# Patient Record
Sex: FEMALE | Race: BLACK OR AFRICAN AMERICAN | NOT HISPANIC OR LATINO | Employment: FULL TIME | URBAN - METROPOLITAN AREA
[De-identification: names, ages, dates, MRNs, and addresses within clinical notes are randomized per-mention and may not be internally consistent; named-entity substitution may affect disease eponyms.]

---

## 2017-07-21 ENCOUNTER — HOSPITAL ENCOUNTER (OUTPATIENT)
Facility: HOSPITAL | Age: 52
Setting detail: OBSERVATION
Discharge: HOME | End: 2017-07-23
Attending: EMERGENCY MEDICINE | Admitting: FAMILY MEDICINE

## 2017-07-21 ENCOUNTER — APPOINTMENT (OUTPATIENT)
Dept: GENERAL RADIOLOGY | Facility: HOSPITAL | Age: 52
End: 2017-07-21

## 2017-07-21 ENCOUNTER — APPOINTMENT (OUTPATIENT)
Dept: CARDIOLOGY | Facility: HOSPITAL | Age: 52
End: 2017-07-21

## 2017-07-21 ENCOUNTER — APPOINTMENT (OUTPATIENT)
Dept: CT IMAGING | Facility: HOSPITAL | Age: 52
End: 2017-07-21

## 2017-07-21 ENCOUNTER — TRANSCRIBE ORDERS (OUTPATIENT)
Dept: ADMINISTRATIVE | Facility: HOSPITAL | Age: 52
End: 2017-07-21

## 2017-07-21 DIAGNOSIS — R07.9 CHEST PAIN, UNSPECIFIED TYPE: Primary | ICD-10-CM

## 2017-07-21 DIAGNOSIS — R07.9 CHEST PAIN, UNSPECIFIED: Primary | ICD-10-CM

## 2017-07-21 PROBLEM — R60.0 LOWER EXTREMITY EDEMA: Status: ACTIVE | Noted: 2017-07-21

## 2017-07-21 PROBLEM — N64.4 PAIN OF RIGHT BREAST: Status: ACTIVE | Noted: 2017-07-21

## 2017-07-21 PROBLEM — I10 HYPERTENSION: Status: ACTIVE | Noted: 2017-07-21

## 2017-07-21 PROBLEM — R06.09 DYSPNEA ON EXERTION: Status: ACTIVE | Noted: 2017-07-21

## 2017-07-21 PROBLEM — E11.9 DIABETES MELLITUS: Status: ACTIVE | Noted: 2017-07-21

## 2017-07-21 LAB
ALBUMIN SERPL-MCNC: 4.2 G/DL (ref 3.2–4.8)
ALBUMIN/GLOB SERPL: 1.2 G/DL (ref 1.5–2.5)
ALP SERPL-CCNC: 86 U/L (ref 25–100)
ALT SERPL W P-5'-P-CCNC: 42 U/L (ref 7–40)
ANION GAP SERPL CALCULATED.3IONS-SCNC: -3 MMOL/L (ref 3–11)
AST SERPL-CCNC: 27 U/L (ref 0–33)
BASOPHILS # BLD AUTO: 0.02 10*3/MM3 (ref 0–0.2)
BASOPHILS NFR BLD AUTO: 0.3 % (ref 0–1)
BILIRUB SERPL-MCNC: 0.3 MG/DL (ref 0.3–1.2)
BNP SERPL-MCNC: 13 PG/ML (ref 0–100)
BUN BLD-MCNC: 8 MG/DL (ref 9–23)
BUN/CREAT SERPL: 11.4 (ref 7–25)
CALCIUM SPEC-SCNC: 9.6 MG/DL (ref 8.7–10.4)
CHLORIDE SERPL-SCNC: 101 MMOL/L (ref 99–109)
CO2 SERPL-SCNC: 39 MMOL/L (ref 20–31)
CREAT BLD-MCNC: 0.7 MG/DL (ref 0.6–1.3)
DEPRECATED RDW RBC AUTO: 43.4 FL (ref 37–54)
EOSINOPHIL # BLD AUTO: 0.19 10*3/MM3 (ref 0–0.3)
EOSINOPHIL NFR BLD AUTO: 2.5 % (ref 0–3)
ERYTHROCYTE [DISTWIDTH] IN BLOOD BY AUTOMATED COUNT: 13.9 % (ref 11.3–14.5)
GFR SERPL CREATININE-BSD FRML MDRD: 106 ML/MIN/1.73
GLOBULIN UR ELPH-MCNC: 3.4 GM/DL
GLUCOSE BLD-MCNC: 180 MG/DL (ref 70–100)
GLUCOSE BLDC GLUCOMTR-MCNC: 168 MG/DL (ref 70–130)
HCT VFR BLD AUTO: 36.8 % (ref 34.5–44)
HGB BLD-MCNC: 11.8 G/DL (ref 11.5–15.5)
HOLD SPECIMEN: NORMAL
HOLD SPECIMEN: NORMAL
IMM GRANULOCYTES # BLD: 0.01 10*3/MM3 (ref 0–0.03)
IMM GRANULOCYTES NFR BLD: 0.1 % (ref 0–0.6)
LIPASE SERPL-CCNC: 30 U/L (ref 6–51)
LYMPHOCYTES # BLD AUTO: 2.44 10*3/MM3 (ref 0.6–4.8)
LYMPHOCYTES NFR BLD AUTO: 31.6 % (ref 24–44)
MAGNESIUM SERPL-MCNC: 1.8 MG/DL (ref 1.3–2.7)
MCH RBC QN AUTO: 27.4 PG (ref 27–31)
MCHC RBC AUTO-ENTMCNC: 32.1 G/DL (ref 32–36)
MCV RBC AUTO: 85.4 FL (ref 80–99)
MONOCYTES # BLD AUTO: 0.39 10*3/MM3 (ref 0–1)
MONOCYTES NFR BLD AUTO: 5.1 % (ref 0–12)
NEUTROPHILS # BLD AUTO: 4.66 10*3/MM3 (ref 1.5–8.3)
NEUTROPHILS NFR BLD AUTO: 60.4 % (ref 41–71)
PLATELET # BLD AUTO: 192 10*3/MM3 (ref 150–450)
PMV BLD AUTO: 11.7 FL (ref 6–12)
POTASSIUM BLD-SCNC: 3.8 MMOL/L (ref 3.5–5.5)
PROT SERPL-MCNC: 7.6 G/DL (ref 5.7–8.2)
RBC # BLD AUTO: 4.31 10*6/MM3 (ref 3.89–5.14)
SODIUM BLD-SCNC: 137 MMOL/L (ref 132–146)
TROPONIN I SERPL-MCNC: 0 NG/ML (ref 0–0.07)
TROPONIN I SERPL-MCNC: 0 NG/ML (ref 0–0.07)
WBC NRBC COR # BLD: 7.71 10*3/MM3 (ref 3.5–10.8)
WHOLE BLOOD HOLD SPECIMEN: NORMAL
WHOLE BLOOD HOLD SPECIMEN: NORMAL

## 2017-07-21 PROCEDURE — 82962 GLUCOSE BLOOD TEST: CPT

## 2017-07-21 PROCEDURE — 93005 ELECTROCARDIOGRAM TRACING: CPT

## 2017-07-21 PROCEDURE — 93005 ELECTROCARDIOGRAM TRACING: CPT | Performed by: EMERGENCY MEDICINE

## 2017-07-21 PROCEDURE — 63710000001 INSULIN LISPRO (HUMAN) PER 5 UNITS: Performed by: FAMILY MEDICINE

## 2017-07-21 PROCEDURE — 84484 ASSAY OF TROPONIN QUANT: CPT

## 2017-07-21 PROCEDURE — 99220 PR INITIAL OBSERVATION CARE/DAY 70 MINUTES: CPT | Performed by: FAMILY MEDICINE

## 2017-07-21 PROCEDURE — 0 IOPAMIDOL PER 1 ML: Performed by: EMERGENCY MEDICINE

## 2017-07-21 PROCEDURE — 71010 HC CHEST PA OR AP: CPT

## 2017-07-21 PROCEDURE — 83880 ASSAY OF NATRIURETIC PEPTIDE: CPT | Performed by: EMERGENCY MEDICINE

## 2017-07-21 PROCEDURE — 25010000002 MORPHINE PER 10 MG: Performed by: EMERGENCY MEDICINE

## 2017-07-21 PROCEDURE — 25010000002 MORPHINE SULFATE (PF) 2 MG/ML SOLUTION: Performed by: FAMILY MEDICINE

## 2017-07-21 PROCEDURE — 99285 EMERGENCY DEPT VISIT HI MDM: CPT

## 2017-07-21 PROCEDURE — 84484 ASSAY OF TROPONIN QUANT: CPT | Performed by: FAMILY MEDICINE

## 2017-07-21 PROCEDURE — 96375 TX/PRO/DX INJ NEW DRUG ADDON: CPT

## 2017-07-21 PROCEDURE — 96372 THER/PROPH/DIAG INJ SC/IM: CPT

## 2017-07-21 PROCEDURE — G0378 HOSPITAL OBSERVATION PER HR: HCPCS

## 2017-07-21 PROCEDURE — 25010000002 HEPARIN (PORCINE) PER 1000 UNITS: Performed by: FAMILY MEDICINE

## 2017-07-21 PROCEDURE — 85025 COMPLETE CBC W/AUTO DIFF WBC: CPT | Performed by: EMERGENCY MEDICINE

## 2017-07-21 PROCEDURE — 83735 ASSAY OF MAGNESIUM: CPT | Performed by: FAMILY MEDICINE

## 2017-07-21 PROCEDURE — 96376 TX/PRO/DX INJ SAME DRUG ADON: CPT

## 2017-07-21 PROCEDURE — 71275 CT ANGIOGRAPHY CHEST: CPT

## 2017-07-21 PROCEDURE — 96365 THER/PROPH/DIAG IV INF INIT: CPT

## 2017-07-21 PROCEDURE — 83690 ASSAY OF LIPASE: CPT | Performed by: EMERGENCY MEDICINE

## 2017-07-21 PROCEDURE — 80053 COMPREHEN METABOLIC PANEL: CPT | Performed by: EMERGENCY MEDICINE

## 2017-07-21 RX ORDER — NITROGLYCERIN 0.4 MG/1
TABLET SUBLINGUAL
Status: COMPLETED
Start: 2017-07-21 | End: 2017-07-21

## 2017-07-21 RX ORDER — CYCLOBENZAPRINE HCL 10 MG
10 TABLET ORAL 2 TIMES DAILY
COMMUNITY
End: 2019-05-06

## 2017-07-21 RX ORDER — LISINOPRIL 5 MG/1
5 TABLET ORAL DAILY
Status: DISCONTINUED | OUTPATIENT
Start: 2017-07-21 | End: 2017-07-21

## 2017-07-21 RX ORDER — MORPHINE SULFATE 4 MG/ML
4 INJECTION, SOLUTION INTRAMUSCULAR; INTRAVENOUS ONCE
Status: COMPLETED | OUTPATIENT
Start: 2017-07-21 | End: 2017-07-21

## 2017-07-21 RX ORDER — NITROGLYCERIN 0.4 MG/1
0.4 TABLET SUBLINGUAL
Status: DISCONTINUED | OUTPATIENT
Start: 2017-07-21 | End: 2017-07-21

## 2017-07-21 RX ORDER — SODIUM CHLORIDE 0.9 % (FLUSH) 0.9 %
10 SYRINGE (ML) INJECTION AS NEEDED
Status: DISCONTINUED | OUTPATIENT
Start: 2017-07-21 | End: 2017-07-23 | Stop reason: HOSPADM

## 2017-07-21 RX ORDER — NICOTINE POLACRILEX 4 MG
15 LOZENGE BUCCAL
Status: DISCONTINUED | OUTPATIENT
Start: 2017-07-21 | End: 2017-07-23 | Stop reason: HOSPADM

## 2017-07-21 RX ORDER — HEPARIN SODIUM 5000 [USP'U]/ML
5000 INJECTION, SOLUTION INTRAVENOUS; SUBCUTANEOUS EVERY 8 HOURS SCHEDULED
Status: DISCONTINUED | OUTPATIENT
Start: 2017-07-21 | End: 2017-07-23 | Stop reason: HOSPADM

## 2017-07-21 RX ORDER — OXYCODONE AND ACETAMINOPHEN 7.5; 325 MG/1; MG/1
1 TABLET ORAL EVERY 6 HOURS PRN
COMMUNITY
End: 2018-08-28

## 2017-07-21 RX ORDER — SUMATRIPTAN 50 MG/1
50 TABLET, FILM COATED ORAL
COMMUNITY
End: 2021-05-20 | Stop reason: SDUPTHER

## 2017-07-21 RX ORDER — ESTRADIOL 1 MG/1
1 TABLET ORAL DAILY
COMMUNITY
End: 2019-05-06

## 2017-07-21 RX ORDER — SODIUM CHLORIDE 0.9 % (FLUSH) 0.9 %
1-10 SYRINGE (ML) INJECTION AS NEEDED
Status: DISCONTINUED | OUTPATIENT
Start: 2017-07-21 | End: 2017-07-23 | Stop reason: HOSPADM

## 2017-07-21 RX ORDER — ASPIRIN 81 MG/1
324 TABLET, CHEWABLE ORAL ONCE
Status: DISCONTINUED | OUTPATIENT
Start: 2017-07-21 | End: 2017-07-21

## 2017-07-21 RX ORDER — ESCITALOPRAM OXALATE 10 MG/1
10 TABLET ORAL DAILY
COMMUNITY
End: 2017-08-09

## 2017-07-21 RX ORDER — CLOPIDOGREL BISULFATE 75 MG/1
75 TABLET ORAL DAILY
Status: DISCONTINUED | OUTPATIENT
Start: 2017-07-22 | End: 2017-07-21

## 2017-07-21 RX ORDER — ONDANSETRON 2 MG/ML
4 INJECTION INTRAMUSCULAR; INTRAVENOUS EVERY 6 HOURS PRN
Status: DISCONTINUED | OUTPATIENT
Start: 2017-07-21 | End: 2017-07-23 | Stop reason: HOSPADM

## 2017-07-21 RX ORDER — TRAZODONE HYDROCHLORIDE 100 MG/1
100 TABLET ORAL NIGHTLY
COMMUNITY
End: 2019-07-26

## 2017-07-21 RX ORDER — MORPHINE SULFATE 2 MG/ML
2 INJECTION, SOLUTION INTRAMUSCULAR; INTRAVENOUS EVERY 4 HOURS PRN
Status: DISCONTINUED | OUTPATIENT
Start: 2017-07-21 | End: 2017-07-23 | Stop reason: HOSPADM

## 2017-07-21 RX ORDER — OXYCODONE AND ACETAMINOPHEN 7.5; 325 MG/1; MG/1
1 TABLET ORAL EVERY 6 HOURS PRN
Status: DISCONTINUED | OUTPATIENT
Start: 2017-07-21 | End: 2017-07-23 | Stop reason: HOSPADM

## 2017-07-21 RX ORDER — CARVEDILOL 3.12 MG/1
3.12 TABLET ORAL 2 TIMES DAILY WITH MEALS
Status: DISCONTINUED | OUTPATIENT
Start: 2017-07-21 | End: 2017-07-23

## 2017-07-21 RX ORDER — FUROSEMIDE 40 MG/1
40 TABLET ORAL ONCE
Status: COMPLETED | OUTPATIENT
Start: 2017-07-21 | End: 2017-07-21

## 2017-07-21 RX ORDER — ASPIRIN 81 MG/1
324 TABLET, CHEWABLE ORAL ONCE
Status: COMPLETED | OUTPATIENT
Start: 2017-07-21 | End: 2017-07-21

## 2017-07-21 RX ORDER — CLOPIDOGREL BISULFATE 75 MG/1
600 TABLET ORAL ONCE
Status: DISCONTINUED | OUTPATIENT
Start: 2017-07-21 | End: 2017-07-21

## 2017-07-21 RX ORDER — ESCITALOPRAM OXALATE 20 MG/1
10 TABLET ORAL DAILY
Status: DISCONTINUED | OUTPATIENT
Start: 2017-07-22 | End: 2017-07-23 | Stop reason: HOSPADM

## 2017-07-21 RX ORDER — LOSARTAN POTASSIUM AND HYDROCHLOROTHIAZIDE 25; 100 MG/1; MG/1
1 TABLET ORAL DAILY
COMMUNITY
End: 2017-07-23 | Stop reason: HOSPADM

## 2017-07-21 RX ORDER — ATORVASTATIN CALCIUM 40 MG/1
80 TABLET, FILM COATED ORAL NIGHTLY
Status: DISCONTINUED | OUTPATIENT
Start: 2017-07-21 | End: 2017-07-23 | Stop reason: HOSPADM

## 2017-07-21 RX ORDER — FAMOTIDINE 20 MG/1
20 TABLET, FILM COATED ORAL DAILY
Status: DISCONTINUED | OUTPATIENT
Start: 2017-07-21 | End: 2017-07-23 | Stop reason: HOSPADM

## 2017-07-21 RX ORDER — CLOPIDOGREL BISULFATE 75 MG/1
75 TABLET ORAL DAILY
Status: DISCONTINUED | OUTPATIENT
Start: 2017-07-22 | End: 2017-07-23 | Stop reason: HOSPADM

## 2017-07-21 RX ORDER — ASPIRIN 81 MG/1
81 TABLET ORAL DAILY
Status: DISCONTINUED | OUTPATIENT
Start: 2017-07-22 | End: 2017-07-23 | Stop reason: HOSPADM

## 2017-07-21 RX ORDER — DEXTROSE MONOHYDRATE 25 G/50ML
25 INJECTION, SOLUTION INTRAVENOUS
Status: DISCONTINUED | OUTPATIENT
Start: 2017-07-21 | End: 2017-07-23 | Stop reason: HOSPADM

## 2017-07-21 RX ORDER — CLOPIDOGREL BISULFATE 75 MG/1
300 TABLET ORAL ONCE
Status: COMPLETED | OUTPATIENT
Start: 2017-07-21 | End: 2017-07-21

## 2017-07-21 RX ORDER — NITROGLYCERIN 20 MG/100ML
10-50 INJECTION INTRAVENOUS
Status: DISCONTINUED | OUTPATIENT
Start: 2017-07-21 | End: 2017-07-23 | Stop reason: HOSPADM

## 2017-07-21 RX ADMIN — INSULIN LISPRO 2 UNITS: 100 INJECTION, SOLUTION INTRAVENOUS; SUBCUTANEOUS at 21:03

## 2017-07-21 RX ADMIN — CLOPIDOGREL BISULFATE 300 MG: 75 TABLET ORAL at 20:07

## 2017-07-21 RX ADMIN — HEPARIN SODIUM 5000 UNITS: 5000 INJECTION, SOLUTION INTRAVENOUS; SUBCUTANEOUS at 20:08

## 2017-07-21 RX ADMIN — ASPIRIN 81 MG CHEWABLE TABLET 324 MG: 81 TABLET CHEWABLE at 17:04

## 2017-07-21 RX ADMIN — NITROGLYCERIN 0.4 MG: 0.4 TABLET SUBLINGUAL at 17:04

## 2017-07-21 RX ADMIN — IOPAMIDOL 60 ML: 755 INJECTION, SOLUTION INTRAVENOUS at 17:48

## 2017-07-21 RX ADMIN — OXYCODONE HYDROCHLORIDE AND ACETAMINOPHEN 1 TABLET: 7.5; 325 TABLET ORAL at 23:36

## 2017-07-21 RX ADMIN — MORPHINE SULFATE 4 MG: 4 INJECTION, SOLUTION INTRAMUSCULAR; INTRAVENOUS at 18:05

## 2017-07-21 RX ADMIN — CARVEDILOL 3.12 MG: 3.12 TABLET, FILM COATED ORAL at 20:07

## 2017-07-21 RX ADMIN — NITROGLYCERIN 15 MCG/MIN: 20 INJECTION INTRAVENOUS at 20:15

## 2017-07-21 RX ADMIN — ATORVASTATIN CALCIUM 80 MG: 40 TABLET, FILM COATED ORAL at 20:07

## 2017-07-21 RX ADMIN — NITROGLYCERIN 0.4 MG: 0.4 TABLET SUBLINGUAL at 17:14

## 2017-07-21 RX ADMIN — FAMOTIDINE 20 MG: 20 TABLET ORAL at 20:07

## 2017-07-21 RX ADMIN — MORPHINE SULFATE 2 MG: 2 INJECTION, SOLUTION INTRAMUSCULAR; INTRAVENOUS at 20:20

## 2017-07-21 RX ADMIN — NITROGLYCERIN 10 MCG/MIN: 20 INJECTION INTRAVENOUS at 18:04

## 2017-07-21 RX ADMIN — FUROSEMIDE 40 MG: 40 TABLET ORAL at 20:07

## 2017-07-21 NOTE — H&P
HOSPITAL MEDICINE HISTORY AND PHYSICAL    PCP: CORRIE Anderson  Specialists:    Chief Complaint: chest pain     Subjective     History of Present Illness  Kellen Esparza is a 51 y.o.female with a history of HTN and DM who presents to the ED with c/o intermittent chest pain and tightness, shortness of breath, and diaphoresis that began one week ago. Her episodes last between five and seven minutes and worsen with exertion. She states the pain radiates down her right breast and down her right arm. She was scheduled for a Mammogram today but says she is uptodate. She has had more fatigue as well as lower ext edema this past week as well.  She was seen by her PCP for this today and had an EKG and blood work done, which was overall negtive but she was referred to the ED for further evaluation. She has ongoing htn that she feels like is never really controlled but they attribute it to her chronic pain that she has in her knee. She denies fever or chills. No cough. No problems with bowel or bladder.denies any abdominal pain. She denies any history of smoking. She has a family history of CAD and MI. She also has a hx of PE/DVT 28 years ago after a hospitalization. She currently complains of CP at 7-8/10 .     Review of Systems   Otherwise complete ROS is negative except as mentioned in the HPI.    Past Medical History:   Past Medical History:   Diagnosis Date   • Hypertension    • Pulmonary embolism        Past Surgical History:  Past Surgical History:   Procedure Laterality Date   • BREAST BIOPSY Left 2014   • CHOLECYSTECTOMY     • HYSTERECTOMY  11/2014   • OOPHORECTOMY  11/2014   • TUBAL ABDOMINAL LIGATION         Family History: family history includes Breast cancer (age of onset: 42) in her mother. There is no history of Ovarian cancer.     Social History:  reports that she has never smoked. She does not have any smokeless tobacco history on file. She reports that she does not drink alcohol or use  "illicit drugs.    Medications:  Prescriptions Prior to Admission   Medication Sig Dispense Refill Last Dose   • cyclobenzaprine (FLEXERIL) 10 MG tablet Take 10 mg by mouth 2 (Two) Times a Day As Needed for Muscle Spasms.      • escitalopram (LEXAPRO) 10 MG tablet Take 10 mg by mouth Daily.      • estradiol (ESTRACE) 1 MG tablet Take 1 mg by mouth Daily.      • losartan-hydrochlorothiazide (HYZAAR) 100-25 MG per tablet Take 1 tablet by mouth Daily.      • metFORMIN (GLUCOPHAGE) 500 MG tablet Take 500 mg by mouth 2 (Two) Times a Day With Meals.      • oxyCODONE-acetaminophen (PERCOCET) 7.5-325 MG per tablet Take 1 tablet by mouth Every 6 (Six) Hours As Needed.      • SUMAtriptan (IMITREX) 50 MG tablet Take 50 mg by mouth Every 2 (Two) Hours As Needed for Migraine. Take one tablet at onset of headache. May repeat dose one time in 2 hours if headache not relieved.      • traZODone (DESYREL) 100 MG tablet Take 100 mg by mouth Every Night.        No Known Allergies    Objective    Physical Exam:   Vital Signs: /97  Pulse 82  Temp 97.9 °F (36.6 °C) (Oral)   Resp 16  Ht 71\" (180.3 cm)  Wt 268 lb (122 kg)  LMP 10/31/2014 (Approximate)  SpO2 97%  BMI 37.38 kg/m2  Physical Exam  Temp:  [97.9 °F (36.6 °C)] 97.9 °F (36.6 °C)  Heart Rate:  [82-94] 82  Resp:  [16] 16  BP: (135-167)/() 135/97  Constitutional: no acute distress, awake, alert, pleasant and cooperative   Eyes: PERRLA, sclerae anicteric, no conjunctival injection  Neck: supple, no thyromegaly, trachea midline  Respiratory: Clear to auscultation bilaterally, nonlabored respirations   Cardiovascular: RRR, no murmurs, rubs, or gallops, palpable pedal pulses bilaterally, nonreproducible chest pain.   Gastrointestinal: Positive bowel sounds, soft, nontender, nondistended  Musculoskeletal: positive bilateral ankle and calf edema, no clubbing or cyanosis to bilateral lower extremities  Psychiatric: oriented x 3, appropriate affect, cooperative  Neurologic: " Strength symmetric in all extremities, Cranial Nerves grossly intact to confrontation       Results Reviewed:  I have personally reviewed current lab, radiology, and data and agree.    Results from last 7 days  Lab Units 07/21/17  1558   WBC 10*3/mm3 7.71   HEMOGLOBIN g/dL 11.8   PLATELETS 10*3/mm3 192       Results from last 7 days  Lab Units 07/21/17  1558   SODIUM mmol/L 137   POTASSIUM mmol/L 3.8   CO2 mmol/L 39.0*   CREATININE mg/dL 0.70   GLUCOSE mg/dL 180*   CALCIUM mg/dL 9.6     Troponin 0.00 x 2    Imaging Results (last 24 hours)     Procedure Component Value Units Date/Time    XR Chest 1 View [[id number]] Collected:  07/21/17 1628     Updated:  07/21/17 1630    Narrative:       EXAMINATION: XR CHEST 1 VW- 07/21/2017     INDICATION: Chest Pain triage protocol      COMPARISON: NONE     FINDINGS: Cardiac size is borderline enlarged with increased pulmonary  vascularity centrally. No pneumothorax or pleural effusion. No focal  airspace opacity is otherwise identified. No acute osseous abnormality.       Impression:       Cardiac size is borderline enlarged with increased pulmonary  vascularity centrally. No pleural effusion.     D:  07/21/2017  E:  07/21/2017          CT Angiogram Chest With Contrast [[id number]] Collected:  07/21/17 1833     Updated:  07/21/17 1834    Narrative:       EXAMINATION: CT ANGIOGRAM CHEST W/CONTRAST date      INDICATION: R07.9-Chest pain, unspecified. Evaluate for pulmonary  embolus.      TECHNIQUE: Spiral acquisition 3 mm post-IV contrast images through the  chest and upper abdomen with coronal 10 mm 2D reconstructions.     The radiation dose reduction device was turned on for each scan per the  ALARA (As Low as Reasonably Achievable) protocol.     COMPARISON: 10/08/2009 angiographic chest CT scan.     FINDINGS: Report of 10/08/2009 study indicates no acute disease. Today  indicates chest pain and dyspnea. Previous history of PE.     Today's exam shows good contrast opacification of  the pulmonary  arteries. There are no visible filling defects to suggest pulmonary  embolic disease. There is no evidence of pericardial or pleural effusion  or mediastinal adenopathy. There is no evidence of thoracic aortic  aneurysm or dissection.     Lung window images show trace linear scarring in the right lung base,  and a few granulomatous calcifications. Lungs otherwise appear clear.     Included images of the upper abdomen show clips in the gallbladder  fossa. No significant abnormalities are seen of the liver, spleen,  pancreatic tail, adrenal glands, or upper renal poles.       Impression:       No evidence of pulmonary embolus or other acute chest  disease.     DICTATED:     07/21/2017  EDITED:         07/21/2017                  Assessment/Plan   Assessment & Plan  Active Problems:    Chest pain    Dyspnea on exertion    Diabetes mellitus    Hypertension    Pain of right breast    Lower extremity edema        Plan:  52 yo AAF with chest pain and sob x 1 week  --troponins negative x 2  --EKG with out changes but appears somewhat changed from 2015  --Cardiac echo  --nitro gtt  --asa, statin add beta blocker  --will need stress test in am   --will give 1 dose lasix 20 IV     NIX  --likely due to above likely element of CHF    DM  --check HgA1C  --LDSSI  --check tsh    HTN  --likely uncontrolled and may be etiology of above  --add bb and lasix for now , moniter closely    Right breast pain  --ensure she gets Mammo as scheduled    Lower ext edema  --venous duplex pending  --neg CTA, lasix x 1    DVT prophy  --lovenox  --teds and scds      discussed the patients findings and my recommendations with patient and family     I believe this patient meets observation     Zaria Painter DO   07/21/17   6:50 PM

## 2017-07-21 NOTE — ED PROVIDER NOTES
Subjective   HPI Comments: Kellen Esparza is a 51 y.o.female with a history of HTN and DM who presents to the ED with c/o intermittent chest pain and tightness, shortness of breath, and diaphoresis that began one week ago. Her episodes last between five and seven minutes and worsen with exertion. She was seen by her PCP for this today and had an EKG and blood work done, however, she is unable to recall these results. She was referred to the ED for further evaluation. In the ED, she also c/o lower extremity swelling and breast tenderness. There are no other acute complaints at this time. She denies any history of smoking. She has a family history of CAD and MI.     Patient is a 51 y.o. female presenting with chest pain.   History provided by:  Patient  Chest Pain   Pain location:  Substernal area  Pain quality: pressure and tightness    Pain radiates to:  Does not radiate  Pain severity:  Moderate  Onset quality:  Gradual  Duration:  1 week  Timing:  Intermittent  Progression:  Unchanged  Chronicity:  New  Relieved by:  Nothing  Worsened by:  Exertion  Ineffective treatments:  None tried  Associated symptoms: diaphoresis (intermittent) and shortness of breath (intermittent)    Risk factors: diabetes mellitus and hypertension    Risk factors: no coronary artery disease, no high cholesterol, not male and no smoking        Review of Systems   Constitutional: Positive for diaphoresis (intermittent).   Respiratory: Positive for chest tightness (intermittent) and shortness of breath (intermittent).    Cardiovascular: Positive for chest pain (intermittent) and leg swelling.   Genitourinary:        Breast tenderness   All other systems reviewed and are negative.      No past medical history on file.    No Known Allergies    Past Surgical History:   Procedure Laterality Date   • BREAST BIOPSY Left 2014   • HYSTERECTOMY  11/2014   • OOPHORECTOMY  11/2014       Family History   Problem Relation Age of Onset   • Breast cancer  Mother 42   • Ovarian cancer Neg Hx        Social History     Social History   • Marital status: Single     Spouse name: N/A   • Number of children: N/A   • Years of education: N/A     Social History Main Topics   • Smoking status: Not on file   • Smokeless tobacco: Not on file   • Alcohol use Not on file   • Drug use: Not on file   • Sexual activity: Not on file     Other Topics Concern   • Not on file     Social History Narrative   • No narrative on file         Objective   Physical Exam   Constitutional: She is oriented to person, place, and time. She appears well-developed and well-nourished. No distress.   HENT:   Head: Normocephalic and atraumatic.   Nose: Nose normal.   Mouth/Throat: Oropharynx is clear and moist.   Eyes: Conjunctivae are normal. No scleral icterus.   Neck: Normal range of motion. Neck supple.   Cardiovascular: Normal rate, regular rhythm and normal heart sounds.    No murmur heard.  Pulmonary/Chest: Effort normal and breath sounds normal. No respiratory distress. She has no wheezes. She has no rales.   Abdominal: Soft. Bowel sounds are normal. She exhibits no mass. There is no tenderness. There is no rebound and no guarding.   Musculoskeletal: Normal range of motion. She exhibits no edema.   No breast TTP.  No breast masses or erythema.   Neurological: She is alert and oriented to person, place, and time.   Skin: Skin is warm and dry. No erythema.   Psychiatric: She has a normal mood and affect. Her behavior is normal.   Nursing note and vitals reviewed.      Procedures         ED Course  ED Course   Comment By Time   1712 paged hospitalist -CUBA Montanez 07/21 1716   Discussed case in detail with the hospitalist on call who will admit. Compared current EKG with EKG from month ago which shows some nonspecific changes. Pt reports feeling better after receiving NTG. -CUBA Montanez 07/21 1718     Recent Results (from the past 24 hour(s))   Lipase    Collection Time: 07/21/17  3:58  "PM   Result Value Ref Range    Lipase 30 6 - 51 U/L   BNP    Collection Time: 07/21/17  3:58 PM   Result Value Ref Range    BNP 13.0 0.0 - 100.0 pg/mL   CBC Auto Differential    Collection Time: 07/21/17  3:58 PM   Result Value Ref Range    WBC 7.71 3.50 - 10.80 10*3/mm3    RBC 4.31 3.89 - 5.14 10*6/mm3    Hemoglobin 11.8 11.5 - 15.5 g/dL    Hematocrit 36.8 34.5 - 44.0 %    MCV 85.4 80.0 - 99.0 fL    MCH 27.4 27.0 - 31.0 pg    MCHC 32.1 32.0 - 36.0 g/dL    RDW 13.9 11.3 - 14.5 %    RDW-SD 43.4 37.0 - 54.0 fl    MPV 11.7 6.0 - 12.0 fL    Platelets 192 150 - 450 10*3/mm3    Neutrophil % 60.4 41.0 - 71.0 %    Lymphocyte % 31.6 24.0 - 44.0 %    Monocyte % 5.1 0.0 - 12.0 %    Eosinophil % 2.5 0.0 - 3.0 %    Basophil % 0.3 0.0 - 1.0 %    Immature Grans % 0.1 0.0 - 0.6 %    Neutrophils, Absolute 4.66 1.50 - 8.30 10*3/mm3    Lymphocytes, Absolute 2.44 0.60 - 4.80 10*3/mm3    Monocytes, Absolute 0.39 0.00 - 1.00 10*3/mm3    Eosinophils, Absolute 0.19 0.00 - 0.30 10*3/mm3    Basophils, Absolute 0.02 0.00 - 0.20 10*3/mm3    Immature Grans, Absolute 0.01 0.00 - 0.03 10*3/mm3   POC Troponin, Rapid    Collection Time: 07/21/17  4:03 PM   Result Value Ref Range    Troponin I 0.00 0.00 - 0.07 ng/mL     Note: In addition to lab results from this visit, the labs listed above may include labs taken at another facility or during a different encounter within the last 24 hours. Please correlate lab times with ED admission and discharge times for further clarification of the services performed during this visit.    XR Chest 1 View   Preliminary Result   Cardiac size is borderline enlarged with increased pulmonary   vascularity centrally. No pleural effusion.       D:  07/21/2017   E:  07/21/2017                Vitals:    07/21/17 1544   BP: 159/89   Pulse: 94   Resp: 16   Temp: 97.9 °F (36.6 °C)   TempSrc: Oral   SpO2: 98%   Weight: 268 lb (122 kg)   Height: 71\" (180.3 cm)     Medications   sodium chloride 0.9 % flush 10 mL (not " administered)   aspirin chewable tablet 324 mg (not administered)     ECG/EMG Results (last 24 hours)     Procedure Component Value Units Date/Time    ECG 12 Lead [[id number]] Collected:  07/21/17 1550     Updated:  07/21/17 1550             HEART Score  History: Moderately suspicious (+1)  ECG: Non specific repolarization disturbance (+1)  Age: 45 through 65 (+1)  Risk Factors: 3 or more risk factors OR history of atherosclerotic disease (+2)  Troponin: Normal limit or lower (+0)  Total: 5             MDM    Final diagnoses:   Chest pain, unspecified type       Documentation assistance provided by ana paula Montanez.  Information recorded by the scribe was done at my direction and has been verified and validated by me.     Wen Montanez  07/21/17 1657       Dilshad Amaya DO  07/22/17 0146

## 2017-07-22 ENCOUNTER — APPOINTMENT (OUTPATIENT)
Dept: CARDIOLOGY | Facility: HOSPITAL | Age: 52
End: 2017-07-22
Attending: FAMILY MEDICINE

## 2017-07-22 LAB
ANION GAP SERPL CALCULATED.3IONS-SCNC: 5 MMOL/L (ref 3–11)
ARTICHOKE IGE QN: 96 MG/DL (ref 0–130)
BH CV ECHO MEAS - AO MAX PG (FULL): 1.7 MMHG
BH CV ECHO MEAS - AO MAX PG: 6.8 MMHG
BH CV ECHO MEAS - AO MEAN PG (FULL): 1.6 MMHG
BH CV ECHO MEAS - AO MEAN PG: 4.4 MMHG
BH CV ECHO MEAS - AO ROOT AREA (BSA CORRECTED): 1.4
BH CV ECHO MEAS - AO ROOT AREA: 8.6 CM^2
BH CV ECHO MEAS - AO ROOT DIAM: 3.3 CM
BH CV ECHO MEAS - AO V2 MAX: 130.3 CM/SEC
BH CV ECHO MEAS - AO V2 MEAN: 98.6 CM/SEC
BH CV ECHO MEAS - AO V2 VTI: 27.1 CM
BH CV ECHO MEAS - AVA(I,A): 2.1 CM^2
BH CV ECHO MEAS - AVA(I,D): 2.1 CM^2
BH CV ECHO MEAS - AVA(V,A): 2.2 CM^2
BH CV ECHO MEAS - AVA(V,D): 2.2 CM^2
BH CV ECHO MEAS - BSA(HAYCOCK): 2.5 M^2
BH CV ECHO MEAS - BSA(HAYCOCK): 2.5 M^2
BH CV ECHO MEAS - BSA: 2.4 M^2
BH CV ECHO MEAS - BSA: 2.4 M^2
BH CV ECHO MEAS - BZI_BMI: 37.4 KILOGRAMS/M^2
BH CV ECHO MEAS - BZI_BMI: 37.4 KILOGRAMS/M^2
BH CV ECHO MEAS - BZI_METRIC_HEIGHT: 180.3 CM
BH CV ECHO MEAS - BZI_METRIC_HEIGHT: 180.3 CM
BH CV ECHO MEAS - BZI_METRIC_WEIGHT: 121.6 KG
BH CV ECHO MEAS - BZI_METRIC_WEIGHT: 121.6 KG
BH CV ECHO MEAS - CONTRAST EF (2CH): 56.8 ML/M^2
BH CV ECHO MEAS - CONTRAST EF 4CH: 67.2 ML/M^2
BH CV ECHO MEAS - EDV(CUBED): 47.6 ML
BH CV ECHO MEAS - EDV(MOD-SP2): 81 ML
BH CV ECHO MEAS - EDV(MOD-SP4): 64 ML
BH CV ECHO MEAS - EDV(TEICH): 55.3 ML
BH CV ECHO MEAS - EF(CUBED): 64.6 %
BH CV ECHO MEAS - EF(MOD-SP2): 56.8 %
BH CV ECHO MEAS - EF(TEICH): 57 %
BH CV ECHO MEAS - ESV(CUBED): 16.9 ML
BH CV ECHO MEAS - ESV(MOD-SP2): 35 ML
BH CV ECHO MEAS - ESV(MOD-SP4): 21 ML
BH CV ECHO MEAS - ESV(TEICH): 23.8 ML
BH CV ECHO MEAS - FS: 29.2 %
BH CV ECHO MEAS - IVS/LVPW: 1.3
BH CV ECHO MEAS - IVSD: 1.7 CM
BH CV ECHO MEAS - LA DIMENSION: 3.4 CM
BH CV ECHO MEAS - LA/AO: 1
BH CV ECHO MEAS - LAT PEAK E' VEL: 7.3 CM/SEC
BH CV ECHO MEAS - LV DIASTOLIC VOL/BSA (35-75): 26.8 ML/M^2
BH CV ECHO MEAS - LV MASS(C)D: 217.8 GRAMS
BH CV ECHO MEAS - LV MASS(C)DI: 91.2 GRAMS/M^2
BH CV ECHO MEAS - LV MAX PG: 5 MMHG
BH CV ECHO MEAS - LV MEAN PG: 2.7 MMHG
BH CV ECHO MEAS - LV SYSTOLIC VOL/BSA (12-30): 8.8 ML/M^2
BH CV ECHO MEAS - LV V1 MAX: 112.3 CM/SEC
BH CV ECHO MEAS - LV V1 MEAN: 77.2 CM/SEC
BH CV ECHO MEAS - LV V1 VTI: 22.8 CM
BH CV ECHO MEAS - LVIDD: 3.6 CM
BH CV ECHO MEAS - LVIDS: 2.6 CM
BH CV ECHO MEAS - LVLD AP2: 8.4 CM
BH CV ECHO MEAS - LVLD AP4: 8.5 CM
BH CV ECHO MEAS - LVLS AP2: 6.2 CM
BH CV ECHO MEAS - LVLS AP4: 6.5 CM
BH CV ECHO MEAS - LVOT AREA (M): 2.5 CM^2
BH CV ECHO MEAS - LVOT AREA: 2.5 CM^2
BH CV ECHO MEAS - LVOT DIAM: 1.8 CM
BH CV ECHO MEAS - LVPWD: 1.4 CM
BH CV ECHO MEAS - MED PEAK E' VEL: 6.35 CM/SEC
BH CV ECHO MEAS - MV A MAX VEL: 82.4 CM/SEC
BH CV ECHO MEAS - MV E MAX VEL: 47.9 CM/SEC
BH CV ECHO MEAS - MV E/A: 0.58
BH CV ECHO MEAS - PA ACC SLOPE: 811.9 CM/SEC^2
BH CV ECHO MEAS - PA ACC TIME: 0.08 SEC
BH CV ECHO MEAS - PA MAX PG: 3.7 MMHG
BH CV ECHO MEAS - PA PR(ACCEL): 41 MMHG
BH CV ECHO MEAS - PA V2 MAX: 95.6 CM/SEC
BH CV ECHO MEAS - SI(AO): 97.7 ML/M^2
BH CV ECHO MEAS - SI(CUBED): 12.9 ML/M^2
BH CV ECHO MEAS - SI(LVOT): 24.2 ML/M^2
BH CV ECHO MEAS - SI(MOD-SP2): 19.3 ML/M^2
BH CV ECHO MEAS - SI(MOD-SP4): 18 ML/M^2
BH CV ECHO MEAS - SI(TEICH): 13.2 ML/M^2
BH CV ECHO MEAS - SV(AO): 233.3 ML
BH CV ECHO MEAS - SV(CUBED): 30.7 ML
BH CV ECHO MEAS - SV(LVOT): 57.9 ML
BH CV ECHO MEAS - SV(MOD-SP2): 46 ML
BH CV ECHO MEAS - SV(MOD-SP4): 43 ML
BH CV ECHO MEAS - SV(TEICH): 31.5 ML
BH CV ECHO MEAS - TAPSE (>1.6): 2.7 CM2
BH CV LOWER VASCULAR LEFT COMMON FEMORAL AUGMENT: NORMAL
BH CV LOWER VASCULAR LEFT COMMON FEMORAL COMPRESS: NORMAL
BH CV LOWER VASCULAR LEFT COMMON FEMORAL PHASIC: NORMAL
BH CV LOWER VASCULAR LEFT COMMON FEMORAL SPONT: NORMAL
BH CV LOWER VASCULAR LEFT DISTAL FEMORAL COMPRESS: NORMAL
BH CV LOWER VASCULAR LEFT GASTRONEMIUS COMPRESS: NORMAL
BH CV LOWER VASCULAR LEFT GREATER SAPH AK COMPRESS: NORMAL
BH CV LOWER VASCULAR LEFT GREATER SAPH BK COMPRESS: NORMAL
BH CV LOWER VASCULAR LEFT MID FEMORAL AUGMENT: NORMAL
BH CV LOWER VASCULAR LEFT MID FEMORAL COMPRESS: NORMAL
BH CV LOWER VASCULAR LEFT MID FEMORAL PHASIC: NORMAL
BH CV LOWER VASCULAR LEFT MID FEMORAL SPONT: NORMAL
BH CV LOWER VASCULAR LEFT PERONEAL COMPRESS: NORMAL
BH CV LOWER VASCULAR LEFT POPLITEAL AUGMENT: NORMAL
BH CV LOWER VASCULAR LEFT POPLITEAL COMPRESS: NORMAL
BH CV LOWER VASCULAR LEFT POPLITEAL PHASIC: NORMAL
BH CV LOWER VASCULAR LEFT POPLITEAL SPONT: NORMAL
BH CV LOWER VASCULAR LEFT POSTERIOR TIBIAL COMPRESS: NORMAL
BH CV LOWER VASCULAR LEFT PROXIMAL FEMORAL COMPRESS: NORMAL
BH CV LOWER VASCULAR LEFT SAPHENOFEMORAL JUNCTION AUGMENT: NORMAL
BH CV LOWER VASCULAR LEFT SAPHENOFEMORAL JUNCTION COMPRESS: NORMAL
BH CV LOWER VASCULAR LEFT SAPHENOFEMORAL JUNCTION PHASIC: NORMAL
BH CV LOWER VASCULAR LEFT SAPHENOFEMORAL JUNCTION SPONT: NORMAL
BH CV LOWER VASCULAR RIGHT COMMON FEMORAL AUGMENT: NORMAL
BH CV LOWER VASCULAR RIGHT COMMON FEMORAL COMPRESS: NORMAL
BH CV LOWER VASCULAR RIGHT COMMON FEMORAL PHASIC: NORMAL
BH CV LOWER VASCULAR RIGHT COMMON FEMORAL SPONT: NORMAL
BH CV LOWER VASCULAR RIGHT DISTAL FEMORAL COMPRESS: NORMAL
BH CV LOWER VASCULAR RIGHT GASTRONEMIUS COMPRESS: NORMAL
BH CV LOWER VASCULAR RIGHT GREATER SAPH AK COMPRESS: NORMAL
BH CV LOWER VASCULAR RIGHT GREATER SAPH BK COMPRESS: NORMAL
BH CV LOWER VASCULAR RIGHT MID FEMORAL AUGMENT: NORMAL
BH CV LOWER VASCULAR RIGHT MID FEMORAL COMPRESS: NORMAL
BH CV LOWER VASCULAR RIGHT MID FEMORAL PHASIC: NORMAL
BH CV LOWER VASCULAR RIGHT MID FEMORAL SPONT: NORMAL
BH CV LOWER VASCULAR RIGHT PERONEAL COMPRESS: NORMAL
BH CV LOWER VASCULAR RIGHT POPLITEAL AUGMENT: NORMAL
BH CV LOWER VASCULAR RIGHT POPLITEAL COMPRESS: NORMAL
BH CV LOWER VASCULAR RIGHT POPLITEAL PHASIC: NORMAL
BH CV LOWER VASCULAR RIGHT POPLITEAL SPONT: NORMAL
BH CV LOWER VASCULAR RIGHT POSTERIOR TIBIAL COMPRESS: NORMAL
BH CV LOWER VASCULAR RIGHT PROXIMAL FEMORAL COMPRESS: NORMAL
BH CV LOWER VASCULAR RIGHT SAPHENOFEMORAL JUNCTION AUGMENT: NORMAL
BH CV LOWER VASCULAR RIGHT SAPHENOFEMORAL JUNCTION COMPRESS: NORMAL
BH CV LOWER VASCULAR RIGHT SAPHENOFEMORAL JUNCTION PHASIC: NORMAL
BH CV LOWER VASCULAR RIGHT SAPHENOFEMORAL JUNCTION SPONT: NORMAL
BH CV VAS BP RIGHT ARM: NORMAL MMHG
BH CV XLRA - RV BASE: 2 CM
BH CV XLRA - RV LENGTH: 7.2 CM
BH CV XLRA - RV MID: 1.8 CM
BH CV XLRA - TDI S': 10.2 CM/SEC
BUN BLD-MCNC: 13 MG/DL (ref 9–23)
BUN/CREAT SERPL: 18.6 (ref 7–25)
CALCIUM SPEC-SCNC: 9.9 MG/DL (ref 8.7–10.4)
CHLORIDE SERPL-SCNC: 104 MMOL/L (ref 99–109)
CHOLEST SERPL-MCNC: 163 MG/DL (ref 0–200)
CO2 SERPL-SCNC: 33 MMOL/L (ref 20–31)
CREAT BLD-MCNC: 0.7 MG/DL (ref 0.6–1.3)
DEPRECATED RDW RBC AUTO: 43.7 FL (ref 37–54)
E/E' RATIO: 6.5
ERYTHROCYTE [DISTWIDTH] IN BLOOD BY AUTOMATED COUNT: 14.1 % (ref 11.3–14.5)
GFR SERPL CREATININE-BSD FRML MDRD: 106 ML/MIN/1.73
GLUCOSE BLD-MCNC: 132 MG/DL (ref 70–100)
GLUCOSE BLDC GLUCOMTR-MCNC: 119 MG/DL (ref 70–130)
GLUCOSE BLDC GLUCOMTR-MCNC: 122 MG/DL (ref 70–130)
GLUCOSE BLDC GLUCOMTR-MCNC: 132 MG/DL (ref 70–130)
GLUCOSE BLDC GLUCOMTR-MCNC: 135 MG/DL (ref 70–130)
HBA1C MFR BLD: 6.6 % (ref 4.8–5.6)
HCT VFR BLD AUTO: 35.4 % (ref 34.5–44)
HDLC SERPL-MCNC: 46 MG/DL (ref 40–60)
HGB BLD-MCNC: 11.1 G/DL (ref 11.5–15.5)
LEFT ATRIUM VOLUME INDEX: 18.4 ML/M2
MAGNESIUM SERPL-MCNC: 1.9 MG/DL (ref 1.3–2.7)
MCH RBC QN AUTO: 26.8 PG (ref 27–31)
MCHC RBC AUTO-ENTMCNC: 31.4 G/DL (ref 32–36)
MCV RBC AUTO: 85.5 FL (ref 80–99)
PLATELET # BLD AUTO: 183 10*3/MM3 (ref 150–450)
PMV BLD AUTO: 11.4 FL (ref 6–12)
POTASSIUM BLD-SCNC: 4 MMOL/L (ref 3.5–5.5)
RBC # BLD AUTO: 4.14 10*6/MM3 (ref 3.89–5.14)
SODIUM BLD-SCNC: 142 MMOL/L (ref 132–146)
TRIGL SERPL-MCNC: 249 MG/DL (ref 0–150)
TROPONIN I SERPL-MCNC: <0.006 NG/ML
TROPONIN I SERPL-MCNC: <0.006 NG/ML
TSH SERPL DL<=0.05 MIU/L-ACNC: 3.28 MIU/ML (ref 0.35–5.35)
WBC NRBC COR # BLD: 6.22 10*3/MM3 (ref 3.5–10.8)

## 2017-07-22 PROCEDURE — 93005 ELECTROCARDIOGRAM TRACING: CPT | Performed by: FAMILY MEDICINE

## 2017-07-22 PROCEDURE — 84484 ASSAY OF TROPONIN QUANT: CPT | Performed by: FAMILY MEDICINE

## 2017-07-22 PROCEDURE — G0378 HOSPITAL OBSERVATION PER HR: HCPCS

## 2017-07-22 PROCEDURE — 93970 EXTREMITY STUDY: CPT | Performed by: INTERNAL MEDICINE

## 2017-07-22 PROCEDURE — 80048 BASIC METABOLIC PNL TOTAL CA: CPT | Performed by: FAMILY MEDICINE

## 2017-07-22 PROCEDURE — 25010000002 MORPHINE SULFATE (PF) 2 MG/ML SOLUTION: Performed by: FAMILY MEDICINE

## 2017-07-22 PROCEDURE — 93970 EXTREMITY STUDY: CPT

## 2017-07-22 PROCEDURE — 96376 TX/PRO/DX INJ SAME DRUG ADON: CPT

## 2017-07-22 PROCEDURE — 82962 GLUCOSE BLOOD TEST: CPT

## 2017-07-22 PROCEDURE — 80061 LIPID PANEL: CPT | Performed by: FAMILY MEDICINE

## 2017-07-22 PROCEDURE — 99226 PR SBSQ OBSERVATION CARE/DAY 35 MINUTES: CPT | Performed by: INTERNAL MEDICINE

## 2017-07-22 PROCEDURE — 83735 ASSAY OF MAGNESIUM: CPT | Performed by: FAMILY MEDICINE

## 2017-07-22 PROCEDURE — 96372 THER/PROPH/DIAG INJ SC/IM: CPT

## 2017-07-22 PROCEDURE — 84443 ASSAY THYROID STIM HORMONE: CPT | Performed by: FAMILY MEDICINE

## 2017-07-22 PROCEDURE — 93010 ELECTROCARDIOGRAM REPORT: CPT | Performed by: INTERNAL MEDICINE

## 2017-07-22 PROCEDURE — 85027 COMPLETE CBC AUTOMATED: CPT | Performed by: FAMILY MEDICINE

## 2017-07-22 PROCEDURE — 25010000002 HEPARIN (PORCINE) PER 1000 UNITS: Performed by: FAMILY MEDICINE

## 2017-07-22 PROCEDURE — 93306 TTE W/DOPPLER COMPLETE: CPT | Performed by: INTERNAL MEDICINE

## 2017-07-22 PROCEDURE — 63710000001 DIPHENHYDRAMINE PER 50 MG: Performed by: NURSE PRACTITIONER

## 2017-07-22 PROCEDURE — 93306 TTE W/DOPPLER COMPLETE: CPT

## 2017-07-22 PROCEDURE — 83036 HEMOGLOBIN GLYCOSYLATED A1C: CPT | Performed by: FAMILY MEDICINE

## 2017-07-22 RX ORDER — DIPHENHYDRAMINE HCL 25 MG
25 CAPSULE ORAL ONCE
Status: COMPLETED | OUTPATIENT
Start: 2017-07-22 | End: 2017-07-22

## 2017-07-22 RX ORDER — DIPHENHYDRAMINE HCL 25 MG
25 CAPSULE ORAL EVERY 6 HOURS PRN
Status: DISCONTINUED | OUTPATIENT
Start: 2017-07-22 | End: 2017-07-22

## 2017-07-22 RX ORDER — CYCLOBENZAPRINE HCL 10 MG
10 TABLET ORAL 2 TIMES DAILY PRN
Status: DISCONTINUED | OUTPATIENT
Start: 2017-07-22 | End: 2017-07-23 | Stop reason: HOSPADM

## 2017-07-22 RX ADMIN — FAMOTIDINE 20 MG: 20 TABLET ORAL at 08:19

## 2017-07-22 RX ADMIN — ATORVASTATIN CALCIUM 80 MG: 40 TABLET, FILM COATED ORAL at 21:25

## 2017-07-22 RX ADMIN — CLOPIDOGREL BISULFATE 75 MG: 75 TABLET ORAL at 08:20

## 2017-07-22 RX ADMIN — OXYCODONE HYDROCHLORIDE AND ACETAMINOPHEN 1 TABLET: 7.5; 325 TABLET ORAL at 05:19

## 2017-07-22 RX ADMIN — CYCLOBENZAPRINE HYDROCHLORIDE 10 MG: 10 TABLET, FILM COATED ORAL at 01:27

## 2017-07-22 RX ADMIN — CYCLOBENZAPRINE HYDROCHLORIDE 10 MG: 10 TABLET, FILM COATED ORAL at 21:25

## 2017-07-22 RX ADMIN — CARVEDILOL 3.12 MG: 3.12 TABLET, FILM COATED ORAL at 17:28

## 2017-07-22 RX ADMIN — HEPARIN SODIUM 5000 UNITS: 5000 INJECTION, SOLUTION INTRAVENOUS; SUBCUTANEOUS at 13:34

## 2017-07-22 RX ADMIN — ASPIRIN 81 MG: 81 TABLET, COATED ORAL at 08:20

## 2017-07-22 RX ADMIN — MORPHINE SULFATE 2 MG: 2 INJECTION, SOLUTION INTRAMUSCULAR; INTRAVENOUS at 03:41

## 2017-07-22 RX ADMIN — HEPARIN SODIUM 5000 UNITS: 5000 INJECTION, SOLUTION INTRAVENOUS; SUBCUTANEOUS at 21:26

## 2017-07-22 RX ADMIN — DIPHENHYDRAMINE HYDROCHLORIDE 25 MG: 25 CAPSULE ORAL at 22:12

## 2017-07-22 RX ADMIN — CARVEDILOL 3.12 MG: 3.12 TABLET, FILM COATED ORAL at 08:19

## 2017-07-22 RX ADMIN — MORPHINE SULFATE 2 MG: 2 INJECTION, SOLUTION INTRAMUSCULAR; INTRAVENOUS at 21:24

## 2017-07-22 RX ADMIN — MORPHINE SULFATE 2 MG: 2 INJECTION, SOLUTION INTRAMUSCULAR; INTRAVENOUS at 10:10

## 2017-07-22 RX ADMIN — OXYCODONE HYDROCHLORIDE AND ACETAMINOPHEN 1 TABLET: 7.5; 325 TABLET ORAL at 19:47

## 2017-07-22 RX ADMIN — HEPARIN SODIUM 5000 UNITS: 5000 INJECTION, SOLUTION INTRAVENOUS; SUBCUTANEOUS at 05:18

## 2017-07-22 RX ADMIN — ESCITALOPRAM OXALATE 10 MG: 20 TABLET ORAL at 08:19

## 2017-07-22 NOTE — PLAN OF CARE
Problem: Pain, Acute (Adult)  Goal: Acceptable Pain Control/Comfort Level  Outcome: Ongoing (interventions implemented as appropriate)    07/22/17 0224   Pain, Acute (Adult)   Acceptable Pain Control/Comfort Level making progress toward outcome

## 2017-07-22 NOTE — PLAN OF CARE
Problem: Acute Coronary Syndrome (ACS) (Adult)  Goal: Signs and Symptoms of Listed Potential Problems Will be Absent or Manageable (Acute Coronary Syndrome)  Outcome: Ongoing (interventions implemented as appropriate)    07/22/17 0224   Acute Coronary Syndrome (ACS)   Problems Assessed (Acute Coronary Syndrome (ACS)) all   Problems Present (Acute Coronary Syndrome (ACS)) chest pain (angina)

## 2017-07-22 NOTE — PLAN OF CARE
Problem: Pain, Acute (Adult)  Goal: Identify Related Risk Factors and Signs and Symptoms  Outcome: Ongoing (interventions implemented as appropriate)    07/22/17 0224   Pain, Acute   Related Risk Factors (Acute Pain) persistent pain   Signs and Symptoms (Acute Pain) verbalization of pain descriptors

## 2017-07-22 NOTE — PROGRESS NOTES
"      HOSPITALIST DAILY PROGRESS NOTE    Chief Complaint: chest pain    Subjective   SUBJECTIVE/OVERNIGHT EVENTS   Pt still had some episodes of chest pain last pm and this am, but is currently CP free.  NIX still present- noticeable on ambulation to restroom.  Just went for TTE this am.     Review of Systems:  Gen-no fevers, no chills  CV-as above  Resp-no cough, no dyspnea  GI-no N/V/D, no abd pain    Objective   OBJECTIVE   I have reviewed the vital signs.  /96 (BP Location: Left arm, Patient Position: Lying)  Pulse 77  Temp 97.8 °F (36.6 °C) (Oral)   Resp 16  Ht 71\" (180.3 cm)  Wt 268 lb (122 kg)  LMP 10/31/2014 (Approximate)  SpO2 96%  BMI 37.38 kg/m2    Physical Exam:  Gen-no acute distress  CV-RRR, S1 S2 normal, no m/r/g  Resp-CTAB, no wheezes  Abd-soft, NT, ND, +BS  Ext-no edema  Neuro-A&Ox3, no focal deficits  Psych-appropriate mood    Results:  I have reviewed the labs, culture data, radiology results, and diagnostic studies.      Results from last 7 days  Lab Units 07/22/17  0525 07/21/17  1558   WBC 10*3/mm3 6.22 7.71   HEMOGLOBIN g/dL 11.1* 11.8   HEMATOCRIT % 35.4 36.8   PLATELETS 10*3/mm3 183 192       Results from last 7 days  Lab Units 07/22/17  0525   SODIUM mmol/L 142   POTASSIUM mmol/L 4.0   CHLORIDE mmol/L 104   CO2 mmol/L 33.0*   BUN mg/dL 13   CREATININE mg/dL 0.70   GLUCOSE mg/dL 132*   CALCIUM mg/dL 9.9       Culture Data:  Cultures:           Radiology Results:  Imaging Results (last 24 hours)     Procedure Component Value Units Date/Time    XR Chest 1 View [[id number]] Collected:  07/21/17 1628     Updated:  07/21/17 1630    Narrative:       EXAMINATION: XR CHEST 1 VW- 07/21/2017     INDICATION: Chest Pain triage protocol      COMPARISON: NONE     FINDINGS: Cardiac size is borderline enlarged with increased pulmonary  vascularity centrally. No pneumothorax or pleural effusion. No focal  airspace opacity is otherwise identified. No acute osseous abnormality.       Impression:   "     Cardiac size is borderline enlarged with increased pulmonary  vascularity centrally. No pleural effusion.     D:  07/21/2017  E:  07/21/2017          CT Angiogram Chest With Contrast [[id number]] Collected:  07/21/17 1833     Updated:  07/21/17 2346    Narrative:       EXAMINATION: CT ANGIOGRAM CHEST W/CONTRAST date      INDICATION: R07.9-Chest pain, unspecified. Evaluate for pulmonary  embolus.      TECHNIQUE: Spiral acquisition 3 mm post-IV contrast images through the  chest and upper abdomen with coronal 10 mm 2D reconstructions.     The radiation dose reduction device was turned on for each scan per the  ALARA (As Low as Reasonably Achievable) protocol.     COMPARISON: 10/08/2009 angiographic chest CT scan.     FINDINGS: Report of 10/08/2009 study indicates no acute disease. Today  indicates chest pain and dyspnea. Previous history of PE.     Today's exam shows good contrast opacification of the pulmonary  arteries. There are no visible filling defects to suggest pulmonary  embolic disease. There is no evidence of pericardial or pleural effusion  or mediastinal adenopathy. There is no evidence of thoracic aortic  aneurysm or dissection.     Lung window images show trace linear scarring in the right lung base,  and a few granulomatous calcifications. Lungs otherwise appear clear.     Included images of the upper abdomen show clips in the gallbladder  fossa. No significant abnormalities are seen of the liver, spleen,  pancreatic tail, adrenal glands, or upper renal poles.       Impression:       No evidence of pulmonary embolus or other acute chest  disease.     DICTATED:     07/21/2017  EDITED:         07/21/2017        This report was finalized on 7/21/2017 11:44 PM by DR. Reginald Rehman MD.             I have reviewed the medications.      Assessment/Plan   ASSESSMENT/PLAN    Active Problems:    Chest pain    Dyspnea on exertion    Hypertension    Pain of right breast    Lower extremity edema    Diabetes  mellitus      Ms. Esparza is a very pleasant 52 yo AAF with PMH of HTN and DM who presents with several days of NIX and angina.      Plan:  --TTE PENDING read, have ordered stress test, NPO for stress test  --troponins negative x3, EKG without changes compared to 2015  --continue NTG gtt, asa, statin, started beta blocker for better BP control  --SSI for now  --venous duplex PENDING for BLE edema on admission (improved per my exam)    I expect patient to be discharged in: 1 day to home if stress test is okay    Meghan Mejia MD  07/22/17  1:07 PM

## 2017-07-23 VITALS
HEART RATE: 73 BPM | TEMPERATURE: 98.3 F | RESPIRATION RATE: 16 BRPM | DIASTOLIC BLOOD PRESSURE: 106 MMHG | SYSTOLIC BLOOD PRESSURE: 167 MMHG | WEIGHT: 268 LBS | HEIGHT: 71 IN | BODY MASS INDEX: 37.52 KG/M2 | OXYGEN SATURATION: 97 %

## 2017-07-23 LAB
ANION GAP SERPL CALCULATED.3IONS-SCNC: 7 MMOL/L (ref 3–11)
BUN BLD-MCNC: 12 MG/DL (ref 9–23)
BUN/CREAT SERPL: 17.1 (ref 7–25)
CALCIUM SPEC-SCNC: 9.6 MG/DL (ref 8.7–10.4)
CHLORIDE SERPL-SCNC: 101 MMOL/L (ref 99–109)
CO2 SERPL-SCNC: 30 MMOL/L (ref 20–31)
CREAT BLD-MCNC: 0.7 MG/DL (ref 0.6–1.3)
DEPRECATED RDW RBC AUTO: 41.6 FL (ref 37–54)
ERYTHROCYTE [DISTWIDTH] IN BLOOD BY AUTOMATED COUNT: 13.7 % (ref 11.3–14.5)
GFR SERPL CREATININE-BSD FRML MDRD: 106 ML/MIN/1.73
GLUCOSE BLD-MCNC: 120 MG/DL (ref 70–100)
GLUCOSE BLDC GLUCOMTR-MCNC: 114 MG/DL (ref 70–130)
GLUCOSE BLDC GLUCOMTR-MCNC: 125 MG/DL (ref 70–130)
HCT VFR BLD AUTO: 35.5 % (ref 34.5–44)
HGB BLD-MCNC: 11.5 G/DL (ref 11.5–15.5)
MAGNESIUM SERPL-MCNC: 1.9 MG/DL (ref 1.3–2.7)
MCH RBC QN AUTO: 26.9 PG (ref 27–31)
MCHC RBC AUTO-ENTMCNC: 32.4 G/DL (ref 32–36)
MCV RBC AUTO: 83.1 FL (ref 80–99)
PLATELET # BLD AUTO: 181 10*3/MM3 (ref 150–450)
PMV BLD AUTO: 11 FL (ref 6–12)
POTASSIUM BLD-SCNC: 4.2 MMOL/L (ref 3.5–5.5)
RBC # BLD AUTO: 4.27 10*6/MM3 (ref 3.89–5.14)
SODIUM BLD-SCNC: 138 MMOL/L (ref 132–146)
WBC NRBC COR # BLD: 6.5 10*3/MM3 (ref 3.5–10.8)

## 2017-07-23 PROCEDURE — 94799 UNLISTED PULMONARY SVC/PX: CPT

## 2017-07-23 PROCEDURE — 85027 COMPLETE CBC AUTOMATED: CPT | Performed by: INTERNAL MEDICINE

## 2017-07-23 PROCEDURE — 25010000002 MORPHINE SULFATE (PF) 2 MG/ML SOLUTION: Performed by: FAMILY MEDICINE

## 2017-07-23 PROCEDURE — 96376 TX/PRO/DX INJ SAME DRUG ADON: CPT

## 2017-07-23 PROCEDURE — 99217 PR OBSERVATION CARE DISCHARGE MANAGEMENT: CPT | Performed by: NURSE PRACTITIONER

## 2017-07-23 PROCEDURE — 83735 ASSAY OF MAGNESIUM: CPT | Performed by: FAMILY MEDICINE

## 2017-07-23 PROCEDURE — 82962 GLUCOSE BLOOD TEST: CPT

## 2017-07-23 PROCEDURE — G0378 HOSPITAL OBSERVATION PER HR: HCPCS

## 2017-07-23 PROCEDURE — 25010000002 HEPARIN (PORCINE) PER 1000 UNITS: Performed by: FAMILY MEDICINE

## 2017-07-23 PROCEDURE — 96372 THER/PROPH/DIAG INJ SC/IM: CPT

## 2017-07-23 PROCEDURE — 80048 BASIC METABOLIC PNL TOTAL CA: CPT | Performed by: INTERNAL MEDICINE

## 2017-07-23 RX ORDER — CLOPIDOGREL BISULFATE 75 MG/1
75 TABLET ORAL DAILY
Qty: 30 TABLET | Refills: 0 | Status: SHIPPED | OUTPATIENT
Start: 2017-07-23 | End: 2017-12-11

## 2017-07-23 RX ORDER — CARVEDILOL 6.25 MG/1
6.25 TABLET ORAL 2 TIMES DAILY WITH MEALS
Qty: 60 TABLET | Refills: 0 | Status: SHIPPED | OUTPATIENT
Start: 2017-07-23 | End: 2017-09-15 | Stop reason: SDUPTHER

## 2017-07-23 RX ORDER — ATORVASTATIN CALCIUM 80 MG/1
80 TABLET, FILM COATED ORAL NIGHTLY
Qty: 30 TABLET | Refills: 0 | Status: SHIPPED | OUTPATIENT
Start: 2017-07-23 | End: 2019-07-18

## 2017-07-23 RX ORDER — CARVEDILOL 6.25 MG/1
6.25 TABLET ORAL 2 TIMES DAILY WITH MEALS
Status: DISCONTINUED | OUTPATIENT
Start: 2017-07-23 | End: 2017-07-23 | Stop reason: HOSPADM

## 2017-07-23 RX ORDER — ASPIRIN 81 MG/1
81 TABLET ORAL DAILY
Start: 2017-07-23 | End: 2017-08-09

## 2017-07-23 RX ORDER — CARVEDILOL 3.12 MG/1
3.12 TABLET ORAL ONCE
Status: COMPLETED | OUTPATIENT
Start: 2017-07-23 | End: 2017-07-23

## 2017-07-23 RX ADMIN — MORPHINE SULFATE 2 MG: 2 INJECTION, SOLUTION INTRAMUSCULAR; INTRAVENOUS at 08:28

## 2017-07-23 RX ADMIN — HEPARIN SODIUM 5000 UNITS: 5000 INJECTION, SOLUTION INTRAVENOUS; SUBCUTANEOUS at 05:15

## 2017-07-23 RX ADMIN — CARVEDILOL 3.12 MG: 3.12 TABLET, FILM COATED ORAL at 11:34

## 2017-07-23 RX ADMIN — ASPIRIN 81 MG: 81 TABLET, COATED ORAL at 08:20

## 2017-07-23 RX ADMIN — HEPARIN SODIUM 5000 UNITS: 5000 INJECTION, SOLUTION INTRAVENOUS; SUBCUTANEOUS at 13:37

## 2017-07-23 RX ADMIN — CARVEDILOL 3.12 MG: 3.12 TABLET, FILM COATED ORAL at 08:21

## 2017-07-23 RX ADMIN — FAMOTIDINE 20 MG: 20 TABLET ORAL at 08:22

## 2017-07-23 RX ADMIN — ESCITALOPRAM OXALATE: 20 TABLET ORAL at 08:21

## 2017-07-23 RX ADMIN — CLOPIDOGREL BISULFATE 75 MG: 75 TABLET ORAL at 08:21

## 2017-07-23 RX ADMIN — MORPHINE SULFATE 2 MG: 2 INJECTION, SOLUTION INTRAMUSCULAR; INTRAVENOUS at 03:24

## 2017-07-23 NOTE — PLAN OF CARE
Problem: Patient Care Overview (Adult)  Goal: Plan of Care Review  Outcome: Ongoing (interventions implemented as appropriate)    Problem: Pain, Acute (Adult)  Goal: Identify Related Risk Factors and Signs and Symptoms  Outcome: Outcome(s) achieved Date Met:  07/23/17    Problem: Acute Coronary Syndrome (ACS) (Adult)  Goal: Signs and Symptoms of Listed Potential Problems Will be Absent or Manageable (Acute Coronary Syndrome)  Outcome: Ongoing (interventions implemented as appropriate)

## 2017-07-23 NOTE — DISCHARGE SUMMARY
[facility] Medicine Services  DISCHARGE SUMMARY       Date of Admission: 7/21/2017  Date of Discharge:  7/23/2017  Primary Care Physician: CORRIE Anderson  Consulting Physician(s)          None         Discharge Diagnoses:  Active Hospital Problems (** Indicates Principal Problem)    Diagnosis Date Noted   • Chest pain [R07.9] 07/21/2017   • Dyspnea on exertion [R06.09] 07/21/2017   • Hypertension [I10] 07/21/2017   • Pain of right breast [N64.4] 07/21/2017   • Lower extremity edema [R60.0] 07/21/2017   • Diabetes mellitus [E11.9] 07/21/2017      Resolved Hospital Problems    Diagnosis Date Noted Date Resolved   No resolved problems to display.     Presenting Problem/History of Present Illness  Chest pain, unspecified type [R07.9]     Chief Complaint on Day of Discharge:   Patient states she still complaining of some mild chest pain and shortness of air on exertion.    History of Present Illness on Day of Discharge:   Patient was resting comfortably awaiting a stress test, but it has been postponed to outpatient.  Patient needs to have an outpatient stress test set up tomorrow.  Patient states she still short of breath on exertion and have mild chest pain.    Hospital Course  Mrs. Esparza is a 51-year-old female with past medical history significant for DM, HTN who presented to [facility] ED on 7/21/17 with complaints of intermittent chest pain described as tightness, shortness of air on exertion and diaphoresis ×1 week.  Her episodes last between 5-7 minutes and worsen with exertion.  Patient stated that the pain radiated down her right breast and her right arm.  Patient also complains of increased fatigue, as well as lower extremity edema.  Patient was seen by her PCP prior to coming to the ED and had EKG and lab work drawn, but was referred to the ED for further evaluation.  Patient states she has ongoing HTN that feels like it is never really controlled, but they attributed to her  chronic pain in her knee.  Patient has a history of PE/DVT 28 years ago after hospitalization.  Patient has CT chest PE protocol and lower extremity duplex Dopplers that were negative for for embolism and DVT.  Patient was admitted to the hospital medicine service for further evaluation and treatment.    Patient was started on nitroglycerin drip that did not affect her pain.  Patient was scheduled for a stress test, but that is being postponed to an outpatient stress test due to the fact that no stress tests were being done on Sunday.  Patient needs to have a stress test scheduled tomorrow and she needs to be called with the information.  Patient states she still having some mild pain and dyspnea on exertion.  Patient's blood pressure medications were adjusted and patient will be discharged with adjusted medications.  Patient is to have an outpatient stress test and follow-up with her PCP sometime this week.  Patient is ready for discharge.    Procedures Performed   None    Consults:   Consults     No orders found from 6/22/2017 to 7/22/2017.        Pertinent Test Results:  Imaging Results (most recent)     Procedure Component Value Units Date/Time    XR Chest 1 View [[id number]] Collected:  07/21/17 1628     Updated:  07/21/17 1630    Narrative:       EXAMINATION: XR CHEST 1 VW- 07/21/2017     INDICATION: Chest Pain triage protocol      COMPARISON: NONE     FINDINGS: Cardiac size is borderline enlarged with increased pulmonary  vascularity centrally. No pneumothorax or pleural effusion. No focal  airspace opacity is otherwise identified. No acute osseous abnormality.       Impression:       Cardiac size is borderline enlarged with increased pulmonary  vascularity centrally. No pleural effusion.     D:  07/21/2017  E:  07/21/2017          CT Angiogram Chest With Contrast [[id number]] Collected:  07/21/17 1833     Updated:  07/21/17 2346    Narrative:       EXAMINATION: CT ANGIOGRAM CHEST W/CONTRAST date       INDICATION: R07.9-Chest pain, unspecified. Evaluate for pulmonary  embolus.      TECHNIQUE: Spiral acquisition 3 mm post-IV contrast images through the  chest and upper abdomen with coronal 10 mm 2D reconstructions.     The radiation dose reduction device was turned on for each scan per the  ALARA (As Low as Reasonably Achievable) protocol.     COMPARISON: 10/08/2009 angiographic chest CT scan.     FINDINGS: Report of 10/08/2009 study indicates no acute disease. Today  indicates chest pain and dyspnea. Previous history of PE.     Today's exam shows good contrast opacification of the pulmonary  arteries. There are no visible filling defects to suggest pulmonary  embolic disease. There is no evidence of pericardial or pleural effusion  or mediastinal adenopathy. There is no evidence of thoracic aortic  aneurysm or dissection.     Lung window images show trace linear scarring in the right lung base,  and a few granulomatous calcifications. Lungs otherwise appear clear.     Included images of the upper abdomen show clips in the gallbladder  fossa. No significant abnormalities are seen of the liver, spleen,  pancreatic tail, adrenal glands, or upper renal poles.       Impression:       No evidence of pulmonary embolus or other acute chest  disease.     DICTATED:     07/21/2017  EDITED:         07/21/2017        This report was finalized on 7/21/2017 11:44 PM by DR. Reginald Rehman MD.           Lab Results (most recent)     Procedure Component Value Units Date/Time    CBC & Differential [[id number]] Collected:  07/21/17 1558    Specimen:  Blood Updated:  07/21/17 1612    Narrative:       The following orders were created for panel order CBC & Differential.  Procedure                               Abnormality         Status                     ---------                               -----------         ------                     CBC Auto Differential[[id number]]        Normal              Final result                 Please  view results for these tests on the individual orders.    CBC Auto Differential [[id number]]  (Normal) Collected:  07/21/17 1558    Specimen:  Blood Updated:  07/21/17 1612     WBC 7.71 10*3/mm3      RBC 4.31 10*6/mm3      Hemoglobin 11.8 g/dL      Hematocrit 36.8 %      MCV 85.4 fL      MCH 27.4 pg      MCHC 32.1 g/dL      RDW 13.9 %      RDW-SD 43.4 fl      MPV 11.7 fL      Platelets 192 10*3/mm3      Neutrophil % 60.4 %      Lymphocyte % 31.6 %      Monocyte % 5.1 %      Eosinophil % 2.5 %      Basophil % 0.3 %      Immature Grans % 0.1 %      Neutrophils, Absolute 4.66 10*3/mm3      Lymphocytes, Absolute 2.44 10*3/mm3      Monocytes, Absolute 0.39 10*3/mm3      Eosinophils, Absolute 0.19 10*3/mm3      Basophils, Absolute 0.02 10*3/mm3      Immature Grans, Absolute 0.01 10*3/mm3     POC Troponin, Rapid [[id number]]  (Normal) Collected:  07/21/17 1603    Specimen:  Blood Updated:  07/21/17 1620     Troponin I 0.00 ng/mL       Serial Number: [id number]:  [id number]       BNP [[id number]]  (Normal) Collected:  07/21/17 1558    Specimen:  Blood Updated:  07/21/17 1633     BNP 13.0 pg/mL     Lipase [[id number]]  (Normal) Collected:  07/21/17 1558    Specimen:  Blood Updated:  07/21/17 1651     Lipase 30 U/L     Comprehensive Metabolic Panel [[id number]]  (Abnormal) Collected:  07/21/17 1558    Specimen:  Blood Updated:  07/21/17 1658     Glucose 180 (H) mg/dL      BUN 8 (L) mg/dL      Creatinine 0.70 mg/dL      Sodium 137 mmol/L      Potassium 3.8 mmol/L      Chloride 101 mmol/L      CO2 39.0 (H) mmol/L      Calcium 9.6 mg/dL      Total Protein 7.6 g/dL      Albumin 4.20 g/dL      ALT (SGPT) 42 (H) U/L      AST (SGOT) 27 U/L      Alkaline Phosphatase 86 U/L      Total Bilirubin 0.3 mg/dL      eGFR   Amer 106 mL/min/1.73      Globulin 3.4 gm/dL      A/G Ratio 1.2 (L) g/dL      BUN/Creatinine Ratio 11.4     Anion Gap -3.0 (L) mmol/L     POC Troponin, Rapid [[id number]]  (Normal) Collected:  07/21/17 1815    Specimen:   Blood Updated:  07/21/17 1835     Troponin I 0.00 ng/mL     Magnesium [[id number]]  (Normal) Collected:  07/21/17 1558    Specimen:  Blood Updated:  07/21/17 1946     Magnesium 1.8 mg/dL     POC Glucose Fingerstick [[id number]]  (Abnormal) Collected:  07/21/17 2051    Specimen:  Blood Updated:  07/21/17 2052     Glucose 168 (H) mg/dL     Narrative:       Meter: [id number] : [id number] Renae    Troponin [[id number]]  (Normal) Collected:  07/21/17 2341    Specimen:  Blood Updated:  07/22/17 0021     Troponin I <0.006 ng/mL     CBC (No Diff) [[id number]]  (Abnormal) Collected:  07/22/17 0525    Specimen:  Blood Updated:  07/22/17 0603     WBC 6.22 10*3/mm3      RBC 4.14 10*6/mm3      Hemoglobin 11.1 (L) g/dL      Hematocrit 35.4 %      MCV 85.5 fL      MCH 26.8 (L) pg      MCHC 31.4 (L) g/dL      RDW 14.1 %      RDW-SD 43.7 fl      MPV 11.4 fL      Platelets 183 10*3/mm3     Magnesium [[id number]]  (Normal) Collected:  07/22/17 0525    Specimen:  Blood Updated:  07/22/17 0626     Magnesium 1.9 mg/dL     Lipid Panel [[id number]]  (Abnormal) Collected:  07/22/17 0525    Specimen:  Blood Updated:  07/22/17 0626     Total Cholesterol 163 mg/dL      Triglycerides 249 (H) mg/dL      HDL Cholesterol 46 mg/dL      LDL Cholesterol  96 mg/dL     Basic Metabolic Panel [[id number]]  (Abnormal) Collected:  07/22/17 0525    Specimen:  Blood Updated:  07/22/17 0628     Glucose 132 (H) mg/dL      BUN 13 mg/dL      Creatinine 0.70 mg/dL      Sodium 142 mmol/L      Potassium 4.0 mmol/L      Chloride 104 mmol/L      CO2 33.0 (H) mmol/L      Calcium 9.9 mg/dL      eGFR   Amer 106 mL/min/1.73      BUN/Creatinine Ratio 18.6     Anion Gap 5.0 mmol/L     TSH [[id number]]  (Normal) Collected:  07/22/17 0525    Specimen:  Blood Updated:  07/22/17 0631     TSH 3.278 mIU/mL     Troponin [[id number]]  (Normal) Collected:  07/22/17 0525    Specimen:  Blood Updated:  07/22/17 0631     Troponin I <0.006 ng/mL     POC Glucose Fingerstick  [[id number]]  (Abnormal) Collected:  07/22/17 0724    Specimen:  Blood Updated:  07/22/17 0726     Glucose 132 (H) mg/dL     Narrative:       Verify with Lab Meter: [id number] : [id number] David Hancock    Hemoglobin A1c [[id number]]  (Abnormal) Collected:  07/22/17 0525    Specimen:  Blood Updated:  07/22/17 0852     Hemoglobin A1C 6.60 (H) %     POC Glucose Fingerstick [[id number]]  (Abnormal) Collected:  07/22/17 1152    Specimen:  Blood Updated:  07/22/17 1153     Glucose 135 (H) mg/dL     Narrative:       Meter: [id number] : [id number] [employer]    POC Glucose Fingerstick [[id number]]  (Normal) Collected:  07/22/17 1652    Specimen:  Blood Updated:  07/22/17 1654     Glucose 119 mg/dL     Narrative:       Meter: [id number] : [id number] [employer]    POC Glucose Fingerstick [[id number]]  (Normal) Collected:  07/22/17 1944    Specimen:  Blood Updated:  07/22/17 2000     Glucose 122 mg/dL     Narrative:       Meter: [id number] : [id number] Bon Meyer    CBC (No Diff) [[id number]]  (Abnormal) Collected:  07/23/17 0628    Specimen:  Blood Updated:  07/23/17 0656     WBC 6.50 10*3/mm3      RBC 4.27 10*6/mm3      Hemoglobin 11.5 g/dL      Hematocrit 35.5 %      MCV 83.1 fL      MCH 26.9 (L) pg      MCHC 32.4 g/dL      RDW 13.7 %      RDW-SD 41.6 fl      MPV 11.0 fL      Platelets 181 10*3/mm3     Magnesium [[id number]]  (Normal) Collected:  07/23/17 0628    Specimen:  Blood Updated:  07/23/17 0715     Magnesium 1.9 mg/dL     Basic Metabolic Panel [[id number]]  (Abnormal) Collected:  07/23/17 0628    Specimen:  Blood Updated:  07/23/17 0715     Glucose 120 (H) mg/dL      BUN 12 mg/dL      Creatinine 0.70 mg/dL      Sodium 138 mmol/L      Potassium 4.2 mmol/L      Chloride 101 mmol/L      CO2 30.0 mmol/L      Calcium 9.6 mg/dL      eGFR   Amer 106 mL/min/1.73      BUN/Creatinine Ratio 17.1     Anion Gap 7.0 mmol/L     POC Glucose Fingerstick [[id number]]  (Normal) Collected:  07/23/17 0777     "Specimen:  Blood Updated:  07/23/17 0740     Glucose 114 mg/dL     Narrative:       Verify with Lab Meter: [id number] : [id number] David Hancock    POC Glucose Fingerstick [[id number]]  (Normal) Collected:  07/23/17 1135    Specimen:  Blood Updated:  07/23/17 1137     Glucose 125 mg/dL     Narrative:       Meter: [id number] : [id number] Keith Mansfield        Condition on Discharge:  Stable    Physical Exam on Discharge:BP (!) 154/108 (BP Location: Right arm, Patient Position: Lying) Comment: nurse notified  Pulse 79  Temp 98.2 °F (36.8 °C) (Oral)   Resp 16  Ht 71\" (180.3 cm)  Wt 268 lb (122 kg)  LMP 10/31/2014 (Approximate)  SpO2 99%  BMI 37.38 kg/m2  Physical Exam  General: Alert, well-developed well-nourished female in no acute distress    Head: Normocephalic atraumatic    Eyes: PERRLA, EOMI, nonicteric, conjunctiva normal    ENT: Pink, moist mucous membranes    Neck: Supple, nontender, trachea midline without lymphadenopathy, JVD, nuchal rigidity.      Cardiovascular: RRR  no M/R/G    Respiratory: Nonlabored, symmetrical chest expansion, clear to auscultation bilaterally    Abdomen: Obese, soft, nontender, nondistended,  positive bowel sounds in all 4 quadrants     Extremities: FROM in upper and lower extremities bilaterally negative for edema/cyanosis/clubbing.  Negative calf pain    Skin: Pink/warm/dry.  No rash or lesions noted    Neuro: Alert and oriented to person place time and situation, speech is clear, follows all commands, recent and remote memory intact    Psych: Patient is pleasant and cooperative.  Normal affect.  Negative suicidal ideation or homicidal ideation.    Discharge Disposition  Home or Self Care    Discharge Medications   Kellen Esparza   Home Medication Instructions ELY:[id number]    Printed on:07/23/17 1243   Medication Information                      aspirin 81 MG EC tablet  Take 1 tablet by mouth Daily.             atorvastatin (LIPITOR) 80 MG tablet  Take 1 tablet by " mouth Every Night.             carvedilol (COREG) 6.25 MG tablet  Take 1 tablet by mouth 2 (Two) Times a Day With Meals.             clopidogrel (PLAVIX) 75 MG tablet  Take 1 tablet by mouth Daily.             cyclobenzaprine (FLEXERIL) 10 MG tablet  Take 10 mg by mouth 2 (Two) Times a Day As Needed for Muscle Spasms.             escitalopram (LEXAPRO) 10 MG tablet  Take 10 mg by mouth Daily.             estradiol (ESTRACE) 1 MG tablet  Take 1 mg by mouth Daily.             metFORMIN (GLUCOPHAGE) 500 MG tablet  Take 500 mg by mouth 2 (Two) Times a Day With Meals.             oxyCODONE-acetaminophen (PERCOCET) 7.5-325 MG per tablet  Take 1 tablet by mouth Every 6 (Six) Hours As Needed.             SUMAtriptan (IMITREX) 50 MG tablet  Take 50 mg by mouth Every 2 (Two) Hours As Needed for Migraine. Take one tablet at onset of headache. May repeat dose one time in 2 hours if headache not relieved.             traZODone (DESYREL) 100 MG tablet  Take 100 mg by mouth Every Night.               Discharge Diet:   Diet Instructions     Diet: Regular, Consistent Carbohydrate, Cardiac; Thin Liquids, No Restrictions       Discharge Diet:   Regular  Consistent Carbohydrate  Cardiac      Fluid Consistency:  Thin Liquids, No Restrictions               Discharge Care Plan / Instructions:  Activity at Discharge:   Activity Instructions     Activity as Tolerated                   Follow-up Appointments  No future appointments.  Additional Instructions for the Follow-ups that You Need to Schedule     Discharge Follow-Up With Specified Provider    As directed    To:  Patient needs to be set up for outpatient stress test as soon as possible; please make sure patient is given a number to phone on Monday so she came get the specifics about when her stress test as scheduled.       Discharge Follow-up with PCP    As directed    Follow Up Details:  Follow-up with CORRIE Anderson sometime this week; call the office tomorrow to  schedule the appointment               Test Results Pending at Discharge  None     CORRIE Duran 07/23/17 12:43 PM    Time: Discharge 60 min    Please note that portions of this note may have been completed with a voice recognition program. Efforts were made to edit the dictations, but occasionally words are mistranscribed.

## 2017-07-25 ENCOUNTER — DOCUMENTATION (OUTPATIENT)
Dept: CARDIAC REHAB | Facility: HOSPITAL | Age: 52
End: 2017-07-25

## 2017-07-26 ENCOUNTER — TELEPHONE (OUTPATIENT)
Dept: CARDIOLOGY | Facility: HOSPITAL | Age: 52
End: 2017-07-26

## 2017-07-26 ENCOUNTER — OFFICE VISIT (OUTPATIENT)
Dept: CARDIOLOGY | Facility: HOSPITAL | Age: 52
End: 2017-07-26

## 2017-07-26 ENCOUNTER — HOSPITAL ENCOUNTER (OUTPATIENT)
Dept: CARDIOLOGY | Facility: HOSPITAL | Age: 52
Discharge: HOME | End: 2017-07-26
Admitting: NURSE PRACTITIONER

## 2017-07-26 VITALS
WEIGHT: 262 LBS | HEIGHT: 71 IN | SYSTOLIC BLOOD PRESSURE: 150 MMHG | DIASTOLIC BLOOD PRESSURE: 118 MMHG | BODY MASS INDEX: 36.68 KG/M2 | HEART RATE: 70 BPM

## 2017-07-26 VITALS
SYSTOLIC BLOOD PRESSURE: 160 MMHG | BODY MASS INDEX: 37.13 KG/M2 | HEIGHT: 71 IN | WEIGHT: 265.2 LBS | TEMPERATURE: 97.7 F | HEART RATE: 83 BPM | RESPIRATION RATE: 22 BRPM | DIASTOLIC BLOOD PRESSURE: 90 MMHG | OXYGEN SATURATION: 98 %

## 2017-07-26 DIAGNOSIS — E11.8 TYPE 2 DIABETES MELLITUS WITH COMPLICATION, WITHOUT LONG-TERM CURRENT USE OF INSULIN (HCC): ICD-10-CM

## 2017-07-26 DIAGNOSIS — R07.2 PRECORDIAL CHEST PAIN: Primary | ICD-10-CM

## 2017-07-26 DIAGNOSIS — I10 ESSENTIAL HYPERTENSION: ICD-10-CM

## 2017-07-26 DIAGNOSIS — R94.39 ABNORMAL STRESS TEST: Primary | ICD-10-CM

## 2017-07-26 LAB
BH CV STRESS BP STAGE 1: NORMAL
BH CV STRESS BP STAGE 3: NORMAL
BH CV STRESS COMMENTS STAGE 1: NORMAL
BH CV STRESS DOSE REGADENOSON STAGE 1: 0.4
BH CV STRESS DURATION MIN STAGE 1: 1
BH CV STRESS DURATION MIN STAGE 2: 1
BH CV STRESS DURATION MIN STAGE 3: 1
BH CV STRESS DURATION MIN STAGE 4: 1
BH CV STRESS DURATION SEC STAGE 1: 0
BH CV STRESS DURATION SEC STAGE 2: 0
BH CV STRESS DURATION SEC STAGE 3: 0
BH CV STRESS DURATION SEC STAGE 4: 0
BH CV STRESS HR STAGE 1: 99
BH CV STRESS HR STAGE 2: 96
BH CV STRESS HR STAGE 3: 94
BH CV STRESS HR STAGE 4: 92
BH CV STRESS PROTOCOL 1: NORMAL
BH CV STRESS RECOVERY BP: NORMAL MMHG
BH CV STRESS RECOVERY HR: 88 BPM
BH CV STRESS RECOVERY O2: 99 %
BH CV STRESS STAGE 1: 1
BH CV STRESS STAGE 2: 2
BH CV STRESS STAGE 3: 3
BH CV STRESS STAGE 4: 4
LV EF NUC BP: 70 %
MAXIMAL PREDICTED HEART RATE: 167 BPM
PERCENT MAX PREDICTED HR: 63.47 %
STRESS BASELINE BP: NORMAL MMHG
STRESS BASELINE HR: 70 BPM
STRESS O2 SAT REST: 97 %
STRESS PERCENT HR: 75 %
STRESS POST O2 SAT PEAK: 100 %
STRESS POST PEAK BP: NORMAL MMHG
STRESS POST PEAK HR: 106 BPM
STRESS TARGET HR: 142 BPM

## 2017-07-26 PROCEDURE — 93017 CV STRESS TEST TRACING ONLY: CPT

## 2017-07-26 PROCEDURE — 0 RUBIDIUM CHLORIDE: Performed by: NURSE PRACTITIONER

## 2017-07-26 PROCEDURE — 78492 MYOCRD IMG PET MLT RST&STRS: CPT | Performed by: INTERNAL MEDICINE

## 2017-07-26 PROCEDURE — 99214 OFFICE O/P EST MOD 30 MIN: CPT | Performed by: NURSE PRACTITIONER

## 2017-07-26 PROCEDURE — A9555 RB82 RUBIDIUM: HCPCS | Performed by: NURSE PRACTITIONER

## 2017-07-26 PROCEDURE — 25010000002 REGADENOSON 0.4 MG/5ML SOLUTION: Performed by: NURSE PRACTITIONER

## 2017-07-26 PROCEDURE — 93018 CV STRESS TEST I&R ONLY: CPT | Performed by: INTERNAL MEDICINE

## 2017-07-26 PROCEDURE — 78492 MYOCRD IMG PET MLT RST&STRS: CPT

## 2017-07-26 RX ORDER — NAPROXEN 500 MG/1
500 TABLET ORAL DAILY PRN
Refills: 2 | COMMUNITY
Start: 2017-07-13 | End: 2019-01-15

## 2017-07-26 RX ADMIN — RUBIDIUM CHLORIDE RB-82 1 DOSE: 150 INJECTION, SOLUTION INTRAVENOUS at 10:28

## 2017-07-26 RX ADMIN — REGADENOSON 0.4 MG: 0.08 INJECTION, SOLUTION INTRAVENOUS at 10:37

## 2017-07-26 RX ADMIN — RUBIDIUM CHLORIDE RB-82 1 DOSE: 150 INJECTION, SOLUTION INTRAVENOUS at 10:39

## 2017-07-26 NOTE — TELEPHONE ENCOUNTER
Spoke with Dr. Sommer.  PET interpretation today as well as evaluating EKG today.  EKG shows new T wave inversion.  PET images demonstrate anterior defect.  He recommends elective cardiac catheter.  I spoke with patient.  She is agreeable.  Advised to continue same meds as noted today.  Report to ED for worsening symptoms.  She will be contacted by [facility] scheduling regarding arrangements for cath.

## 2017-07-26 NOTE — PROGRESS NOTES
"[facility]  [facility]  Chest Pain Clinic    Encounter Date:07/26/2017     Kellen Esparza  [address] [place] [address]  [phone]    1965    CORRIE Anderson    Kellen Esparza is a 51 y.o. female.      Subjective:     Chief Complaint: Chest pain       HPI: Ms. Esparza comes to the chest pain clinic today at the request of Dr. Ame Mejia.  Patient presented to the emergency department on Friday, July 21st after seeing primary care earlier in the day.  Ms. Esparza relates that she has had central and right-sided chest pain off and on for several days prior to the primary care appointment.  She describes the pain as dull, but waxing and waning in its intensity.  It worsened with activity.  She relates that primary care physician, Dr. Rahman, who compared the EKG from her visit 7/21 to an EKG a few weeks prior and decided there were some worrisome changes.  That is why she was referred to the emergency department.  Mrs. Esparza was admitted to the observation unit following her emergency department evaluation.  A nitroglycerin drip and other medications were utilized.  Her symptoms improved and it was decided she could come for her stress testing on an outpatient basis.  Today, patient relates she feels \"the same/just tired\".    Mrs. Esparza has a pertinent past history of poorly controlled hypertension for the past 2 years, as well as a new diagnosis of type 2 diabetes over the past one to 2 months.  Also pertinent past medical history of pulmonary embolism 20 years ago following a prolonged hospitalization/bedrest for appendicitis.  CTA chest was negative for recurrent PE.  This was done through the emergency department.    Cardiac risk factors: HTN, DM, obesity, age >50, family hx early CAD  History of CVD, dyslipidemia, hypertension, diabetes.  Tobacco use, sedentary lifestyle, obesity (BMI > 30), gender, age (>50)  Family history of premature coronary artery " disease (male < 55 yrs, female <66 yrs)    No Known Allergies      Current Outpatient Prescriptions:   •  aspirin 81 MG EC tablet, Take 1 tablet by mouth Daily., Disp: , Rfl:   •  atorvastatin (LIPITOR) 80 MG tablet, Take 1 tablet by mouth Every Night., Disp: 30 tablet, Rfl: 0  •  carvedilol (COREG) 6.25 MG tablet, Take 1 tablet by mouth 2 (Two) Times a Day With Meals., Disp: 60 tablet, Rfl: 0  •  clopidogrel (PLAVIX) 75 MG tablet, Take 1 tablet by mouth Daily., Disp: 30 tablet, Rfl: 0  •  cyclobenzaprine (FLEXERIL) 10 MG tablet, Take 10 mg by mouth 2 (Two) Times a Day As Needed for Muscle Spasms., Disp: , Rfl:   •  escitalopram (LEXAPRO) 10 MG tablet, Take 10 mg by mouth Daily., Disp: , Rfl:   •  estradiol (ESTRACE) 1 MG tablet, Take 1 mg by mouth Daily., Disp: , Rfl:   •  metFORMIN (GLUCOPHAGE) 500 MG tablet, Take 500 mg by mouth 2 (Two) Times a Day With Meals., Disp: , Rfl:   •  naproxen (NAPROSYN) 500 MG tablet, TK 1 T PO  BID, Disp: , Rfl: 2  •  oxyCODONE-acetaminophen (PERCOCET) 7.5-325 MG per tablet, Take 1 tablet by mouth Every 6 (Six) Hours As Needed., Disp: , Rfl:   •  SUMAtriptan (IMITREX) 50 MG tablet, Take 50 mg by mouth Every 2 (Two) Hours As Needed for Migraine. Take one tablet at onset of headache. May repeat dose one time in 2 hours if headache not relieved., Disp: , Rfl:   •  traZODone (DESYREL) 100 MG tablet, Take 100 mg by mouth Every Night., Disp: , Rfl:   No current facility-administered medications for this visit.     The following portions of the patient's history were reviewed and updated as appropriate in Epic:  Problem list, allergies, current medications, past medical and surgical history, past social and family history.     Review of Systems   Constitution: Positive for malaise/fatigue. Negative for chills, decreased appetite, diaphoresis, fever, weakness, night sweats, weight gain and weight loss.   HENT: Negative for congestion, headaches, hearing loss, hoarse voice and nosebleeds.   "  Eyes: Negative for blurred vision, visual disturbance and visual halos.   Cardiovascular: Positive for chest pain and dyspnea on exertion. Negative for claudication, cyanosis, irregular heartbeat, leg swelling, near-syncope, orthopnea, palpitations, paroxysmal nocturnal dyspnea and syncope.   Respiratory: Positive for shortness of breath. Negative for cough, hemoptysis, sleep disturbances due to breathing, snoring, sputum production and wheezing.    Hematologic/Lymphatic: Negative for bleeding problem. Does not bruise/bleed easily.   Skin: Negative for dry skin, itching and rash.   Musculoskeletal: Negative for arthritis, joint pain, joint swelling and myalgias.   Gastrointestinal: Negative for bloating, abdominal pain, constipation, diarrhea, flatus, heartburn, hematemesis, hematochezia, melena, nausea and vomiting.   Genitourinary: Negative for dysuria, frequency, hematuria, nocturia and urgency.   Neurological: Negative for excessive daytime sleepiness, dizziness, light-headedness and loss of balance.   Psychiatric/Behavioral: Negative for depression. The patient does not have insomnia and is not nervous/anxious.        Objective:     Vitals:    07/26/17 0826 07/26/17 0828 07/26/17 0831   BP: 158/95 160/93 160/90   BP Location: Right arm Left arm Left arm   Patient Position: Sitting Sitting Standing   Pulse: 77 73 83   Resp: 22     Temp: 97.7 °F (36.5 °C)     TempSrc: Temporal Artery      SpO2: 98%     Weight: 265 lb 3.2 oz (120 kg)     Height: 71\" (180.3 cm)           Physical Exam   Constitutional: She is oriented to person, place, and time. She appears well-developed and well-nourished. No distress.   Very pleasant, moderately obese young adult female in [facility].     HENT:   Head: Normocephalic and atraumatic.   Eyes: Pupils are equal, round, and reactive to light.   Neck: Neck supple. No JVD present. No tracheal deviation present.   Cardiovascular: Normal rate, regular rhythm, normal heart sounds and intact " distal pulses.    No murmur heard.  Pulmonary/Chest: Effort normal and breath sounds normal. No respiratory distress.   Abdominal: Soft. Bowel sounds are normal. There is no tenderness.   Musculoskeletal: Normal range of motion. She exhibits no edema.   Neurological: She is alert and oriented to person, place, and time. No cranial nerve deficit.   Skin: Skin is warm and dry. No rash noted.   Psychiatric: She has a normal mood and affect. Her behavior is normal.       Lab and Diagnostic Review: ER labs, EKG's, progress notes, imaging studies    Assessment and Plan:     1. Precordial chest pain  - s/p ER evaluation and observation in house.  Troponin negative.  EKG showed abnormal T waves.  BB, ASA, plavix, statin.  - Stress Test With Pet Myocardial Perfusion (Multi Study)  -Follow up with Cardiology.  - Will contact patient with results of today's testing and make further plans afterwards.    2. Essential hypertension  - Coreg added per Hospitalist,     3. Type 2 diabetes mellitus with complication, without long-term current use of insulin  - New diagnosis.  PCP started metformin w/in the past 6 weeks.    - HgA1C 6.6%.        *Please note that portions of this note were completed with a voice recognition program. Efforts were made to edit the dictations, but occasionally words are mistranscribed.

## 2017-08-03 PROBLEM — R94.39 ABNORMAL STRESS TEST: Status: ACTIVE | Noted: 2017-08-03

## 2017-08-04 ENCOUNTER — PREP FOR SURGERY (OUTPATIENT)
Dept: OTHER | Facility: HOSPITAL | Age: 52
End: 2017-08-04

## 2017-08-04 DIAGNOSIS — R94.39 ABNORMAL STRESS TEST: Primary | ICD-10-CM

## 2017-08-04 RX ORDER — ASPIRIN 81 MG/1
81 TABLET ORAL DAILY
Status: CANCELLED | OUTPATIENT
Start: 2017-08-05

## 2017-08-04 RX ORDER — SODIUM CHLORIDE 0.9 % (FLUSH) 0.9 %
1-10 SYRINGE (ML) INJECTION AS NEEDED
Status: CANCELLED | OUTPATIENT
Start: 2017-08-04

## 2017-08-04 RX ORDER — ASPIRIN 81 MG/1
324 TABLET, CHEWABLE ORAL ONCE
Status: CANCELLED | OUTPATIENT
Start: 2017-08-04 | End: 2017-08-04

## 2017-08-04 RX ORDER — LIDOCAINE HYDROCHLORIDE 10 MG/ML
0.1 INJECTION, SOLUTION EPIDURAL; INFILTRATION; INTRACAUDAL; PERINEURAL ONCE AS NEEDED
Status: CANCELLED | OUTPATIENT
Start: 2017-08-04

## 2017-08-04 RX ORDER — NITROGLYCERIN 0.4 MG/1
0.4 TABLET SUBLINGUAL
Status: CANCELLED | OUTPATIENT
Start: 2017-08-04

## 2017-08-04 RX ORDER — ONDANSETRON 2 MG/ML
4 INJECTION INTRAMUSCULAR; INTRAVENOUS EVERY 6 HOURS PRN
Status: CANCELLED | OUTPATIENT
Start: 2017-08-04

## 2017-08-09 ENCOUNTER — APPOINTMENT (OUTPATIENT)
Dept: PREADMISSION TESTING | Facility: HOSPITAL | Age: 52
End: 2017-08-09

## 2017-08-09 ENCOUNTER — HOSPITAL ENCOUNTER (OUTPATIENT)
Dept: GENERAL RADIOLOGY | Facility: HOSPITAL | Age: 52
Discharge: HOME | End: 2017-08-09
Admitting: NURSE PRACTITIONER

## 2017-08-09 DIAGNOSIS — R94.39 ABNORMAL STRESS TEST: ICD-10-CM

## 2017-08-09 LAB
ANION GAP SERPL CALCULATED.3IONS-SCNC: 5 MMOL/L (ref 3–11)
ARTICHOKE IGE QN: 47 MG/DL (ref 0–130)
BUN BLD-MCNC: 16 MG/DL (ref 9–23)
BUN/CREAT SERPL: 20 (ref 7–25)
CALCIUM SPEC-SCNC: 9.8 MG/DL (ref 8.7–10.4)
CHLORIDE SERPL-SCNC: 102 MMOL/L (ref 99–109)
CHOLEST SERPL-MCNC: 108 MG/DL (ref 0–200)
CO2 SERPL-SCNC: 31 MMOL/L (ref 20–31)
CREAT BLD-MCNC: 0.8 MG/DL (ref 0.6–1.3)
DEPRECATED RDW RBC AUTO: 39.9 FL (ref 37–54)
ERYTHROCYTE [DISTWIDTH] IN BLOOD BY AUTOMATED COUNT: 13.3 % (ref 11.3–14.5)
GFR SERPL CREATININE-BSD FRML MDRD: 91 ML/MIN/1.73
GLUCOSE BLD-MCNC: 121 MG/DL (ref 70–100)
HCT VFR BLD AUTO: 36 % (ref 34.5–44)
HDLC SERPL-MCNC: 48 MG/DL (ref 40–60)
HGB BLD-MCNC: 11.9 G/DL (ref 11.5–15.5)
MCH RBC QN AUTO: 26.8 PG (ref 27–31)
MCHC RBC AUTO-ENTMCNC: 33.1 G/DL (ref 32–36)
MCV RBC AUTO: 81.1 FL (ref 80–99)
PLATELET # BLD AUTO: 218 10*3/MM3 (ref 150–450)
PMV BLD AUTO: 10.9 FL (ref 6–12)
POTASSIUM BLD-SCNC: 4.6 MMOL/L (ref 3.5–5.5)
RBC # BLD AUTO: 4.44 10*6/MM3 (ref 3.89–5.14)
SODIUM BLD-SCNC: 138 MMOL/L (ref 132–146)
TRIGL SERPL-MCNC: 62 MG/DL (ref 0–150)
WBC NRBC COR # BLD: 7.12 10*3/MM3 (ref 3.5–10.8)

## 2017-08-09 PROCEDURE — 85027 COMPLETE CBC AUTOMATED: CPT | Performed by: NURSE PRACTITIONER

## 2017-08-09 PROCEDURE — 93010 ELECTROCARDIOGRAM REPORT: CPT | Performed by: INTERNAL MEDICINE

## 2017-08-09 PROCEDURE — 80048 BASIC METABOLIC PNL TOTAL CA: CPT | Performed by: NURSE PRACTITIONER

## 2017-08-09 PROCEDURE — 80061 LIPID PANEL: CPT | Performed by: NURSE PRACTITIONER

## 2017-08-09 PROCEDURE — 71010 HC CHEST PA OR AP: CPT

## 2017-08-09 PROCEDURE — 36415 COLL VENOUS BLD VENIPUNCTURE: CPT

## 2017-08-09 PROCEDURE — 93005 ELECTROCARDIOGRAM TRACING: CPT

## 2017-08-09 RX ORDER — ERGOCALCIFEROL 1.25 MG/1
50000 CAPSULE ORAL WEEKLY
COMMUNITY
End: 2021-03-19

## 2017-08-09 NOTE — DISCHARGE INSTRUCTIONS
The following instructions given during Pre Admission Testing visit:    Do not eat or drink anything after MN except for sips of water with your a.m. Prescription meds unless otherwise instructed by your physician.    Glasses and jewelry may be worn, but dentures must be removed prior to cath/procedure.    Leave any items you consider valuable at home.    Family members may wait in [facility] waiting area during procedure.    Bring all medications in their original containers the day of procedure.    Bring photo ID and insurance cards on the day of procedure.    Need to make arrangements for transportation prior to discharge.    The following handouts were given:     Heart Cath pathway (if applicable)   Cardiac Cath booklet published by [facility]    OR appropriate [facility] procedure booklet    If applicable, pt instructed to bring CPAP mask and tubing the day of procedure.

## 2017-08-10 ENCOUNTER — HOSPITAL ENCOUNTER (OUTPATIENT)
Facility: HOSPITAL | Age: 52
Discharge: HOME | End: 2017-08-10
Attending: INTERNAL MEDICINE | Admitting: INTERNAL MEDICINE

## 2017-08-10 VITALS
OXYGEN SATURATION: 99 % | WEIGHT: 264.33 LBS | BODY MASS INDEX: 37.01 KG/M2 | TEMPERATURE: 97.1 F | DIASTOLIC BLOOD PRESSURE: 98 MMHG | RESPIRATION RATE: 16 BRPM | HEIGHT: 71 IN | HEART RATE: 79 BPM | SYSTOLIC BLOOD PRESSURE: 119 MMHG

## 2017-08-10 DIAGNOSIS — R94.39 ABNORMAL STRESS TEST: ICD-10-CM

## 2017-08-10 PROBLEM — G43.909 MIGRAINE: Status: ACTIVE | Noted: 2017-08-10

## 2017-08-10 PROBLEM — E78.5 HYPERLIPIDEMIA: Status: ACTIVE | Noted: 2017-08-10

## 2017-08-10 LAB
GLUCOSE BLDC GLUCOMTR-MCNC: 114 MG/DL (ref 70–130)
GLUCOSE BLDC GLUCOMTR-MCNC: 131 MG/DL (ref 70–130)

## 2017-08-10 PROCEDURE — 93458 L HRT ARTERY/VENTRICLE ANGIO: CPT | Performed by: INTERNAL MEDICINE

## 2017-08-10 PROCEDURE — C1769 GUIDE WIRE: HCPCS | Performed by: INTERNAL MEDICINE

## 2017-08-10 PROCEDURE — C1894 INTRO/SHEATH, NON-LASER: HCPCS | Performed by: INTERNAL MEDICINE

## 2017-08-10 PROCEDURE — 25010000002 FENTANYL CITRATE (PF) 100 MCG/2ML SOLUTION: Performed by: INTERNAL MEDICINE

## 2017-08-10 PROCEDURE — 82962 GLUCOSE BLOOD TEST: CPT

## 2017-08-10 PROCEDURE — 0 IOPAMIDOL PER 1 ML: Performed by: INTERNAL MEDICINE

## 2017-08-10 PROCEDURE — 25010000002 HEPARIN (PORCINE) PER 1000 UNITS: Performed by: INTERNAL MEDICINE

## 2017-08-10 PROCEDURE — 25010000002 MIDAZOLAM PER 1 MG: Performed by: INTERNAL MEDICINE

## 2017-08-10 RX ORDER — ONDANSETRON 2 MG/ML
4 INJECTION INTRAMUSCULAR; INTRAVENOUS EVERY 6 HOURS PRN
Status: DISCONTINUED | OUTPATIENT
Start: 2017-08-10 | End: 2017-08-10 | Stop reason: HOSPADM

## 2017-08-10 RX ORDER — MIDAZOLAM HYDROCHLORIDE 1 MG/ML
INJECTION INTRAMUSCULAR; INTRAVENOUS AS NEEDED
Status: DISCONTINUED | OUTPATIENT
Start: 2017-08-10 | End: 2017-08-10 | Stop reason: HOSPADM

## 2017-08-10 RX ORDER — ASPIRIN 81 MG/1
324 TABLET, CHEWABLE ORAL ONCE
Status: COMPLETED | OUTPATIENT
Start: 2017-08-10 | End: 2017-08-10

## 2017-08-10 RX ORDER — LIDOCAINE HYDROCHLORIDE 10 MG/ML
INJECTION, SOLUTION INFILTRATION; PERINEURAL AS NEEDED
Status: DISCONTINUED | OUTPATIENT
Start: 2017-08-10 | End: 2017-08-10 | Stop reason: HOSPADM

## 2017-08-10 RX ORDER — NITROGLYCERIN 0.4 MG/1
0.4 TABLET SUBLINGUAL
Status: DISCONTINUED | OUTPATIENT
Start: 2017-08-10 | End: 2017-08-10 | Stop reason: HOSPADM

## 2017-08-10 RX ORDER — LIDOCAINE HYDROCHLORIDE 10 MG/ML
0.1 INJECTION, SOLUTION EPIDURAL; INFILTRATION; INTRACAUDAL; PERINEURAL ONCE AS NEEDED
Status: DISCONTINUED | OUTPATIENT
Start: 2017-08-10 | End: 2017-08-10 | Stop reason: HOSPADM

## 2017-08-10 RX ORDER — SODIUM CHLORIDE 9 MG/ML
1-3 INJECTION, SOLUTION INTRAVENOUS CONTINUOUS
Status: DISCONTINUED | OUTPATIENT
Start: 2017-08-10 | End: 2017-08-10 | Stop reason: HOSPADM

## 2017-08-10 RX ORDER — FENTANYL CITRATE 50 UG/ML
INJECTION, SOLUTION INTRAMUSCULAR; INTRAVENOUS AS NEEDED
Status: DISCONTINUED | OUTPATIENT
Start: 2017-08-10 | End: 2017-08-10 | Stop reason: HOSPADM

## 2017-08-10 RX ORDER — SODIUM CHLORIDE 0.9 % (FLUSH) 0.9 %
1-10 SYRINGE (ML) INJECTION AS NEEDED
Status: DISCONTINUED | OUTPATIENT
Start: 2017-08-10 | End: 2017-08-10 | Stop reason: HOSPADM

## 2017-08-10 RX ORDER — ASPIRIN 81 MG/1
81 TABLET ORAL DAILY
Status: DISCONTINUED | OUTPATIENT
Start: 2017-08-11 | End: 2017-08-10 | Stop reason: HOSPADM

## 2017-08-10 RX ADMIN — ASPIRIN 81 MG 324 MG: 81 TABLET ORAL at 08:44

## 2017-08-10 RX ADMIN — SODIUM CHLORIDE 3 ML/KG/HR: 9 INJECTION, SOLUTION INTRAVENOUS at 08:45

## 2017-08-10 NOTE — PLAN OF CARE
Problem: Patient Care Overview (Adult)  Goal: Plan of Care Review  PT. TO HAVE HEART CATH TODAY WITH DR. METCALF.

## 2017-08-10 NOTE — H&P
[facility] at [facility]        Date of Hospital Visit: 08/10/17      Place of Service: [facility]    Patient Name: Kellen Esparza  :1965    Referral Provider: Johny Sommer MD  Primary Care Provider: CORRIE Anderson    Chief complaint/Reason for Consultation:  chest pain    Problem List:  Problem   Abnormal Stress Test    Cardiac PET 17  · Left ventricular ejection fraction is normal (Calculated EF = 70%).  · Myocardial perfusion imaging indicates a medium-sized infarct pattern located in the anterior wall with no significant ischemia noted. This finding is worse on resting images which is suggestive of possible breast attenuation artifact instead of an infarction. Impressions are consistent with a study indicating uncertain risk.     Chest Pain   Hyperlipidemia   Dyspnea On Exertion   Hypertension   Diabetes Mellitus   Migraine   History of Pulmonary Embolus (Pe)       Cardiac Risk Factors:  diabetes mellitus, hypertension      History of Present Illness:  51 year old hypertensive dyslipidemic obese female with no known prior cardiac history.  She recently presented to her primary care with a 2 to three-week complaint of intermittent chest pressure that was squeezing in nature and occurred randomly.  She states this lasted 30 seconds to 1 minute and resolves spontaneously and is associated with shortness of breath and radiating pain to her left upper shoulder.  She was felt to have an abnormal EKG at primary care and referred to the [facility] emergency department for evaluation.  There MI was excluded.  She was admitted by the hospitalist service and monitored overnight.  She was discharged the next day with plans for an outpatient cardiac PET scan.  Exam was abnormal last noted above and she was referred for cardiac catheterization.  She continues to have intermittent chest pain.  She also has exertional dyspnea and progressive fatigue.  She is  unable to do simple activities such as laundry or shower without having exertional dyspnea.  She does not smoke, has no history of rheumatic heart disease or family history of precocious coronary artery disease.  She has a remote history of pulmonary embolus.  During her hospitalization she had a CT scan of the chest which excluded acute PE or other significant pulmonary defects.        No Known Allergies    Past Medical History:   Diagnosis Date   • Diabetes mellitus     doesnt check sugar    • Hypertension    • Migraine    • Pulmonary embolism 1997       Past Surgical History:   Procedure Laterality Date   • BREAST BIOPSY Left 2014   • CHOLECYSTECTOMY     • COLONOSCOPY     • ENDOSCOPY     • HYSTERECTOMY  11/2014   • KNEE SURGERY      right    • NASAL SINUS SURGERY     • OOPHORECTOMY  11/2014   • PULMONARY EMBOLISM SURGERY      right lung   • TUBAL ABDOMINAL LIGATION           Prescriptions Prior to Admission   Medication Sig Dispense Refill Last Dose   • atorvastatin (LIPITOR) 80 MG tablet Take 1 tablet by mouth Every Night. 30 tablet 0 8/9/2017 at Unknown time   • carvedilol (COREG) 6.25 MG tablet Take 1 tablet by mouth 2 (Two) Times a Day With Meals. 60 tablet 0 8/10/2017 at 0745   • clopidogrel (PLAVIX) 75 MG tablet Take 1 tablet by mouth Daily. 30 tablet 0 8/9/2017 at Unknown time   • cyclobenzaprine (FLEXERIL) 10 MG tablet Take 10 mg by mouth 2 (Two) Times a Day.   8/9/2017 at Unknown time   • estradiol (ESTRACE) 1 MG tablet Take 1 mg by mouth Daily.   8/10/2017 at 0745   • HYDROXYZINE PAMOATE PO Take 25 mg by mouth Every Night. 1-2 tablets at night-  Pt only takes one tablet   8/9/2017 at Unknown time   • Multiple Vitamins-Minerals (CENTRUM WOMEN PO) Take 1 capsule by mouth Daily. gummies   8/9/2017 at Unknown time   • oxyCODONE-acetaminophen (PERCOCET) 7.5-325 MG per tablet Take 1 tablet by mouth Every 6 (Six) Hours As Needed for Moderate Pain (4-6).   8/9/2017 at Unknown time   • traZODone (DESYREL) 100 MG  tablet Take 100 mg by mouth Every Night.   8/9/2017 at Unknown time   • vitamin D (ERGOCALCIFEROL) 96464 UNITS capsule capsule Take 50,000 Units by mouth 1 (One) Time Per Week. Wednesday 8/9/2017 at Unknown time   • metFORMIN (GLUCOPHAGE) 1000 MG tablet Take 1,000 mg by mouth 2 (Two) Times a Day With Meals.   8/9/2017   • naproxen (NAPROSYN) 500 MG tablet TK 1 T PO  BID  2 8/9/2017   • SUMAtriptan (IMITREX) 50 MG tablet Take 50 mg by mouth Every 2 (Two) Hours As Needed for Migraine. Take one tablet at onset of headache. May repeat dose one time in 2 hours if headache not relieved.   More than a month at Unknown time         Social History     Social History   • Marital status: Single     Spouse name: N/A   • Number of children: N/A   • Years of education: N/A     Occupational History   • Not on file.     Social History Main Topics   • Smoking status: Never Smoker   • Smokeless tobacco: Never Used   • Alcohol use No      Comment: rare   • Drug use: No   • Sexual activity: Defer     Other Topics Concern   • Not on file     Social History Narrative    Patient consumes 1 glass tea daily.     Patient lives at home with partner, Donald.            Family History   Problem Relation Age of Onset   • Breast cancer Mother 42   • No Known Problems Father    • Hypertension Brother    • Hypertension Maternal Grandmother    • Hypertension Maternal Grandfather    • No Known Problems Paternal Grandmother    • No Known Problems Paternal Grandfather    • Heart failure Son    • Hypertension Sister    • Ovarian cancer Neg Hx        REVIEW OF SYSTEMS:   Review of Systems   Constitution: Negative.   HENT: Negative.    Eyes: Negative.    Cardiovascular: Positive for chest pain, dyspnea on exertion and leg swelling. Negative for claudication, orthopnea, palpitations, paroxysmal nocturnal dyspnea and syncope.   Respiratory: Positive for shortness of breath.    Endocrine: Negative.    Hematologic/Lymphatic: Negative.    Skin: Negative.   "  Musculoskeletal: Negative.    Gastrointestinal: Negative.    Genitourinary: Negative.    Neurological: Negative.    Psychiatric/Behavioral: Negative.    Allergic/Immunologic: Negative.    All other systems reviewed and are negative.        Objective:     Vitals:    08/10/17 0824   BP: 135/89   BP Location: Left arm   Patient Position: Lying   Pulse: 79   Resp: 20   Temp: 97.1 °F (36.2 °C)   TempSrc: Temporal Artery    SpO2: 96%   Weight: 264 lb 5.3 oz (120 kg)   Height: 71\" (180.3 cm)     Body mass index is 36.87 kg/(m^2).  Flowsheet Rows         First Filed Value    Admission Height  71\" (180.3 cm) Documented at 08/10/2017 0824    Admission Weight  264 lb 5.3 oz (120 kg) Documented at 08/10/2017 0824          Physical Exam   General: No acute distress, well-developed and well-nourished.    Skin: Skin is warm and dry. No obvious cyanosis, erythema or pallor.   HEENT: Atraumatic, normocephalic, no conjunctival pallor, no scleral icterus.   Neck: Supple, no JVD. Normal carotid upstrokes, no bruits.    Chest:No respiratory distres No chest wall tenderness. Breath sounds are normal. No wheezes,  rhonchi or rales.  Cardiovascular: Normal S1 and S2, no murmer, gallop or rub. PMI is not displaced.    Pulses:Radial and pedal pulses are 2+ and symmetric.    Abdomen: Soft, non tender, normal bowel sounds.   Musculoskeletal/Extremities:  No clubbing, cyanosis or edema. No gross deformity.   Neurological: Alert and oriented to person, place, and time, no gross focal deficits.   Psychiatric: Normal mood and affect.Speech and behavior is normal.    Barbaeu Test:  Left: Not Assessed  (oxymetric Allens) Right: Normal     Lab Review:                  Results from last 7 days  Lab Units 08/09/17  1057   SODIUM mmol/L 138   POTASSIUM mmol/L 4.6   CHLORIDE mmol/L 102   CO2 mmol/L 31.0   BUN mg/dL 16   CREATININE mg/dL 0.80   GLUCOSE mg/dL 121*   CALCIUM mg/dL 9.8           Results from last 7 days  Lab Units 08/09/17  1057   WBC " 10*3/mm3 7.12   HEMOGLOBIN g/dL 11.9   HEMATOCRIT % 36.0   PLATELETS 10*3/mm3 218       Results from last 7 days  Lab Units 08/09/17  1057   CHOLESTEROL mg/dL 108   TRIGLYCERIDES mg/dL 62   HDL CHOL mg/dL 48   LDL CHOL mg/dL 47           EKG: CARD EKG FINDINGS: normal sinus rhythm, Lateral T wave inversions          Assessment:     · Angina, with abnormal cardiac PET scan  · Exertional dyspnea, anginal equivalent  · Hypertension  · Diabetes  · Obesity          Plan:     · Left heart catheterization        Scribed for Jez Sommer MD by Rian Coley PA-C. 8/10/2017  9:22 AM    I, Johny Sommer MD, personally performed the services as scribed by the above named individual. I have made any necessary edits and it is both accurate and complete.     Johny Sommer MD, MSc, [facility]  Interventional Cardiology  [facility] at [facility]

## 2017-09-06 PROBLEM — M50.30 DDD (DEGENERATIVE DISC DISEASE), CERVICAL: Status: ACTIVE | Noted: 2017-09-06

## 2017-09-11 ENCOUNTER — OFFICE VISIT (OUTPATIENT)
Dept: CARDIOLOGY | Facility: CLINIC | Age: 52
End: 2017-09-11

## 2017-09-11 VITALS
WEIGHT: 269.8 LBS | SYSTOLIC BLOOD PRESSURE: 160 MMHG | HEART RATE: 72 BPM | BODY MASS INDEX: 37.77 KG/M2 | HEIGHT: 71 IN | DIASTOLIC BLOOD PRESSURE: 92 MMHG

## 2017-09-11 DIAGNOSIS — I10 ESSENTIAL HYPERTENSION: Primary | ICD-10-CM

## 2017-09-11 DIAGNOSIS — I25.118 CORONARY ARTERY DISEASE OF NATIVE ARTERY OF NATIVE HEART WITH STABLE ANGINA PECTORIS (HCC): ICD-10-CM

## 2017-09-11 DIAGNOSIS — R07.9 CHEST PAIN, UNSPECIFIED TYPE: ICD-10-CM

## 2017-09-11 PROCEDURE — 99214 OFFICE O/P EST MOD 30 MIN: CPT | Performed by: INTERNAL MEDICINE

## 2017-09-11 NOTE — PROGRESS NOTES
[facility] AT [address], [address]  [place], [place], [address] [phone]  [email or web address]           OUTPATIENT CLINIC FOLLOW UP NOTE    Patient Care Team:  Patient Care Team:  CORRIE Anderson as PCP - General (Family Medicine)    Subjective:   Reason for consultation:   Chief Complaint   Patient presents with   • Hypertension   • Hyperlipidemia       HPI:    Kellen Esparza is a 51 y.o. female.  History of Present Illness  The patient has a history of diabetes, hypertension, hyperlipidemia, migraines, history of PE, who is a recent history of chest pain.  Today she presents for follow-up.    The patient underwent a negative CT for PE, underwent a stress test which was found to be abnormal.  Her left heart catheterization revealed small vessel disease in the setting of mild diastolic dysfunction and elevated blood pressures.  Today she presents for follow-up.  The patient continues to have exertional chest tightness that is a band across her chest.  The pain does not radiate anywhere, is mild to moderate severity, relieves spontaneously.    PFSH:  Patient Active Problem List   Diagnosis   • Chest pain   • Dyspnea on exertion   • Hypertension   • Pain of right breast   • Lower extremity edema   • Diabetes mellitus   • Abnormal stress test   • Migraine   • History of pulmonary embolus (PE)   • Hyperlipidemia   • DDD (degenerative disc disease), cervical   • Coronary artery disease of native artery of native heart with stable angina pectoris         Current Outpatient Prescriptions:   •  carvedilol (COREG) 6.25 MG tablet, Take 1 tablet by mouth 2 (Two) Times a Day With Meals., Disp: 60 tablet, Rfl: 0  •  clopidogrel (PLAVIX) 75 MG tablet, Take 1 tablet by mouth Daily., Disp: 30 tablet, Rfl: 0  •  cyclobenzaprine (FLEXERIL) 10 MG tablet, Take 10 mg by mouth 2 (Two) Times a Day., Disp: , Rfl:   •  estradiol (ESTRACE) 1 MG tablet, Take 1 mg by mouth Daily.,  "Disp: , Rfl:   •  HYDROXYZINE PAMOATE PO, Take 25 mg by mouth Every Night. 1-2 tablets at night-  Pt only takes one tablet, Disp: , Rfl:   •  metFORMIN (GLUCOPHAGE) 1000 MG tablet, Take 1,000 mg by mouth 2 (Two) Times a Day With Meals., Disp: , Rfl:   •  Multiple Vitamins-Minerals (CENTRUM WOMEN PO), Take 1 capsule by mouth Daily. gummies, Disp: , Rfl:   •  naproxen (NAPROSYN) 500 MG tablet, TK 1 T PO  BID, Disp: , Rfl: 2  •  oxyCODONE-acetaminophen (PERCOCET) 7.5-325 MG per tablet, Take 1 tablet by mouth Every 6 (Six) Hours As Needed for Moderate Pain (4-6)., Disp: , Rfl:   •  SUMAtriptan (IMITREX) 50 MG tablet, Take 50 mg by mouth Every 2 (Two) Hours As Needed for Migraine. Take one tablet at onset of headache. May repeat dose one time in 2 hours if headache not relieved., Disp: , Rfl:   •  traZODone (DESYREL) 100 MG tablet, Take 100 mg by mouth Every Night., Disp: , Rfl:   •  vitamin D (ERGOCALCIFEROL) 34631 UNITS capsule capsule, Take 50,000 Units by mouth 1 (One) Time Per Week. Wednesday, Disp: , Rfl:   •  atorvastatin (LIPITOR) 80 MG tablet, Take 1 tablet by mouth Every Night., Disp: 30 tablet, Rfl: 0    No Known Allergies     reports that she has never smoked. She has never used smokeless tobacco.    Family History   Problem Relation Age of Onset   • Breast cancer Mother 42   • No Known Problems Father    • Hypertension Brother    • Hypertension Maternal Grandmother    • Hypertension Maternal Grandfather    • No Known Problems Paternal Grandmother    • No Known Problems Paternal Grandfather    • Heart failure Son    • Hypertension Sister    • Ovarian cancer Neg Hx        Review of Systems:  Positive for chest pain  Negative for dyspnea with exertion, orthopnea, PND, lower extremity edema, palpitations, lightheadedness, syncope.   All other systems reviewed and are negative.    Objective:   Blood pressure 160/92, pulse 72, height 71\" (180.3 cm), weight 269 lb 12.8 oz (122 kg), last menstrual period 10/31/2014, " not currently breastfeeding.  CONSTITUTIONAL: No acute distress, normal affect  RESPIRATORY: Normal effort. Clear to auscultation bilaterally without wheezing or rales  CARDIOVASCULAR: Carotids with normal upstrokes without bruits.  Regular rate and rhythm with normal S1 and S2. Without murmur, gallop or rub.  PERIPHERAL VASCULAR: Normal radial pulses bilaterally. There is 1+ lower extremity edema bilaterally.    Labs:  Lab Results   Component Value Date    ALT 42 (H) 07/21/2017    AST 27 07/21/2017     Lab Results   Component Value Date    CHOL 108 08/09/2017    TRIG 62 08/09/2017    HDL 48 08/09/2017    LDLDIRECT 47 08/09/2017    CREATININE 0.80 08/09/2017       Diagnostic Data:    Procedures  TTE 7/2017  · Estimated EF appears to be in the range of 66 - 70%.  · Left ventricular wall thickness is consistent with moderate asymmetric hypertrophy.  · Left ventricular diastolic dysfunction (grade I) consistent with impaired relaxation.    [facility] 7/2017  · There is mild to moderate diffuse tortuosity of the vessels.  There is evidence of small vessel disease in the distal portion of a small RPL S.  There is no obstructive coronary artery disease.  · Normal left ventricular ejection fraction with no regional wall motion abnormalities    CT PE Protocol 7/2017 - No evidence of pulmonary embolus or other acute chest  disease.    Assessment and Plan:   Kellen was seen today for hypertension and hyperlipidemia.    Diagnoses and all orders for this visit:    Essential hypertension  Coronary artery disease of native artery of native heart with stable angina pectoris  Chest pain, unspecified type  -Etiology of her chest pain is not entirely clear.  For a possible it is due to small vessel disease in the setting of diastolic dysfunction and hypertension.  Her blood pressures have been in the 150- 160s systolic and 90 to 100s diastolic.  -Increase carvedilol to 12.5 mg twice a day, start hydrochlorothiazide 12.5 mg daily (that'll if  her blood pressure, but also with mild lower external swelling)  -We will call the patient one week to check her blood pressure.  If her blood pressure still uncontrolled, consider increasing carvedilol to 25 mg twice a day if her heart rate can tolerate.  If her heart rate cannot tolerate further increase in her beta blocker, can increase hydrochlorothiazide 25 mg daily.  -Follow up in 4-6 weeks.  Repeat BMP at that time if still on hydrochlorothiazide    - Dietary and exercise counseling was provided during this visit  - Return in about 4 weeks (around 10/9/2017).    Johny Sommer MD, MSc, [facility]  Interventional Cardiology  [facility] at [facility]

## 2017-09-14 RX ORDER — HYDROCHLOROTHIAZIDE 12.5 MG/1
12.5 CAPSULE, GELATIN COATED ORAL DAILY
Qty: 30 CAPSULE | Refills: 11 | Status: SHIPPED | OUTPATIENT
Start: 2017-09-14 | End: 2018-08-28 | Stop reason: SDUPTHER

## 2017-09-15 RX ORDER — CARVEDILOL 12.5 MG/1
12.5 TABLET ORAL 2 TIMES DAILY WITH MEALS
Qty: 60 TABLET | Refills: 11 | Status: SHIPPED | OUTPATIENT
Start: 2017-09-15 | End: 2017-12-11 | Stop reason: SDUPTHER

## 2017-09-19 ENCOUNTER — TELEPHONE (OUTPATIENT)
Dept: CARDIOLOGY | Facility: CLINIC | Age: 52
End: 2017-09-19

## 2017-11-13 ENCOUNTER — TRANSCRIBE ORDERS (OUTPATIENT)
Dept: ADMINISTRATIVE | Facility: HOSPITAL | Age: 52
End: 2017-11-13

## 2017-11-13 DIAGNOSIS — Z12.31 VISIT FOR SCREENING MAMMOGRAM: Primary | ICD-10-CM

## 2017-11-15 ENCOUNTER — TRANSCRIBE ORDERS (OUTPATIENT)
Dept: ADMINISTRATIVE | Facility: HOSPITAL | Age: 52
End: 2017-11-15

## 2017-11-15 DIAGNOSIS — G43.919 INTRACTABLE MIGRAINE WITHOUT STATUS MIGRAINOSUS, UNSPECIFIED MIGRAINE TYPE: Primary | ICD-10-CM

## 2017-11-17 ENCOUNTER — APPOINTMENT (OUTPATIENT)
Dept: MAMMOGRAPHY | Facility: HOSPITAL | Age: 52
End: 2017-11-17

## 2017-11-20 ENCOUNTER — APPOINTMENT (OUTPATIENT)
Dept: MRI IMAGING | Facility: HOSPITAL | Age: 52
End: 2017-11-20

## 2017-11-29 ENCOUNTER — HOSPITAL ENCOUNTER (OUTPATIENT)
Dept: MRI IMAGING | Facility: HOSPITAL | Age: 52
Discharge: HOME | End: 2017-11-29
Admitting: NURSE PRACTITIONER

## 2017-11-29 ENCOUNTER — TRANSCRIBE ORDERS (OUTPATIENT)
Dept: ADMINISTRATIVE | Facility: HOSPITAL | Age: 52
End: 2017-11-29

## 2017-11-29 ENCOUNTER — HOSPITAL ENCOUNTER (OUTPATIENT)
Dept: GENERAL RADIOLOGY | Facility: HOSPITAL | Age: 52
Discharge: HOME | End: 2017-11-29

## 2017-11-29 DIAGNOSIS — Z01.818 PRE-OP EXAM: ICD-10-CM

## 2017-11-29 DIAGNOSIS — G43.919 INTRACTABLE MIGRAINE WITHOUT STATUS MIGRAINOSUS, UNSPECIFIED MIGRAINE TYPE: ICD-10-CM

## 2017-11-29 DIAGNOSIS — Z01.818 PRE-OP EXAM: Primary | ICD-10-CM

## 2017-11-29 PROCEDURE — 71020 HC CHEST PA AND LATERAL: CPT

## 2017-11-29 PROCEDURE — 70551 MRI BRAIN STEM W/O DYE: CPT

## 2017-12-11 ENCOUNTER — OFFICE VISIT (OUTPATIENT)
Dept: CARDIOLOGY | Facility: CLINIC | Age: 52
End: 2017-12-11

## 2017-12-11 VITALS
WEIGHT: 264.8 LBS | BODY MASS INDEX: 37.07 KG/M2 | HEART RATE: 83 BPM | DIASTOLIC BLOOD PRESSURE: 90 MMHG | SYSTOLIC BLOOD PRESSURE: 148 MMHG | HEIGHT: 71 IN | OXYGEN SATURATION: 96 %

## 2017-12-11 DIAGNOSIS — E78.2 MIXED HYPERLIPIDEMIA: ICD-10-CM

## 2017-12-11 DIAGNOSIS — I10 ESSENTIAL HYPERTENSION: Primary | ICD-10-CM

## 2017-12-11 DIAGNOSIS — I25.118 CORONARY ARTERY DISEASE OF NATIVE ARTERY OF NATIVE HEART WITH STABLE ANGINA PECTORIS (HCC): ICD-10-CM

## 2017-12-11 DIAGNOSIS — Z01.811 PRE-OP CHEST EXAM: ICD-10-CM

## 2017-12-11 PROCEDURE — 99214 OFFICE O/P EST MOD 30 MIN: CPT | Performed by: INTERNAL MEDICINE

## 2017-12-11 RX ORDER — CARVEDILOL 25 MG/1
25 TABLET ORAL 2 TIMES DAILY WITH MEALS
Qty: 60 TABLET | Refills: 11 | Status: SHIPPED | OUTPATIENT
Start: 2017-12-11 | End: 2017-12-11 | Stop reason: SDUPTHER

## 2017-12-11 RX ORDER — CARVEDILOL 25 MG/1
25 TABLET ORAL 2 TIMES DAILY WITH MEALS
Qty: 60 TABLET | Refills: 11 | Status: SHIPPED | OUTPATIENT
Start: 2017-12-11 | End: 2019-04-18

## 2017-12-11 NOTE — PROGRESS NOTES
[facility] AT [address], [address]  [place], [place], [address] [phone]  WWW[facility]           OUTPATIENT CLINIC FOLLOW UP NOTE    Patient Care Team:  Patient Care Team:  CORRIE Anderson as PCP - General (Family Medicine)    Subjective:     HPI:    Kellen Esparza is a 52 y.o. female.  History of Present Illness  The patient has a history of CAD (small vessel disease close), mild diastolic dysfunction, diabetes, hypertension, hyperlipidemia, migraines, history of PE, who is a recent history of chest pain.  Today she presents for follow-up.    The patient continues to have very mild, atypical chest pain that is not exacerbated clearly with exertion.  The chest pains resolve spontaneously.  Overall the patient feels like she has more energy on a day-to-day basis. Blood pressure at home has been running around 140/75    PFSH:  Patient Active Problem List   Diagnosis   • Chest pain   • Dyspnea on exertion   • Essential hypertension   • Pain of right breast   • Lower extremity edema   • Diabetes mellitus   • Abnormal stress test   • Migraine   • History of pulmonary embolus (PE)   • Hyperlipidemia   • DDD (degenerative disc disease), cervical   • Coronary artery disease of native artery of native heart with stable angina pectoris         Current Outpatient Prescriptions:   •  atorvastatin (LIPITOR) 80 MG tablet, Take 1 tablet by mouth Every Night., Disp: 30 tablet, Rfl: 0  •  carvedilol (COREG) 25 MG tablet, Take 1 tablet by mouth 2 (Two) Times a Day With Meals., Disp: 60 tablet, Rfl: 11  •  cyclobenzaprine (FLEXERIL) 10 MG tablet, Take 10 mg by mouth 2 (Two) Times a Day., Disp: , Rfl:   •  estradiol (ESTRACE) 1 MG tablet, Take 1 mg by mouth Daily., Disp: , Rfl:   •  hydrochlorothiazide (MICROZIDE) 12.5 MG capsule, Take 1 capsule by mouth Daily., Disp: 30 capsule, Rfl: 11  •  HYDROXYZINE PAMOATE PO, Take 25 mg by mouth Every Night. 1-2 tablets at night-   "Pt only takes one tablet, Disp: , Rfl:   •  metFORMIN (GLUCOPHAGE) 1000 MG tablet, Take 1,000 mg by mouth 2 (Two) Times a Day With Meals., Disp: , Rfl:   •  Multiple Vitamins-Minerals (CENTRUM WOMEN PO), Take 1 capsule by mouth Daily. gummies, Disp: , Rfl:   •  naproxen (NAPROSYN) 500 MG tablet, TK 1 T PO  BID, Disp: , Rfl: 2  •  oxyCODONE-acetaminophen (PERCOCET) 7.5-325 MG per tablet, Take 1 tablet by mouth Every 6 (Six) Hours As Needed for Moderate Pain (4-6)., Disp: , Rfl:   •  SUMAtriptan (IMITREX) 50 MG tablet, Take 50 mg by mouth Every 2 (Two) Hours As Needed for Migraine. Take one tablet at onset of headache. May repeat dose one time in 2 hours if headache not relieved., Disp: , Rfl:   •  traZODone (DESYREL) 100 MG tablet, Take 100 mg by mouth Every Night., Disp: , Rfl:   •  vitamin D (ERGOCALCIFEROL) 77773 UNITS capsule capsule, Take 50,000 Units by mouth 1 (One) Time Per Week. Wednesday, Disp: , Rfl:     No Known Allergies     reports that she has never smoked. She has never used smokeless tobacco.    Family History   Problem Relation Age of Onset   • Breast cancer Mother 42   • No Known Problems Father    • Hypertension Brother    • Hypertension Maternal Grandmother    • Hypertension Maternal Grandfather    • No Known Problems Paternal Grandmother    • No Known Problems Paternal Grandfather    • Heart failure Son    • Hypertension Sister    • Ovarian cancer Neg Hx        Review of Systems:  Positive for chest pain  Negative for dyspnea with exertion, orthopnea, PND, lower extremity edema, palpitations, lightheadedness, syncope.   All other systems reviewed and are negative.    Objective:   Blood pressure 148/90, pulse 83, height 180.3 cm (71\"), weight 120 kg (264 lb 12.8 oz), last menstrual period 10/31/2014, SpO2 96 %, not currently breastfeeding.  CONSTITUTIONAL: No acute distress, normal affect  RESPIRATORY: Normal effort. Clear to auscultation bilaterally without wheezing or rales  CARDIOVASCULAR: " Carotids with normal upstrokes without bruits.  Regular rate and rhythm with normal S1 and S2. Without gallop or rub. Soft systolic murmur in the right upper sternal border without radiation to the carotids.  PERIPHERAL VASCULAR: Normal radial pulses bilaterally. There is 1+ Right lower extremity edema     Labs:  Lab Results   Component Value Date    ALT 42 (H) 07/21/2017    AST 27 07/21/2017     Lab Results   Component Value Date    CHOL 108 08/09/2017    TRIG 62 08/09/2017    HDL 48 08/09/2017    LDLDIRECT 47 08/09/2017    CREATININE 0.80 08/09/2017       Diagnostic Data:    Procedures  TTE 7/2017  · Estimated EF appears to be in the range of 66 - 70%.  · Left ventricular wall thickness is consistent with moderate asymmetric hypertrophy.  · Left ventricular diastolic dysfunction (grade I) consistent with impaired relaxation.    C 7/2017  · There is mild to moderate diffuse tortuosity of the vessels.  There is evidence of small vessel disease in the distal portion of a small RPL S.  There is no obstructive coronary artery disease.  · Normal left ventricular ejection fraction with no regional wall motion abnormalities    CT PE Protocol 7/2017 - No evidence of pulmonary embolus or other acute chest  disease.    Assessment and Plan:   Kellen was seen today for hypertension and hyperlipidemia.    Diagnoses and all orders for this visit:    Essential hypertension  Coronary artery disease of native artery of native heart with stable angina pectoris  Chest pain, unspecified type  -Atypical chest pain that has improved with better blood pressure control.  Chest pain may have been secondary to small vessel disease and poorly controlled blood pressure.    -Increase carvedilol to 25 mg daily   -Aspirin 81 mg daily   -Patient to call us with blood pressure measurements the week after surgery to consider further titration.  Hydrochlorothiazide could be increased to 25 mg daily if her blood pressure averages above 130/80  mmHg.    Preoperative cardiac risk assessment  -The patient has been doing well from a cardiac standpoint.  She is able to perform 4 metabolic equivalents without difficulty.  She has no active cardiac complaints.  No further cardiac testing is recommended prior to surgery.  Recommend continuing her aspirin 81 mg daily and blood pressure medicines perioperatively.    - Dietary and exercise counseling was provided during this visit  - Return in about 6 months (around 6/11/2018).    Johny Sommer MD, MSc, [facility]  Interventional Cardiology  [facility] at [facility]

## 2018-01-09 ENCOUNTER — TELEPHONE (OUTPATIENT)
Dept: CARDIOLOGY | Facility: CLINIC | Age: 53
End: 2018-01-09

## 2018-02-26 ENCOUNTER — TELEPHONE (OUTPATIENT)
Dept: CARDIOLOGY | Facility: CLINIC | Age: 53
End: 2018-02-26

## 2018-02-26 NOTE — TELEPHONE ENCOUNTER
"The patient called complaining of NIX which has worsened over the last 3 weeks.  She states that her boyfriend has complained about her \"stopping breathing during the night and catching her breath.\"  After speaking with ANCA I advised that he has recommended a sleep study, and I asked that she keep a log of her BP as well since no BP readings are available.  Understanding and agreement verbalized.   "

## 2018-04-25 ENCOUNTER — HOSPITAL ENCOUNTER (OUTPATIENT)
Dept: GENERAL RADIOLOGY | Facility: HOSPITAL | Age: 53
Discharge: HOME | End: 2018-04-25
Admitting: NURSE PRACTITIONER

## 2018-04-25 ENCOUNTER — TRANSCRIBE ORDERS (OUTPATIENT)
Dept: ADMINISTRATIVE | Facility: HOSPITAL | Age: 53
End: 2018-04-25

## 2018-04-25 DIAGNOSIS — G89.29 CHRONIC BILATERAL LOW BACK PAIN WITHOUT SCIATICA: Primary | ICD-10-CM

## 2018-04-25 DIAGNOSIS — G89.29 CHRONIC BILATERAL LOW BACK PAIN WITHOUT SCIATICA: ICD-10-CM

## 2018-04-25 DIAGNOSIS — M54.50 CHRONIC BILATERAL LOW BACK PAIN WITHOUT SCIATICA: ICD-10-CM

## 2018-04-25 DIAGNOSIS — M54.50 CHRONIC BILATERAL LOW BACK PAIN WITHOUT SCIATICA: Primary | ICD-10-CM

## 2018-04-25 PROCEDURE — 72100 X-RAY EXAM L-S SPINE 2/3 VWS: CPT

## 2018-04-25 PROCEDURE — 72072 X-RAY EXAM THORAC SPINE 3VWS: CPT

## 2018-06-06 DIAGNOSIS — Z12.11 SCREENING FOR COLON CANCER: Primary | ICD-10-CM

## 2018-06-13 ENCOUNTER — OUTSIDE FACILITY SERVICE (OUTPATIENT)
Dept: GASTROENTEROLOGY | Facility: CLINIC | Age: 53
End: 2018-06-13

## 2018-06-13 PROCEDURE — G0121 COLON CA SCRN NOT HI RSK IND: HCPCS | Performed by: INTERNAL MEDICINE

## 2018-08-27 ENCOUNTER — TELEPHONE (OUTPATIENT)
Dept: CARDIOLOGY | Facility: CLINIC | Age: 53
End: 2018-08-27

## 2018-08-27 NOTE — TELEPHONE ENCOUNTER
The patient called c/o BLE edema and dyspnea requesting to be seen.  I advised that we will get her scheduled in the office, and in the meantime she can be seen in the [facility] center if they have availability.  She is amenable to this plan.  I provided her with the number to [facility], and transferred her to scheduling.  All questions and concerns addressed at this time.  Understanding verbalized.

## 2018-08-28 ENCOUNTER — HOSPITAL ENCOUNTER (OUTPATIENT)
Dept: CARDIOLOGY | Facility: HOSPITAL | Age: 53
Discharge: HOME | End: 2018-08-28
Admitting: NURSE PRACTITIONER

## 2018-08-28 ENCOUNTER — OFFICE VISIT (OUTPATIENT)
Dept: CARDIOLOGY | Facility: HOSPITAL | Age: 53
End: 2018-08-28

## 2018-08-28 VITALS
BODY MASS INDEX: 40.1 KG/M2 | OXYGEN SATURATION: 96 % | TEMPERATURE: 97.8 F | WEIGHT: 286.4 LBS | HEIGHT: 71 IN | RESPIRATION RATE: 18 BRPM | DIASTOLIC BLOOD PRESSURE: 111 MMHG | HEART RATE: 86 BPM | SYSTOLIC BLOOD PRESSURE: 187 MMHG

## 2018-08-28 DIAGNOSIS — I25.10 CORONARY ARTERY DISEASE INVOLVING NATIVE CORONARY ARTERY OF NATIVE HEART, ANGINA PRESENCE UNSPECIFIED: ICD-10-CM

## 2018-08-28 DIAGNOSIS — R60.0 LOCALIZED EDEMA: ICD-10-CM

## 2018-08-28 DIAGNOSIS — E78.2 MIXED HYPERLIPIDEMIA: ICD-10-CM

## 2018-08-28 DIAGNOSIS — R06.00 DYSPNEA, UNSPECIFIED TYPE: ICD-10-CM

## 2018-08-28 DIAGNOSIS — I10 ESSENTIAL HYPERTENSION: ICD-10-CM

## 2018-08-28 DIAGNOSIS — E11.9 TYPE 2 DIABETES MELLITUS WITHOUT COMPLICATION, WITHOUT LONG-TERM CURRENT USE OF INSULIN (HCC): ICD-10-CM

## 2018-08-28 DIAGNOSIS — I25.10 CORONARY ARTERY DISEASE INVOLVING NATIVE CORONARY ARTERY OF NATIVE HEART, ANGINA PRESENCE UNSPECIFIED: Primary | ICD-10-CM

## 2018-08-28 PROCEDURE — 93005 ELECTROCARDIOGRAM TRACING: CPT | Performed by: NURSE PRACTITIONER

## 2018-08-28 PROCEDURE — 93010 ELECTROCARDIOGRAM REPORT: CPT | Performed by: INTERNAL MEDICINE

## 2018-08-28 PROCEDURE — 99214 OFFICE O/P EST MOD 30 MIN: CPT | Performed by: NURSE PRACTITIONER

## 2018-08-28 RX ORDER — HYDROCHLOROTHIAZIDE 12.5 MG/1
25 CAPSULE, GELATIN COATED ORAL DAILY
Qty: 30 CAPSULE | Refills: 3 | Status: SHIPPED | OUTPATIENT
Start: 2018-08-28 | End: 2018-08-28

## 2018-08-28 RX ORDER — HYDROCHLOROTHIAZIDE 25 MG/1
25 TABLET ORAL DAILY
Qty: 30 TABLET | Refills: 3 | Status: SHIPPED | OUTPATIENT
Start: 2018-08-28 | End: 2019-01-09 | Stop reason: SDUPTHER

## 2018-08-28 RX ORDER — ESCITALOPRAM OXALATE 10 MG/1
10 TABLET ORAL DAILY
Refills: 5 | COMMUNITY
Start: 2018-08-23 | End: 2020-01-06

## 2018-08-28 RX ORDER — MELOXICAM 15 MG/1
15 TABLET ORAL DAILY PRN
Refills: 3 | COMMUNITY
Start: 2018-08-23 | End: 2019-07-18

## 2018-08-28 NOTE — PROGRESS NOTES
[facility]  [facility]      Encounter Date:08/28/2018     Kellen Esparza  [address] [place] [address]      1965    Hemalatha Rahman APRN    Kellen Esparza is a 52 y.o. female.      Subjective:     Chief Complaint:  Edema and Shortness of Breath       HPI     Patient with a history of CAD (small vessel disease), mild diastolic dysfunction, diabetes, hypertension, hyperlipidemia, migraines, history of PE.  Patient with atypical chest pain presentation.      Pt no showed her last appointment with Dr. Sommer last month.  She presented today c/o of SOA with exertion, swelling.  Duration 1 week.  Denies CP, pressure, palpitations, dizziness, near syncope/syncope.  Pt is sedentary.  Works from home.  Has been without HCTZ 1 month.  Needs new PCP    Patient Active Problem List    Diagnosis   • Coronary artery disease of native artery of native heart with stable angina pectoris (CMS/HCC) [I25.118]   • DDD (degenerative disc disease), cervical [M50.30]     Overview Note:     Degenerative disk disease  I have reviewed the cervical and lumbar MRI. She straightening of the cervical spine, but no evidence of significant disk protrusion. She does have some degenerative disk disease, however. The lumbar shows similar findings of degenerative change 12/15/16     • Migraine [G43.909]   • Hyperlipidemia [E78.5]   • Abnormal stress test [R94.39]     Overview Note:     Cardiac PET 7-26-17  · Left ventricular ejection fraction is normal (Calculated EF = 70%).  · Myocardial perfusion imaging indicates a medium-sized infarct pattern located in the anterior wall with no significant ischemia noted. This finding is worse on resting images which is suggestive of possible breast attenuation artifact instead of an infarction. Impressions are consistent with a study indicating uncertain risk.     • Chest pain [R07.9]   • Dyspnea on exertion [R06.09]   • Essential hypertension [I10]   • Pain of right  breast [N64.4]   • Lower extremity edema [R60.0]   • Diabetes mellitus (CMS/HCC) [E11.9]   • History of pulmonary embolus (PE) [Z86.711]         Past Surgical History:   Procedure Laterality Date   • BREAST BIOPSY Left 2014   • CARDIAC CATHETERIZATION Left 8/10/2017    Procedure: Cardiac Catheterization/Vascular Study;  Surgeon: Johny Sommer MD;  Location: [facility] INVASIVE LOCATION;  Service:    • CHOLECYSTECTOMY     • COLONOSCOPY     • ENDOSCOPY     • HYSTERECTOMY  11/2014   • KNEE SURGERY Right 12/2017    total replacement   • NASAL SINUS SURGERY     • OOPHORECTOMY  11/2014   • PULMONARY EMBOLISM SURGERY      right lung   • TUBAL ABDOMINAL LIGATION         No Known Allergies      Current Outpatient Prescriptions:   •  aspirin 81 MG tablet, Take 1 tablet by mouth Daily., Disp: 30 tablet, Rfl: 11  •  atorvastatin (LIPITOR) 80 MG tablet, Take 1 tablet by mouth Every Night., Disp: 30 tablet, Rfl: 0  •  carvedilol (COREG) 25 MG tablet, Take 1 tablet by mouth 2 (Two) Times a Day With Meals., Disp: 60 tablet, Rfl: 11  •  cyclobenzaprine (FLEXERIL) 10 MG tablet, Take 10 mg by mouth 2 (Two) Times a Day., Disp: , Rfl:   •  escitalopram (LEXAPRO) 10 MG tablet, Take 10 mg by mouth Daily., Disp: , Rfl: 5  •  estradiol (ESTRACE) 1 MG tablet, Take 1 mg by mouth Daily., Disp: , Rfl:   •  HYDROXYZINE PAMOATE PO, Take 25 mg by mouth Every Night. 1-2 tablets at night-  Pt only takes one tablet, Disp: , Rfl:   •  meloxicam (MOBIC) 15 MG tablet, Take 15 mg by mouth Daily As Needed., Disp: , Rfl: 3  •  metFORMIN (GLUCOPHAGE) 1000 MG tablet, Take 1,000 mg by mouth 2 (Two) Times a Day With Meals., Disp: , Rfl:   •  Multiple Vitamins-Minerals (CENTRUM WOMEN PO), Take 1 capsule by mouth Daily. gummies, Disp: , Rfl:   •  naproxen (NAPROSYN) 500 MG tablet, Take 500 mg by mouth Daily As Needed., Disp: , Rfl: 2  •  SUMAtriptan (IMITREX) 50 MG tablet, Take 50 mg by mouth Every 2 (Two) Hours As Needed for Migraine. Take one tablet at  onset of headache. May repeat dose one time in 2 hours if headache not relieved., Disp: , Rfl:   •  traZODone (DESYREL) 100 MG tablet, Take 100 mg by mouth Every Night., Disp: , Rfl:   •  vitamin D (ERGOCALCIFEROL) 15770 UNITS capsule capsule, Take 50,000 Units by mouth 1 (One) Time Per Week. Wednesday, Disp: , Rfl:   •  hydrochlorothiazide (MICROZIDE) 12.5 MG capsule, Take 2 capsules by mouth Daily., Disp: 30 capsule, Rfl: 3    The following portions of the patient's history were reviewed and updated as appropriate: allergies, current medications, past family history, past medical history, past social history, past surgical history and problem list.    Review of Systems   Constitution: Positive for malaise/fatigue. Negative for chills, decreased appetite, diaphoresis, fever, weakness, night sweats, weight gain and weight loss.   HENT: Negative for congestion, hearing loss, hoarse voice and nosebleeds.    Eyes: Negative for blurred vision, visual disturbance and visual halos.   Cardiovascular: Positive for chest pain and dyspnea on exertion. Negative for claudication, cyanosis, irregular heartbeat, leg swelling, near-syncope, orthopnea, palpitations, paroxysmal nocturnal dyspnea and syncope.   Respiratory: Positive for shortness of breath. Negative for cough, hemoptysis, sleep disturbances due to breathing, snoring, sputum production and wheezing.    Hematologic/Lymphatic: Negative for bleeding problem. Does not bruise/bleed easily.   Skin: Negative for dry skin, itching and rash.   Musculoskeletal: Negative for arthritis, joint pain, joint swelling and myalgias.   Gastrointestinal: Negative for bloating, abdominal pain, constipation, diarrhea, flatus, heartburn, hematemesis, hematochezia, melena, nausea and vomiting.   Genitourinary: Negative for dysuria, frequency, hematuria, nocturia and urgency.   Neurological: Negative for excessive daytime sleepiness, dizziness, headaches, light-headedness and loss of balance.  "  Psychiatric/Behavioral: Negative for depression. The patient does not have insomnia and is not nervous/anxious.        Objective:     Vitals:    08/28/18 1312 08/28/18 1314 08/28/18 1315   BP: 163/99 163/99 (!) 187/111   BP Location: Right arm Left arm Left arm   Patient Position: Sitting Sitting Standing   Pulse: 84  86   Resp: 18     Temp: 97.8 °F (36.6 °C)     TempSrc: Temporal Artery      SpO2: 96%     Weight: 130 kg (286 lb 6.4 oz)     Height: 180.3 cm (71\")           Physical Exam   Constitutional: She is oriented to person, place, and time. She appears well-developed and well-nourished. No distress.   Very pleasant, moderately obese young adult female in [place].     HENT:   Head: Normocephalic and atraumatic.   Eyes: Pupils are equal, round, and reactive to light.   Neck: Neck supple. No JVD present. No tracheal deviation present.   Cardiovascular: Normal rate, regular rhythm, normal heart sounds and intact distal pulses.    No murmur heard.  Pulmonary/Chest: Effort normal and breath sounds normal. No respiratory distress.   Abdominal: Soft. Bowel sounds are normal. There is no tenderness.   Musculoskeletal: Normal range of motion. She exhibits no edema.   Neurological: She is alert and oriented to person, place, and time. No cranial nerve deficit.   Skin: Skin is warm and dry. No rash noted.   Psychiatric: She has a normal mood and affect. Her behavior is normal.       Lab and Diagnostic Review:  Procedures  TTE 7/2017  · Estimated EF appears to be in the range of 66 - 70%.  · Left ventricular wall thickness is consistent with moderate asymmetric hypertrophy.  · Left ventricular diastolic dysfunction (grade I) consistent with impaired relaxation.     [facility] 7/2017  · There is mild to moderate diffuse tortuosity of the vessels.  There is evidence of small vessel disease in the distal portion of a small RPL S.  There is no obstructive coronary artery disease.  · Normal left ventricular ejection fraction with no " regional wall motion abnormalities     CT PE Protocol 7/2017 - No evidence of pulmonary embolus or other acute chest  disease.  Assessment and Plan:         1. Coronary artery disease involving native coronary artery of native heart, angina presence unspecified  (small vessel disease per medical records)  No CP.  Monitor at this time  - ECG 12 Lead; SR 81 bpm  - Lipid Panel; Future    2. Essential hypertension  restart  - hydrochlorothiazide (MICROZIDE) 25 mg daily  - Comprehensive Metabolic Panel; Future    Continue Coreg  3. Localized edema    - hydrochlorothiazide (MICROZIDE) 25 mg daily    4. Dyspnea, unspecified type    - CBC (No Diff); Future  If s/s worsen please call    5. Type 2 diabetes mellitus without complication, without long-term current use of insulin (CMS/[facility])    - Hemoglobin A1c; Future    6. BMI 40.0-44.9, adult (CMS/[facility])  Diet and exercise modifications    - Lipid Panel; Future    7. Mixed hyperlipidemia  statin    F/u 6 weeks or sooner if needed.    *Please note that portions of this note were completed with a voice recognition program. Efforts were made to edit the dictations, but occasionally words are mistranscribed.

## 2018-10-24 ENCOUNTER — TRANSCRIBE ORDERS (OUTPATIENT)
Dept: DIABETES SERVICES | Facility: HOSPITAL | Age: 53
End: 2018-10-24

## 2018-10-24 DIAGNOSIS — E11.9 TYPE 2 DIABETES MELLITUS WITHOUT COMPLICATION, WITHOUT LONG-TERM CURRENT USE OF INSULIN (HCC): Primary | ICD-10-CM

## 2018-11-07 ENCOUNTER — OFFICE VISIT (OUTPATIENT)
Dept: CARDIOLOGY | Facility: HOSPITAL | Age: 53
End: 2018-11-07

## 2018-11-07 VITALS
TEMPERATURE: 97.8 F | WEIGHT: 280 LBS | HEART RATE: 77 BPM | HEIGHT: 71 IN | SYSTOLIC BLOOD PRESSURE: 149 MMHG | DIASTOLIC BLOOD PRESSURE: 97 MMHG | RESPIRATION RATE: 16 BRPM | OXYGEN SATURATION: 98 % | BODY MASS INDEX: 39.2 KG/M2

## 2018-11-07 DIAGNOSIS — I10 ESSENTIAL HYPERTENSION: ICD-10-CM

## 2018-11-07 DIAGNOSIS — I25.118 CORONARY ARTERY DISEASE OF NATIVE ARTERY OF NATIVE HEART WITH STABLE ANGINA PECTORIS (HCC): Primary | ICD-10-CM

## 2018-11-07 DIAGNOSIS — R06.02 SHORTNESS OF BREATH: ICD-10-CM

## 2018-11-07 DIAGNOSIS — R07.9 CHEST PAIN, UNSPECIFIED TYPE: ICD-10-CM

## 2018-11-07 PROCEDURE — 99214 OFFICE O/P EST MOD 30 MIN: CPT | Performed by: NURSE PRACTITIONER

## 2018-11-07 RX ORDER — ISOSORBIDE MONONITRATE 30 MG/1
30 TABLET, EXTENDED RELEASE ORAL EVERY MORNING
Qty: 30 TABLET | Refills: 3 | Status: SHIPPED | OUTPATIENT
Start: 2018-11-07 | End: 2019-04-02 | Stop reason: SDUPTHER

## 2018-11-07 RX ORDER — AMLODIPINE BESYLATE 5 MG/1
5 TABLET ORAL DAILY
Qty: 30 TABLET | Refills: 3 | Status: SHIPPED | OUTPATIENT
Start: 2018-11-07 | End: 2018-11-07

## 2018-11-07 NOTE — PROGRESS NOTES
[facility]  [facility]      Encounter Date:11/07/2018     Kellen Esparza  [address] [place] [address]  [phone]    1965    Hemalatha Rahman APRN    Kellen Esparza is a 52 y.o. female.      Subjective:     Chief Complaint:  Follow-up (HTN, CP, dyspnea)       HPI     Patient with history of non-obstructive CAD (small vessel disease), mild diastolic dysfunction, diabetes, hypertension, hyperlipidemia, migraines, history of PE.  Atypical chest.  Last seen for SOA with exertion, swelling.  Pt had been without HCTZ 1 month.  PT states continued dyspnea on exertion. She has tried to start walking daily, but has not increased endurance and has not noticed improved dyspnea.  Pt describes intermittent chest pressure with exertion, stress/anxiety.  Pt is scheduled for sleep study in December for poor quality of sleep.  Denies palpitations, edema, dizziness, syncope.     Patient Active Problem List    Diagnosis Date Noted   • Coronary artery disease of native artery of native heart with stable angina pectoris (CMS/[facility]) 09/11/2017   • DDD (degenerative disc disease), cervical 09/06/2017     Note Last Updated: 9/6/2017     Degenerative disk disease  I have reviewed the cervical and lumbar MRI. She straightening of the cervical spine, but no evidence of significant disk protrusion. She does have some degenerative disk disease, however. The lumbar shows similar findings of degenerative change 12/15/16     • Migraine 08/10/2017   • Hyperlipidemia 08/10/2017   • Abnormal stress test 08/03/2017     Note Last Updated: 8/10/2017     Cardiac PET 7-26-17  · Left ventricular ejection fraction is normal (Calculated EF = 70%).  · Myocardial perfusion imaging indicates a medium-sized infarct pattern located in the anterior wall with no significant ischemia noted. This finding is worse on resting images which is suggestive of possible breast attenuation artifact instead of an infarction.  Impressions are consistent with a study indicating uncertain risk.     • Chest pain 07/21/2017   • Dyspnea on exertion 07/21/2017   • Essential hypertension 07/21/2017   • Pain of right breast 07/21/2017   • Lower extremity edema 07/21/2017   • Diabetes mellitus (CMS/HCC) 07/21/2017   • History of pulmonary embolus (PE) 01/01/1997         Past Surgical History:   Procedure Laterality Date   • BREAST BIOPSY Left 2014   • CARDIAC CATHETERIZATION Left 8/10/2017    Procedure: Cardiac Catheterization/Vascular Study;  Surgeon: Johny Sommer MD;  Location: [facility] INVASIVE LOCATION;  Service:    • CHOLECYSTECTOMY     • COLONOSCOPY     • ENDOSCOPY     • HYSTERECTOMY  11/2014   • KNEE SURGERY Right 12/2017    total replacement   • NASAL SINUS SURGERY     • OOPHORECTOMY  11/2014   • PULMONARY EMBOLISM SURGERY      right lung   • TUBAL ABDOMINAL LIGATION         No Known Allergies      Current Outpatient Prescriptions:   •  aspirin 81 MG tablet, Take 1 tablet by mouth Daily., Disp: 30 tablet, Rfl: 11  •  atorvastatin (LIPITOR) 80 MG tablet, Take 1 tablet by mouth Every Night., Disp: 30 tablet, Rfl: 0  •  carvedilol (COREG) 25 MG tablet, Take 1 tablet by mouth 2 (Two) Times a Day With Meals., Disp: 60 tablet, Rfl: 11  •  cyclobenzaprine (FLEXERIL) 10 MG tablet, Take 10 mg by mouth 2 (Two) Times a Day., Disp: , Rfl:   •  escitalopram (LEXAPRO) 10 MG tablet, Take 10 mg by mouth Daily., Disp: , Rfl: 5  •  estradiol (ESTRACE) 1 MG tablet, Take 1 mg by mouth Daily., Disp: , Rfl:   •  hydrochlorothiazide (HYDRODIURIL) 25 MG tablet, Take 1 tablet by mouth Daily., Disp: 30 tablet, Rfl: 3  •  HYDROXYZINE PAMOATE PO, Take 25 mg by mouth Every Night. 1-2 tablets at night-  Pt only takes one tablet, Disp: , Rfl:   •  meloxicam (MOBIC) 15 MG tablet, Take 15 mg by mouth Daily As Needed., Disp: , Rfl: 3  •  metFORMIN (GLUCOPHAGE) 1000 MG tablet, Take 1,000 mg by mouth 2 (Two) Times a Day With Meals., Disp: , Rfl:   •  Multiple  Vitamins-Minerals (CENTRUM WOMEN PO), Take 1 capsule by mouth Daily. gummies, Disp: , Rfl:   •  naproxen (NAPROSYN) 500 MG tablet, Take 500 mg by mouth Daily As Needed., Disp: , Rfl: 2  •  SUMAtriptan (IMITREX) 50 MG tablet, Take 50 mg by mouth Every 2 (Two) Hours As Needed for Migraine. Take one tablet at onset of headache. May repeat dose one time in 2 hours if headache not relieved., Disp: , Rfl:   •  traZODone (DESYREL) 100 MG tablet, Take 100 mg by mouth Every Night., Disp: , Rfl:   •  vitamin D (ERGOCALCIFEROL) 11557 UNITS capsule capsule, Take 50,000 Units by mouth 1 (One) Time Per Week. Wednesday, Disp: , Rfl:     The following portions of the patient's history were reviewed and updated as appropriate: allergies, current medications, past family history, past medical history, past social history, past surgical history and problem list.    Review of Systems   Constitution: Positive for malaise/fatigue. Negative for chills, decreased appetite, diaphoresis, fever, weakness, night sweats, weight gain and weight loss.   HENT: Negative for congestion, hearing loss, hoarse voice and nosebleeds.    Eyes: Negative for blurred vision, visual disturbance and visual halos.   Cardiovascular: Positive for chest pain and dyspnea on exertion. Negative for claudication, cyanosis, irregular heartbeat, leg swelling, near-syncope, orthopnea, palpitations, paroxysmal nocturnal dyspnea and syncope.   Respiratory: Positive for shortness of breath. Negative for cough, hemoptysis, sleep disturbances due to breathing, snoring, sputum production and wheezing.    Hematologic/Lymphatic: Negative for bleeding problem. Does not bruise/bleed easily.   Skin: Negative for dry skin, itching and rash.   Musculoskeletal: Negative for arthritis, joint pain, joint swelling and myalgias.   Gastrointestinal: Negative for bloating, abdominal pain, constipation, diarrhea, flatus, heartburn, hematemesis, hematochezia, melena, nausea and vomiting.  "  Genitourinary: Negative for dysuria, frequency, hematuria, nocturia and urgency.   Neurological: Negative for excessive daytime sleepiness, dizziness, headaches, light-headedness and loss of balance.   Psychiatric/Behavioral: Negative for depression. The patient does not have insomnia and is not nervous/anxious.        Objective:     Vitals:    11/07/18 0958 11/07/18 1000 11/07/18 1001   BP: 138/89 148/92 149/97   BP Location: Right arm Left arm Left arm   Patient Position: Sitting Sitting Standing   Pulse: 74  77   Resp: 16     Temp: 97.8 °F (36.6 °C)     TempSrc: Temporal Artery      SpO2: 98%     Weight: 127 kg (280 lb)     Height: 180.3 cm (70.98\")           Physical Exam   Constitutional: She is oriented to person, place, and time. She appears well-developed and well-nourished. No distress.   Very pleasant, moderately obese young adult female in [facility].     HENT:   Head: Normocephalic and atraumatic.   Eyes: Pupils are equal, round, and reactive to light.   Neck: Neck supple. No JVD present. No tracheal deviation present.   Cardiovascular: Normal rate, regular rhythm, normal heart sounds and intact distal pulses.    No murmur heard.  Pulmonary/Chest: Effort normal and breath sounds normal. No respiratory distress.   Abdominal: Soft. Bowel sounds are normal. There is no tenderness.   Musculoskeletal: Normal range of motion. She exhibits no edema.   Neurological: She is alert and oriented to person, place, and time. No cranial nerve deficit.   Skin: Skin is warm and dry. No rash noted.   Psychiatric: She has a normal mood and affect. Her behavior is normal.       Lab and Diagnostic Review:  Lab Results   Component Value Date    WBC 7.12 08/09/2017    HGB 11.9 08/09/2017    HCT 36.0 08/09/2017    MCV 81.1 08/09/2017     08/09/2017     Lab Results   Component Value Date    GLUCOSE 121 (H) 08/09/2017    BUN 16 08/09/2017    CREATININE 0.80 08/09/2017    EGFRIFAFRI 91 08/09/2017    BCR 20.0 08/09/2017    K 4.6 " 08/09/2017    CO2 31.0 08/09/2017    CALCIUM 9.8 08/09/2017    ALBUMIN 4.20 07/21/2017    AST 27 07/21/2017    ALT 42 (H) 07/21/2017     Lab Results   Component Value Date    CHOL 108 08/09/2017    CHOL 163 07/22/2017     Lab Results   Component Value Date    TRIG 62 08/09/2017    TRIG 249 (H) 07/22/2017     Lab Results   Component Value Date    HDL 48 08/09/2017    HDL 46 07/22/2017     Lab Results   Component Value Date    LDL 47 08/09/2017    LDL 96 07/22/2017     Lab Results   Component Value Date    HGBA1C 6.60 (H) 07/22/2017         Assessment and Plan:         1. Coronary artery disease of native artery of native heart with stable angina pectoris (CMS/HCC)  Non-obstructive, but small vessel disease noted on [facility] report  Continue asa, statin, BB  Initiate:  - isosorbide mononitrate (IMDUR) 30 MG 24 hr tablet; Take 1 tablet by mouth Every Morning.  Dispense: 30 tablet; Refill: 3    2. Essential hypertension  Coreg, HCTZ  - isosorbide mononitrate (IMDUR) 30 MG 24 hr tablet; Take 1 tablet by mouth Every Morning.  Dispense: 30 tablet; Refill: 3    3. Shortness of breath    - Adult Transthoracic Echo Complete W/ Cont if Necessary Per Protocol; Future  - Full Pulmonary Function Test With Bronchodilator; Future    4. Chest pain, unspecified type    - isosorbide mononitrate (IMDUR) 30 MG 24 hr tablet; Take 1 tablet by mouth Every Morning.  Dispense: 30 tablet; Refill: 3    F/u 4 weeks or sooner if needed.    *Please note that portions of this note were completed with a voice recognition program. Efforts were made to edit the dictations, but occasionally words are mistranscribed.

## 2018-11-28 ENCOUNTER — HOSPITAL ENCOUNTER (OUTPATIENT)
Dept: PULMONOLOGY | Facility: HOSPITAL | Age: 53
Discharge: HOME | End: 2018-11-28
Admitting: NURSE PRACTITIONER

## 2018-11-28 ENCOUNTER — HOSPITAL ENCOUNTER (OUTPATIENT)
Dept: CARDIOLOGY | Facility: HOSPITAL | Age: 53
Discharge: HOME | End: 2018-11-28

## 2018-11-28 DIAGNOSIS — R06.02 SHORTNESS OF BREATH: ICD-10-CM

## 2018-11-28 PROCEDURE — 94060 EVALUATION OF WHEEZING: CPT

## 2018-11-28 PROCEDURE — 93306 TTE W/DOPPLER COMPLETE: CPT | Performed by: INTERNAL MEDICINE

## 2018-11-28 PROCEDURE — 94727 GAS DIL/WSHOT DETER LNG VOL: CPT

## 2018-11-28 PROCEDURE — 94727 GAS DIL/WSHOT DETER LNG VOL: CPT | Performed by: INTERNAL MEDICINE

## 2018-11-28 PROCEDURE — 94729 DIFFUSING CAPACITY: CPT | Performed by: INTERNAL MEDICINE

## 2018-11-28 PROCEDURE — 93306 TTE W/DOPPLER COMPLETE: CPT

## 2018-11-28 PROCEDURE — 94729 DIFFUSING CAPACITY: CPT

## 2018-11-28 PROCEDURE — 94060 EVALUATION OF WHEEZING: CPT | Performed by: INTERNAL MEDICINE

## 2018-11-29 LAB
BH CV ECHO MEAS - AI DEC SLOPE: 99.5 CM/SEC^2
BH CV ECHO MEAS - AI MAX PG: 49.9 MMHG
BH CV ECHO MEAS - AI MAX VEL: 351.8 CM/SEC
BH CV ECHO MEAS - AI P1/2T: 1036 MSEC
BH CV ECHO MEAS - AO ROOT AREA (BSA CORRECTED): 1.3
BH CV ECHO MEAS - AO ROOT AREA: 7.3 CM^2
BH CV ECHO MEAS - AO ROOT DIAM: 3 CM
BH CV ECHO MEAS - BSA(HAYCOCK): 2.6 M^2
BH CV ECHO MEAS - BSA: 2.4 M^2
BH CV ECHO MEAS - BZI_BMI: 40.2 KILOGRAMS/M^2
BH CV ECHO MEAS - BZI_METRIC_HEIGHT: 177.8 CM
BH CV ECHO MEAS - BZI_METRIC_WEIGHT: 127 KG
BH CV ECHO MEAS - EDV(CUBED): 83.4 ML
BH CV ECHO MEAS - EDV(MOD-SP2): 84 ML
BH CV ECHO MEAS - EDV(MOD-SP4): 80 ML
BH CV ECHO MEAS - EDV(TEICH): 86.3 ML
BH CV ECHO MEAS - EF(CUBED): 84.6 %
BH CV ECHO MEAS - EF(MOD-BP): 71 %
BH CV ECHO MEAS - EF(MOD-SP2): 75 %
BH CV ECHO MEAS - EF(MOD-SP4): 70 %
BH CV ECHO MEAS - EF(TEICH): 78 %
BH CV ECHO MEAS - ESV(CUBED): 12.8 ML
BH CV ECHO MEAS - ESV(MOD-SP2): 21 ML
BH CV ECHO MEAS - ESV(MOD-SP4): 24 ML
BH CV ECHO MEAS - ESV(TEICH): 19 ML
BH CV ECHO MEAS - FS: 46.4 %
BH CV ECHO MEAS - IVS/LVPW: 1
BH CV ECHO MEAS - IVSD: 1.3 CM
BH CV ECHO MEAS - LA DIMENSION: 4.1 CM
BH CV ECHO MEAS - LA/AO: 1.4
BH CV ECHO MEAS - LAD MAJOR: 5.6 CM
BH CV ECHO MEAS - LAT PEAK E' VEL: 7.8 CM/SEC
BH CV ECHO MEAS - LATERAL E/E' RATIO: 7.4
BH CV ECHO MEAS - LV DIASTOLIC VOL/BSA (35-75): 33.2 ML/M^2
BH CV ECHO MEAS - LV MASS(C)D: 203.4 GRAMS
BH CV ECHO MEAS - LV MASS(C)DI: 84.5 GRAMS/M^2
BH CV ECHO MEAS - LV SYSTOLIC VOL/BSA (12-30): 10 ML/M^2
BH CV ECHO MEAS - LVIDD: 4.4 CM
BH CV ECHO MEAS - LVIDS: 2.3 CM
BH CV ECHO MEAS - LVLD AP2: 8.1 CM
BH CV ECHO MEAS - LVLD AP4: 7.7 CM
BH CV ECHO MEAS - LVLS AP2: 6.1 CM
BH CV ECHO MEAS - LVLS AP4: 5.6 CM
BH CV ECHO MEAS - LVPWD: 1.3 CM
BH CV ECHO MEAS - MED PEAK E' VEL: 5.3 CM/SEC
BH CV ECHO MEAS - MEDIAL E/E' RATIO: 10.9
BH CV ECHO MEAS - MV A MAX VEL: 85.4 CM/SEC
BH CV ECHO MEAS - MV E MAX VEL: 59.2 CM/SEC
BH CV ECHO MEAS - MV E/A: 0.69
BH CV ECHO MEAS - PA ACC SLOPE: 417.1 CM/SEC^2
BH CV ECHO MEAS - PA ACC TIME: 0.14 SEC
BH CV ECHO MEAS - PA PR(ACCEL): 14 MMHG
BH CV ECHO MEAS - RAP SYSTOLE: 3 MMHG
BH CV ECHO MEAS - RVSP: 24 MMHG
BH CV ECHO MEAS - SI(CUBED): 29.3 ML/M^2
BH CV ECHO MEAS - SI(MOD-SP2): 26.2 ML/M^2
BH CV ECHO MEAS - SI(MOD-SP4): 23.3 ML/M^2
BH CV ECHO MEAS - SI(TEICH): 27.9 ML/M^2
BH CV ECHO MEAS - SV(CUBED): 70.6 ML
BH CV ECHO MEAS - SV(MOD-SP2): 63 ML
BH CV ECHO MEAS - SV(MOD-SP4): 56 ML
BH CV ECHO MEAS - SV(TEICH): 67.3 ML
BH CV ECHO MEAS - TR MAX PG: 21 MMHG
BH CV ECHO MEAS - TR MAX VEL: 225.9 CM/SEC
BH CV ECHO MEASUREMENTS AVERAGE E/E' RATIO: 9.04
BH CV VAS BP RIGHT ARM: NORMAL MMHG
LEFT ATRIUM VOLUME INDEX: 29.1 ML/M^2
LEFT ATRIUM VOLUME: 70 ML
LV EF 2D ECHO EST: 65 %
MAXIMAL PREDICTED HEART RATE: 166 BPM
STRESS TARGET HR: 141 BPM

## 2018-11-30 ENCOUNTER — TELEPHONE (OUTPATIENT)
Dept: CARDIOLOGY | Facility: HOSPITAL | Age: 53
End: 2018-11-30

## 2019-01-09 DIAGNOSIS — I10 ESSENTIAL HYPERTENSION: ICD-10-CM

## 2019-01-09 DIAGNOSIS — R60.0 LOCALIZED EDEMA: ICD-10-CM

## 2019-01-09 RX ORDER — HYDROCHLOROTHIAZIDE 25 MG/1
25 TABLET ORAL DAILY
Qty: 30 TABLET | Refills: 3 | Status: SHIPPED | OUTPATIENT
Start: 2019-01-09 | End: 2019-06-25 | Stop reason: SDUPTHER

## 2019-01-09 NOTE — TELEPHONE ENCOUNTER
Last seen on 11/7/18, refills for HCTZ 25mg daily sent to [facility] on [facility] [address].    Manjula Murnoe, ThangD

## 2019-01-15 ENCOUNTER — OFFICE VISIT (OUTPATIENT)
Dept: CARDIOLOGY | Facility: HOSPITAL | Age: 54
End: 2019-01-15

## 2019-01-15 VITALS
HEIGHT: 71 IN | HEART RATE: 91 BPM | DIASTOLIC BLOOD PRESSURE: 101 MMHG | OXYGEN SATURATION: 97 % | WEIGHT: 282 LBS | SYSTOLIC BLOOD PRESSURE: 152 MMHG | BODY MASS INDEX: 39.48 KG/M2 | RESPIRATION RATE: 18 BRPM

## 2019-01-15 DIAGNOSIS — I10 ESSENTIAL HYPERTENSION: ICD-10-CM

## 2019-01-15 DIAGNOSIS — I25.118 CORONARY ARTERY DISEASE OF NATIVE ARTERY OF NATIVE HEART WITH STABLE ANGINA PECTORIS (HCC): Primary | ICD-10-CM

## 2019-01-15 DIAGNOSIS — R60.0 LOCALIZED EDEMA: ICD-10-CM

## 2019-01-15 PROCEDURE — 99214 OFFICE O/P EST MOD 30 MIN: CPT | Performed by: NURSE PRACTITIONER

## 2019-01-15 RX ORDER — AMLODIPINE BESYLATE 5 MG/1
5 TABLET ORAL DAILY
Qty: 30 TABLET | Refills: 3 | Status: SHIPPED | OUTPATIENT
Start: 2019-01-15 | End: 2019-04-18

## 2019-01-15 RX ORDER — AMLODIPINE BESYLATE 5 MG/1
5 TABLET ORAL DAILY
COMMUNITY
End: 2019-01-15 | Stop reason: SDUPTHER

## 2019-01-15 NOTE — PROGRESS NOTES
[facility]  [facility] Valve Center      Encounter Date:01/15/2019     Kellen Esparza  [address] [facility] [address]  [unfilled]    1965    Hemalatha Rahman APRN    Kellen Esparza is a 53 y.o. female.      Subjective:     Chief Complaint:  Hypertension (atypical chest pain)       HPI     Patient with history of nonobstructive CAD (small vessel disease), mild diastolic dysfunction, diabetes, hypertension, hyperlipidemia, migraines.  History of PE.  Patient with history of atypical chest pain.  Complaints of continued shortness of breath with exertion and lower extremity swelling.  States that chest discomfort or pressure associated with exertion, stress and anxiety.  Blood pressure management with carvedilol, HCTZ.  Started on Imdur last office visit.  PFTs completed.  Patient with a history of poor sleep quality.  Been referred for sleep study.    Pt states chest pressure has improved with use of Imdur.  She still has dyspnea on exertion and intermittent dizziness with exertion.  Improves with rest.  Has not been taking BP log.  Edema has resolved on HCTZ.  Sleep study is scheduled this month.      Patient Active Problem List    Diagnosis Date Noted   • Coronary artery disease of native artery of native heart with stable angina pectoris (CMS/[facility]) 09/11/2017     Note Last Updated: 11/30/2018       · Cardiac PET 7-26-17 (Calculated EF = 70%).  Myocardial perfusion imaging indicates a medium-sized infarct pattern located in the anterior wall with no significant ischemia noted. This finding is worse on resting images which is suggestive of possible breast attenuation artifact instead of an infarction. Impressions are consistent with a study indicating uncertain risk.  · Hx of non-obstructive, small vessel disease, C 08/10/18  · Echocardiogram 11/29/18: EF 65%, grade 1 diastolic dysfunction, trace to mild AR     • DDD (degenerative disc disease), cervical 09/06/2017     Note Last  Updated: 9/6/2017     Degenerative disk disease  I have reviewed the cervical and lumbar MRI. She straightening of the cervical spine, but no evidence of significant disk protrusion. She does have some degenerative disk disease, however. The lumbar shows similar findings of degenerative change 12/15/16     • Migraine 08/10/2017   • Hyperlipidemia 08/10/2017   • Abnormal stress test 08/03/2017   • Chest pain 07/21/2017   • Dyspnea on exertion 07/21/2017     Note Last Updated: 12/6/2018     · PFT, 12/5/18: No air way obstruction, moderate restrictive lung disease     • Essential hypertension 07/21/2017   • Pain of right breast 07/21/2017   • Lower extremity edema 07/21/2017   • Diabetes mellitus (CMS/HCC) 07/21/2017   • History of pulmonary embolus (PE) 01/01/1997         Past Surgical History:   Procedure Laterality Date   • BREAST BIOPSY Left 2014   • CARDIAC CATHETERIZATION Left 8/10/2017    Procedure: Cardiac Catheterization/Vascular Study;  Surgeon: Johny Sommer MD;  Location: [facility] INVASIVE LOCATION;  Service:    • CHOLECYSTECTOMY     • COLONOSCOPY     • ENDOSCOPY     • HYSTERECTOMY  11/2014   • KNEE SURGERY Right 12/2017    total replacement   • NASAL SINUS SURGERY     • OOPHORECTOMY  11/2014   • PULMONARY EMBOLISM SURGERY      right lung   • TUBAL ABDOMINAL LIGATION         Allergies   Allergen Reactions   • Lisinopril Cough         Current Outpatient Medications:   •  amLODIPine (NORVASC) 5 MG tablet, Take 1 tablet by mouth Daily., Disp: 30 tablet, Rfl: 3  •  carvedilol (COREG) 25 MG tablet, Take 1 tablet by mouth 2 (Two) Times a Day With Meals., Disp: 60 tablet, Rfl: 11  •  cyclobenzaprine (FLEXERIL) 10 MG tablet, Take 10 mg by mouth 2 (Two) Times a Day., Disp: , Rfl:   •  escitalopram (LEXAPRO) 10 MG tablet, Take 10 mg by mouth Daily., Disp: , Rfl: 5  •  estradiol (ESTRACE) 1 MG tablet, Take 1 mg by mouth Daily., Disp: , Rfl:   •  hydrochlorothiazide (HYDRODIURIL) 25 MG tablet, TAKE 1 TABLET BY  "MOUTH DAILY, Disp: 30 tablet, Rfl: 3  •  HYDROXYZINE PAMOATE PO, Take 25 mg by mouth Every Night. 1-2 tablets at night-  Pt only takes one tablet, Disp: , Rfl:   •  isosorbide mononitrate (IMDUR) 30 MG 24 hr tablet, Take 1 tablet by mouth Every Morning., Disp: 30 tablet, Rfl: 3  •  meloxicam (MOBIC) 15 MG tablet, Take 15 mg by mouth Daily As Needed., Disp: , Rfl: 3  •  metFORMIN (GLUCOPHAGE) 1000 MG tablet, Take 1,000 mg by mouth 2 (Two) Times a Day With Meals., Disp: , Rfl:   •  Multiple Vitamins-Minerals (CENTRUM WOMEN PO), Take 1 capsule by mouth Daily. gummies, Disp: , Rfl:   •  SUMAtriptan (IMITREX) 50 MG tablet, Take 50 mg by mouth Every 2 (Two) Hours As Needed for Migraine. Take one tablet at onset of headache. May repeat dose one time in 2 hours if headache not relieved., Disp: , Rfl:   •  traZODone (DESYREL) 100 MG tablet, Take 100 mg by mouth Every Night., Disp: , Rfl:   •  vitamin D (ERGOCALCIFEROL) 04264 UNITS capsule capsule, Take 50,000 Units by mouth 1 (One) Time Per Week. Wednesday, Disp: , Rfl:   •  aspirin 81 MG tablet, Take 1 tablet by mouth Daily., Disp: 30 tablet, Rfl: 11  •  atorvastatin (LIPITOR) 80 MG tablet, Take 1 tablet by mouth Every Night., Disp: 30 tablet, Rfl: 0    The following portions of the patient's history were reviewed and updated as appropriate: allergies, current medications, past family history, past medical history, past social history, past surgical history and problem list.    Review of Systems   Constitution: Positive for malaise/fatigue.   Cardiovascular: Positive for chest pain.   Respiratory: Positive for shortness of breath.    Neurological: Positive for dizziness.   All other systems reviewed and are negative.      Objective:     Vitals:    01/15/19 0916 01/15/19 0917   BP: 150/92 (!) 152/101   BP Location: Left arm Left arm   Patient Position: Sitting Standing   Pulse: 79 91   Resp: 18    SpO2: 97%    Weight: 128 kg (282 lb)    Height: 180.3 cm (70.98\")          " Physical Exam   Constitutional: She is oriented to person, place, and time. She appears well-developed and well-nourished. No distress.   Very pleasant, moderately obese young adult female in NAD.     HENT:   Head: Normocephalic and atraumatic.   Eyes: Pupils are equal, round, and reactive to light.   Neck: Neck supple. No JVD present. No tracheal deviation present.   Cardiovascular: Normal rate, regular rhythm, normal heart sounds and intact distal pulses.   No murmur heard.  Pulmonary/Chest: Effort normal and breath sounds normal. No respiratory distress.   Abdominal: Soft. Bowel sounds are normal. There is no tenderness.   Musculoskeletal: Normal range of motion. She exhibits no edema.   Neurological: She is alert and oriented to person, place, and time. No cranial nerve deficit.   Skin: Skin is warm and dry. No rash noted.   Psychiatric: She has a normal mood and affect. Her behavior is normal.       Lab and Diagnostic Review:  Lab Results   Component Value Date    WBC 7.12 08/09/2017    HGB 11.9 08/09/2017    HCT 36.0 08/09/2017    MCV 81.1 08/09/2017     08/09/2017     Lab Results   Component Value Date    GLUCOSE 121 (H) 08/09/2017    BUN 16 08/09/2017    CREATININE 0.80 08/09/2017    EGFRIFAFRI 91 08/09/2017    BCR 20.0 08/09/2017    K 4.6 08/09/2017    CO2 31.0 08/09/2017    CALCIUM 9.8 08/09/2017    ALBUMIN 4.20 07/21/2017    AST 27 07/21/2017    ALT 42 (H) 07/21/2017     Lab Results   Component Value Date    TSH 3.278 07/22/2017     Lab Results   Component Value Date    CHOL 108 08/09/2017    CHOL 163 07/22/2017     Lab Results   Component Value Date    TRIG 62 08/09/2017    TRIG 249 (H) 07/22/2017     Lab Results   Component Value Date    HDL 48 08/09/2017    HDL 46 07/22/2017     Lab Results   Component Value Date    LDL 47 08/09/2017    LDL 96 07/22/2017     Reviewed PFTs and echo results  Assessment and Plan:         1. Coronary artery disease of native artery of native heart with stable angina  pectoris (CMS/[facility])  Small vessel disease, non obstructive CAD  Normal EF  Chest pressure improved on imdur  Asa, statni    2. Essential hypertension  Continue coreg, HCTZ  Initiate:  - amLODIPine (NORVASC) 5 MG tablet; Take 1 tablet by mouth Daily.  Dispense: 30 tablet; Refill: 3    Keep home BP log    3. Localized edema  Resolved     4. BMI 40.0-44.9, adult (CMS/HCC)  Diet and exercise modificaitons    F/u 3 months or sooner if needed.  F/u with PcP 2 weeks as scheduled.         *Please note that portions of this note were completed with a voice recognition program. Efforts were made to edit the dictations, but occasionally words are mistranscribed.

## 2019-04-02 DIAGNOSIS — I10 ESSENTIAL HYPERTENSION: ICD-10-CM

## 2019-04-02 DIAGNOSIS — I25.118 CORONARY ARTERY DISEASE OF NATIVE ARTERY OF NATIVE HEART WITH STABLE ANGINA PECTORIS (HCC): ICD-10-CM

## 2019-04-02 DIAGNOSIS — R07.9 CHEST PAIN, UNSPECIFIED TYPE: ICD-10-CM

## 2019-04-02 RX ORDER — ISOSORBIDE MONONITRATE 30 MG/1
30 TABLET, EXTENDED RELEASE ORAL EVERY MORNING
Qty: 30 TABLET | Refills: 3 | Status: SHIPPED | OUTPATIENT
Start: 2019-04-02 | End: 2019-07-18

## 2019-04-02 NOTE — TELEPHONE ENCOUNTER
Last seen 1/15/19 and will follow up 4/3/19. Refills for isosorbide 30mg daily sent to [facility] [address]    Thang QiuD

## 2019-04-03 ENCOUNTER — HOSPITAL ENCOUNTER (OUTPATIENT)
Dept: CARDIOLOGY | Facility: HOSPITAL | Age: 54
Discharge: HOME | End: 2019-04-03
Admitting: NURSE PRACTITIONER

## 2019-04-03 ENCOUNTER — OFFICE VISIT (OUTPATIENT)
Dept: CARDIOLOGY | Facility: HOSPITAL | Age: 54
End: 2019-04-03

## 2019-04-03 ENCOUNTER — HOSPITAL ENCOUNTER (OUTPATIENT)
Dept: CARDIOLOGY | Facility: HOSPITAL | Age: 54
Discharge: HOME | End: 2019-04-03

## 2019-04-03 ENCOUNTER — LAB (OUTPATIENT)
Dept: LAB | Facility: HOSPITAL | Age: 54
End: 2019-04-03

## 2019-04-03 VITALS
WEIGHT: 284 LBS | BODY MASS INDEX: 39.76 KG/M2 | OXYGEN SATURATION: 99 % | HEART RATE: 84 BPM | TEMPERATURE: 95.6 F | SYSTOLIC BLOOD PRESSURE: 153 MMHG | DIASTOLIC BLOOD PRESSURE: 84 MMHG | HEIGHT: 71 IN | RESPIRATION RATE: 18 BRPM

## 2019-04-03 DIAGNOSIS — R42 DIZZINESS: ICD-10-CM

## 2019-04-03 DIAGNOSIS — R55 NEAR SYNCOPE: ICD-10-CM

## 2019-04-03 DIAGNOSIS — R03.0 ELEVATED BLOOD PRESSURE READING: ICD-10-CM

## 2019-04-03 DIAGNOSIS — R06.09 DYSPNEA ON EXERTION: ICD-10-CM

## 2019-04-03 DIAGNOSIS — R07.89 CHEST PAIN, ATYPICAL: ICD-10-CM

## 2019-04-03 DIAGNOSIS — R00.2 PALPITATIONS: Primary | ICD-10-CM

## 2019-04-03 DIAGNOSIS — I10 ELEVATED BLOOD PRESSURE READING IN OFFICE WITH DIAGNOSIS OF HYPERTENSION: ICD-10-CM

## 2019-04-03 DIAGNOSIS — E55.9 VITAMIN D DEFICIENCY: ICD-10-CM

## 2019-04-03 DIAGNOSIS — R53.83 FATIGUE, UNSPECIFIED TYPE: ICD-10-CM

## 2019-04-03 DIAGNOSIS — Z86.711 HISTORY OF PULMONARY EMBOLUS (PE): ICD-10-CM

## 2019-04-03 DIAGNOSIS — R52 GENERALIZED PAIN: ICD-10-CM

## 2019-04-03 DIAGNOSIS — R73.09 ELEVATED HEMOGLOBIN A1C: ICD-10-CM

## 2019-04-03 DIAGNOSIS — R00.2 PALPITATIONS: ICD-10-CM

## 2019-04-03 DIAGNOSIS — I10 ESSENTIAL HYPERTENSION: ICD-10-CM

## 2019-04-03 LAB
25(OH)D3 SERPL-MCNC: 25 NG/ML
ALBUMIN SERPL-MCNC: 4.55 G/DL (ref 3.2–4.8)
ALBUMIN/GLOB SERPL: 1.7 G/DL (ref 1.5–2.5)
ALP SERPL-CCNC: 87 U/L (ref 25–100)
ALT SERPL W P-5'-P-CCNC: 33 U/L (ref 7–40)
ANION GAP SERPL CALCULATED.3IONS-SCNC: 11 MMOL/L (ref 3–11)
AST SERPL-CCNC: 22 U/L (ref 0–33)
BILIRUB SERPL-MCNC: 0.4 MG/DL (ref 0.3–1.2)
BNP SERPL-MCNC: 31 PG/ML (ref 0–100)
BUN BLD-MCNC: 12 MG/DL (ref 9–23)
BUN/CREAT SERPL: 14.8 (ref 7–25)
CALCIUM SPEC-SCNC: 9.8 MG/DL (ref 8.7–10.4)
CHLORIDE SERPL-SCNC: 102 MMOL/L (ref 99–109)
CO2 SERPL-SCNC: 30 MMOL/L (ref 20–31)
CREAT BLD-MCNC: 0.81 MG/DL (ref 0.6–1.3)
CRP SERPL-MCNC: 0.83 MG/DL (ref 0–1)
D DIMER PPP FEU-MCNC: 0.51 MCGFEU/ML (ref 0–0.56)
DEPRECATED RDW RBC AUTO: 44.9 FL (ref 37–54)
ERYTHROCYTE [DISTWIDTH] IN BLOOD BY AUTOMATED COUNT: 15 % (ref 11.3–14.5)
ERYTHROCYTE [SEDIMENTATION RATE] IN BLOOD: 43 MM/HR (ref 0–30)
FOLATE SERPL-MCNC: 8.52 NG/ML (ref 3.2–20)
GFR SERPL CREATININE-BSD FRML MDRD: 89 ML/MIN/1.73
GLOBULIN UR ELPH-MCNC: 2.7 GM/DL
GLUCOSE BLD-MCNC: 92 MG/DL (ref 70–100)
HBA1C MFR BLD: 7.6 % (ref 4.8–5.6)
HCT VFR BLD AUTO: 36.1 % (ref 34.5–44)
HGB BLD-MCNC: 11.7 G/DL (ref 11.5–15.5)
MAGNESIUM SERPL-MCNC: 1.8 MG/DL (ref 1.3–2.7)
MCH RBC QN AUTO: 26.6 PG (ref 27–31)
MCHC RBC AUTO-ENTMCNC: 32.4 G/DL (ref 32–36)
MCV RBC AUTO: 82 FL (ref 80–99)
PLATELET # BLD AUTO: 213 10*3/MM3 (ref 150–450)
PMV BLD AUTO: 11.5 FL (ref 6–12)
POTASSIUM BLD-SCNC: 4.1 MMOL/L (ref 3.5–5.5)
PROT SERPL-MCNC: 7.2 G/DL (ref 5.7–8.2)
RBC # BLD AUTO: 4.4 10*6/MM3 (ref 3.89–5.14)
SODIUM BLD-SCNC: 143 MMOL/L (ref 132–146)
T4 FREE SERPL-MCNC: 1.04 NG/DL (ref 0.89–1.76)
TSH SERPL DL<=0.05 MIU/L-ACNC: 1.31 MIU/ML (ref 0.35–5.35)
VIT B12 BLD-MCNC: 428 PG/ML (ref 211–911)
WBC NRBC COR # BLD: 9.58 10*3/MM3 (ref 3.5–10.8)

## 2019-04-03 PROCEDURE — 80053 COMPREHEN METABOLIC PANEL: CPT

## 2019-04-03 PROCEDURE — 83880 ASSAY OF NATRIURETIC PEPTIDE: CPT

## 2019-04-03 PROCEDURE — 82607 VITAMIN B-12: CPT

## 2019-04-03 PROCEDURE — 85379 FIBRIN DEGRADATION QUANT: CPT

## 2019-04-03 PROCEDURE — 93225 XTRNL ECG REC<48 HRS REC: CPT

## 2019-04-03 PROCEDURE — 82306 VITAMIN D 25 HYDROXY: CPT

## 2019-04-03 PROCEDURE — 84439 ASSAY OF FREE THYROXINE: CPT

## 2019-04-03 PROCEDURE — 99214 OFFICE O/P EST MOD 30 MIN: CPT | Performed by: NURSE PRACTITIONER

## 2019-04-03 PROCEDURE — 36415 COLL VENOUS BLD VENIPUNCTURE: CPT

## 2019-04-03 PROCEDURE — 83036 HEMOGLOBIN GLYCOSYLATED A1C: CPT

## 2019-04-03 PROCEDURE — 82746 ASSAY OF FOLIC ACID SERUM: CPT

## 2019-04-03 PROCEDURE — 86038 ANTINUCLEAR ANTIBODIES: CPT

## 2019-04-03 PROCEDURE — 93786 AMBL BP MNTR W/SW REC ONLY: CPT

## 2019-04-03 PROCEDURE — 85027 COMPLETE CBC AUTOMATED: CPT

## 2019-04-03 PROCEDURE — 83735 ASSAY OF MAGNESIUM: CPT

## 2019-04-03 PROCEDURE — 86430 RHEUMATOID FACTOR TEST QUAL: CPT

## 2019-04-03 PROCEDURE — 84443 ASSAY THYROID STIM HORMONE: CPT

## 2019-04-03 PROCEDURE — 86140 C-REACTIVE PROTEIN: CPT

## 2019-04-03 NOTE — PROGRESS NOTES
[facility]  [facility]      Encounter Date:04/03/2019     Kellen Esparza  [address] [facility] [address]  [unfilled]    1965    Hemalatha Rahman APRN    Kellen Esparza is a 53 y.o. female.      Subjective:     Chief Complaint:  No chief complaint on file.       HPI     Follow-up for hypertension.  Patient with history of nonobstructive CAD (small vessel disease), mild diastolic dysfunction, diabetes, hypertension, hyperlipidemia, migraines.  History of PE.  Patient with history of atypical chest pain.  Blood pressure management with carvedilol and HCTZ, norvasc, Imdur.      Pt reports continued dyspnea on exertion (minimal exertion).  PFTs completed.  Echo 2/2018 normal EF. Stress test with small vessel disease.  Pt reports severe fatigue.  \"Sometimes I can not get out of bed\".  Denies depression or anxiety.  Palpitations several times per day with dizziness, dyspnea, chest tightness.  Pt with hx of DVT/PE.  Pt feels that s/s have worsened with adding BP meds. Pt states I am motivated to move and exercise, but I can't.    Patient Active Problem List    Diagnosis Date Noted   • Coronary artery disease of native artery of native heart with stable angina pectoris (CMS/[facility]) 09/11/2017     Note Last Updated: 11/30/2018       · Cardiac PET 7-26-17 (Calculated EF = 70%).  Myocardial perfusion imaging indicates a medium-sized infarct pattern located in the anterior wall with no significant ischemia noted. This finding is worse on resting images which is suggestive of possible breast attenuation artifact instead of an infarction. Impressions are consistent with a study indicating uncertain risk.  · Hx of non-obstructive, small vessel disease, LHC 08/10/18  · Echocardiogram 11/29/18: EF 65%, grade 1 diastolic dysfunction, trace to mild AR     • DDD (degenerative disc disease), cervical 09/06/2017     Note Last Updated: 9/6/2017     Degenerative disk disease  I have reviewed the " cervical and lumbar MRI. She straightening of the cervical spine, but no evidence of significant disk protrusion. She does have some degenerative disk disease, however. The lumbar shows similar findings of degenerative change 12/15/16     • Migraine 08/10/2017   • Hyperlipidemia 08/10/2017   • Abnormal stress test 08/03/2017   • Chest pain 07/21/2017   • Dyspnea on exertion 07/21/2017     Note Last Updated: 12/6/2018     · PFT, 12/5/18: No air way obstruction, moderate restrictive lung disease     • Essential hypertension 07/21/2017   • Pain of right breast 07/21/2017   • Lower extremity edema 07/21/2017   • Diabetes mellitus (CMS/HCC) 07/21/2017   • History of pulmonary embolus (PE) 01/01/1997         Past Surgical History:   Procedure Laterality Date   • BREAST BIOPSY Left 2014   • CARDIAC CATHETERIZATION Left 8/10/2017    Procedure: Cardiac Catheterization/Vascular Study;  Surgeon: Johny Sommer MD;  Location: [facility] INVASIVE LOCATION;  Service:    • CHOLECYSTECTOMY     • COLONOSCOPY     • ENDOSCOPY     • HYSTERECTOMY  11/2014   • KNEE SURGERY Right 12/2017    total replacement   • NASAL SINUS SURGERY     • OOPHORECTOMY  11/2014   • PULMONARY EMBOLISM SURGERY      right lung   • TUBAL ABDOMINAL LIGATION         Allergies   Allergen Reactions   • Lisinopril Cough         Current Outpatient Medications:   •  amLODIPine (NORVASC) 5 MG tablet, Take 1 tablet by mouth Daily., Disp: 30 tablet, Rfl: 3  •  aspirin 81 MG tablet, Take 1 tablet by mouth Daily., Disp: 30 tablet, Rfl: 11  •  atorvastatin (LIPITOR) 80 MG tablet, Take 1 tablet by mouth Every Night., Disp: 30 tablet, Rfl: 0  •  carvedilol (COREG) 25 MG tablet, Take 1 tablet by mouth 2 (Two) Times a Day With Meals., Disp: 60 tablet, Rfl: 11  •  escitalopram (LEXAPRO) 10 MG tablet, Take 10 mg by mouth Daily., Disp: , Rfl: 5  •  hydrochlorothiazide (HYDRODIURIL) 25 MG tablet, TAKE 1 TABLET BY MOUTH DAILY, Disp: 30 tablet, Rfl: 3  •  HYDROXYZINE PAMOATE PO,  Take 25 mg by mouth Every Night. 1-2 tablets at night-  Pt only takes one tablet, Disp: , Rfl:   •  isosorbide mononitrate (IMDUR) 30 MG 24 hr tablet, TAKE 1 TABLET BY MOUTH EVERY MORNING, Disp: 30 tablet, Rfl: 3  •  meloxicam (MOBIC) 15 MG tablet, Take 15 mg by mouth Daily As Needed., Disp: , Rfl: 3  •  metFORMIN (GLUCOPHAGE) 1000 MG tablet, Take 1,000 mg by mouth 2 (Two) Times a Day With Meals., Disp: , Rfl:   •  Multiple Vitamins-Minerals (CENTRUM WOMEN PO), Take 1 capsule by mouth Daily. gummies, Disp: , Rfl:   •  SUMAtriptan (IMITREX) 50 MG tablet, Take 50 mg by mouth Every 2 (Two) Hours As Needed for Migraine. Take one tablet at onset of headache. May repeat dose one time in 2 hours if headache not relieved., Disp: , Rfl:   •  traZODone (DESYREL) 100 MG tablet, Take 100 mg by mouth Every Night., Disp: , Rfl:   •  vitamin D (ERGOCALCIFEROL) 51524 UNITS capsule capsule, Take 50,000 Units by mouth 1 (One) Time Per Week. Wednesday, Disp: , Rfl:   •  cyclobenzaprine (FLEXERIL) 10 MG tablet, Take 10 mg by mouth 2 (Two) Times a Day., Disp: , Rfl:   •  estradiol (ESTRACE) 1 MG tablet, Take 1 mg by mouth Daily., Disp: , Rfl:     The following portions of the patient's history were reviewed and updated today during office visit as appropriate: allergies, current medications, past family history, past medical history, past social history, past surgical history and problem list.    Review of Systems   Constitution: Positive for weakness, malaise/fatigue and weight gain.   Cardiovascular: Positive for chest pain, dyspnea on exertion, near-syncope and palpitations.   Respiratory: Positive for shortness of breath.    Musculoskeletal: Positive for back pain, joint pain, muscle weakness and myalgias.   Neurological: Positive for dizziness and light-headedness.   Psychiatric/Behavioral: Negative for depression and suicidal ideas. The patient is not nervous/anxious.    All other systems reviewed and are negative.      Objective:  "    Vitals:    04/03/19 1536   BP: 153/84   BP Location: Right arm   Patient Position: Sitting   Cuff Size: Large Adult   Pulse: 84   Resp: 18   Temp: 95.6 °F (35.3 °C)   TempSrc: Temporal   SpO2: 99%   Weight: 129 kg (284 lb)   Height: 180.3 cm (70.98\")         Physical Exam   Constitutional: She is oriented to person, place, and time. She appears well-developed and well-nourished. No distress.   Very pleasant, moderately obese young adult female in NAD.     HENT:   Head: Normocephalic and atraumatic.   Eyes: Pupils are equal, round, and reactive to light.   Neck: Neck supple. No JVD present. No tracheal deviation present.   Cardiovascular: Normal rate, regular rhythm, normal heart sounds and intact distal pulses.   No murmur heard.  Pulmonary/Chest: Effort normal and breath sounds normal. No respiratory distress.   Abdominal: Soft. Bowel sounds are normal. There is no tenderness.   Musculoskeletal: Normal range of motion. She exhibits no edema.   Neurological: She is alert and oriented to person, place, and time. No cranial nerve deficit.   Skin: Skin is warm and dry. No rash noted.   Psychiatric: She has a normal mood and affect. Her behavior is normal.       Lab and Diagnostic Review:  Lab Results   Component Value Date    GLUCOSE 121 (H) 08/09/2017    BUN 16 08/09/2017    CREATININE 0.80 08/09/2017    EGFRIFAFRI 91 08/09/2017    BCR 20.0 08/09/2017    K 4.6 08/09/2017    CO2 31.0 08/09/2017    CALCIUM 9.8 08/09/2017    ALBUMIN 4.20 07/21/2017    AST 27 07/21/2017    ALT 42 (H) 07/21/2017         Assessment and Plan:         1. Palpitations    - Holter Monitor - 48 Hour; Future  - TSH; Future  - T4, Free; Future  - Magnesium; Future  - D-dimer, Quantitative; Future  - Ambulatory Referral to Cardiology    2. Dizziness    - Holter Monitor - 48 Hour; Future    3. Near syncope    - Holter Monitor - 48 Hour; Future  - 24 Hour Blood Pressure Monitor; Future  - Ambulatory Referral to Cardiology    4. Elevated blood " pressure reading  NO home BP log.  Pt reports s/s have worsened with adding BP meds.  If elevated on 24 hour BP monitor will increase Norvasc    - 24 Hour Blood Pressure Monitor; Future  - Ambulatory Referral to Cardiology    5. Fatigue, unspecified type    - TSH; Future  - T4, Free; Future  - Vitamin B12; Future  - Folate; Future  - Rheumatoid Factor; Future  - Sedimentation Rate; Future  - C-reactive Protein; Future  - Antinuclear Antibodies Direct; Future    6. Generalized pain    - Rheumatoid Factor; Future  - Sedimentation Rate; Future  - C-reactive Protein; Future  - Antinuclear Antibodies Direct; Future    7. Chest pain, atypical    - D-dimer, Quantitative; Future  - Ambulatory Referral to Cardiology:  To be seen by cardiology for further managment    8. History of pulmonary embolus (PE)    - D-dimer, Quantitative; Future    9. Dyspnea on exertion    - CBC (No Diff); Future  - BNP; Future  - D-dimer, Quantitative; Future    10. Vitamin D deficiency    - Vitamin D 25 Hydroxy; Future    11. Elevated hemoglobin A1c    - Hemoglobin A1c; Future    12. Essential hypertension    - Comprehensive Metabolic Panel; Future  - Ambulatory Referral to Cardiology    F/u with PCP on routine basis.  F/u with Dr. Sommer (to be scheduled).  Pt to f/u [facility] as needed or as determined by cardiology.     *Please note that portions of this note were completed with a voice recognition program. Efforts were made to edit the dictations, but occasionally words are mistranscribed.

## 2019-04-04 LAB
ANA SER QL: NEGATIVE
RHEUMATOID FACT SERPL-ACNC: NEGATIVE [IU]/ML

## 2019-04-05 ENCOUNTER — DOCUMENTATION (OUTPATIENT)
Dept: CARDIOLOGY | Facility: HOSPITAL | Age: 54
End: 2019-04-05

## 2019-04-05 NOTE — PROGRESS NOTES
[]A 24 Hr. Ambulatory Blood Pressure Monitor was worn from 4/3/2019 to .  Results received and given to Martha for review and management. Copy of results also sent to Medical Records.

## 2019-04-10 ENCOUNTER — TELEPHONE (OUTPATIENT)
Dept: CARDIOLOGY | Facility: HOSPITAL | Age: 54
End: 2019-04-10

## 2019-04-10 NOTE — TELEPHONE ENCOUNTER
Called and reviewed results    24-hour ambulatory blood pressure monitor 4/3/2019: Overall average 135/89, 82 bpm.  Awake average 138/92, 83 bpm.  Sleep average 126/77, 80 bpm.    Continue current meds.  F/u with Dr. Sommer as scheduled.

## 2019-04-17 PROCEDURE — 93227 XTRNL ECG REC<48 HR R&I: CPT | Performed by: INTERNAL MEDICINE

## 2019-04-18 ENCOUNTER — TELEPHONE (OUTPATIENT)
Dept: CARDIOLOGY | Facility: CLINIC | Age: 54
End: 2019-04-18

## 2019-04-18 DIAGNOSIS — I10 ESSENTIAL HYPERTENSION: ICD-10-CM

## 2019-04-18 RX ORDER — AMLODIPINE BESYLATE 10 MG/1
10 TABLET ORAL DAILY
Qty: 30 TABLET | Refills: 2 | Status: SHIPPED | OUTPATIENT
Start: 2019-04-18 | End: 2019-07-18

## 2019-04-18 RX ORDER — CARVEDILOL 12.5 MG/1
12.5 TABLET ORAL 2 TIMES DAILY WITH MEALS
Qty: 60 TABLET | Refills: 2 | Status: SHIPPED | OUTPATIENT
Start: 2019-04-18 | End: 2019-07-18

## 2019-04-18 NOTE — TELEPHONE ENCOUNTER
Patient returned call to discuss Zio results.  Per [facility] patient instructed to decrease her Coreg to 12.5 mg po BID and increase her Norvasc to 10 mg po daily. She was also instructed to keep a BP log at home until her follow-up on 5/6/19 and bring it to her visit. New prescriptions sent for medications. All questions answered at this time.

## 2019-04-30 PROBLEM — I44.30 AV BLOCK: Status: ACTIVE | Noted: 2019-04-30

## 2019-05-03 NOTE — PROGRESS NOTES
[facility] AT [address], [address]  [place], [place], [address] [phone]  WWW.[facility]           OUTPATIENT CLINIC FOLLOW UP NOTE    Patient Care Team:  Patient Care Team:  Hemalatha Rahman APRN as PCP - General (Family Medicine)    Subjective:     Chief complaint: Dyspnea    HPI:    Kellen Esparza is a 53 y.o. female.  The patient has a history of CAD (small vessel disease close), mild diastolic dysfunction, diabetes, hypertension, hyperlipidemia, migraines, history of PE, who is a recent history of chest pain.  Today she presents for follow-up.    Patient states the reason she she has been having worsening dyspnea with exertion, moderately severe, does not require much effort for her to become dyspneic, has significant fatigue, symptoms are associated with palpitations, lower extremity swelling, dizziness, occasional right sided mild fleeting chest pains that radiates to the right axilla.  Symptoms are not associated with weight gain.    PFSH:  Patient Active Problem List   Diagnosis   • Chest pain   • Dyspnea on exertion   • Essential hypertension   • Pain of right breast   • Lower extremity edema   • Diabetes mellitus (CMS/HCC)   • Abnormal stress test   • Migraine   • History of pulmonary embolus (PE)   • Hyperlipidemia   • DDD (degenerative disc disease), cervical   • Coronary artery disease of native artery of native heart with stable angina pectoris (CMS/HCC)   • AV block         Current Outpatient Medications:   •  amLODIPine (NORVASC) 10 MG tablet, Take 1 tablet by mouth Daily., Disp: 30 tablet, Rfl: 2  •  aspirin 81 MG tablet, Take 1 tablet by mouth Daily., Disp: 30 tablet, Rfl: 11  •  atorvastatin (LIPITOR) 80 MG tablet, Take 1 tablet by mouth Every Night. (Patient taking differently: Take 20 mg by mouth Every Night.), Disp: 30 tablet, Rfl: 0  •  carvedilol (COREG) 12.5 MG tablet, Take 1 tablet by mouth 2 (Two) Times a Day With Meals., Disp:  60 tablet, Rfl: 2  •  escitalopram (LEXAPRO) 10 MG tablet, Take 10 mg by mouth Daily., Disp: , Rfl: 5  •  hydrochlorothiazide (HYDRODIURIL) 25 MG tablet, TAKE 1 TABLET BY MOUTH DAILY, Disp: 30 tablet, Rfl: 3  •  HYDROXYZINE PAMOATE PO, Take 25 mg by mouth Every Night. 1-2 tablets at night-  Pt only takes one tablet, Disp: , Rfl:   •  isosorbide mononitrate (IMDUR) 30 MG 24 hr tablet, TAKE 1 TABLET BY MOUTH EVERY MORNING, Disp: 30 tablet, Rfl: 3  •  meloxicam (MOBIC) 15 MG tablet, Take 15 mg by mouth Daily As Needed., Disp: , Rfl: 3  •  metFORMIN (GLUCOPHAGE) 1000 MG tablet, Take 1,000 mg by mouth 2 (Two) Times a Day With Meals., Disp: , Rfl:   •  Multiple Vitamins-Minerals (CENTRUM WOMEN PO), Take 1 capsule by mouth Daily. gummies, Disp: , Rfl:   •  SUMAtriptan (IMITREX) 50 MG tablet, Take 50 mg by mouth Every 2 (Two) Hours As Needed for Migraine. Take one tablet at onset of headache. May repeat dose one time in 2 hours if headache not relieved., Disp: , Rfl:   •  traZODone (DESYREL) 100 MG tablet, Take 100 mg by mouth Every Night., Disp: , Rfl:   •  vitamin D (ERGOCALCIFEROL) 49811 UNITS capsule capsule, Take 50,000 Units by mouth 1 (One) Time Per Week. Wednesday, Disp: , Rfl:     Allergies   Allergen Reactions   • Lisinopril Cough        reports that she has never smoked. She has never used smokeless tobacco.    Family History   Problem Relation Age of Onset   • Breast cancer Mother 42   • Heart failure Mother    • No Known Problems Father    • Hypertension Brother    • Hypertension Maternal Grandmother    • Hypertension Maternal Grandfather    • No Known Problems Paternal Grandmother    • No Known Problems Paternal Grandfather    • Heart failure Son    • Hypertension Sister    • Ovarian cancer Neg Hx        Review of Systems:  Positive for dyspnea, fatigue, palpitations chest pain, lower extremities  Negative for orthopnea, PND,lightheadedness, syncope.     Objective:   /86 (BP Location: Right arm, Patient  "Position: Sitting)   Pulse 81   Ht 180.3 cm (71\")   Wt 124 kg (274 lb)   LMP 10/31/2014 (Approximate)   BMI 38.22 kg/m²   CONSTITUTIONAL: No acute distress, normal affect  RESPIRATORY: Normal effort. Clear to auscultation bilaterally without wheezing or rales  CARDIOVASCULAR: Regular rate and rhythm with normal S1 and S2. Without gallop or rub. AZALIA at RUSB  PERIPHERAL VASCULAR: Normal radial pulses bilaterally. There is mild peripheral edema bilaterally.      Labs:  Lab Results   Component Value Date    ALT 33 04/03/2019    AST 22 04/03/2019     Lab Results   Component Value Date    CHOL 108 08/09/2017    TRIG 62 08/09/2017    HDL 48 08/09/2017    CREATININE 0.81 04/03/2019       Diagnostic Data:      ECG 12 Lead  Date/Time: 5/6/2019 6:23 PM  Performed by: Johny Sommer MD  Authorized by: Johny Sommer MD   Rhythm: sinus rhythm  Ectopy: atrial premature contractions  Comments: Heart rate 81, QRS 84, QTc 466            PFTs 11/2018 - moderate restrictive lung disease     14 days Zio Patch 4/2019  -Only 1.5 days of monitoring.  -High-grade AV block for 4.4 seconds at 7:23 AM.  -Second-degree AV block for 10 seconds at 8:53 PM.  -Average heart rate 86.    TTE 11/2018  · Left ventricular systolic function is normal. Estimated EF = 65%.  · Left ventricular wall thickness is consistent with mild concentric hypertrophy.  · Left ventricular diastolic dysfunction (grade I a) consistent with impaired relaxation.  · Left atrial cavity size is borderline dilated.  · Trace-to-mild aortic valve regurgitation is present.    TTE 7/2017  · Estimated EF appears to be in the range of 66 - 70%.  · Left ventricular wall thickness is consistent with moderate asymmetric hypertrophy.  · Left ventricular diastolic dysfunction (grade I) consistent with impaired relaxation.    C 7/2017  · There is mild to moderate diffuse tortuosity of the vessels.  There is evidence of small vessel disease in the distal portion of a small RPL S.  " There is no obstructive coronary artery disease.  · Normal left ventricular ejection fraction with no regional wall motion abnormalities    CT PE Protocol 7/2017 - No evidence of pulmonary embolus or other acute chest  disease.    Assessment and Plan:   Kellen was seen today for hypertension and hyperlipidemia.    Diagnoses and all orders for this visit:    Dyspnea, sleep disturbance, fatigue  -Abnormal PFTs and sleep disturbances.  Referral to pulmonary for further evaluation of restrictive lung disease and work-up for possible sleep apnea  -Recommended cardiac exercise    Recent heart monitor with short runs of high degree AV block  -No clear benefit from decreasing carvedilol  -ZIO Patch to see if she still having these pauses on a lower dose of beta-blocker    Coronary artery disease of native artery of native heart with stable angina pectoris  Chest pain, unspecified type  -Chest pains are atypical, had small vessel disease on her heart cath in the past, I do not believe her current chest pains or anginal in nature  -Continue current related medications  -Recommended cardiac exercise    Essential hypertension  -Stable, continue current medications      - Return in about 3 months (around 8/6/2019).    Johny Sommer MD, MSc, [facility]  Interventional Cardiology  [facility] at [facility]

## 2019-05-06 ENCOUNTER — OFFICE VISIT (OUTPATIENT)
Dept: CARDIOLOGY | Facility: CLINIC | Age: 54
End: 2019-05-06

## 2019-05-06 VITALS
HEIGHT: 71 IN | WEIGHT: 274 LBS | HEART RATE: 81 BPM | BODY MASS INDEX: 38.36 KG/M2 | SYSTOLIC BLOOD PRESSURE: 134 MMHG | DIASTOLIC BLOOD PRESSURE: 86 MMHG

## 2019-05-06 DIAGNOSIS — R00.2 PALPITATIONS: ICD-10-CM

## 2019-05-06 DIAGNOSIS — E78.2 MIXED HYPERLIPIDEMIA: ICD-10-CM

## 2019-05-06 DIAGNOSIS — R06.09 DYSPNEA ON EXERTION: ICD-10-CM

## 2019-05-06 DIAGNOSIS — I25.9 CHEST PAIN DUE TO MYOCARDIAL ISCHEMIA, UNSPECIFIED ISCHEMIC CHEST PAIN TYPE: Primary | ICD-10-CM

## 2019-05-06 DIAGNOSIS — I25.118 CORONARY ARTERY DISEASE OF NATIVE ARTERY OF NATIVE HEART WITH STABLE ANGINA PECTORIS (HCC): Primary | ICD-10-CM

## 2019-05-06 DIAGNOSIS — I10 ESSENTIAL HYPERTENSION: ICD-10-CM

## 2019-05-06 PROCEDURE — 99214 OFFICE O/P EST MOD 30 MIN: CPT | Performed by: INTERNAL MEDICINE

## 2019-05-06 PROCEDURE — 93000 ELECTROCARDIOGRAM COMPLETE: CPT | Performed by: INTERNAL MEDICINE

## 2019-06-20 ENCOUNTER — TELEPHONE (OUTPATIENT)
Dept: CARDIOLOGY | Facility: CLINIC | Age: 54
End: 2019-06-20

## 2019-06-21 ENCOUNTER — TELEPHONE (OUTPATIENT)
Dept: CARDIOLOGY | Facility: CLINIC | Age: 54
End: 2019-06-21

## 2019-06-21 DIAGNOSIS — I25.9 CHEST PAIN DUE TO MYOCARDIAL ISCHEMIA, UNSPECIFIED ISCHEMIC CHEST PAIN TYPE: Primary | ICD-10-CM

## 2019-06-21 NOTE — TELEPHONE ENCOUNTER
Pt returned call. Pt had 2 ZIOs placed and they both had trouble sticking. Pt willing to try MCOT for 14 days. Pt to come in Monday morning to get monitor placed.

## 2019-06-24 ENCOUNTER — TELEPHONE (OUTPATIENT)
Dept: CARDIOLOGY | Facility: CLINIC | Age: 54
End: 2019-06-24

## 2019-06-24 RX ORDER — VALSARTAN 40 MG/1
40 TABLET ORAL DAILY
Qty: 30 TABLET | Refills: 11 | Status: SHIPPED | OUTPATIENT
Start: 2019-06-24 | End: 2019-07-26

## 2019-06-24 NOTE — TELEPHONE ENCOUNTER
contacted patient in regards to starting Lisinopril which is listed as an allergy. LVM with patient switching this to Valsartan 40 mg daily. Requested return call.

## 2019-06-25 DIAGNOSIS — R60.0 LOCALIZED EDEMA: ICD-10-CM

## 2019-06-25 DIAGNOSIS — I10 ESSENTIAL HYPERTENSION: ICD-10-CM

## 2019-06-25 NOTE — TELEPHONE ENCOUNTER
Pt returned call to discuss monitor alert further. Pt to begin Valsartan today and call office with any further needs. Pt verbalizes understanding and all questions were answered at this time.

## 2019-06-26 RX ORDER — HYDROCHLOROTHIAZIDE 25 MG/1
25 TABLET ORAL DAILY
Qty: 30 TABLET | Refills: 1 | Status: SHIPPED | OUTPATIENT
Start: 2019-06-26 | End: 2019-07-18

## 2019-06-26 NOTE — TELEPHONE ENCOUNTER
Patient last seen on 4/3/19 and will follow up with Dr. Sommer on 8/19/19. Sent refills to last until follow up with Dr. Sommer and instructed patient to have him send refills.    Manjula Munroe, ThangD

## 2019-07-09 ENCOUNTER — DOCUMENTATION (OUTPATIENT)
Dept: CARDIOLOGY | Facility: CLINIC | Age: 54
End: 2019-07-09

## 2019-07-09 DIAGNOSIS — I44.2 AV BLOCK, COMPLETE (HCC): Primary | ICD-10-CM

## 2019-07-09 NOTE — H&P (VIEW-ONLY)
Brief cardiology update  -Patient continues to have intermittent high degree AV block despite discontinuing her beta blocker two weeks ago. The events are symptomatic including a ventricular escape rhythm of 35 bpm in the middle of the day that was associated with lightheadedness  -Discussed with EP and Dr. Gay  -We will set up the patient to undergo dual-chamber pacemaker placement with Dr. Gay in the near future  -Discussed with the patient over the phone.  She is agreeable to proceed    Johny Sommer MD, MSc, [facility]  Interventional Cardiology  [facility]

## 2019-07-09 NOTE — PROGRESS NOTES
Brief cardiology update  -Patient continues to have intermittent high degree AV block despite discontinuing her beta blocker two weeks ago. The events are symptomatic including a ventricular escape rhythm of 35 bpm in the middle of the day that was associated with lightheadedness  -Discussed with EP and Dr. Gay  -We will set up the patient to undergo dual-chamber pacemaker placement with Dr. Gay in the near future  -Discussed with the patient over the phone.  She is agreeable to proceed    Johny Sommer MD, MSc, [facility]  Interventional Cardiology  [facility]

## 2019-07-12 ENCOUNTER — PREP FOR SURGERY (OUTPATIENT)
Dept: OTHER | Facility: HOSPITAL | Age: 54
End: 2019-07-12

## 2019-07-12 DIAGNOSIS — I44.2 AV BLOCK, COMPLETE (HCC): Primary | ICD-10-CM

## 2019-07-12 RX ORDER — CEFAZOLIN SODIUM IN 0.9 % NACL 3 G/100 ML
3 INTRAVENOUS SOLUTION, PIGGYBACK (ML) INTRAVENOUS
Status: CANCELLED | OUTPATIENT
Start: 2019-07-19

## 2019-07-12 NOTE — PLAN OF CARE
Dr. Shaw is not in today, please review this request and comments below:     Requested Prescriptions   Pending Prescriptions Disp Refills     diazepam (VALIUM) 5 MG tablet 30 tablet 0     Sig: Take 1 tablet (5 mg) by mouth daily as needed for anxiety or sleep       There is no refill protocol information for this order        HYDROmorphone (DILAUDID) 4 MG tablet 180 tablet 0     Sig: Take 2 tablets (8 mg) by mouth 4 times daily as needed for moderate to severe pain (every 4-6hour prn)       There is no refill protocol information for this order        morphine (MS CONTIN) 15 MG CR tablet 30 tablet 0     Sig: Take 1 tablet (15 mg) by mouth daily Additional to 30 mg twice daily.       There is no refill protocol information for this order        morphine (MS CONTIN) 30 MG CR tablet 60 tablet 0     Sig: Take 1 tablet (30 mg) by mouth 2 times daily With 15 mg in between.       There is no refill protocol information for this order        Guadalupe Cabrera, KYLIEN, RN, PHN   Problem: Patient Care Overview (Adult)  Goal: Plan of Care Review  PT. TO HAVE HEART TODAY WITH DR. METCALF.

## 2019-07-18 ENCOUNTER — APPOINTMENT (OUTPATIENT)
Dept: PREADMISSION TESTING | Facility: HOSPITAL | Age: 54
End: 2019-07-18

## 2019-07-18 DIAGNOSIS — I44.2 AV BLOCK, COMPLETE (HCC): ICD-10-CM

## 2019-07-18 LAB
ALBUMIN SERPL-MCNC: 4.1 G/DL (ref 3.5–5.2)
ALBUMIN/GLOB SERPL: 1.4 G/DL
ALP SERPL-CCNC: 72 U/L (ref 39–117)
ALT SERPL W P-5'-P-CCNC: 13 U/L (ref 1–33)
ANION GAP SERPL CALCULATED.3IONS-SCNC: 13 MMOL/L (ref 5–15)
AST SERPL-CCNC: 12 U/L (ref 1–32)
BILIRUB SERPL-MCNC: 0.3 MG/DL (ref 0.2–1.2)
BUN BLD-MCNC: 12 MG/DL (ref 6–20)
BUN/CREAT SERPL: 19 (ref 7–25)
CALCIUM SPEC-SCNC: 9.5 MG/DL (ref 8.6–10.5)
CHLORIDE SERPL-SCNC: 100 MMOL/L (ref 98–107)
CO2 SERPL-SCNC: 28 MMOL/L (ref 22–29)
CREAT BLD-MCNC: 0.63 MG/DL (ref 0.57–1)
DEPRECATED RDW RBC AUTO: 42.5 FL (ref 37–54)
ERYTHROCYTE [DISTWIDTH] IN BLOOD BY AUTOMATED COUNT: 14.6 % (ref 12.3–15.4)
GFR SERPL CREATININE-BSD FRML MDRD: 119 ML/MIN/1.73
GLOBULIN UR ELPH-MCNC: 2.9 GM/DL
GLUCOSE BLD-MCNC: 124 MG/DL (ref 65–99)
HCT VFR BLD AUTO: 37.7 % (ref 34–46.6)
HGB BLD-MCNC: 11.9 G/DL (ref 12–15.9)
MCH RBC QN AUTO: 25.7 PG (ref 26.6–33)
MCHC RBC AUTO-ENTMCNC: 31.6 G/DL (ref 31.5–35.7)
MCV RBC AUTO: 81.4 FL (ref 79–97)
PLATELET # BLD AUTO: 170 10*3/MM3 (ref 140–450)
PMV BLD AUTO: 12.5 FL (ref 6–12)
POTASSIUM BLD-SCNC: 4.5 MMOL/L (ref 3.5–5.2)
PROT SERPL-MCNC: 7 G/DL (ref 6–8.5)
RBC # BLD AUTO: 4.63 10*6/MM3 (ref 3.77–5.28)
SODIUM BLD-SCNC: 141 MMOL/L (ref 136–145)
WBC NRBC COR # BLD: 7.28 10*3/MM3 (ref 3.4–10.8)

## 2019-07-18 PROCEDURE — 36415 COLL VENOUS BLD VENIPUNCTURE: CPT

## 2019-07-18 PROCEDURE — 80053 COMPREHEN METABOLIC PANEL: CPT | Performed by: NURSE PRACTITIONER

## 2019-07-18 PROCEDURE — 85027 COMPLETE CBC AUTOMATED: CPT | Performed by: NURSE PRACTITIONER

## 2019-07-18 RX ORDER — ESTRADIOL 1 MG/1
1 TABLET ORAL DAILY
COMMUNITY
End: 2022-02-07 | Stop reason: SDUPTHER

## 2019-07-18 NOTE — DISCHARGE INSTRUCTIONS

## 2019-07-18 NOTE — PAT
Patient to apply Chlorhexadine wipes  to surgical area (as instructed) the night before procedure and the AM of procedure. Wipes provided.    No permit ordered, Pt has not met Dr Gay.

## 2019-07-19 ENCOUNTER — HOSPITAL ENCOUNTER (OUTPATIENT)
Facility: HOSPITAL | Age: 54
Discharge: HOME | End: 2019-07-20
Attending: INTERNAL MEDICINE | Admitting: INTERNAL MEDICINE

## 2019-07-19 DIAGNOSIS — I44.2 AV BLOCK, COMPLETE (HCC): ICD-10-CM

## 2019-07-19 LAB
GLUCOSE BLDC GLUCOMTR-MCNC: 111 MG/DL (ref 70–130)
GLUCOSE BLDC GLUCOMTR-MCNC: 112 MG/DL (ref 70–130)
GLUCOSE BLDC GLUCOMTR-MCNC: 115 MG/DL (ref 70–130)
GLUCOSE BLDC GLUCOMTR-MCNC: 144 MG/DL (ref 70–130)

## 2019-07-19 PROCEDURE — 25010000002 MORPHINE PER 10 MG: Performed by: INTERNAL MEDICINE

## 2019-07-19 PROCEDURE — 33208 INSRT HEART PM ATRIAL & VENT: CPT | Performed by: INTERNAL MEDICINE

## 2019-07-19 PROCEDURE — 63710000001 INSULIN LISPRO (HUMAN) PER 5 UNITS: Performed by: NURSE PRACTITIONER

## 2019-07-19 PROCEDURE — 0 IOPAMIDOL PER 1 ML: Performed by: INTERNAL MEDICINE

## 2019-07-19 PROCEDURE — 99152 MOD SED SAME PHYS/QHP 5/>YRS: CPT | Performed by: INTERNAL MEDICINE

## 2019-07-19 PROCEDURE — 25010000002 MIDAZOLAM PER 1 MG: Performed by: INTERNAL MEDICINE

## 2019-07-19 PROCEDURE — 99153 MOD SED SAME PHYS/QHP EA: CPT | Performed by: INTERNAL MEDICINE

## 2019-07-19 PROCEDURE — 25010000002 FENTANYL CITRATE (PF) 100 MCG/2ML SOLUTION: Performed by: INTERNAL MEDICINE

## 2019-07-19 PROCEDURE — 25010000003 CEFAZOLIN IN DEXTROSE 2-4 GM/100ML-% SOLUTION: Performed by: INTERNAL MEDICINE

## 2019-07-19 PROCEDURE — C1785 PMKR, DUAL, RATE-RESP: HCPCS | Performed by: INTERNAL MEDICINE

## 2019-07-19 PROCEDURE — 82962 GLUCOSE BLOOD TEST: CPT

## 2019-07-19 PROCEDURE — C1898 LEAD, PMKR, OTHER THAN TRANS: HCPCS | Performed by: INTERNAL MEDICINE

## 2019-07-19 PROCEDURE — C1892 INTRO/SHEATH,FIXED,PEEL-AWAY: HCPCS | Performed by: INTERNAL MEDICINE

## 2019-07-19 DEVICE — PACEMAKER
Type: IMPLANTABLE DEVICE | Status: FUNCTIONAL
Brand: ACCOLADE™ MRI DR

## 2019-07-19 DEVICE — ENDOCARDIAL STEROID-ELUTING MR CONDITIONAL STYLET DELIVERED PACE/SENSE LEAD
Type: IMPLANTABLE DEVICE | Status: FUNCTIONAL
Brand: INGEVITY™ MRI

## 2019-07-19 RX ORDER — CEFAZOLIN SODIUM IN 0.9 % NACL 3 G/100 ML
3 INTRAVENOUS SOLUTION, PIGGYBACK (ML) INTRAVENOUS
Status: COMPLETED | OUTPATIENT
Start: 2019-07-19 | End: 2019-07-19

## 2019-07-19 RX ORDER — IBUPROFEN 400 MG/1
400 TABLET ORAL EVERY 6 HOURS PRN
Status: DISCONTINUED | OUTPATIENT
Start: 2019-07-19 | End: 2019-07-20 | Stop reason: HOSPADM

## 2019-07-19 RX ORDER — VALSARTAN 80 MG/1
40 TABLET ORAL DAILY
Status: DISCONTINUED | OUTPATIENT
Start: 2019-07-19 | End: 2019-07-20 | Stop reason: HOSPADM

## 2019-07-19 RX ORDER — ONDANSETRON 2 MG/ML
4 INJECTION INTRAMUSCULAR; INTRAVENOUS EVERY 6 HOURS PRN
Status: DISCONTINUED | OUTPATIENT
Start: 2019-07-19 | End: 2019-07-20 | Stop reason: HOSPADM

## 2019-07-19 RX ORDER — NICOTINE POLACRILEX 4 MG
15 LOZENGE BUCCAL
Status: DISCONTINUED | OUTPATIENT
Start: 2019-07-19 | End: 2019-07-20 | Stop reason: HOSPADM

## 2019-07-19 RX ORDER — MORPHINE SULFATE 2 MG/ML
2 INJECTION, SOLUTION INTRAMUSCULAR; INTRAVENOUS EVERY 4 HOURS PRN
Status: DISCONTINUED | OUTPATIENT
Start: 2019-07-19 | End: 2019-07-20 | Stop reason: HOSPADM

## 2019-07-19 RX ORDER — CEFAZOLIN SODIUM 2 G/100ML
2 INJECTION, SOLUTION INTRAVENOUS EVERY 8 HOURS
Status: COMPLETED | OUTPATIENT
Start: 2019-07-19 | End: 2019-07-19

## 2019-07-19 RX ORDER — LIDOCAINE HYDROCHLORIDE 10 MG/ML
INJECTION, SOLUTION EPIDURAL; INFILTRATION; INTRACAUDAL; PERINEURAL AS NEEDED
Status: DISCONTINUED | OUTPATIENT
Start: 2019-07-19 | End: 2019-07-19 | Stop reason: HOSPADM

## 2019-07-19 RX ORDER — ESCITALOPRAM OXALATE 10 MG/1
10 TABLET ORAL DAILY
Status: DISCONTINUED | OUTPATIENT
Start: 2019-07-19 | End: 2019-07-20 | Stop reason: HOSPADM

## 2019-07-19 RX ORDER — TRAZODONE HYDROCHLORIDE 50 MG/1
100 TABLET ORAL NIGHTLY
Status: DISCONTINUED | OUTPATIENT
Start: 2019-07-19 | End: 2019-07-20 | Stop reason: HOSPADM

## 2019-07-19 RX ORDER — ESTRADIOL 0.5 MG/1
1 TABLET ORAL DAILY
Status: DISCONTINUED | OUTPATIENT
Start: 2019-07-19 | End: 2019-07-20 | Stop reason: HOSPADM

## 2019-07-19 RX ORDER — SODIUM CHLORIDE 0.9 % (FLUSH) 0.9 %
3 SYRINGE (ML) INJECTION EVERY 12 HOURS SCHEDULED
Status: DISCONTINUED | OUTPATIENT
Start: 2019-07-19 | End: 2019-07-20 | Stop reason: HOSPADM

## 2019-07-19 RX ORDER — MIDAZOLAM HYDROCHLORIDE 1 MG/ML
INJECTION INTRAMUSCULAR; INTRAVENOUS AS NEEDED
Status: DISCONTINUED | OUTPATIENT
Start: 2019-07-19 | End: 2019-07-19 | Stop reason: HOSPADM

## 2019-07-19 RX ORDER — NALOXONE HCL 0.4 MG/ML
0.4 VIAL (ML) INJECTION
Status: DISCONTINUED | OUTPATIENT
Start: 2019-07-19 | End: 2019-07-20 | Stop reason: HOSPADM

## 2019-07-19 RX ORDER — HYDROCODONE BITARTRATE AND ACETAMINOPHEN 5; 325 MG/1; MG/1
1 TABLET ORAL EVERY 4 HOURS PRN
Status: DISCONTINUED | OUTPATIENT
Start: 2019-07-19 | End: 2019-07-20 | Stop reason: HOSPADM

## 2019-07-19 RX ORDER — ASPIRIN 81 MG/1
81 TABLET, CHEWABLE ORAL DAILY
Status: DISCONTINUED | OUTPATIENT
Start: 2019-07-19 | End: 2019-07-20 | Stop reason: HOSPADM

## 2019-07-19 RX ORDER — ZOLPIDEM TARTRATE 5 MG/1
5 TABLET ORAL NIGHTLY PRN
Status: DISCONTINUED | OUTPATIENT
Start: 2019-07-19 | End: 2019-07-20 | Stop reason: HOSPADM

## 2019-07-19 RX ORDER — SODIUM CHLORIDE 0.9 % (FLUSH) 0.9 %
1-10 SYRINGE (ML) INJECTION AS NEEDED
Status: DISCONTINUED | OUTPATIENT
Start: 2019-07-19 | End: 2019-07-20 | Stop reason: HOSPADM

## 2019-07-19 RX ORDER — DEXTROSE MONOHYDRATE 25 G/50ML
25 INJECTION, SOLUTION INTRAVENOUS
Status: DISCONTINUED | OUTPATIENT
Start: 2019-07-19 | End: 2019-07-20 | Stop reason: HOSPADM

## 2019-07-19 RX ORDER — FENTANYL CITRATE 50 UG/ML
INJECTION, SOLUTION INTRAMUSCULAR; INTRAVENOUS AS NEEDED
Status: DISCONTINUED | OUTPATIENT
Start: 2019-07-19 | End: 2019-07-19 | Stop reason: HOSPADM

## 2019-07-19 RX ADMIN — CEFAZOLIN SODIUM 2 G: 2 INJECTION, SOLUTION INTRAVENOUS at 14:57

## 2019-07-19 RX ADMIN — HYDROCODONE BITARTRATE AND ACETAMINOPHEN 1 TABLET: 5; 325 TABLET ORAL at 12:02

## 2019-07-19 RX ADMIN — CEFAZOLIN SODIUM 2 G: 2 INJECTION, SOLUTION INTRAVENOUS at 21:58

## 2019-07-19 RX ADMIN — SODIUM CHLORIDE, PRESERVATIVE FREE 3 ML: 5 INJECTION INTRAVENOUS at 20:46

## 2019-07-19 RX ADMIN — HYDROCODONE BITARTRATE AND ACETAMINOPHEN 1 TABLET: 5; 325 TABLET ORAL at 16:00

## 2019-07-19 RX ADMIN — MORPHINE SULFATE 2 MG: 2 INJECTION, SOLUTION INTRAMUSCULAR; INTRAVENOUS at 17:10

## 2019-07-19 RX ADMIN — HYDROCODONE BITARTRATE AND ACETAMINOPHEN 1 TABLET: 5; 325 TABLET ORAL at 20:45

## 2019-07-19 RX ADMIN — ZOLPIDEM TARTRATE 5 MG: 5 TABLET ORAL at 23:07

## 2019-07-19 RX ADMIN — MORPHINE SULFATE 2 MG: 2 INJECTION, SOLUTION INTRAMUSCULAR; INTRAVENOUS at 23:07

## 2019-07-19 RX ADMIN — Medication 3 G: at 08:38

## 2019-07-19 RX ADMIN — TRAZODONE HYDROCHLORIDE 100 MG: 50 TABLET ORAL at 20:45

## 2019-07-19 NOTE — PLAN OF CARE
Problem: Patient Care Overview  Goal: Plan of Care Review  Pt had pacemaker placed today dressing dry intact sling as well, bedrest til 6 however can use bathroom per Dr Gay, room air, pacer spikes off & on,  ekg & cxr in am nsr on monitor with 1st heart block.

## 2019-07-19 NOTE — INTERVAL H&P NOTE
H&P updated. The patient was examined and the following changes are noted:  patient  continues to have intermittent high degree AV block despite discontinuing her beta blocker. The events are symptomatic including a ventricular escape rhythm of 35 bpm in the middle of the day that was associated with lightheadedness. Symptomatic pauses up to 3.4 sec. Echo 11/2018: EF 65%.     Patient presents today for DDD PPM. The risks, benefits, and alternatives of the procedure have been reviewed and the patient wishes to proceed.       Electronically signed by CORRIE Francisco, 07/19/19, 7:07 AM.    Sonya Gay MD, FACC

## 2019-07-20 ENCOUNTER — APPOINTMENT (OUTPATIENT)
Dept: GENERAL RADIOLOGY | Facility: HOSPITAL | Age: 54
End: 2019-07-20

## 2019-07-20 VITALS
BODY MASS INDEX: 38.36 KG/M2 | SYSTOLIC BLOOD PRESSURE: 104 MMHG | HEART RATE: 74 BPM | DIASTOLIC BLOOD PRESSURE: 75 MMHG | OXYGEN SATURATION: 97 % | WEIGHT: 274 LBS | TEMPERATURE: 97.7 F | RESPIRATION RATE: 16 BRPM | HEIGHT: 71 IN

## 2019-07-20 LAB
GLUCOSE BLDC GLUCOMTR-MCNC: 114 MG/DL (ref 70–130)
GLUCOSE BLDC GLUCOMTR-MCNC: 142 MG/DL (ref 70–130)

## 2019-07-20 PROCEDURE — 99213 OFFICE O/P EST LOW 20 MIN: CPT | Performed by: INTERNAL MEDICINE

## 2019-07-20 PROCEDURE — 71046 X-RAY EXAM CHEST 2 VIEWS: CPT

## 2019-07-20 PROCEDURE — 82962 GLUCOSE BLOOD TEST: CPT

## 2019-07-20 PROCEDURE — 93005 ELECTROCARDIOGRAM TRACING: CPT | Performed by: INTERNAL MEDICINE

## 2019-07-20 RX ORDER — ATORVASTATIN CALCIUM 80 MG/1
80 TABLET, FILM COATED ORAL DAILY
Qty: 90 TABLET | Refills: 3 | Status: SHIPPED | OUTPATIENT
Start: 2019-07-20 | End: 2020-07-27

## 2019-07-20 RX ADMIN — HYDROCODONE BITARTRATE AND ACETAMINOPHEN 1 TABLET: 5; 325 TABLET ORAL at 04:24

## 2019-07-20 RX ADMIN — SODIUM CHLORIDE, PRESERVATIVE FREE 3 ML: 5 INJECTION INTRAVENOUS at 09:24

## 2019-07-20 RX ADMIN — ESTRADIOL 1 MG: 0.5 TABLET ORAL at 09:18

## 2019-07-20 RX ADMIN — VALSARTAN 40 MG: 80 TABLET, FILM COATED ORAL at 09:18

## 2019-07-20 RX ADMIN — ESCITALOPRAM OXALATE 10 MG: 10 TABLET ORAL at 09:18

## 2019-07-20 RX ADMIN — ASPIRIN 81 MG 81 MG: 81 TABLET ORAL at 09:17

## 2019-07-20 RX ADMIN — HYDROCODONE BITARTRATE AND ACETAMINOPHEN 1 TABLET: 5; 325 TABLET ORAL at 14:21

## 2019-07-20 RX ADMIN — HYDROCODONE BITARTRATE AND ACETAMINOPHEN 1 TABLET: 5; 325 TABLET ORAL at 09:17

## 2019-07-20 NOTE — PROGRESS NOTES
"[facility] at [facility] Progress Note     LOS: 0 days   Patient Care Team:  Hemalatha Rahman APRN as PCP - General (Family Medicine)  Johny Sommer MD as Consulting Physician (Cardiology)  PCP:  Hemalatha Rahman APRN    Chief Complaint: AV block, status post pacemaker implant.    SUBJECTIVE:  Resting, feels well, occasional discomfort at the pacemaker site, no current chest pain or shortness of breath.  Wishes to go home.    Review of Systems:   All systems have been reviewed and are negative with the exception of those mentioned above.      OBJECTIVE:    Vital Sign Min/Max for last 24 hours  Temp  Min: 97.8 °F (36.6 °C)  Max: 98.6 °F (37 °C)   BP  Min: 99/82  Max: 158/95   Pulse  Min: 63  Max: 91   Resp  Min: 16  Max: 20   SpO2  Min: 93 %  Max: 99 %   No Data Recorded   No Data Recorded     Flowsheet Rows      First Filed Value   Admission Height  180.3 cm (71\") Documented at 07/19/2019 0645   Admission Weight  124 kg (274 lb) Documented at 07/19/2019 0645          Telemetry: nsr, occasionally pacing, 70s      Intake/Output Summary (Last 24 hours) at 7/20/2019 0852  Last data filed at 7/20/2019 0420  Gross per 24 hour   Intake 600 ml   Output 1550 ml   Net -950 ml     Intake & Output (last 3 days)       07/17 0701 - 07/18 0700 07/18 0701 - 07/19 0700 07/19 0701 - 07/20 0700 07/20 0701 - 07/21 0700    P.O.   600     Total Intake(mL/kg)   600 (4.8)     Urine (mL/kg/hr)   1550 (0.5)     Total Output   1550     Net   -950             Urine Unmeasured Occurrence   2 x            Physical Exam:  General: No apparent distress.  Neck: no JVD.  Chest:No respiratory distress, breath sounds are normal. No wheezes,  rhonchi or rales.  Pacemaker site intact, no bleeding or hematoma.  Cardiovascular: Normal S1 and S2, no murmer, gallop or rub.    Extremities: No edema.       LABS/DIAGNOSTIC DATA:  Results from last 7 days   Lab Units 07/18/19  1028   WBC 10*3/mm3 7.28   HEMOGLOBIN g/dL " 11.9*   HEMATOCRIT % 37.7   PLATELETS 10*3/mm3 170     No results found for: TROPONINT      Results from last 7 days   Lab Units 07/18/19  1028   SODIUM mmol/L 141   POTASSIUM mmol/L 4.5   CHLORIDE mmol/L 100   CO2 mmol/L 28.0   BUN mg/dL 12   CREATININE mg/dL 0.63   CALCIUM mg/dL 9.5   BILIRUBIN mg/dL 0.3   ALK PHOS U/L 72   ALT (SGPT) U/L 13   AST (SGOT) U/L 12   GLUCOSE mg/dL 124*                       Medication Review:     aspirin 81 mg Oral Daily   escitalopram 10 mg Oral Daily   estradiol 1 mg Oral Daily   insulin lispro 0-9 Units Subcutaneous 4x Daily With Meals & Nightly   sodium chloride 3 mL Intravenous Q12H   traZODone 100 mg Oral Nightly   valsartan 40 mg Oral Daily              AV block, complete (CMS/HCC)    Chest x-ray: Good lead position, no pneumothorax.  ASSESSMENT:  1. High Degree AV Block/ symptomatic bradycardia  1. S/p DDD PPM 7/19/19 per Dr. Gay  2. 11/2018 echo EF 65%  2. CAD  3. HTN  4. T2DM, a1c 7.6  1. No home testing materials  2. Pt is off statin, states Martha DENTON d/c'd this but I see no record of this.   5. Hx PE      PLAN:  1. Stable post pacemaker implant.  2. Okay to discharge to home, continue usual home medications, Lipitor added for management of dyslipidemia.  3. Follow-up at our office in 8 to 10 days for wound check and in 3 months for device check.    Electronically signed by Ann-Marie Healy PA-C, 07/20/19, 8:28 AM.    I have seen and examined the patient, case was discussed with the physician extender, reviewed the above note, necessary changes were made and I agree with the final note.   Joel Jackson MD, FACC, [facility]

## 2019-07-20 NOTE — PLAN OF CARE
Problem: Patient Care Overview  Goal: Plan of Care Review  Outcome: Ongoing (interventions implemented as appropriate)   07/20/19 5282   Coping/Psychosocial   Plan of Care Reviewed With patient   Plan of Care Review   Progress improving   OTHER   Outcome Summary Pt. c/o of incisional pain; loratab given x2 & morphine once during the shift; after medicating, Pt. seemed to rest well; SR, with pacer spikes on occasion; HR primarily in the 70's; RA; no other problems identified; continue to monitor.

## 2019-07-22 ENCOUNTER — TELEPHONE (OUTPATIENT)
Dept: FAMILY MEDICINE CLINIC | Facility: CLINIC | Age: 54
End: 2019-07-22

## 2019-07-22 NOTE — TELEPHONE ENCOUNTER
Received msg over the weekend from [facility] pt requesting new pcp with [facility]. Called pt. She voiced no preferences. Scheduled with Jake VALLADARES for tomorrow 7/23/19 as requested. TCM not applicable d/t hospital outpatient status.   Statement Selected

## 2019-07-26 ENCOUNTER — OFFICE VISIT (OUTPATIENT)
Dept: FAMILY MEDICINE CLINIC | Facility: CLINIC | Age: 54
End: 2019-07-26

## 2019-07-26 ENCOUNTER — LAB (OUTPATIENT)
Dept: LAB | Facility: HOSPITAL | Age: 54
End: 2019-07-26

## 2019-07-26 VITALS
OXYGEN SATURATION: 98 % | HEIGHT: 71 IN | BODY MASS INDEX: 39.03 KG/M2 | SYSTOLIC BLOOD PRESSURE: 124 MMHG | WEIGHT: 278.8 LBS | HEART RATE: 74 BPM | DIASTOLIC BLOOD PRESSURE: 80 MMHG

## 2019-07-26 DIAGNOSIS — I10 ESSENTIAL HYPERTENSION: Primary | ICD-10-CM

## 2019-07-26 DIAGNOSIS — G47.9 DYSSOMNIA: ICD-10-CM

## 2019-07-26 DIAGNOSIS — I44.2 AV BLOCK, COMPLETE (HCC): ICD-10-CM

## 2019-07-26 DIAGNOSIS — R21 RASH: ICD-10-CM

## 2019-07-26 DIAGNOSIS — E55.9 VITAMIN D DEFICIENCY: ICD-10-CM

## 2019-07-26 DIAGNOSIS — E11.9 TYPE 2 DIABETES MELLITUS WITHOUT COMPLICATION, WITHOUT LONG-TERM CURRENT USE OF INSULIN (HCC): ICD-10-CM

## 2019-07-26 DIAGNOSIS — E78.2 MIXED HYPERLIPIDEMIA: ICD-10-CM

## 2019-07-26 DIAGNOSIS — I10 ESSENTIAL HYPERTENSION: ICD-10-CM

## 2019-07-26 LAB
ALBUMIN SERPL-MCNC: 4.1 G/DL (ref 3.5–5.2)
ALBUMIN UR-MCNC: 4.5 MG/L
ALBUMIN/GLOB SERPL: 1.1 G/DL
ALP SERPL-CCNC: 77 U/L (ref 39–117)
ALT SERPL W P-5'-P-CCNC: 12 U/L (ref 1–33)
ANION GAP SERPL CALCULATED.3IONS-SCNC: 13.6 MMOL/L (ref 5–15)
AST SERPL-CCNC: 13 U/L (ref 1–32)
BASOPHILS # BLD AUTO: 0.05 10*3/MM3 (ref 0–0.2)
BASOPHILS NFR BLD AUTO: 0.6 % (ref 0–1.5)
BILIRUB SERPL-MCNC: 0.3 MG/DL (ref 0.2–1.2)
BILIRUB UR QL STRIP: NEGATIVE
BUN BLD-MCNC: 13 MG/DL (ref 6–20)
BUN/CREAT SERPL: 17.1 (ref 7–25)
CALCIUM SPEC-SCNC: 9.8 MG/DL (ref 8.6–10.5)
CHLORIDE SERPL-SCNC: 101 MMOL/L (ref 98–107)
CHOLEST SERPL-MCNC: 114 MG/DL (ref 0–200)
CLARITY UR: CLEAR
CO2 SERPL-SCNC: 28.4 MMOL/L (ref 22–29)
COLOR UR: YELLOW
CREAT BLD-MCNC: 0.76 MG/DL (ref 0.57–1)
CREAT UR-MCNC: 108.7 MG/DL
DEPRECATED RDW RBC AUTO: 46 FL (ref 37–54)
EOSINOPHIL # BLD AUTO: 0.33 10*3/MM3 (ref 0–0.4)
EOSINOPHIL NFR BLD AUTO: 3.9 % (ref 0.3–6.2)
ERYTHROCYTE [DISTWIDTH] IN BLOOD BY AUTOMATED COUNT: 15 % (ref 12.3–15.4)
GFR SERPL CREATININE-BSD FRML MDRD: 96 ML/MIN/1.73
GLOBULIN UR ELPH-MCNC: 3.6 GM/DL
GLUCOSE BLD-MCNC: 99 MG/DL (ref 65–99)
GLUCOSE UR STRIP-MCNC: NEGATIVE MG/DL
HBA1C MFR BLD: 7.34 % (ref 4.8–5.6)
HCT VFR BLD AUTO: 36.9 % (ref 34–46.6)
HDLC SERPL-MCNC: 55 MG/DL (ref 40–60)
HGB BLD-MCNC: 11.3 G/DL (ref 12–15.9)
HGB UR QL STRIP.AUTO: NEGATIVE
IMM GRANULOCYTES # BLD AUTO: 0.02 10*3/MM3 (ref 0–0.05)
IMM GRANULOCYTES NFR BLD AUTO: 0.2 % (ref 0–0.5)
KETONES UR QL STRIP: NEGATIVE
LDLC SERPL CALC-MCNC: 39 MG/DL (ref 0–100)
LDLC/HDLC SERPL: 0.71 {RATIO}
LEUKOCYTE ESTERASE UR QL STRIP.AUTO: NEGATIVE
LYMPHOCYTES # BLD AUTO: 2.32 10*3/MM3 (ref 0.7–3.1)
LYMPHOCYTES NFR BLD AUTO: 27.1 % (ref 19.6–45.3)
MCH RBC QN AUTO: 25.6 PG (ref 26.6–33)
MCHC RBC AUTO-ENTMCNC: 30.6 G/DL (ref 31.5–35.7)
MCV RBC AUTO: 83.5 FL (ref 79–97)
MICROALBUMIN/CREAT UR: 41.4 MG/G
MONOCYTES # BLD AUTO: 0.53 10*3/MM3 (ref 0.1–0.9)
MONOCYTES NFR BLD AUTO: 6.2 % (ref 5–12)
NEUTROPHILS # BLD AUTO: 5.3 10*3/MM3 (ref 1.7–7)
NEUTROPHILS NFR BLD AUTO: 62 % (ref 42.7–76)
NITRITE UR QL STRIP: NEGATIVE
NRBC BLD AUTO-RTO: 0 /100 WBC (ref 0–0.2)
PH UR STRIP.AUTO: 6.5 [PH] (ref 5–8)
PLATELET # BLD AUTO: 195 10*3/MM3 (ref 140–450)
PMV BLD AUTO: 12.7 FL (ref 6–12)
POTASSIUM BLD-SCNC: 4.2 MMOL/L (ref 3.5–5.2)
PROT SERPL-MCNC: 7.7 G/DL (ref 6–8.5)
PROT UR QL STRIP: NEGATIVE
RBC # BLD AUTO: 4.42 10*6/MM3 (ref 3.77–5.28)
SODIUM BLD-SCNC: 143 MMOL/L (ref 136–145)
SP GR UR STRIP: 1.02 (ref 1–1.03)
TRIGL SERPL-MCNC: 100 MG/DL (ref 0–150)
UROBILINOGEN UR QL STRIP: NORMAL
VLDLC SERPL-MCNC: 20 MG/DL (ref 5–40)
WBC NRBC COR # BLD: 8.55 10*3/MM3 (ref 3.4–10.8)

## 2019-07-26 PROCEDURE — 85025 COMPLETE CBC W/AUTO DIFF WBC: CPT | Performed by: FAMILY MEDICINE

## 2019-07-26 PROCEDURE — 99214 OFFICE O/P EST MOD 30 MIN: CPT | Performed by: FAMILY MEDICINE

## 2019-07-26 PROCEDURE — 82570 ASSAY OF URINE CREATININE: CPT | Performed by: FAMILY MEDICINE

## 2019-07-26 PROCEDURE — 83036 HEMOGLOBIN GLYCOSYLATED A1C: CPT | Performed by: FAMILY MEDICINE

## 2019-07-26 PROCEDURE — 80053 COMPREHEN METABOLIC PANEL: CPT | Performed by: FAMILY MEDICINE

## 2019-07-26 PROCEDURE — 80061 LIPID PANEL: CPT | Performed by: FAMILY MEDICINE

## 2019-07-26 PROCEDURE — 82043 UR ALBUMIN QUANTITATIVE: CPT | Performed by: FAMILY MEDICINE

## 2019-07-26 PROCEDURE — 81003 URINALYSIS AUTO W/O SCOPE: CPT | Performed by: FAMILY MEDICINE

## 2019-07-26 RX ORDER — VALSARTAN 40 MG/1
20 TABLET ORAL DAILY
Qty: 30 TABLET | Refills: 11
Start: 2019-07-26 | End: 2020-06-30 | Stop reason: SDUPTHER

## 2019-07-26 RX ORDER — AMITRIPTYLINE HYDROCHLORIDE 25 MG/1
25 TABLET, FILM COATED ORAL NIGHTLY
Qty: 30 TABLET | Refills: 2 | Status: SHIPPED | OUTPATIENT
Start: 2019-07-26 | End: 2019-10-31

## 2019-07-29 ENCOUNTER — OFFICE VISIT (OUTPATIENT)
Dept: CARDIOLOGY | Facility: CLINIC | Age: 54
End: 2019-07-29

## 2019-07-29 VITALS
HEIGHT: 71 IN | SYSTOLIC BLOOD PRESSURE: 124 MMHG | BODY MASS INDEX: 38.36 KG/M2 | DIASTOLIC BLOOD PRESSURE: 86 MMHG | HEART RATE: 73 BPM | WEIGHT: 274 LBS

## 2019-07-29 DIAGNOSIS — R52 GENERALIZED PAIN: Primary | ICD-10-CM

## 2019-07-29 PROCEDURE — 99024 POSTOP FOLLOW-UP VISIT: CPT | Performed by: NURSE PRACTITIONER

## 2019-07-29 RX ORDER — MELOXICAM 7.5 MG/1
7.5 TABLET ORAL DAILY PRN
Status: DISCONTINUED | OUTPATIENT
Start: 2019-07-29 | End: 2020-01-09

## 2019-07-29 NOTE — PROGRESS NOTES
WOUND CHECK    2019    Kellen Esparza, : 1965    Joan Ahuja APRN    B/P: 124/86 sitting    Pulse: 73 bpm    Patient has fever: [] Temperature if indicated: n/a    Wound Location: left-sided     Dressing Removed [x]        Old Dressing Appearance:  Clean, dry []                 Old, bloody drainage [x]                            Moist, serous drainage []                Moist, thick yellow/green drainage []       Wound Appearance: The edges of the most lateral end of incision site were not approximated. No sutures were visible. No erythema or drainage noted. Small amount of soft tissue swelling and tenderness to palpation noted. No hematoma noted. Small papules along edges from dressing.      Gloves used, wound cleansed with sterile 4x4 and peroxide [x]   Steri strips placed on the most lateral end. Will return on Friday for repeat wound check.     MD notified [] MD orders: Mobic PRN PPM site pain.     Antibiotic started []  If checked, type n/a  Other: n/a    Appointment for follow-up scheduled for 3 months post procedure [x]    Future Appointments   Date Time Provider Department Center   2019 11:00 AM [facility], PFT LAB 3 [facility] None   2019 12:30 PM RAD TECH [facility] None   2019 12:45 PM Vernon Quispe MD [facility] None   2019  9:30 AM CLASSROOM 3 [facility]   10/21/2019 10:00 AM Johny Sommer MD [facility] None   10/28/2019  7:45 AM Ryne Puente DO [facility] None           Electronically signed by CORRIE Francisco, 19, 2:28 PM.

## 2019-07-30 ENCOUNTER — APPOINTMENT (OUTPATIENT)
Dept: DIABETES SERVICES | Facility: HOSPITAL | Age: 54
End: 2019-07-30

## 2019-07-30 ENCOUNTER — CLINICAL SUPPORT NO REQUIREMENTS (OUTPATIENT)
Dept: CARDIOLOGY | Facility: CLINIC | Age: 54
End: 2019-07-30

## 2019-07-30 DIAGNOSIS — I44.2 AV BLOCK, COMPLETE (HCC): Primary | ICD-10-CM

## 2019-07-30 RX ORDER — MELOXICAM 7.5 MG/1
TABLET ORAL
Qty: 14 TABLET | Refills: 0 | Status: SHIPPED | OUTPATIENT
Start: 2019-07-30 | End: 2019-09-23

## 2019-08-02 ENCOUNTER — OFFICE VISIT (OUTPATIENT)
Dept: CARDIOLOGY | Facility: CLINIC | Age: 54
End: 2019-08-02

## 2019-08-02 ENCOUNTER — OFFICE VISIT (OUTPATIENT)
Dept: PULMONOLOGY | Facility: CLINIC | Age: 54
End: 2019-08-02

## 2019-08-02 VITALS
SYSTOLIC BLOOD PRESSURE: 122 MMHG | WEIGHT: 274 LBS | TEMPERATURE: 98.2 F | HEART RATE: 65 BPM | RESPIRATION RATE: 18 BRPM | BODY MASS INDEX: 38.36 KG/M2 | HEIGHT: 71 IN | OXYGEN SATURATION: 97 % | DIASTOLIC BLOOD PRESSURE: 80 MMHG

## 2019-08-02 DIAGNOSIS — R06.09 DYSPNEA ON EXERTION: Primary | ICD-10-CM

## 2019-08-02 DIAGNOSIS — G47.33 OSA (OBSTRUCTIVE SLEEP APNEA): ICD-10-CM

## 2019-08-02 DIAGNOSIS — R94.2 RESTRICTIVE PATTERN PRESENT ON PULMONARY FUNCTION TESTING: ICD-10-CM

## 2019-08-02 DIAGNOSIS — Z95.0 PACEMAKER: Primary | ICD-10-CM

## 2019-08-02 DIAGNOSIS — I44.2 AV BLOCK, COMPLETE (HCC): ICD-10-CM

## 2019-08-02 DIAGNOSIS — E66.9 OBESITY, CLASS II, BMI 35-39.9: ICD-10-CM

## 2019-08-02 PROBLEM — E66.812 OBESITY, CLASS II, BMI 35-39.9: Status: ACTIVE | Noted: 2019-08-02

## 2019-08-02 PROCEDURE — 94729 DIFFUSING CAPACITY: CPT | Performed by: INTERNAL MEDICINE

## 2019-08-02 PROCEDURE — 94726 PLETHYSMOGRAPHY LUNG VOLUMES: CPT | Performed by: INTERNAL MEDICINE

## 2019-08-02 PROCEDURE — 99024 POSTOP FOLLOW-UP VISIT: CPT | Performed by: INTERNAL MEDICINE

## 2019-08-02 PROCEDURE — 99214 OFFICE O/P EST MOD 30 MIN: CPT | Performed by: INTERNAL MEDICINE

## 2019-08-02 PROCEDURE — 94375 RESPIRATORY FLOW VOLUME LOOP: CPT | Performed by: INTERNAL MEDICINE

## 2019-08-02 NOTE — PROGRESS NOTES
WOUND CHECK    2019    Kellen Sherrill Esparza, : 1965    WOUND CHECK            Patient has fever: No    Wound Location: Left infraclavicular    Dressing Removed [x]   Steristrips     Old Dressing Appearance:  Clean, dry [x]                 Old, bloody drainage []                            Moist, serous drainage []                Moist, thick yellow/green drainage []       Wound Appearance: Redness []                  Drainage []                  Culture obtained []        Color: N/A     Consistency: NA     Amount: none         Gloves used, wound cleansed with sterile 4x4 and peroxide [x]       MD notified [] MD orders:     Antibiotic started []  If checked, type     Other:  Steri-strip adhesive had become gooey and wanted to separate from the strip and not come off of skin.  I had to use a lot of adhesive remover in order to get strips off.  It did not however do that right over incision, so incision was well-approximated now at distal site.  I was able to remove all the adhesive, cleansed with peroxide and was pleased with how the site looked at the end.  Pt is going to return on Monday at 2:30 for you to just re-check since the strips were so difficult and pt still having sig amount of pain but did think it was slightly better from Monday when Joellen saw her.    Appointment for follow-up scheduled for 3 months post procedure []    Future Appointments   Date Time Provider Department Center   2019 11:00 AM [facility], PFT LAB 3 [facility] None   2019 12:30 PM RAD TECH [facility] None   2019 12:45 PM Vernon Quispe MD [facility] None   2019  2:30 PM Sonya Gay MD [facility] None   2019  9:30 AM CLASSROOM 3 [facility]   10/21/2019 10:00 AM Johny Sommer MD [facility] None   10/28/2019  7:45 AM Ryne Puente DO [facility] None           Naz Carver RN,  08/02/19      MD Signature:______________________________ Completed By/Date:

## 2019-08-02 NOTE — PROGRESS NOTES
PULMONARY  NOTE    Chief Complaint     Dyspnea, non-smoker, high degree AV block with permanent pacemaker, class II obesity    History of Present Illness     53-year-old  female referred for evaluation of dyspnea.    She indicates that she has had dyspnea for about a year.  She feels that it came on fairly abruptly.  She is short of breath currently to the point that she would have difficulty going up 1 flight of stairs without having to stop due to shortness of breath.  She does not really feel short of breath at rest.    She has noted wheezing intermittently, typically with exertion.  She has been on no respiratory medicines and has no history of asthma.    She had a persistent cough but her ACE inhibitor was changed to valsartan and the dose was recently decreased and she feels that her cough has gotten significantly better.  She has no sputum production and is never had hemoptysis.    She has had a recurrent high degree AV block and underwent a permanent pacemaker placement on 7/9/2019.  She has not felt that her dyspnea has improved since that time.    Her last echocardiogram was in November 2018.  That revealed grade 1 diastolic dysfunction, dilated left atrium, but no significant valvular heart disease or elevated right-sided pressures.    She has symptoms suggestive of obstructive sleep apnea including nonrestorative sleep, poor sleep quality, daytime hypersomnolence.  She is never had a sleep study.    Patient Active Problem List   Diagnosis   • Chest pain   • Dyspnea on exertion   • Essential hypertension   • Pain of right breast   • Lower extremity edema   • Diabetes mellitus (CMS/HCC)   • Abnormal stress test   • Migraine   • History of pulmonary embolus (PE)   • Hyperlipidemia   • DDD (degenerative disc disease), cervical   • Coronary artery disease of native artery of native heart with stable angina pectoris (CMS/HCC)   • AV block, complete (S/P PPM 7/2019)   • Restrictive pattern on  PFT's without evidence of ILD   • Obesity, Class II, BMI 35-39.9   • R/O ALYSIA (obstructive sleep apnea)     Allergies   Allergen Reactions   • Lisinopril Cough       Current Outpatient Medications:   •  amitriptyline (ELAVIL) 25 MG tablet, Take 1 tablet by mouth Every Night., Disp: 30 tablet, Rfl: 2  •  aspirin 81 MG tablet, Take 1 tablet by mouth Daily., Disp: 30 tablet, Rfl: 11  •  atorvastatin (LIPITOR) 80 MG tablet, Take 1 tablet by mouth Daily., Disp: 90 tablet, Rfl: 3  •  escitalopram (LEXAPRO) 10 MG tablet, Take 10 mg by mouth Daily., Disp: , Rfl: 5  •  estradiol (ESTRACE) 1 MG tablet, Take 1 mg by mouth Daily., Disp: , Rfl:   •  meloxicam (MOBIC) 7.5 MG tablet, 1 tab po daily as needed, Disp: 14 tablet, Rfl: 0  •  metFORMIN (GLUCOPHAGE) 500 MG tablet, Take 1,000 mg by mouth 2 (Two) Times a Day With Meals., Disp: , Rfl:   •  Multiple Vitamins-Minerals (CENTRUM WOMEN PO), Take 1 capsule by mouth Daily. gummies, Disp: , Rfl:   •  SUMAtriptan (IMITREX) 50 MG tablet, Take 50 mg by mouth Every 2 (Two) Hours As Needed for Migraine. Take one tablet at onset of headache. May repeat dose one time in 2 hours if headache not relieved., Disp: , Rfl:   •  valsartan (DIOVAN) 40 MG tablet, Take 0.5 tablets by mouth Daily., Disp: 30 tablet, Rfl: 11  •  vitamin D (ERGOCALCIFEROL) 01893 UNITS capsule capsule, Take 50,000 Units by mouth 1 (One) Time Per Week. Wednesday, Disp: , Rfl:     Current Facility-Administered Medications:   •  meloxicam (MOBIC) tablet 7.5 mg, 7.5 mg, Oral, Daily PRN, Seymour, Joan B, APRN  MEDICATION LIST AND ALLERGIES REVIEWED.    Family History   Problem Relation Age of Onset   • Breast cancer Mother 42   • Heart failure Mother    • No Known Problems Father    • Hypertension Brother    • Hypertension Maternal Grandmother    • Hypertension Maternal Grandfather    • No Known Problems Paternal Grandmother    • No Known Problems Paternal Grandfather    • Heart failure Son    • Hypertension Sister   "  • Ovarian cancer Neg Hx      Social History     Tobacco Use   • Smoking status: Never Smoker   • Smokeless tobacco: Never Used   Substance Use Topics   • Alcohol use: No     Frequency: Never     Comment: rare   • Drug use: No     Social History     Social History Narrative    Patient consumes NO CAFFEINE     Patient lives at home with partner, Donald.         Lifelong non-smoker    Does not drink alcohol    Works as a      FAMILY AND SOCIAL HISTORY REVIEWED.    Review of Systems  ALSO REFER TO SCANNED ROS SHEET FROM SAME DATE.    /80 (BP Location: Left arm, Patient Position: Sitting, Cuff Size: Adult)   Pulse 65   Temp 98.2 °F (36.8 °C)   Resp 18   Ht 180.3 cm (71\")   Wt 124 kg (274 lb)   LMP 10/31/2014 (Approximate)   SpO2 97% Comment: room air at rest  BMI 38.22 kg/m²   Physical Exam   Constitutional: She is oriented to person, place, and time. She appears well-developed. No distress.   HENT:   Head: Normocephalic and atraumatic.   Neck: No thyromegaly present.   Cardiovascular: Normal rate, regular rhythm and normal heart sounds.   No murmur heard.  Pacemaker incision site healing well   Pulmonary/Chest: Effort normal and breath sounds normal. No stridor.   Abdominal: Soft. Bowel sounds are normal.   Musculoskeletal: Normal range of motion.   Trace ankle edema   Lymphadenopathy:     She has no cervical adenopathy.        Right: No supraclavicular and no epitrochlear adenopathy present.        Left: No supraclavicular and no epitrochlear adenopathy present.   Neurological: She is alert and oriented to person, place, and time.   Skin: Skin is warm and dry. She is not diaphoretic.   Psychiatric: She has a normal mood and affect. Her behavior is normal.   Nursing note and vitals reviewed.      Results     Chest x-ray from 7/20/2019 reveals permanent pacemaker placement without pneumothorax, infiltrate, effusion, or consolidation.    Echocardiogram from November " 2018 revealed a dilated left atrium in changes of grade 1 diastolic dysfunction otherwise unremarkable.    PFTs today revealed no airway obstruction, severe restriction, and a normal corrected diffusion capacity.    Problem List       ICD-10-CM ICD-9-CM   1. Dyspnea on exertion R06.09 786.09   2. Restrictive pattern on PFT's without evidence of ILD R94.2 794.2   3. Obesity, Class II, BMI 35-39.9 E66.9 278.00   4. R/O ALYSIA (obstructive sleep apnea) G47.33 327.23   5. AV block, complete (S/P PPM 7/2019) I44.2 426.0       Discussion     We reviewed her test results.  Her most recent chest x-ray is unremarkable for parenchymal disease.  She had a CT scan of the chest in 2017 which revealed no parenchymal disease.  Her PFTs reveal no airway obstruction.  Her restrictive defect by TLC is probably on the basis of her obesity.  She does not appear to have evidence of interstitial lung disease.    Her cough is improved with her antihypertensive dosing and may be related to that.    The etiology of her lifestyle limiting dyspnea is unclear at this time.  We will obtain a ventilation/perfusion scan to help exclude chronic pulmonary thromboembolic disease.  Assuming that is unremarkable, then a cardiopulmonary exercise test.    We will also arrange for a high-resolution CT scan of the chest to evaluate for underlying interstitial lung disease.    She has multiple symptoms suggestive of obstructive sleep apnea including nonrestorative sleep and daytime hypersomnolence.  We will arrange a sleep study.    At some point, might consider a bronchoscopy to evaluate her airway and rule out excessive dynamic airway collapse as a contributing factor to her symptoms.    I will plan to see her back after the above    Vernon Quispe MD  Note electronically signed    CC: Ryne Puente DO

## 2019-08-05 ENCOUNTER — OFFICE VISIT (OUTPATIENT)
Dept: CARDIOLOGY | Facility: CLINIC | Age: 54
End: 2019-08-05

## 2019-08-05 VITALS
OXYGEN SATURATION: 96 % | SYSTOLIC BLOOD PRESSURE: 144 MMHG | DIASTOLIC BLOOD PRESSURE: 92 MMHG | TEMPERATURE: 98.7 F | HEART RATE: 72 BPM

## 2019-08-05 DIAGNOSIS — I44.2 AV BLOCK, COMPLETE (HCC): Primary | ICD-10-CM

## 2019-08-05 PROCEDURE — 99024 POSTOP FOLLOW-UP VISIT: CPT | Performed by: INTERNAL MEDICINE

## 2019-08-05 NOTE — PROGRESS NOTES
Kellen Esparza  1965    There is no work phone number on file.      08/05/2019    Ryne Puente,     No chief complaint on file.        Allergies   Allergen Reactions   • Lisinopril Cough       Current Medications:      Current Outpatient Medications:   •  amitriptyline (ELAVIL) 25 MG tablet, Take 1 tablet by mouth Every Night., Disp: 30 tablet, Rfl: 2  •  aspirin 81 MG tablet, Take 1 tablet by mouth Daily., Disp: 30 tablet, Rfl: 11  •  atorvastatin (LIPITOR) 80 MG tablet, Take 1 tablet by mouth Daily., Disp: 90 tablet, Rfl: 3  •  escitalopram (LEXAPRO) 10 MG tablet, Take 10 mg by mouth Daily., Disp: , Rfl: 5  •  estradiol (ESTRACE) 1 MG tablet, Take 1 mg by mouth Daily., Disp: , Rfl:   •  meloxicam (MOBIC) 7.5 MG tablet, 1 tab po daily as needed, Disp: 14 tablet, Rfl: 0  •  metFORMIN (GLUCOPHAGE) 500 MG tablet, Take 1,000 mg by mouth 2 (Two) Times a Day With Meals., Disp: , Rfl:   •  Multiple Vitamins-Minerals (CENTRUM WOMEN PO), Take 1 capsule by mouth Daily. gummies, Disp: , Rfl:   •  SUMAtriptan (IMITREX) 50 MG tablet, Take 50 mg by mouth Every 2 (Two) Hours As Needed for Migraine. Take one tablet at onset of headache. May repeat dose one time in 2 hours if headache not relieved., Disp: , Rfl:   •  valsartan (DIOVAN) 40 MG tablet, Take 0.5 tablets by mouth Daily., Disp: 30 tablet, Rfl: 11  •  vitamin D (ERGOCALCIFEROL) 89112 UNITS capsule capsule, Take 50,000 Units by mouth 1 (One) Time Per Week. Wednesday, Disp: , Rfl:     Current Facility-Administered Medications:   •  meloxicam (MOBIC) tablet 7.5 mg, 7.5 mg, Oral, Daily PRN, Joan Ahuja, CORRIE    HPI    Ms. Esparza returns for follow-up after pacemaker placement.  She was seen last week twice.  She apparently had a reaction to the Aquacel which was used to cover the pacemaker site and then the Steri-Strips that were placed for very small area of skin which was not completely opposed at the lateral edge.  She is feeling good at this  point and does not have any complaints.  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    The following portions of the patient's history were reviewed and updated as appropriate: allergies, current medications and problem list.    The above systems were reviewed and pertinent positives were noted.    Vitals:    08/05/19 1433   BP: 144/92   BP Location: Right arm   Patient Position: Sitting   Pulse: 72   Temp: 98.7 °F (37.1 °C)   SpO2: 96%     The pacemaker site is well-healed.  The lateral edge is now well opposed.  There is no erythema.  There is no fluctuance.  There is no hematoma.  There is no drainage.        Diagnostic Data:        Invalid input(s): LABALBU, PROT  @LABRCNTIP(wbc:3,hgb:3,hct:3,PLT:3,NEUTOPHILPCT:3;LYMPHOPCT:3,MONOPCT  )@  Lab Results   Component Value Date    CHOL 114 07/26/2019    CHOL 108 08/09/2017    CHOL 163 07/22/2017     Lab Results   Component Value Date    TRIG 100 07/26/2019    TRIG 62 08/09/2017    TRIG 249 (H) 07/22/2017     Lab Results   Component Value Date    HDL 55 07/26/2019    HDL 48 08/09/2017    HDL 46 07/22/2017     No components found for: LDLCALC    Procedures    Assessment:      ICD-10-CM ICD-9-CM   1. AV block, complete (S/P PPM 7/2019) I44.2 426.0       Plan:      1. Continue current medications.  2. F/up with Dr. Sommer in October or sooner if needed.    Sonya Gay MD, FACC

## 2019-08-06 DIAGNOSIS — G47.33 OSA (OBSTRUCTIVE SLEEP APNEA): ICD-10-CM

## 2019-08-06 DIAGNOSIS — R06.02 SHORTNESS OF BREATH: ICD-10-CM

## 2019-08-06 DIAGNOSIS — R06.09 DYSPNEA ON EXERTION: Primary | ICD-10-CM

## 2019-08-13 NOTE — PROGRESS NOTES
Subjective   Kellen Esparza is a 53 y.o. female.     Chief Complaint   Patient presents with   • Establish Care       History of Present Illness     Acute complaints:  Kellen Esparza is a 53 y.o. female who presents today to establish care.  She has a history of hypertension and hyperlipidemia, which have been stable on diet, exercise, and atorvastatin 80 mg.  She is a diabetic on metformin monotherapy.  She has never been to diabetic education, and has not had a diabetic eye exam.  Acutely today she reports a rash of her right forearm, would like to see a dermatologist.    This patient is accompanied by their self who contributes to the history of their care.    The following portions of the patient's history were reviewed and updated as appropriate: allergies, current medications, past family history, past medical history, past social history, past surgical history and problem list.    Active Ambulatory Problems     Diagnosis Date Noted   • Chest pain 07/21/2017   • Dyspnea on exertion 07/21/2017   • Essential hypertension 07/21/2017   • Pain of right breast 07/21/2017   • Lower extremity edema 07/21/2017   • Diabetes mellitus (CMS/HCC) 07/21/2017   • Abnormal stress test 08/03/2017   • Migraine 08/10/2017   • History of pulmonary embolus (PE) 01/01/1997   • Hyperlipidemia 08/10/2017   • DDD (degenerative disc disease), cervical 09/06/2017   • Coronary artery disease of native artery of native heart with stable angina pectoris (CMS/[facility]) 09/11/2017   • AV block, complete (S/P PPM 7/2019) 04/30/2019   • Restrictive pattern on PFT's without evidence of ILD 08/02/2019   • Obesity, Class II, BMI 35-39.9 08/02/2019   • R/O ALYSIA (obstructive sleep apnea) 08/02/2019     Resolved Ambulatory Problems     Diagnosis Date Noted   • No Resolved Ambulatory Problems     Past Medical History:   Diagnosis Date   • Arthritis    • Diabetes mellitus (CMS/HCC)    • Hypertension    • Migraine    • Musculoligamentous strain    •  Pulmonary embolism (CMS/HCC) 1997   • Wears eyeglasses      Past Surgical History:   Procedure Laterality Date   • BREAST BIOPSY Left 2014   • CARDIAC CATHETERIZATION Left 8/10/2017    Procedure: Cardiac Catheterization/Vascular Study;  Surgeon: Johny Sommer MD;  Location:  [facility];  Service:    • CARDIAC ELECTROPHYSIOLOGY PROCEDURE N/A 7/19/2019    Procedure: Pacemaker DC new;  Surgeon: Sonya Gay MD;  Location:  [facility];  Service: Cardiology   • CHOLECYSTECTOMY     • COLONOSCOPY     • ENDOSCOPY     • HYSTERECTOMY  11/2014   • INSERT / REPLACE / REMOVE PACEMAKER     • JOINT REPLACEMENT Right     knee   • KNEE SURGERY Right 12/2017    total replacement   • NASAL SINUS SURGERY     • OOPHORECTOMY  11/2014   • PULMONARY EMBOLISM SURGERY      right lung   • TUBAL ABDOMINAL LIGATION       Family History   Problem Relation Age of Onset   • Breast cancer Mother 42   • Heart failure Mother    • No Known Problems Father    • Hypertension Brother    • Hypertension Maternal Grandmother    • Hypertension Maternal Grandfather    • No Known Problems Paternal Grandmother    • No Known Problems Paternal Grandfather    • Heart failure Son    • Hypertension Sister    • Ovarian cancer Neg Hx      Social History     Socioeconomic History   • Marital status: Single     Spouse name: Not on file   • Number of children: Not on file   • Years of education: Not on file   • Highest education level: Not on file   Tobacco Use   • Smoking status: Never Smoker   • Smokeless tobacco: Never Used   Substance and Sexual Activity   • Alcohol use: No     Frequency: Never     Comment: rare   • Drug use: No   • Sexual activity: Defer   Social History Narrative    Patient consumes NO CAFFEINE     Patient lives at home with partner, Donald.         Lifelong non-smoker    Does not drink alcohol    Works as a          Review of Systems   Constitutional: Negative.  "   HENT: Negative.    Eyes: Negative.    Respiratory: Negative for cough, chest tightness, shortness of breath and wheezing.    Cardiovascular: Negative for chest pain and palpitations.   Gastrointestinal: Negative for abdominal distention, abdominal pain, constipation, diarrhea, nausea and vomiting.   Musculoskeletal: Negative for gait problem and joint swelling.   Skin: Positive for rash. Negative for color change and wound.   Psychiatric/Behavioral: Negative for agitation and hallucinations.       Objective   Blood pressure 124/80, pulse 74, height 180.3 cm (70.98\"), weight 126 kg (278 lb 12.8 oz), last menstrual period 10/31/2014, SpO2 98 %, not currently breastfeeding.  Nursing note reviewed  Physical Exam  Const: NAD, A&Ox4, Pleasant, Cooperative  Eyes: EOMI, no conjunctivitis  ENT: No nasal discharge present, neck supple  Cardiac: Regular rate and rhythm, no cyanosis  Resp: Respiratory rate within normal limits, no increased work of breathing, no audible wheezing or retractions noted  GI: No distention or ascites  MSK: Motor and sensation grossly intact in bilateral upper extremities  Neurologic: CN II-XII grossly intact  Psych: Appropriate mood and behavior.  Skin: Rash noted on right forearm  Procedures  Assessment/Plan   Problem List Items Addressed This Visit        Cardiovascular and Mediastinum    Essential hypertension - Primary    Overview     · 24-hour ambulatory blood pressure monitor 4/3/2019: Overall average 135/89, 82 bpm.  Awake average 138/92, 83 bpm.  Sleep average 126/77, 80 bpm.         Relevant Medications    valsartan (DIOVAN) 40 MG tablet    Other Relevant Orders    Urinalysis With Microscopic If Indicated (No Culture) - Urine, Clean Catch (Completed)    Microalbumin / Creatinine Urine Ratio - Urine, Clean Catch (Completed)    Hyperlipidemia    Relevant Orders    Lipid Panel (Completed)    AV block, complete (S/P PPM 7/2019)    Overview     · 24-hour Holter 4/3/2019: Average heart rate 86 " bpm.  High-grade AV block for 4.4 seconds at 7:23 AM.  Second-degree AV block for 10 seconds at 8:53 PM.         Relevant Orders    CBC & Differential (Completed)       Endocrine    Diabetes mellitus (CMS/HCC)    Relevant Medications    valsartan (DIOVAN) 40 MG tablet    Other Relevant Orders    Ambulatory Referral to Diabetic Education    Urinalysis With Microscopic If Indicated (No Culture) - Urine, Clean Catch (Completed)    Hemoglobin A1c (Completed)    Comprehensive Metabolic Panel (Completed)    Microalbumin / Creatinine Urine Ratio - Urine, Clean Catch (Completed)    Ambulatory Referral for Diabetic Eye Exam-Ophthalmology      Other Visit Diagnoses     Vitamin D deficiency        Dyssomnia        Relevant Medications    amitriptyline (ELAVIL) 25 MG tablet    Rash of right forearm        Relevant Orders    Ambulatory Referral to Dermatology          We will plan to obtain previous records both for chronic preventative care as well as those related to the current episode of care.  Any records that the patient brought with her today were reviewed personally by me during the visit today and will be scanned into the chart for posterity.    Patient Instructions   1.  Continue medications and supplements - as listed.    2.  Continue well-balanced diet - low in calories and low in added sugar.  - A1c down to 6.5%  - Aim for 5-7 pounds of weight loss by next visit    3.  Maintain a routine exercise program - every week.    4.  A letter will be sent with your test results.      Return in about 3 months (around 10/26/2019) for with A1c;.    Ambulatory progress note signed and attested to by Ryne Puente D.O.

## 2019-08-25 DIAGNOSIS — R07.9 CHEST PAIN, UNSPECIFIED TYPE: ICD-10-CM

## 2019-08-25 DIAGNOSIS — R60.0 LOCALIZED EDEMA: ICD-10-CM

## 2019-08-25 DIAGNOSIS — I10 ESSENTIAL HYPERTENSION: ICD-10-CM

## 2019-08-25 DIAGNOSIS — I25.118 CORONARY ARTERY DISEASE OF NATIVE ARTERY OF NATIVE HEART WITH STABLE ANGINA PECTORIS (HCC): ICD-10-CM

## 2019-08-26 RX ORDER — MELOXICAM 7.5 MG/1
TABLET ORAL
Qty: 14 TABLET | Refills: 0 | OUTPATIENT
Start: 2019-08-26

## 2019-08-26 RX ORDER — HYDROCHLOROTHIAZIDE 25 MG/1
TABLET ORAL
Qty: 30 TABLET | Refills: 0 | OUTPATIENT
Start: 2019-08-26

## 2019-08-26 RX ORDER — ISOSORBIDE MONONITRATE 30 MG/1
30 TABLET, EXTENDED RELEASE ORAL EVERY MORNING
Qty: 30 TABLET | Refills: 0 | OUTPATIENT
Start: 2019-08-26

## 2019-08-26 NOTE — TELEPHONE ENCOUNTER
Last seen in clinic on 4/3.  No Show 4/16, no follow ups scheduled.  Contacted patient to discuss with her.  She stated that she has not been taking either of these medications (hctz/ imdur).    Will refuse Rx.    Encouraged her to send refill requests to PCP or cards.    Ainsley Lovell, ThangD

## 2019-08-28 ENCOUNTER — HOSPITAL ENCOUNTER (OUTPATIENT)
Dept: SLEEP MEDICINE | Facility: HOSPITAL | Age: 54
End: 2019-08-28

## 2019-09-12 ENCOUNTER — TELEPHONE (OUTPATIENT)
Dept: CARDIOLOGY | Facility: CLINIC | Age: 54
End: 2019-09-12

## 2019-09-12 NOTE — TELEPHONE ENCOUNTER
"Patient called to report that her PM site has been \"pinching\" since yesterday and is causing her a lot of pain. Pt denies redness, swelling, drainage. Pt advised to come into office for wound check. Pt agreeable to plan.   "

## 2019-09-18 ENCOUNTER — TELEPHONE (OUTPATIENT)
Dept: PULMONOLOGY | Facility: CLINIC | Age: 54
End: 2019-09-18

## 2019-09-18 DIAGNOSIS — R06.02 SHORTNESS OF BREATH: Primary | ICD-10-CM

## 2019-09-18 NOTE — TELEPHONE ENCOUNTER
Called patient regarding her missed appt for her HRCT and V/Q scan ordered by Dr Kareem Quispe. She states that she forgot about scans and was having issues with her PM and HR. She agreed to call and reschedule, number to CS provided. Will need to place a new order for HRCT as referral was closed.

## 2019-09-19 ENCOUNTER — TELEPHONE (OUTPATIENT)
Dept: CARDIOLOGY | Facility: CLINIC | Age: 54
End: 2019-09-19

## 2019-09-19 NOTE — TELEPHONE ENCOUNTER
"Patient called to report that her PM site is still \"unbearable\". Pt also reports that she is still feeling tired and as bad as she did before her PM implant. Pt denies any drainage, redness around site but does state that their is a dark bruise over the area. Pt reports no fever.     Pt scheduled sooner follow up with Dr. Sommer (9/23/19) and is agreeable to plan.   "

## 2019-09-23 ENCOUNTER — OFFICE VISIT (OUTPATIENT)
Dept: CARDIOLOGY | Facility: CLINIC | Age: 54
End: 2019-09-23

## 2019-09-23 ENCOUNTER — HOSPITAL ENCOUNTER (OUTPATIENT)
Dept: SLEEP MEDICINE | Facility: HOSPITAL | Age: 54
Discharge: HOME | End: 2019-09-23
Admitting: INTERNAL MEDICINE

## 2019-09-23 VITALS
SYSTOLIC BLOOD PRESSURE: 178 MMHG | OXYGEN SATURATION: 97 % | BODY MASS INDEX: 40.4 KG/M2 | WEIGHT: 288.6 LBS | HEART RATE: 76 BPM | DIASTOLIC BLOOD PRESSURE: 93 MMHG | HEIGHT: 71 IN

## 2019-09-23 VITALS
BODY MASS INDEX: 40.57 KG/M2 | SYSTOLIC BLOOD PRESSURE: 146 MMHG | DIASTOLIC BLOOD PRESSURE: 90 MMHG | HEART RATE: 83 BPM | WEIGHT: 289.8 LBS | HEIGHT: 71 IN

## 2019-09-23 DIAGNOSIS — I44.2 AV BLOCK, COMPLETE (HCC): ICD-10-CM

## 2019-09-23 DIAGNOSIS — I10 ESSENTIAL HYPERTENSION: ICD-10-CM

## 2019-09-23 DIAGNOSIS — G47.33 OSA (OBSTRUCTIVE SLEEP APNEA): ICD-10-CM

## 2019-09-23 DIAGNOSIS — I25.118 CORONARY ARTERY DISEASE OF NATIVE ARTERY OF NATIVE HEART WITH STABLE ANGINA PECTORIS (HCC): Primary | ICD-10-CM

## 2019-09-23 DIAGNOSIS — R06.09 DYSPNEA ON EXERTION: ICD-10-CM

## 2019-09-23 PROCEDURE — 99214 OFFICE O/P EST MOD 30 MIN: CPT | Performed by: INTERNAL MEDICINE

## 2019-09-23 PROCEDURE — 95800 SLP STDY UNATTENDED: CPT | Performed by: INTERNAL MEDICINE

## 2019-09-23 PROCEDURE — 95800 SLP STDY UNATTENDED: CPT

## 2019-09-23 PROCEDURE — 93280 PM DEVICE PROGR EVAL DUAL: CPT | Performed by: INTERNAL MEDICINE

## 2019-09-23 RX ORDER — HYDROCHLOROTHIAZIDE 12.5 MG/1
12.5 CAPSULE, GELATIN COATED ORAL DAILY
Qty: 30 CAPSULE | Refills: 11 | Status: SHIPPED | OUTPATIENT
Start: 2019-09-23 | End: 2020-09-21 | Stop reason: SDUPTHER

## 2019-09-23 RX ORDER — GABAPENTIN 300 MG/1
300 CAPSULE ORAL 2 TIMES DAILY
COMMUNITY
End: 2019-10-22

## 2019-09-23 NOTE — PROGRESS NOTES
[facility] AT [address], [address]  [place], [place], [address] [phone]  [email or web address]           OUTPATIENT CLINIC FOLLOW UP NOTE    Patient Care Team:  Patient Care Team:  Ryne Puente DO as PCP - General (Family Medicine)  Johny Sommer MD as Consulting Physician (Cardiology)    Subjective:     Chief complaint: Dyspnea, AV block, pacemaker site pain    HPI:    Kellen Esparza is a 53 y.o. female.  Problem List  1/ CAD (small vessel disease)  2. High degree AV Block s/p DC PPM 7/2019  3. Mild diastolic dysfunction  4. Diabetes  5. Hypertension  6. Hyperlipidemia  7. Migraines  8. History of PE    Today she presents for follow-up.  The patient has exquisitely sensitive tenderness over her pacemaker incision site.  Denies associated swelling or ecchymosis at this time.    Also the patient has persistent chronic significant dyspnea after walking a block or 2.  Denies associated chest pain.  When she takes a shower she does develop some back tightness over her mid to lower back.    Has mild lower extremity swelling over the last few days.  Previously on hydrochlorothiazide but was discontinued for unclear reasons    Review of Systems:  Positive for pacemaker site pain, dyspnea, fatigue, lower extremity edema  Negative for chest pain, palpitations, orthopnea, PND,lightheadedness, syncope.     PFSH:  Patient Active Problem List   Diagnosis   • Chest pain   • Dyspnea on exertion   • Essential hypertension   • Pain of right breast   • Lower extremity edema   • Diabetes mellitus (CMS/HCC)   • Abnormal stress test   • Migraine   • History of pulmonary embolus (PE)   • Hyperlipidemia   • DDD (degenerative disc disease), cervical   • Coronary artery disease of native artery of native heart with stable angina pectoris (CMS/HCC)   • AV block, complete (S/P PPM 7/2019)   • Restrictive pattern on PFT's without evidence of ILD   • Obesity, Class II, BMI 35-39.9   •  R/O ALYSIA (obstructive sleep apnea)         Current Outpatient Medications:   •  amitriptyline (ELAVIL) 25 MG tablet, Take 1 tablet by mouth Every Night., Disp: 30 tablet, Rfl: 2  •  aspirin 81 MG tablet, Take 1 tablet by mouth Daily., Disp: 30 tablet, Rfl: 11  •  atorvastatin (LIPITOR) 80 MG tablet, Take 1 tablet by mouth Daily., Disp: 90 tablet, Rfl: 3  •  escitalopram (LEXAPRO) 10 MG tablet, Take 10 mg by mouth Daily., Disp: , Rfl: 5  •  estradiol (ESTRACE) 1 MG tablet, Take 1 mg by mouth Daily., Disp: , Rfl:   •  meloxicam (MOBIC) 7.5 MG tablet, 1 tab po daily as needed, Disp: 14 tablet, Rfl: 0  •  metFORMIN (GLUCOPHAGE) 500 MG tablet, Take 1,000 mg by mouth 2 (Two) Times a Day With Meals., Disp: , Rfl:   •  Multiple Vitamins-Minerals (CENTRUM WOMEN PO), Take 1 capsule by mouth Daily. gummies, Disp: , Rfl:   •  SUMAtriptan (IMITREX) 50 MG tablet, Take 50 mg by mouth Every 2 (Two) Hours As Needed for Migraine. Take one tablet at onset of headache. May repeat dose one time in 2 hours if headache not relieved., Disp: , Rfl:   •  valsartan (DIOVAN) 40 MG tablet, Take 0.5 tablets by mouth Daily., Disp: 30 tablet, Rfl: 11  •  vitamin D (ERGOCALCIFEROL) 36951 UNITS capsule capsule, Take 50,000 Units by mouth 1 (One) Time Per Week. Wednesday, Disp: , Rfl:     Current Facility-Administered Medications:   •  meloxicam (MOBIC) tablet 7.5 mg, 7.5 mg, Oral, Daily PRN, Wallys-Bahena, Joan B, APRN    Allergies   Allergen Reactions   • Lisinopril Cough        reports that she has never smoked. She has never used smokeless tobacco.    Family History   Problem Relation Age of Onset   • Breast cancer Mother 42   • Heart failure Mother    • No Known Problems Father    • Hypertension Brother    • Hypertension Maternal Grandmother    • Hypertension Maternal Grandfather    • No Known Problems Paternal Grandmother    • No Known Problems Paternal Grandfather    • Heart failure Son    • Hypertension Sister    • Ovarian cancer Neg Hx           Objective:   LMP 10/31/2014 (Approximate)   CONSTITUTIONAL: No acute distress, normal affect  RESPIRATORY: Normal effort. Clear to auscultation bilaterally without wheezing or rales  CARDIOVASCULAR: Regular rate and rhythm with normal S1 and S2. Without gallop or rub.  Her previously heard systolic ejection murmur at the right upper sternal border is no audible today.  PERIPHERAL VASCULAR: Normal radial pulses bilaterally. There is mild peripheral edema bilaterally.  MSK: Pacemaker site clean dry and intact, keloid present over incision, exquisitely tender, no ecchymosis, not swollen    Labs:  Lab Results   Component Value Date    ALT 12 07/26/2019    AST 13 07/26/2019     Lab Results   Component Value Date    CHOL 114 07/26/2019    TRIG 100 07/26/2019    HDL 55 07/26/2019    CREATININE 0.76 07/26/2019       Diagnostic Data:    Procedures    PFTs 11/2018 - moderate restrictive lung disease     [employer] heart monitor 6/2019  -High degree symptomatic AV Block    Zio Patch 4/2019  -Only 1.5 days of monitoring.  -High-grade AV block for 4.4 seconds at 7:23 AM.  -Second-degree AV block for 10 seconds at 8:53 PM.  -Average heart rate 86.    TTE 11/2018  · Left ventricular systolic function is normal. Estimated EF = 65%.  · Left ventricular wall thickness is consistent with mild concentric hypertrophy.  · Left ventricular diastolic dysfunction (grade I a) consistent with impaired relaxation.  · Left atrial cavity size is borderline dilated.  · Trace-to-mild aortic valve regurgitation is present.    TTE 7/2017  · Estimated EF appears to be in the range of 66 - 70%.  · Left ventricular wall thickness is consistent with moderate asymmetric hypertrophy.  · Left ventricular diastolic dysfunction (grade I) consistent with impaired relaxation.    [facility] 7/2017  · There is mild to moderate diffuse tortuosity of the vessels.  There is evidence of small vessel disease in the distal portion of a small RPL S.  There is no  obstructive coronary artery disease.  · Normal left ventricular ejection fraction with no regional wall motion abnormalities    CT PE Protocol 7/2017 - No evidence of pulmonary embolus or other acute chest disease.    DEVICE INTERROGATION:  [employer], Interrogation date 9/23/19-   RA pacing 1%%, RV pacing <1%. P wave is 4.2 mV with a threshold of 0.7 V at 0.5 msec and an impedance of 565 ohms. R wave is 14.3 mV with a threshold of 0.9 V at 0.5 msec and an impedance of 660 ohms. Battery voltage is 10yrs.  RA and RV outputs decreased to 2.0 at 0.5.      Assessment and Plan:   Kellen was seen today for hypertension and hyperlipidemia.    Diagnoses and all orders for this visit:    Complete AV block  -s/p [employer] DC PPM 7/2019, Dr Gay  -Pacemaker incision site exquisitely tender.  Meloxicam did not help.  Incision does appear to have healed over adequately and has a keloid.  Will start gabapentin at 300 mg daily and increase to 300 mg twice daily in a couple days to see if this helps her pain. Sherrill Brothers to discuss findings with Dr. Gay and determine next steps.    Coronary artery disease of native artery of native heart with stable angina pectoris  Chest pain, unspecified type  -CCS class 0 at this current time.  No clear anginal-like symptoms.  -Continue current related medications    Essential hypertension  -Stable  -Elevated, has some swelling, restart hydrochlorothiazide at 12.5 mg daily.  The patient will take her blood pressure once a day for the next week.  Will increase to 25 mg daily if still averaging above 140/90 mmHg.    Dyspnea, sleep disturbance, fatigue  -Followed by Dr Quispe, pulmonary  -Has restrictive lung disease due to obesity  -High-resolution CT scan and VQ scan still pending.  -If those studies are unremarkable, will consider repeat echocardiogram.  With that said, her findings between 2017 and 2018 echoes were unchanged.      - Return in about 3 months (around  12/23/2019).    Johny Sommer MD, MSc, [facility]  Interventional Cardiology  [facility] at [facility]

## 2019-09-30 ENCOUNTER — HOSPITAL ENCOUNTER (OUTPATIENT)
Dept: NUCLEAR MEDICINE | Facility: HOSPITAL | Age: 54
Discharge: HOME | End: 2019-09-30

## 2019-09-30 ENCOUNTER — HOSPITAL ENCOUNTER (OUTPATIENT)
Dept: GENERAL RADIOLOGY | Facility: HOSPITAL | Age: 54
Discharge: HOME | End: 2019-09-30

## 2019-09-30 ENCOUNTER — HOSPITAL ENCOUNTER (OUTPATIENT)
Dept: CT IMAGING | Facility: HOSPITAL | Age: 54
Discharge: HOME | End: 2019-09-30
Admitting: NURSE PRACTITIONER

## 2019-09-30 ENCOUNTER — TELEPHONE (OUTPATIENT)
Dept: PULMONOLOGY | Facility: CLINIC | Age: 54
End: 2019-09-30

## 2019-09-30 DIAGNOSIS — R06.00 DYSPNEA: ICD-10-CM

## 2019-09-30 DIAGNOSIS — R06.09 DYSPNEA ON EXERTION: ICD-10-CM

## 2019-09-30 PROCEDURE — 71250 CT THORAX DX C-: CPT

## 2019-09-30 PROCEDURE — 0 XENON XE 133: Performed by: INTERNAL MEDICINE

## 2019-09-30 PROCEDURE — 78582 LUNG VENTILAT&PERFUS IMAGING: CPT

## 2019-09-30 PROCEDURE — A9540 TC99M MAA: HCPCS | Performed by: INTERNAL MEDICINE

## 2019-09-30 PROCEDURE — A9558 XE133 XENON 10MCI: HCPCS | Performed by: INTERNAL MEDICINE

## 2019-09-30 PROCEDURE — 71045 X-RAY EXAM CHEST 1 VIEW: CPT

## 2019-09-30 PROCEDURE — 0 TECHNETIUM ALBUMIN AGGREGATED: Performed by: INTERNAL MEDICINE

## 2019-09-30 RX ADMIN — XENON XE-133 28.3 MILLICURIE: 10 GAS RESPIRATORY (INHALATION) at 13:15

## 2019-09-30 RX ADMIN — Medication 1 DOSE: at 13:25

## 2019-09-30 NOTE — TELEPHONE ENCOUNTER
Spoke with patient regarding her Sleep Study. She is agreeable to start auto CPAP 8-18. RT will contact patient to set up.    Impression:       -  moderate obstructive sleep apnea is present.  -  Oxygen desaturation is present.    -  Snoring is present.     Plan:      - Recommend trial of CPAP such as AutoSet with an 8 cm minimum an 18 cm maximum.  - Recommend weight loss.  - Recommend the patient avoid alcohol and sedatives close to bedtime.

## 2019-10-03 ENCOUNTER — TELEPHONE (OUTPATIENT)
Dept: CARDIOLOGY | Facility: CLINIC | Age: 54
End: 2019-10-03

## 2019-10-03 NOTE — TELEPHONE ENCOUNTER
Spoke with patient, she is to come to office today to  prescription. Advised patient to keep scheduled follow up with [facility]. Pt agreeable to plan.

## 2019-10-03 NOTE — TELEPHONE ENCOUNTER
Per Dr. Sommer, send in Ultram 50 mg every 8 hours as needed for pain- will see Dr. Gay as scheduled next wednesday she will discuss pocket revision with Dr. Stout- we may end up doing revision instead of appointment     trinity will get script from -

## 2019-10-03 NOTE — TELEPHONE ENCOUNTER
Patient called to report that her pacemaker site pain is not improved. Patient reports that since starting the Gabapentin her pain is worse. Patient states that the pain is keeping her up at night and she is unable to type on a keyboard at work.     Offered patient a follow up with Dr. Gay on Wednesday, October 9. Patient agreeable to appointment, however, feels as if she cannot wait to be seen that long.       Patient advised that I will consult with [facility] and return call with recommendations.

## 2019-10-08 ENCOUNTER — APPOINTMENT (OUTPATIENT)
Dept: GENERAL RADIOLOGY | Facility: HOSPITAL | Age: 54
End: 2019-10-08

## 2019-10-08 ENCOUNTER — HOSPITAL ENCOUNTER (EMERGENCY)
Facility: HOSPITAL | Age: 54
Discharge: HOME | End: 2019-10-09
Attending: EMERGENCY MEDICINE | Admitting: EMERGENCY MEDICINE

## 2019-10-08 DIAGNOSIS — R25.3 MUSCLE TWITCHING: Primary | ICD-10-CM

## 2019-10-08 LAB
ALBUMIN SERPL-MCNC: 4.5 G/DL (ref 3.5–5.2)
ALBUMIN/GLOB SERPL: 1.2 G/DL
ALP SERPL-CCNC: 90 U/L (ref 39–117)
ALT SERPL W P-5'-P-CCNC: 15 U/L (ref 1–33)
ANION GAP SERPL CALCULATED.3IONS-SCNC: 10 MMOL/L (ref 5–15)
AST SERPL-CCNC: 15 U/L (ref 1–32)
BASOPHILS # BLD AUTO: 0.03 10*3/MM3 (ref 0–0.2)
BASOPHILS NFR BLD AUTO: 0.3 % (ref 0–1.5)
BILIRUB SERPL-MCNC: 0.3 MG/DL (ref 0.2–1.2)
BUN BLD-MCNC: 10 MG/DL (ref 6–20)
BUN/CREAT SERPL: 14.3 (ref 7–25)
CALCIUM SPEC-SCNC: 10.1 MG/DL (ref 8.6–10.5)
CHLORIDE SERPL-SCNC: 94 MMOL/L (ref 98–107)
CO2 SERPL-SCNC: 33 MMOL/L (ref 22–29)
CREAT BLD-MCNC: 0.7 MG/DL (ref 0.57–1)
DEPRECATED RDW RBC AUTO: 41.5 FL (ref 37–54)
EOSINOPHIL # BLD AUTO: 0.19 10*3/MM3 (ref 0–0.4)
EOSINOPHIL NFR BLD AUTO: 1.9 % (ref 0.3–6.2)
ERYTHROCYTE [DISTWIDTH] IN BLOOD BY AUTOMATED COUNT: 14.2 % (ref 12.3–15.4)
GFR SERPL CREATININE-BSD FRML MDRD: 105 ML/MIN/1.73
GLOBULIN UR ELPH-MCNC: 3.7 GM/DL
GLUCOSE BLD-MCNC: 144 MG/DL (ref 65–99)
HCT VFR BLD AUTO: 36.6 % (ref 34–46.6)
HGB BLD-MCNC: 11.9 G/DL (ref 12–15.9)
IMM GRANULOCYTES # BLD AUTO: 0.03 10*3/MM3 (ref 0–0.05)
IMM GRANULOCYTES NFR BLD AUTO: 0.3 % (ref 0–0.5)
LIPASE SERPL-CCNC: 18 U/L (ref 13–60)
LYMPHOCYTES # BLD AUTO: 3.58 10*3/MM3 (ref 0.7–3.1)
LYMPHOCYTES NFR BLD AUTO: 35.3 % (ref 19.6–45.3)
MCH RBC QN AUTO: 26.2 PG (ref 26.6–33)
MCHC RBC AUTO-ENTMCNC: 32.5 G/DL (ref 31.5–35.7)
MCV RBC AUTO: 80.4 FL (ref 79–97)
MONOCYTES # BLD AUTO: 0.66 10*3/MM3 (ref 0.1–0.9)
MONOCYTES NFR BLD AUTO: 6.5 % (ref 5–12)
NEUTROPHILS # BLD AUTO: 5.64 10*3/MM3 (ref 1.7–7)
NEUTROPHILS NFR BLD AUTO: 55.7 % (ref 42.7–76)
NRBC BLD AUTO-RTO: 0 /100 WBC (ref 0–0.2)
NT-PROBNP SERPL-MCNC: 7.9 PG/ML (ref 5–900)
PLATELET # BLD AUTO: 218 10*3/MM3 (ref 140–450)
PMV BLD AUTO: 10.8 FL (ref 6–12)
POTASSIUM BLD-SCNC: 3.5 MMOL/L (ref 3.5–5.2)
PROT SERPL-MCNC: 8.2 G/DL (ref 6–8.5)
RBC # BLD AUTO: 4.55 10*6/MM3 (ref 3.77–5.28)
SODIUM BLD-SCNC: 137 MMOL/L (ref 136–145)
TROPONIN T SERPL-MCNC: <0.01 NG/ML (ref 0–0.03)
WBC NRBC COR # BLD: 10.13 10*3/MM3 (ref 3.4–10.8)

## 2019-10-08 PROCEDURE — 71045 X-RAY EXAM CHEST 1 VIEW: CPT

## 2019-10-08 PROCEDURE — 83690 ASSAY OF LIPASE: CPT | Performed by: EMERGENCY MEDICINE

## 2019-10-08 PROCEDURE — 85025 COMPLETE CBC W/AUTO DIFF WBC: CPT | Performed by: EMERGENCY MEDICINE

## 2019-10-08 PROCEDURE — 99283 EMERGENCY DEPT VISIT LOW MDM: CPT

## 2019-10-08 PROCEDURE — 93005 ELECTROCARDIOGRAM TRACING: CPT | Performed by: EMERGENCY MEDICINE

## 2019-10-08 PROCEDURE — 84484 ASSAY OF TROPONIN QUANT: CPT | Performed by: EMERGENCY MEDICINE

## 2019-10-08 PROCEDURE — 80053 COMPREHEN METABOLIC PANEL: CPT | Performed by: EMERGENCY MEDICINE

## 2019-10-08 PROCEDURE — 83880 ASSAY OF NATRIURETIC PEPTIDE: CPT | Performed by: EMERGENCY MEDICINE

## 2019-10-08 RX ORDER — SODIUM CHLORIDE 0.9 % (FLUSH) 0.9 %
10 SYRINGE (ML) INJECTION AS NEEDED
Status: DISCONTINUED | OUTPATIENT
Start: 2019-10-08 | End: 2019-10-09 | Stop reason: HOSPADM

## 2019-10-08 RX ORDER — ASPIRIN 81 MG/1
324 TABLET, CHEWABLE ORAL ONCE
Status: DISCONTINUED | OUTPATIENT
Start: 2019-10-08 | End: 2019-10-08

## 2019-10-09 ENCOUNTER — OFFICE VISIT (OUTPATIENT)
Dept: CARDIOLOGY | Facility: CLINIC | Age: 54
End: 2019-10-09

## 2019-10-09 VITALS
SYSTOLIC BLOOD PRESSURE: 150 MMHG | DIASTOLIC BLOOD PRESSURE: 86 MMHG | HEIGHT: 71 IN | BODY MASS INDEX: 38.92 KG/M2 | WEIGHT: 278 LBS | OXYGEN SATURATION: 97 % | HEART RATE: 80 BPM

## 2019-10-09 VITALS
OXYGEN SATURATION: 95 % | RESPIRATION RATE: 22 BRPM | BODY MASS INDEX: 36.4 KG/M2 | SYSTOLIC BLOOD PRESSURE: 142 MMHG | TEMPERATURE: 98.3 F | HEIGHT: 71 IN | HEART RATE: 92 BPM | WEIGHT: 260 LBS | DIASTOLIC BLOOD PRESSURE: 95 MMHG

## 2019-10-09 DIAGNOSIS — I44.2 AV BLOCK, COMPLETE (HCC): Primary | ICD-10-CM

## 2019-10-09 DIAGNOSIS — I10 ESSENTIAL HYPERTENSION: ICD-10-CM

## 2019-10-09 DIAGNOSIS — E78.2 MIXED HYPERLIPIDEMIA: ICD-10-CM

## 2019-10-09 DIAGNOSIS — R52 PAIN IN PACEMAKER POCKET: ICD-10-CM

## 2019-10-09 DIAGNOSIS — I25.118 CORONARY ARTERY DISEASE OF NATIVE ARTERY OF NATIVE HEART WITH STABLE ANGINA PECTORIS (HCC): ICD-10-CM

## 2019-10-09 LAB
HOLD SPECIMEN: NORMAL
HOLD SPECIMEN: NORMAL
WHOLE BLOOD HOLD SPECIMEN: NORMAL
WHOLE BLOOD HOLD SPECIMEN: NORMAL

## 2019-10-09 PROCEDURE — 99024 POSTOP FOLLOW-UP VISIT: CPT | Performed by: INTERNAL MEDICINE

## 2019-10-09 RX ORDER — PREDNISONE 1 MG/1
TABLET ORAL
Qty: 44 TABLET | Refills: 0 | Status: SHIPPED | OUTPATIENT
Start: 2019-10-09 | End: 2019-10-22

## 2019-10-09 NOTE — PROGRESS NOTES
Kellen Mccartney Isaias  1965  53 y.o.  [phone]  [phone]      Date: 10/09/2019    PCP: Ryne Puente DO    Chief Complaint   Patient presents with   • av block complete       Problem List:  1. CAD (small vessel disease)  2.  High degree AV Block s/p DC PPM 7/2019  a. Significant neuropathic pain at the pacemaker site  3.  Mild diastolic dysfunction  4. Diabetes  5.  Hypertension  6.  Hyperlipidemia  7. Migraines  8. History of PE    Allergies   Allergen Reactions   • Lisinopril Cough       Current Medications:      Current Outpatient Medications:   •  amitriptyline (ELAVIL) 25 MG tablet, Take 1 tablet by mouth Every Night., Disp: 30 tablet, Rfl: 2  •  aspirin 81 MG tablet, Take 1 tablet by mouth Daily., Disp: 30 tablet, Rfl: 11  •  atorvastatin (LIPITOR) 80 MG tablet, Take 1 tablet by mouth Daily., Disp: 90 tablet, Rfl: 3  •  escitalopram (LEXAPRO) 10 MG tablet, Take 10 mg by mouth Daily., Disp: , Rfl: 5  •  estradiol (ESTRACE) 1 MG tablet, Take 1 mg by mouth Daily., Disp: , Rfl:   •  gabapentin (NEURONTIN) 300 MG capsule, Take 300 mg by mouth 2 (Two) Times a Day., Disp: , Rfl:   •  hydrochlorothiazide (MICROZIDE) 12.5 MG capsule, Take 1 capsule by mouth Daily., Disp: 30 capsule, Rfl: 11  •  metFORMIN (GLUCOPHAGE) 500 MG tablet, Take 1,000 mg by mouth 2 (Two) Times a Day With Meals., Disp: , Rfl:   •  Multiple Vitamins-Minerals (CENTRUM WOMEN PO), Take 1 capsule by mouth Daily. gummies, Disp: , Rfl:   •  SUMAtriptan (IMITREX) 50 MG tablet, Take 50 mg by mouth Every 2 (Two) Hours As Needed for Migraine. Take one tablet at onset of headache. May repeat dose one time in 2 hours if headache not relieved., Disp: , Rfl:   •  valsartan (DIOVAN) 40 MG tablet, Take 0.5 tablets by mouth Daily., Disp: 30 tablet, Rfl: 11  •  vitamin D (ERGOCALCIFEROL) 96671 UNITS capsule capsule, Take 50,000 Units by mouth 1 (One) Time Per Week. Wednesday, Disp: , Rfl:     Current Facility-Administered Medications:   •   "meloxicam (MOBIC) tablet 7.5 mg, 7.5 mg, Oral, Daily PRN, Joan Ahuja APRN    HPI    Kellen Esparza is a 53 y.o. female who presents today for  follow up of pacemaker site pain.She has a hx of AV block, CAD, hypertension, and hyperlipidemia. Since last visit, she still complains of pain in her PM incision site. The pain has been worsening and is exacerbated by palpation. She has not been on steroids for pain management, and gabapentin, tramadol and Mobic have been ineffective.  She reports \"jerking\" for which she went to the ER las night.  She is not sleeping well at all.  Patient denies chest pain, palpitations, shortness of breath, edema, dizziness, and syncope. She works in customer service at [employer].    The following portions of the patient's history were reviewed and updated as appropriate: allergies, current medications and problem list.    Pertinent positives as listed in the HPI.  All other systems reviewed are negative.    Vitals:    10/09/19 1247   BP: 150/86   BP Location: Right arm   Patient Position: Sitting   Pulse: 80   SpO2: 97%   Weight: 126 kg (278 lb)   Height: 180.3 cm (71\")       Physical Exam:    General: Alert and oriented.  The pacemaker site is exquisitely starting approximately 4 cm above the site and extending 2 cm below the site.    Diagnostic Data:  Lab Results   Component Value Date    GLUCOSE 144 (H) 10/08/2019    BUN 10 10/08/2019    CREATININE 0.70 10/08/2019    EGFRIFAFRI 105 10/08/2019    BCR 14.3 10/08/2019     10/08/2019    K 3.5 10/08/2019    CL 94 (L) 10/08/2019    CO2 33.0 (H) 10/08/2019    CALCIUM 10.1 10/08/2019    ALBUMIN 4.50 10/08/2019    AST 15 10/08/2019    ALT 15 10/08/2019     Lab Results   Component Value Date    CHOL 114 07/26/2019    TRIG 100 07/26/2019    HDL 55 07/26/2019    LDL 39 07/26/2019      Lab Results   Component Value Date    WBC 10.13 10/08/2019    HGB 11.9 (L) 10/08/2019    HCT 36.6 10/08/2019    MCV 80.4 10/08/2019     " 10/08/2019     Lab Results   Component Value Date    TSH 1.314 04/03/2019       Procedures    Assessment:      ICD-10-CM ICD-9-CM   1. AV block, complete (S/P PPM 7/2019) I44.2 426.0   2. Essential hypertension I10 401.9   3. Mixed hyperlipidemia E78.2 272.2   4. Coronary artery disease of native artery of native heart with stable angina pectoris (CMS/[facility]) I25.118 414.01     413.9   5. Pain in pacemaker pocket R52 338.19     Lab results found above were reviewed with the patient.    Plan:      1. Start prednisone for pacemaker pocket pain management. Start at a dose of 40mg then gradually decrease to 5mg as directed  2. Lortab 7.5 #20 given with no refills every 6 hours as needed pain  3. Continue atorvastatin for hyperlipidemia  4. Continue valsartan for hypertension  5. continue aspirin for CAD  6. Continue all other current medications.  7. FU 1 week 9:15 am      Scribed for Sonya Gay MD by Christiana Sidhu. 10/2/2019  12:40 PM    I Sonya Gay MD personally performed the services described in this documentation as scribed by the above individual in my presence, and it is both accurate and complete.    Sonya Gay MD, [facility]

## 2019-10-09 NOTE — DISCHARGE INSTRUCTIONS
The patient is advised to keep follow-up with her cardiologist, Dr. Gay, as scheduled.    Return to the ER with any further concern.

## 2019-10-09 NOTE — ED PROVIDER NOTES
"Subjective   Ms. Kellen Esparza is a 53 year old female who presents to the ED with c/o \"jerks.\"  Patient reports her whole body suddenly started jerking this afternoon around 1700.  She tells me this problem was initially pretty mild and episodes were sporadic, so she tried ignoring it and went about her day.  However, as time progressed she laments that they became much worse and more frequent.  She decided to come in tonight for evaluation of this issue.  On exam, she continues to intermittently jerk.  She denies any associated numbness, weakness, or tingling.  No recent fevers or chills.  No abdominal pain, nausea, vomiting, diarrhea, or urinary symptoms.  Does have some chest pain now but this is chronic (throught to be associated with her pacemaker and scar tissue) and unchanged from baseline.  No other acute symptoms at this time.  Other past medical history includes arthritis, poorly monitored diabetes mellitus, hypertension, migraine, pulmonary embolism, and sleep apnea (recently started CPAP).        History provided by:  Patient  Illness   Location:  Generalized  Quality:  \"jerks\"  Severity:  Moderate  Onset quality:  Sudden  Duration:  6 hours  Timing:  Intermittent  Progression:  Worsening  Chronicity:  New  Associated symptoms: no abdominal pain, no diarrhea, no fever, no headaches, no nausea, no shortness of breath and no vomiting  Chest pain: chronic.        Review of Systems   Constitutional: Negative for chills and fever.   Respiratory: Negative for shortness of breath.    Cardiovascular: Chest pain: chronic.   Gastrointestinal: Negative for abdominal pain, diarrhea, nausea and vomiting.   Genitourinary: Negative.  Negative for dysuria.   Musculoskeletal: Negative for back pain and neck pain.   Neurological: Negative for weakness, numbness and headaches.        + \"jerks\"   All other systems reviewed and are negative.      Past Medical History:   Diagnosis Date   • Arthritis    • Diabetes mellitus " (CMS/HCC)     doesnt check sugar    • Hypertension    • Migraine    • Musculoligamentous strain    • Pulmonary embolism (CMS/HCC) 1997   • Sleep apnea    • Wears eyeglasses        Allergies   Allergen Reactions   • Lisinopril Cough       Past Surgical History:   Procedure Laterality Date   • BREAST BIOPSY Left 2014   • CARDIAC CATHETERIZATION Left 8/10/2017    Procedure: Cardiac Catheterization/Vascular Study;  Surgeon: Johny Sommer MD;  Location:  [facility];  Service:    • CARDIAC ELECTROPHYSIOLOGY PROCEDURE N/A 7/19/2019    Procedure: Pacemaker DC new;  Surgeon: Sonya Gay MD;  Location:  [facility];  Service: Cardiology   • CHOLECYSTECTOMY     • COLONOSCOPY     • ENDOSCOPY     • HYSTERECTOMY  11/2014   • INSERT / REPLACE / REMOVE PACEMAKER     • JOINT REPLACEMENT Right     knee   • KNEE SURGERY Right 12/2017    total replacement   • NASAL SINUS SURGERY     • OOPHORECTOMY  11/2014   • PULMONARY EMBOLISM SURGERY      right lung   • TUBAL ABDOMINAL LIGATION         Family History   Problem Relation Age of Onset   • Breast cancer Mother 42   • Heart failure Mother    • No Known Problems Father    • Hypertension Brother    • Hypertension Maternal Grandmother    • Hypertension Maternal Grandfather    • No Known Problems Paternal Grandmother    • No Known Problems Paternal Grandfather    • Heart failure Son    • Hypertension Sister    • Ovarian cancer Neg Hx        Social History     Socioeconomic History   • Marital status: Single     Spouse name: Not on file   • Number of children: Not on file   • Years of education: Not on file   • Highest education level: Not on file   Tobacco Use   • Smoking status: Never Smoker   • Smokeless tobacco: Never Used   Substance and Sexual Activity   • Alcohol use: No     Frequency: Never     Comment: rare   • Drug use: No   • Sexual activity: Defer   Social History Narrative    Patient consumes NO CAFFEINE     Patient lives at home  with partner, Donald.         Lifelong non-smoker    Does not drink alcohol    Works as a          Objective   Physical Exam   Constitutional: She is oriented to person, place, and time. She appears well-developed and well-nourished. No distress.   HENT:   Head: Normocephalic and atraumatic.   Eyes: Conjunctivae are normal. No scleral icterus.   Neck: Normal range of motion. Neck supple.   Cardiovascular: Normal rate, regular rhythm, normal heart sounds and intact distal pulses. Exam reveals no gallop and no friction rub.   No murmur heard.  Pulmonary/Chest: Effort normal and breath sounds normal. No respiratory distress. She has no wheezes. She has no rales.   Abdominal: Soft. Bowel sounds are normal. There is no tenderness. There is no guarding.   Musculoskeletal: Normal range of motion.   Neurological: She is alert and oriented to person, place, and time.   Intermittent whole body jerks throughout exam.   Skin: Skin is warm and dry. She is not diaphoretic.   Psychiatric: She has a normal mood and affect. Her behavior is normal.   Nursing note and vitals reviewed.      Procedures         ED Course       Recent Results (from the past 24 hour(s))   Troponin    Collection Time: 10/08/19 10:47 PM   Result Value Ref Range    Troponin T <0.010 0.000 - 0.030 ng/mL   Comprehensive Metabolic Panel    Collection Time: 10/08/19 10:47 PM   Result Value Ref Range    Glucose 144 (H) 65 - 99 mg/dL    BUN 10 6 - 20 mg/dL    Creatinine 0.70 0.57 - 1.00 mg/dL    Sodium 137 136 - 145 mmol/L    Potassium 3.5 3.5 - 5.2 mmol/L    Chloride 94 (L) 98 - 107 mmol/L    CO2 33.0 (H) 22.0 - 29.0 mmol/L    Calcium 10.1 8.6 - 10.5 mg/dL    Total Protein 8.2 6.0 - 8.5 g/dL    Albumin 4.50 3.50 - 5.20 g/dL    ALT (SGPT) 15 1 - 33 U/L    AST (SGOT) 15 1 - 32 U/L    Alkaline Phosphatase 90 39 - 117 U/L    Total Bilirubin 0.3 0.2 - 1.2 mg/dL    eGFR  African Amer 105 >60 mL/min/1.73    Globulin 3.7 gm/dL    A/G  Ratio 1.2 g/dL    BUN/Creatinine Ratio 14.3 7.0 - 25.0    Anion Gap 10.0 5.0 - 15.0 mmol/L   Lipase    Collection Time: 10/08/19 10:47 PM   Result Value Ref Range    Lipase 18 13 - 60 U/L   BNP    Collection Time: 10/08/19 10:47 PM   Result Value Ref Range    proBNP 7.9 5.0 - 900.0 pg/mL   Light Blue Top    Collection Time: 10/08/19 10:47 PM   Result Value Ref Range    Extra Tube hold for add-on    Green Top (Gel)    Collection Time: 10/08/19 10:47 PM   Result Value Ref Range    Extra Tube Hold for add-ons.    Lavender Top    Collection Time: 10/08/19 10:47 PM   Result Value Ref Range    Extra Tube hold for add-on    Gold Top - SST    Collection Time: 10/08/19 10:47 PM   Result Value Ref Range    Extra Tube Hold for add-ons.    CBC Auto Differential    Collection Time: 10/08/19 10:47 PM   Result Value Ref Range    WBC 10.13 3.40 - 10.80 10*3/mm3    RBC 4.55 3.77 - 5.28 10*6/mm3    Hemoglobin 11.9 (L) 12.0 - 15.9 g/dL    Hematocrit 36.6 34.0 - 46.6 %    MCV 80.4 79.0 - 97.0 fL    MCH 26.2 (L) 26.6 - 33.0 pg    MCHC 32.5 31.5 - 35.7 g/dL    RDW 14.2 12.3 - 15.4 %    RDW-SD 41.5 37.0 - 54.0 fl    MPV 10.8 6.0 - 12.0 fL    Platelets 218 140 - 450 10*3/mm3    Neutrophil % 55.7 42.7 - 76.0 %    Lymphocyte % 35.3 19.6 - 45.3 %    Monocyte % 6.5 5.0 - 12.0 %    Eosinophil % 1.9 0.3 - 6.2 %    Basophil % 0.3 0.0 - 1.5 %    Immature Grans % 0.3 0.0 - 0.5 %    Neutrophils, Absolute 5.64 1.70 - 7.00 10*3/mm3    Lymphocytes, Absolute 3.58 (H) 0.70 - 3.10 10*3/mm3    Monocytes, Absolute 0.66 0.10 - 0.90 10*3/mm3    Eosinophils, Absolute 0.19 0.00 - 0.40 10*3/mm3    Basophils, Absolute 0.03 0.00 - 0.20 10*3/mm3    Immature Grans, Absolute 0.03 0.00 - 0.05 10*3/mm3    nRBC 0.0 0.0 - 0.2 /100 WBC     Note: In addition to lab results from this visit, the labs listed above may include labs taken at another facility or during a different encounter within the last 24 hours. Please correlate lab times with ED admission and discharge  "times for further clarification of the services performed during this visit.    XR Chest 1 View   Final Result   No active disease.      Signer Name: Kev Zelaya MD    Signed: 10/8/2019 11:31 PM    Workstation Name: [email or web address]     [facility]        Vitals:    10/08/19 2238   BP: 169/89   BP Location: Left arm   Patient Position: Sitting   Pulse: 86   Resp: 22   Temp: 98.3 °F (36.8 °C)   TempSrc: Oral   SpO2: 95%   Weight: 118 kg (260 lb)   Height: 180.3 cm (71\")     Medications   sodium chloride 0.9 % flush 10 mL (not administered)     ECG/EMG Results (last 24 hours)     Procedure Component Value Units Date/Time    ECG 12 Lead [[id number]] Collected:  10/08/19 2248     Updated:  10/08/19 2249        ECG 12 Lead                           MDM  Number of Diagnoses or Management Options  Muscle twitching: new and requires workup  Diagnosis management comments: Patient presents with a complaint of dark/twitching to her upper body that began earlier today.  She has a pacemaker in place but there is no defibrillator present.  She denies any other changes in her home routine or different medications.  She denies fever or systemic symptoms of infection.  No chest pain or difficulty breathing.  No abdominal pain, no change in bowel or urinary function.    No significant abnormalities identified on vital signs or lab evaluation.    The patient continued to have intermittent jerks throughout the ER course.  I offered to contact her cardiologist for further discussion, but she reports she is scheduled to see her cardiologist, Dr. Gay, tomorrow.  She is advised to keep this outpatient follow-up and she was discharged home appearing well.       Amount and/or Complexity of Data Reviewed  Clinical lab tests: ordered and reviewed  Tests in the radiology section of CPT®: ordered and reviewed  Decide to obtain previous medical records or to obtain history from someone other than the patient: " yes  Obtain history from someone other than the patient: yes  Review and summarize past medical records: yes  Independent visualization of images, tracings, or specimens: yes        Final diagnoses:   Muscle twitching       Documentation assistance provided by ana paula Colorado.  Information recorded by the scribe was done at my direction and has been verified and validated by me.     Alexis [place]  10/09/19 0026       Alexis [place]  10/09/19 0027       Samantha Patel MD  10/09/19 0104

## 2019-10-16 ENCOUNTER — OFFICE VISIT (OUTPATIENT)
Dept: CARDIOLOGY | Facility: CLINIC | Age: 54
End: 2019-10-16

## 2019-10-16 VITALS
SYSTOLIC BLOOD PRESSURE: 122 MMHG | OXYGEN SATURATION: 95 % | BODY MASS INDEX: 40.4 KG/M2 | HEIGHT: 71 IN | WEIGHT: 288.6 LBS | HEART RATE: 69 BPM | DIASTOLIC BLOOD PRESSURE: 84 MMHG

## 2019-10-16 DIAGNOSIS — I25.118 CORONARY ARTERY DISEASE OF NATIVE ARTERY OF NATIVE HEART WITH STABLE ANGINA PECTORIS (HCC): ICD-10-CM

## 2019-10-16 DIAGNOSIS — I10 ESSENTIAL HYPERTENSION: ICD-10-CM

## 2019-10-16 DIAGNOSIS — E78.2 MIXED HYPERLIPIDEMIA: ICD-10-CM

## 2019-10-16 DIAGNOSIS — I44.2 AV BLOCK, COMPLETE (HCC): ICD-10-CM

## 2019-10-16 DIAGNOSIS — R52 PAIN IN PACEMAKER POCKET: Primary | ICD-10-CM

## 2019-10-16 PROCEDURE — 99024 POSTOP FOLLOW-UP VISIT: CPT | Performed by: INTERNAL MEDICINE

## 2019-10-16 RX ORDER — HYDROCODONE BITARTRATE AND ACETAMINOPHEN 7.5; 325 MG/1; MG/1
TABLET ORAL
Refills: 0 | COMMUNITY
Start: 2019-10-09 | End: 2019-11-14

## 2019-10-16 RX ORDER — TRAZODONE HYDROCHLORIDE 100 MG/1
100 TABLET ORAL NIGHTLY
Qty: 30 TABLET | Refills: 0 | Status: SHIPPED | OUTPATIENT
Start: 2019-10-16 | End: 2019-10-31 | Stop reason: SDUPTHER

## 2019-10-16 NOTE — PROGRESS NOTES
[facility] Cardiology  Kellen Esparza  1965  53 y.o.  [phone]      Date: 10/16/2019    PCP: Ryne Puente DO    Chief Complaint   Patient presents with   • AV Block, Complete       Problem List:  1. Small-vessel coronary artery disease:  a. MPS, 07/26/2017: EF 70%. Medium-sized infarct pattern located in the anterior wall with no significant ischemia noted. This finding is worse on resting images which is suggestive of possible breast attenuation artifact instead of an infarction.  b. LHC, 08/10/2017: Mild-to-moderate diffuse tortuosity of the vessels. Evidence of small vessel disease in the distal portion of a small RPL S. No obstructive CAD.  2.  High degree AV Block:  amrit. Fátima, 04/29/2019: Only 1.5 days of monitoring. High grade AV block for 4.4 seconds at 7:23 am. 2nd degree AV block for 10 seconds at 8:53 pm. Average HR 86 bpm.  dotty Shine, 06/18/2019: Only one day of monitoring. 2.9% PACs. 1st degree AV block, brief 2nd degree type I AVB.   c. MCOT, 06/24/2019: High degree AV block.  d. Pacemaker implant, 07/19/2019, Dr. Gay: OneM[employer] Accolade MRI DR model L311 serial #[id number]. DDDR .  e. Significant neuropathic pain at the pacemaker site.  3. Mild diastolic dysfunction:  a. Echo, 11/28/2018: EF 65% with mild concentric LVH. Grade I a diastolic dysfunction. Left atrium borderline dilated.  4. Diabetes  5. Hypertension:  a. 24h BP monitor, 04/09/2019: 45% > 140 mmHg systolic, max 164, min 103. Average HR 82 max 111, min 56.  6. Hyperlipidemia  7. Migraines  8. History of PE  9. ALYSIA, on CPAP.    Allergies   Allergen Reactions   • Lisinopril Cough       Current Medications:      Current Outpatient Medications:   •  amitriptyline (ELAVIL) 25 MG tablet, Take 1 tablet by mouth Every Night., Disp: 30 tablet, Rfl: 2  •  aspirin 81 MG tablet, Take 1 tablet by mouth Daily., Disp: 30 tablet, Rfl: 11  •  atorvastatin (LIPITOR) 80 MG tablet, Take 1 tablet by mouth  "Daily., Disp: 90 tablet, Rfl: 3  •  escitalopram (LEXAPRO) 10 MG tablet, Take 10 mg by mouth Daily., Disp: , Rfl: 5  •  estradiol (ESTRACE) 1 MG tablet, Take 1 mg by mouth Daily., Disp: , Rfl:   •  gabapentin (NEURONTIN) 300 MG capsule, Take 300 mg by mouth 2 (Two) Times a Day., Disp: , Rfl:   •  hydrochlorothiazide (MICROZIDE) 12.5 MG capsule, Take 1 capsule by mouth Daily., Disp: 30 capsule, Rfl: 11  •  HYDROcodone-acetaminophen (NORCO) 7.5-325 MG per tablet, TK 1 T PO Q 6 H PRF PAIN, Disp: , Rfl: 0  •  metFORMIN (GLUCOPHAGE) 500 MG tablet, Take 1,000 mg by mouth 2 (Two) Times a Day With Meals., Disp: , Rfl:   •  Multiple Vitamins-Minerals (CENTRUM WOMEN PO), Take 1 capsule by mouth Daily. gummies, Disp: , Rfl:   •  predniSONE (DELTASONE) 5 MG tablet, 40 mg X 1 day.30 mg X 2 days.20 mg X 3 days.10 mg X 4 days. 5 mg X 4 days., Disp: 44 tablet, Rfl: 0  •  SUMAtriptan (IMITREX) 50 MG tablet, Take 50 mg by mouth Every 2 (Two) Hours As Needed for Migraine. Take one tablet at onset of headache. May repeat dose one time in 2 hours if headache not relieved., Disp: , Rfl:   •  valsartan (DIOVAN) 40 MG tablet, Take 0.5 tablets by mouth Daily., Disp: 30 tablet, Rfl: 11  •  vitamin D (ERGOCALCIFEROL) 15665 UNITS capsule capsule, Take 50,000 Units by mouth 1 (One) Time Per Week. Wednesday, Disp: , Rfl:     Current Facility-Administered Medications:   •  meloxicam (MOBIC) tablet 7.5 mg, 7.5 mg, Oral, Daily PRN, Joan Ahuja APRN HPI    Kellen Esparza is a 53 y.o. female who presents today for 1 week follow up of pacemaker site pain. Since last visit, she has continued to experience pain in her pacemaker site. She does report an improvement in her symptoms since she was put on prednisone daily and Lortab prn, in addition to her previously prescribed gabapentin. Pt states that her pain relief is short, and reoccurs daily \"after the meds wear off\", after 8-10 hours. She takes Lortab twice daily, which has helped " "give her relief at nighttime. Pt has been sleeping better since she was started on her CPAP. Pt was previously on trazodone 100 mg nightly, but was switched to Elavil in July. She does not feel that this medication is as effective as trazodone was in putting her and keeping her asleep. Pt notes an improvement in the jerking she experienced last week. At last visit, we decided to keep the patient off work until 10/22/2019. Pt is looking forward to return to work after her pain becomes manageable.  She is interested in options other than a pacemaker pocket revision.    The following portions of the patient's history were reviewed and updated as appropriate: allergies, current medications and problem list.    Pertinent positives as listed in the HPI.  All other systems reviewed are negative.    Vitals:    10/16/19 0933   BP: 122/84   BP Location: Right arm   Patient Position: Sitting   Pulse: 69   SpO2: 95%   Weight: 131 kg (288 lb 9.6 oz)   Height: 180.3 cm (71\")       Physical Exam:    General: Alert and oriented.  Cardiovascular: Heart has a nondisplaced focal PMI. Regular rate and rhythm without murmur, gallop or rub.  Lungs: Clear without rales or wheezes. Equal expansion is noted.   Extremities: Show no edema.  Skin: Warm and dry.      Diagnostic Data:  Lab Results   Component Value Date    GLUCOSE 144 (H) 10/08/2019    BUN 10 10/08/2019    CREATININE 0.70 10/08/2019    EGFRIFAFRI 105 10/08/2019    BCR 14.3 10/08/2019     10/08/2019    K 3.5 10/08/2019    CL 94 (L) 10/08/2019    CO2 33.0 (H) 10/08/2019    CALCIUM 10.1 10/08/2019    ALBUMIN 4.50 10/08/2019    AST 15 10/08/2019    ALT 15 10/08/2019     Lab Results   Component Value Date    CHOL 114 07/26/2019    TRIG 100 07/26/2019    HDL 55 07/26/2019    LDL 39 07/26/2019      Lab Results   Component Value Date    WBC 10.13 10/08/2019    HGB 11.9 (L) 10/08/2019    HCT 36.6 10/08/2019    MCV 80.4 10/08/2019     10/08/2019     Lab Results   Component " Value Date    TSH 1.314 04/03/2019       Procedures    Assessment:      ICD-10-CM ICD-9-CM   1. Pain in pacemaker pocket R52 338.19   2. AV block, complete (S/P PPM 7/2019) I44.2 426.0   3. Coronary artery disease of native artery of native heart with stable angina pectoris (CMS/[facility]) I25.118 414.01     413.9   4. Essential hypertension I10 401.9   5. Mixed hyperlipidemia E78.2 272.2     Lab results found above were reviewed with the patient.    Plan:    1. DC gabapentin and start Lyrica 150 mg BID for neuropathic pain in pacemaker site.  2. DC Elavil and start trazodone 100 mg qhs for better sleep quality.  3. Lortab 7.5, q6h as needed for pain.  4. Continue all other current medications.  5. F/up on 10/22/2019, or sooner if needed.      Scribed for Sonya Gay MD by Jessa Barry. 10/16/2019  9:45 AM     I Sonya Gay MD personally performed the services described in this documentation as scribed by the above individual in my presence, and it is both accurate and complete.    Sonya Gay MD, FACC

## 2019-10-22 ENCOUNTER — OFFICE VISIT (OUTPATIENT)
Dept: CARDIOLOGY | Facility: CLINIC | Age: 54
End: 2019-10-22

## 2019-10-22 VITALS
HEART RATE: 70 BPM | SYSTOLIC BLOOD PRESSURE: 136 MMHG | BODY MASS INDEX: 40.6 KG/M2 | DIASTOLIC BLOOD PRESSURE: 80 MMHG | HEIGHT: 71 IN | WEIGHT: 290 LBS

## 2019-10-22 DIAGNOSIS — I44.2 AV BLOCK, COMPLETE (HCC): Primary | ICD-10-CM

## 2019-10-22 PROCEDURE — 99024 POSTOP FOLLOW-UP VISIT: CPT | Performed by: INTERNAL MEDICINE

## 2019-10-22 RX ORDER — PREGABALIN 150 MG/1
CAPSULE ORAL
Refills: 5 | COMMUNITY
Start: 2019-10-16 | End: 2020-06-30 | Stop reason: SDUPTHER

## 2019-10-22 NOTE — PROGRESS NOTES
Kellen Sherrill Esparza  1965    There is no work phone number on file.      10/22/2019    Ryne Puente DO    Chief Complaint   Patient presents with   • AV block, complete   • Coronary Artery Disease         Problem List:  1. Small-vessel coronary artery disease:  a. MPS, 07/26/2017: EF 70%. Medium-sized infarct pattern located in the anterior wall with no significant ischemia noted. This finding is worse on resting images which is suggestive of possible breast attenuation artifact instead of an infarction.  b. LHC, 08/10/2017: Mild-to-moderate diffuse tortuosity of the vessels. Evidence of small vessel disease in the distal portion of a small RPL S. No obstructive CAD.  2.  High degree AV Block:  a. Fátima, 04/29/2019: Only 1.5 days of monitoring. High grade AV block for 4.4 seconds at 7:23 am. 2nd degree AV block for 10 seconds at 8:53 pm. Average HR 86 bpm.  dotty Shine, 06/18/2019: Only one day of monitoring. 2.9% PACs. 1st degree AV block, brief 2nd degree type I AVB.   c. MCOT, 06/24/2019: High degree AV block.  d. Pacemaker implant, 07/19/2019, Dr. Gay: [employer]alakshmi Accolade MRI DR model L311 serial #[id number]. DDDR .  e. Significant neuropathic pain at the pacemaker site.  3. Mild diastolic dysfunction:  a. Echo, 11/28/2018: EF 65% with mild concentric LVH. Grade I a diastolic dysfunction. Left atrium borderline dilated.  4. Diabetes  5. Hypertension:  a. 24h BP monitor, 04/09/2019: 45% > 140 mmHg systolic, max 164, min 103. Average HR 82 max 111, min 56.  6. Hyperlipidemia  7. Migraines  8. History of PE  9. ALYSIA, on CPAP.      Allergies   Allergen Reactions   • Lisinopril Cough       Current Medications:      Current Outpatient Medications:   •  amitriptyline (ELAVIL) 25 MG tablet, Take 1 tablet by mouth Every Night., Disp: 30 tablet, Rfl: 2  •  aspirin 81 MG tablet, Take 1 tablet by mouth Daily., Disp: 30 tablet, Rfl: 11  •  atorvastatin (LIPITOR) 80 MG tablet, Take 1 tablet by mouth Daily., Disp:  90 tablet, Rfl: 3  •  escitalopram (LEXAPRO) 10 MG tablet, Take 10 mg by mouth Daily., Disp: , Rfl: 5  •  estradiol (ESTRACE) 1 MG tablet, Take 1 mg by mouth Daily., Disp: , Rfl:   •  hydrochlorothiazide (MICROZIDE) 12.5 MG capsule, Take 1 capsule by mouth Daily., Disp: 30 capsule, Rfl: 11  •  HYDROcodone-acetaminophen (NORCO) 7.5-325 MG per tablet, TK 1 T PO Q 6 H PRF PAIN, Disp: , Rfl: 0  •  metFORMIN (GLUCOPHAGE) 500 MG tablet, Take 1,000 mg by mouth 2 (Two) Times a Day With Meals., Disp: , Rfl:   •  Multiple Vitamins-Minerals (CENTRUM WOMEN PO), Take 1 capsule by mouth Daily. gummies, Disp: , Rfl:   •  pregabalin (LYRICA) 150 MG capsule, TK ONE C PO BID, Disp: , Rfl: 5  •  SUMAtriptan (IMITREX) 50 MG tablet, Take 50 mg by mouth Every 2 (Two) Hours As Needed for Migraine. Take one tablet at onset of headache. May repeat dose one time in 2 hours if headache not relieved., Disp: , Rfl:   •  traZODone (DESYREL) 100 MG tablet, Take 1 tablet by mouth Every Night., Disp: 30 tablet, Rfl: 0  •  valsartan (DIOVAN) 40 MG tablet, Take 0.5 tablets by mouth Daily., Disp: 30 tablet, Rfl: 11  •  vitamin D (ERGOCALCIFEROL) 08725 UNITS capsule capsule, Take 50,000 Units by mouth 1 (One) Time Per Week. Wednesday, Disp: , Rfl:     Current Facility-Administered Medications:   •  meloxicam (MOBIC) tablet 7.5 mg, 7.5 mg, Oral, Daily PRN, Kuns-Bahena, Joan B, APRN    HPI      The following portions of the patient's history were reviewed and updated as appropriate: allergies, current medications and problem list.  Lise returns for follow-up of the discomfort at her pacemaker site.  She is currently taking Lyrica and Lortab.  She is now finished with her steroids.  She looks more symptomatic today than she did at the last visit.  She states that she did not sleep well last night.  The pain is becoming more chronic in nature and seems to be less intermittently sharp.    The above systems were reviewed and pertinent positives were  "noted.    Vitals:    10/22/19 1525   BP: 136/80   BP Location: Right arm   Patient Position: Sitting   Pulse: 70   Weight: 132 kg (290 lb)   Height: 180.3 cm (71\")         Diagnostic Data:        Invalid input(s): LABALBU, PROT  @LABRCNTIP(wbc:3,hgb:3,hct:3,PLT:3,NEUTOPHILPCT:3;LYMPHOPCT:3,MONOPCT  )@  Lab Results   Component Value Date    CHOL 114 07/26/2019    CHOL 108 08/09/2017    CHOL 163 07/22/2017     Lab Results   Component Value Date    TRIG 100 07/26/2019    TRIG 62 08/09/2017    TRIG 249 (H) 07/22/2017     Lab Results   Component Value Date    HDL 55 07/26/2019    HDL 48 08/09/2017    HDL 46 07/22/2017     No components found for: LDLCALC    Procedures    Assessment:      ICD-10-CM ICD-9-CM   1. AV block, complete (S/P PPM 7/2019) I44.2 426.0       Plan:    1. Discussed further with Dr. Stout tomorrow.  I have already discussed this with Dr. Johny Leblanc who has agreed to see the patient if she would like.  2. Continue current medications.  3. F/up on November 6 or sooner if needed.  4. The patient will remain off work until November 7.    Sonya Gay MD, FACC    "

## 2019-10-31 ENCOUNTER — OFFICE VISIT (OUTPATIENT)
Dept: FAMILY MEDICINE CLINIC | Facility: CLINIC | Age: 54
End: 2019-10-31

## 2019-10-31 VITALS
DIASTOLIC BLOOD PRESSURE: 80 MMHG | WEIGHT: 290 LBS | HEART RATE: 73 BPM | BODY MASS INDEX: 40.6 KG/M2 | OXYGEN SATURATION: 98 % | HEIGHT: 71 IN | SYSTOLIC BLOOD PRESSURE: 120 MMHG

## 2019-10-31 DIAGNOSIS — E11.9 TYPE 2 DIABETES MELLITUS WITHOUT COMPLICATION, WITHOUT LONG-TERM CURRENT USE OF INSULIN (HCC): Primary | ICD-10-CM

## 2019-10-31 DIAGNOSIS — I10 ESSENTIAL HYPERTENSION: ICD-10-CM

## 2019-10-31 DIAGNOSIS — G47.9 DYSSOMNIA: ICD-10-CM

## 2019-10-31 LAB — HBA1C MFR BLD: 6.8 %

## 2019-10-31 PROCEDURE — 99214 OFFICE O/P EST MOD 30 MIN: CPT | Performed by: FAMILY MEDICINE

## 2019-10-31 PROCEDURE — 83036 HEMOGLOBIN GLYCOSYLATED A1C: CPT | Performed by: FAMILY MEDICINE

## 2019-10-31 RX ORDER — TRAZODONE HYDROCHLORIDE 100 MG/1
100 TABLET ORAL NIGHTLY
Qty: 30 TABLET | Refills: 2 | Status: SHIPPED | OUTPATIENT
Start: 2019-10-31 | End: 2020-01-06

## 2019-11-07 ENCOUNTER — TELEPHONE (OUTPATIENT)
Dept: CARDIOLOGY | Facility: CLINIC | Age: 54
End: 2019-11-07

## 2019-11-07 DIAGNOSIS — R52 PAIN IN PACEMAKER POCKET: Primary | ICD-10-CM

## 2019-11-07 NOTE — TELEPHONE ENCOUNTER
Dr. Leblanc and Dr. Gay discussed pt- Dr. Gay will contact PT tomorrow for further recommendations

## 2019-11-07 NOTE — TELEPHONE ENCOUNTER
PT left a msg stating that she saw Dr. Leblanc yesterday and he did a steroid injection- today she is in worse pain than she has ever been in-     Per Dr. Gay, she needs to contact Dr. Leblanc to let him know. relayed to PT and she verbalized understanding-

## 2019-11-07 NOTE — TELEPHONE ENCOUNTER
Pt called back and has requested a referral to another physician - I explained that she needs to give Dr. Leblanc's office a chance to return her message that she left them-

## 2019-11-08 NOTE — TELEPHONE ENCOUNTER
Dr. Gay talked with PT this morning and would like to refer PT to pain management. She did provide another script for Phillip benton and scanned into chart. Referral placed

## 2019-11-13 ENCOUNTER — TELEPHONE (OUTPATIENT)
Dept: PAIN MEDICINE | Facility: CLINIC | Age: 54
End: 2019-11-13

## 2019-11-13 ENCOUNTER — OFFICE VISIT (OUTPATIENT)
Dept: CARDIOLOGY | Facility: CLINIC | Age: 54
End: 2019-11-13

## 2019-11-13 VITALS
WEIGHT: 286 LBS | SYSTOLIC BLOOD PRESSURE: 128 MMHG | DIASTOLIC BLOOD PRESSURE: 82 MMHG | HEART RATE: 75 BPM | OXYGEN SATURATION: 96 % | BODY MASS INDEX: 40.04 KG/M2 | HEIGHT: 71 IN

## 2019-11-13 DIAGNOSIS — R52 PAIN IN PACEMAKER POCKET: Primary | ICD-10-CM

## 2019-11-13 DIAGNOSIS — I44.2 AV BLOCK, COMPLETE (HCC): ICD-10-CM

## 2019-11-13 DIAGNOSIS — I10 ESSENTIAL HYPERTENSION: ICD-10-CM

## 2019-11-13 DIAGNOSIS — E78.2 MIXED HYPERLIPIDEMIA: ICD-10-CM

## 2019-11-13 DIAGNOSIS — Z86.711 HISTORY OF PULMONARY EMBOLUS (PE): ICD-10-CM

## 2019-11-13 DIAGNOSIS — I48.0 PAROXYSMAL ATRIAL FIBRILLATION (HCC): ICD-10-CM

## 2019-11-13 DIAGNOSIS — I25.118 CORONARY ARTERY DISEASE OF NATIVE ARTERY OF NATIVE HEART WITH STABLE ANGINA PECTORIS (HCC): ICD-10-CM

## 2019-11-13 PROCEDURE — 99214 OFFICE O/P EST MOD 30 MIN: CPT | Performed by: INTERNAL MEDICINE

## 2019-11-13 PROCEDURE — 93280 PM DEVICE PROGR EVAL DUAL: CPT | Performed by: INTERNAL MEDICINE

## 2019-11-13 NOTE — PROGRESS NOTES
[facility]  Kellen Esparza  1965  53 y.o.  [phone]      Date: 11/13/2019    PCP: Ryne Puente DO    Chief Complaint   Patient presents with   • av block complete       Problem List:  1. Small-vessel coronary artery disease:  a. MPS, 07/26/2017: EF 70%. Medium-sized infarct pattern located in the anterior wall with no significant ischemia noted. This finding is worse on resting images which is suggestive of possible breast attenuation artifact instead of an infarction.  b. LHC, 08/10/2017: Mild-to-moderate diffuse tortuosity of the vessels. Evidence of small vessel disease in the distal portion of a small RPL S. No obstructive CAD.  2.  High degree AV Block:  srinivasan Shine, 04/29/2019: Only 1.5 days of monitoring. High grade AV block for 4.4 seconds at 7:23 am. 2nd degree AV block for 10 seconds at 8:53 pm. Average HR 86 bpm.  dotty Shine, 06/18/2019: Only one day of monitoring. 2.9% PACs. 1st degree AV block, brief 2nd degree type I AVB.   c. MCOT, 06/24/2019: High degree AV block.  d. Pacemaker implant, 07/19/2019, Dr. Gay: Flin[employer]er Accolade MRI DR model L311 serial #[id number]. DDDR .  e. Significant neuropathic pain at the pacemaker site.  3. Mild diastolic dysfunction:  a. Echo, 11/28/2018: EF 65% with mild concentric LVH. Grade I a diastolic dysfunction. Left atrium borderline dilated.  4. Paroxysmal atrial fibrillation:  a. Noted on device interrogation, 11/13/2019.   b. CHADS-VASc = 5 (HTN, DM, Female, Vasc disease, history of PE).    c. Multiple episodes of atrial fibrillation all less than 1 minute.  5. Diabetes  6. Hypertension:  a. 24h BP monitor, 04/09/2019: 45% > 140 mmHg systolic, max 164, min 103. Average HR 82 max 111, min 56.  7. Hyperlipidemia  8. Migraines  9. History of PE  10. ALYSIA, on CPAP.    Allergies   Allergen Reactions   • Lisinopril Cough       Current Medications:      Current Outpatient Medications:   •  aspirin 81  MG tablet, Take 1 tablet by mouth Daily., Disp: 30 tablet, Rfl: 11  •  atorvastatin (LIPITOR) 80 MG tablet, Take 1 tablet by mouth Daily., Disp: 90 tablet, Rfl: 3  •  escitalopram (LEXAPRO) 10 MG tablet, Take 10 mg by mouth Daily., Disp: , Rfl: 5  •  estradiol (ESTRACE) 1 MG tablet, Take 1 mg by mouth Daily., Disp: , Rfl:   •  hydrochlorothiazide (MICROZIDE) 12.5 MG capsule, Take 1 capsule by mouth Daily., Disp: 30 capsule, Rfl: 11  •  HYDROcodone-acetaminophen (NORCO) 7.5-325 MG per tablet, TK 1 T PO Q 6 H PRF PAIN, Disp: , Rfl: 0  •  metFORMIN (GLUCOPHAGE) 500 MG tablet, Take 1,000 mg by mouth 2 (Two) Times a Day With Meals., Disp: , Rfl:   •  Multiple Vitamins-Minerals (CENTRUM WOMEN PO), Take 1 capsule by mouth Daily. gummies, Disp: , Rfl:   •  pregabalin (LYRICA) 150 MG capsule, TK ONE C PO BID, Disp: , Rfl: 5  •  SUMAtriptan (IMITREX) 50 MG tablet, Take 50 mg by mouth Every 2 (Two) Hours As Needed for Migraine. Take one tablet at onset of headache. May repeat dose one time in 2 hours if headache not relieved., Disp: , Rfl:   •  traZODone (DESYREL) 100 MG tablet, Take 1 tablet by mouth Every Night., Disp: 30 tablet, Rfl: 2  •  valsartan (DIOVAN) 40 MG tablet, Take 0.5 tablets by mouth Daily., Disp: 30 tablet, Rfl: 11  •  vitamin D (ERGOCALCIFEROL) 49363 UNITS capsule capsule, Take 50,000 Units by mouth 1 (One) Time Per Week. Wednesday, Disp: , Rfl:     Current Facility-Administered Medications:   •  meloxicam (MOBIC) tablet 7.5 mg, 7.5 mg, Oral, Daily PRN, Joan Ahuja, APRN    HPI    Kellen Esparza is a 53 y.o. female who presents today for follow up of pacemaker site pain. Since last visit, she has continued to experience significant pain in her pacemaker site. She was given a steroid/Kenalog injection by Dr. Leblanc on 11/06, which she reports has made it worse. Before the shot, her pain was localized to the area of the pacemaker pocket. After the short, this pain began radiating to the left  "shoulder. She has been taking 1.5 tablets of Lortab every 6 hours. Pt has obstructive sleep apnea and has been wearing a CPAP for  \"one or two months\".  She reports waking up with extreme fatigue some days, despite wearing her CPAP. Pt is present in the office today with her daughter. Patient denies palpitations, shortness of breath, edema, dizziness, and syncope.        The following portions of the patient's history were reviewed and updated as appropriate: allergies, current medications and problem list.    Pertinent positives as listed in the HPI.  All other systems reviewed are negative.    Vitals:    11/13/19 0927   BP: 128/82   BP Location: Right arm   Patient Position: Sitting   Pulse: 75   SpO2: 96%   Weight: 130 kg (286 lb)   Height: 180.3 cm (71\")       Physical Exam:    General: Alert and oriented.  Neck: Jugular venous pressure is within normal limits. Carotids have normal upstrokes without bruits.   Cardiovascular: Heart has a nondisplaced focal PMI. Regular rate and rhythm without murmur, gallop or rub.  Lungs: Clear without rales or wheezes. Equal expansion is noted.   Extremities: Show no edema.  Skin: Warm and dry.  Still very tender over the pacemaker site  Neurologic: Nonfocal.    Diagnostic Data:  Lab Results   Component Value Date    GLUCOSE 144 (H) 10/08/2019    BUN 10 10/08/2019    CREATININE 0.70 10/08/2019    EGFRIFAFRI 105 10/08/2019    BCR 14.3 10/08/2019     10/08/2019    K 3.5 10/08/2019    CL 94 (L) 10/08/2019    CO2 33.0 (H) 10/08/2019    CALCIUM 10.1 10/08/2019    ALBUMIN 4.50 10/08/2019    AST 15 10/08/2019    ALT 15 10/08/2019     Lab Results   Component Value Date    CHOL 114 07/26/2019    TRIG 100 07/26/2019    HDL 55 07/26/2019    LDL 39 07/26/2019      Lab Results   Component Value Date    WBC 10.13 10/08/2019    HGB 11.9 (L) 10/08/2019    HCT 36.6 10/08/2019    MCV 80.4 10/08/2019     10/08/2019     Lab Results   Component Value Date    TSH 1.314 04/03/2019 "     Lab Results   Component Value Date    HGBA1C 6.8 10/31/2019        Procedures     MANUAL DEVICE INTERROGATION: 11/13/2019, [facility] Pacemaker:   RA pacing <1%, RV pacing <1%.  P wave is 5.2 mV with a threshold of 0.8 V at 0.4 msec and an impedance of 613 ohms.   R wave is 21.4 mV with a threshold of 1.0 V at 0.4 msec and an impedance of 685 ohms.  Battery voltage is 9.5 years.  Events: 19 AMS (longest <1 min, total burden <1%). 4 VHR.     Assessment:      ICD-10-CM ICD-9-CM   1. Pain in pacemaker pocket R52 338.19   2. AV block, complete (S/P PPM 7/2019) I44.2 426.0   3. Paroxysmal atrial fibrillation (CMS/HCC) I48.0 427.31   4. Coronary artery disease of native artery of native heart with stable angina pectoris (CMS/HCC) I25.118 414.01     413.9   5. History of pulmonary embolus (PE) Z86.711 V12.55   6. Essential hypertension I10 401.9   7. Mixed hyperlipidemia E78.2 272.2     Lab results and device interrogation found above were reviewed with the patient. She was educated on her PAF, as well as the importance of anticoagulant therapy for stroke prevention given her high CHADS-VASc. We also discussed antiarrhythmic therapy, which we will start at a later date.    Plan:    1. Start Eliquis 5 mg BID for stroke prophylaxis with PAF. Samples given.  2. Consider sotalol 80 mg twice daily at follow-up.  An alternative would be propafenone.  Flecainide would be a bad choice as the patient has small vessel disease noted at cath  3. Continue atorvastatin 80 mg for hyperlipidemia.  4. Continue valsartan and HCT for hypertension and fluid retention.  5. Continue all other current medications.  6. F/up in 3 weeks, or sooner if needed.    The patient has been on temporary disability.  She would like to go back to work and is worked things out with her employer such that she can work a split shift daily.  She will need FMLA with up to 2 days off per week for appointments and for days that she feels that she is unable to go  again.  I spoke with Dr. Choi's office this morning and hopefully she will be able to get into his office for an appointment soon for pain management.  I gave her a prescription for Lortab 10 as she is taking 1-1/2 of the 7.5's every 6 hours.  I have asked her to only take 1 every 6 hours and I did give her 120 this time.    Scribed for Sonya Gay MD by Jessa Barry. 11/13/2019  9:44 AM     I Sonya Gay MD personally performed the services described in this documentation as scribed by the above individual in my presence, and it is both accurate and complete.    Sonya Gay MD, FACC

## 2019-11-14 ENCOUNTER — OFFICE VISIT (OUTPATIENT)
Dept: PAIN MEDICINE | Facility: CLINIC | Age: 54
End: 2019-11-14

## 2019-11-14 VITALS
HEIGHT: 71 IN | DIASTOLIC BLOOD PRESSURE: 108 MMHG | TEMPERATURE: 97.7 F | BODY MASS INDEX: 40.04 KG/M2 | WEIGHT: 286 LBS | SYSTOLIC BLOOD PRESSURE: 168 MMHG

## 2019-11-14 DIAGNOSIS — I25.118 CORONARY ARTERY DISEASE OF NATIVE ARTERY OF NATIVE HEART WITH STABLE ANGINA PECTORIS (HCC): ICD-10-CM

## 2019-11-14 DIAGNOSIS — Z99.89 OSA ON CPAP: ICD-10-CM

## 2019-11-14 DIAGNOSIS — G90.59 COMPLEX REGIONAL PAIN SYNDROME TYPE 1 AFFECTING OTHER SITE: ICD-10-CM

## 2019-11-14 DIAGNOSIS — E66.01 MORBID OBESITY (HCC): ICD-10-CM

## 2019-11-14 DIAGNOSIS — E11.9 TYPE 2 DIABETES MELLITUS WITHOUT COMPLICATION, WITHOUT LONG-TERM CURRENT USE OF INSULIN (HCC): ICD-10-CM

## 2019-11-14 DIAGNOSIS — Z00.8 PRE-SURGICAL PSYCHOLOGICAL ASSESSMENT, ENCOUNTER FOR: ICD-10-CM

## 2019-11-14 DIAGNOSIS — Z95.0 PACEMAKER: ICD-10-CM

## 2019-11-14 DIAGNOSIS — I44.2 AV BLOCK, COMPLETE (HCC): ICD-10-CM

## 2019-11-14 DIAGNOSIS — Z79.01 CHRONIC ANTICOAGULATION: ICD-10-CM

## 2019-11-14 DIAGNOSIS — G47.33 OSA ON CPAP: ICD-10-CM

## 2019-11-14 DIAGNOSIS — G90.59 COMPLEX REGIONAL PAIN SYNDROME TYPE 1 AFFECTING OTHER SITE: Primary | ICD-10-CM

## 2019-11-14 DIAGNOSIS — R52 PAIN IN PACEMAKER POCKET: ICD-10-CM

## 2019-11-14 DIAGNOSIS — G58.9 ENTRAPMENT NEUROPATHY: ICD-10-CM

## 2019-11-14 PROBLEM — G90.50 COMPLEX REGIONAL PAIN SYNDROME TYPE I: Status: ACTIVE | Noted: 2019-11-14

## 2019-11-14 PROCEDURE — 99205 OFFICE O/P NEW HI 60 MIN: CPT | Performed by: ANESTHESIOLOGY

## 2019-11-14 RX ORDER — BACLOFEN 10 MG/1
TABLET ORAL
Qty: 60 TABLET | Refills: 5 | Status: SHIPPED | OUTPATIENT
Start: 2019-11-14 | End: 2020-06-01

## 2019-11-14 RX ORDER — NORTRIPTYLINE HYDROCHLORIDE 10 MG/1
CAPSULE ORAL
Qty: 60 CAPSULE | Refills: 5 | Status: SHIPPED | OUTPATIENT
Start: 2019-11-14 | End: 2020-01-09 | Stop reason: DRUGHIGH

## 2019-11-14 RX ORDER — MULTIVITAMIN WITH IRON
100 TABLET ORAL DAILY
Qty: 30 TABLET | Refills: 5 | Status: SHIPPED | OUTPATIENT
Start: 2019-11-14 | End: 2020-06-01

## 2019-11-14 RX ORDER — HYDROCODONE BITARTRATE AND ACETAMINOPHEN 10; 325 MG/1; MG/1
TABLET ORAL
Refills: 0 | COMMUNITY
Start: 2019-11-13 | End: 2020-01-06

## 2019-11-14 RX ORDER — ME-TETRAHYDROFOLATE/B12/HRB236 1-1-500 MG
CAPSULE ORAL
Qty: 180 CAPSULE | Refills: 1 | Status: SHIPPED | OUTPATIENT
Start: 2019-11-14 | End: 2020-01-06 | Stop reason: SDUPTHER

## 2019-11-14 NOTE — PROGRESS NOTES
"Chief Complaint: \"Pain at pacemaker pocket site.\"      History of Present Illness:   Patient: Ms. Kellen Esparza, 53 y.o. female   Referring Physician: Dr. Sonya Gay   Reason for Referral: Consultation for chronic intractable left upper chest wall pacemaker insertion site pain.   Pain History: Patient reports a 4-month history of pain, which began after the pacemaker was implanted. Pain has progressed in intensity over the past 4 months.  Patient reports that approximately 1 week after implantation of her pacemaker device, she started experiencing her current pain.  Patient reports that she had some blistering when they remove the dressings that resolved within the following 2 weeks.  Unfortunately, her pain has persisted. Patient saw Dr. Johny Leblanc who performed a steroid injection in her wound that apparently caused more pain.  Records are not available for review.      Pain Description: Constant pain with intermittent exacerbation, described as burning, sharp and \"worse than shingles sensation.\"   Radiation of pain: The pain radiates from the pacemaker site on the left upper quadrant of her chest wall into the anterior aspect of the left shoulder and proximal arm and into the left side of her neck  Pain intensity today: 7/10 (after taking Lortab)  Average pain intensity last week: 10/10  Pain intensity ranges from: 7/10 to 9/10  Aggravating factors: Pain increases with laying on the left side, light touch, when water touches the skin when showering.  Patient describes severe hypersensitivity around the area of her pacemaker generator  Alleviating factors: Pain decreases with analgesics and topical compounded cream, opiods, Lyrica     Associated Symptoms:   Patient denies pain, numbness or weakness in the upper or lower extremities  Patient denies any new bladder or bowel problems.   Patient denies difficulties with her balance or falls  Pain interferes with her sleep causing " fragmentation.    Review of previous therapies and additional medical records:  Kellen Esparza has already failed the following measures, including:   Conservative measures: Oral analgesics, opioids and topical analgesics   Interventional measures: Steroid/Lidocaine Injection by Dr. Leblanc- Records requested- not yet available   Surgical measures: Left chest wall pacemaker placement, 07/19/2019 by Dr. Gay  Kellen Esparza presents with significant comorbidities including insomnia, obesity, hypertension, non-insulin dependent diabetes, PE 21 years ago, ALYSIA on CPAP, complete AV block s/p pacemaker placement ([employer]), coronary artery disease of native artery of native heart with stable angina pectoris, on Eliquis, mixed hyperlipidemia, engaged in treatment.  In terms of current analgesics, Kellen Esparza takes: Meloxicam, Norco 7.5/325, Lyrica 150 mg BID, Topical Compounded Cream.  Patient also takes trazodone, Lexapro  I have reviewed Smith Report #[id number] consistent to medication reconciliation.     Global Pain Scale 11-14  2019                  Pain  20                 Feelings    6                 Clinical outcomes  20                 Activities  23                 GPS Total:  69                    Review of Diagnostic Studies:    Xray Chest, 10/08/2019: Heart size and pulmonary vascularity are within normal limits. The lungs are expanded and clear. Dual-chamber cardiac pacemaker. No pleural effusion.  CT Chest, 09/30/2019: no evidence of pulmonary fibrosis. No evidence for subpleural reticulation or bronchiolectasis. Very mild lower lobe bronchial wall thickening is identified with bilateral lower lobe (right greater than left) scarring/atelectasis. No evidence of focal airspace disease to suggest pneumonia. No pleural effusion or pneumothorax identified. The thyroid does not demonstrate abnormality. No axillary or mediastinal lymphadenopathy identified. No pericardial effusion. Cardiac  conduction device is identified. The visualized upper abdomen is unrevealing. The surrounding soft tissues do not demonstrate abnormality.  Xray Chest, 07/20/2019: Left-sided dual-lead pacemaker has been placed. The heart and vasculature are normal in size. Lungs appear well-expanded and clear.       Review of Systems   Respiratory: Positive for apnea.    Psychiatric/Behavioral: Positive for sleep disturbance.   All other systems reviewed and are negative.        Patient Active Problem List   Diagnosis   • Chest pain   • Dyspnea on exertion   • Essential hypertension   • Pain of right breast   • Lower extremity edema   • Diabetes mellitus (CMS/HCC)   • Abnormal stress test   • Migraine   • History of pulmonary embolus (PE)   • Hyperlipidemia   • DDD (degenerative disc disease), cervical   • Coronary artery disease of native artery of native heart with stable angina pectoris (CMS/HCC)   • AV block, complete (S/P PPM 7/2019)   • Restrictive pattern on PFT's without evidence of ILD   • Obesity, Class II, BMI 35-39.9   • ALYSIA on CPAP   • Pain in pacemaker pocket   • Paroxysmal atrial fibrillation (CMS/HCC)   • Complex regional pain syndrome type I   • Chronic anticoagulation   • Pacemaker   • Morbid obesity (CMS/HCC)   • Entrapment neuropathy       Past Medical History:   Diagnosis Date   • Arthritis    • Diabetes mellitus (CMS/HCC)     doesnt check sugar    • Hypertension    • Migraine    • Musculoligamentous strain    • Pulmonary embolism (CMS/HCC) 1997   • Sleep apnea    • Wears eyeglasses          Past Surgical History:   Procedure Laterality Date   • BREAST BIOPSY Left 2014   • CARDIAC CATHETERIZATION Left 8/10/2017    Procedure: Cardiac Catheterization/Vascular Study;  Surgeon: Johny Sommer MD;  Location:  [facility] C[facility] INVASIVE LOCATION;  Service:    • CARDIAC ELECTROPHYSIOLOGY PROCEDURE N/A 7/19/2019    Procedure: Pacemaker DC new;  Surgeon: Sonya Gay MD;  Location:  [facility] CATH INVASIVE LOCATION;   Service: Cardiology   • CHOLECYSTECTOMY     • COLONOSCOPY     • ENDOSCOPY     • HYSTERECTOMY  11/2014   • INSERT / REPLACE / REMOVE PACEMAKER     • JOINT REPLACEMENT Right     knee   • KNEE SURGERY Right 12/2017    total replacement   • NASAL SINUS SURGERY     • OOPHORECTOMY  11/2014   • PULMONARY EMBOLISM SURGERY      right lung   • TUBAL ABDOMINAL LIGATION           Family History   Problem Relation Age of Onset   • Breast cancer Mother 42   • Heart failure Mother    • No Known Problems Father    • Hypertension Brother    • Hypertension Maternal Grandmother    • Hypertension Maternal Grandfather    • No Known Problems Paternal Grandmother    • No Known Problems Paternal Grandfather    • Heart failure Son    • Hypertension Sister    • Ovarian cancer Neg Hx          Social History     Socioeconomic History   • Marital status: Single     Spouse name: Not on file   • Number of children: Not on file   • Years of education: Not on file   • Highest education level: Not on file   Tobacco Use   • Smoking status: Never Smoker   • Smokeless tobacco: Never Used   Substance and Sexual Activity   • Alcohol use: No     Frequency: Never     Comment: rare   • Drug use: No   • Sexual activity: Defer   Social History Narrative    Patient consumes NO CAFFEINE     Patient lives at home with partner, Donald.         Lifelong non-smoker    Does not drink alcohol    Works as a            Current Outpatient Medications:   •  apixaban (ELIQUIS) 5 MG tablet tablet, Take 1 tablet by mouth 2 (Two) Times a Day., Disp: 180 tablet, Rfl: 3  •  aspirin 81 MG tablet, Take 1 tablet by mouth Daily., Disp: 30 tablet, Rfl: 11  •  atorvastatin (LIPITOR) 80 MG tablet, Take 1 tablet by mouth Daily., Disp: 90 tablet, Rfl: 3  •  escitalopram (LEXAPRO) 10 MG tablet, Take 10 mg by mouth Daily., Disp: , Rfl: 5  •  estradiol (ESTRACE) 1 MG tablet, Take 1 mg by mouth Daily., Disp: , Rfl:   •  hydrochlorothiazide (MICROZIDE)  "12.5 MG capsule, Take 1 capsule by mouth Daily., Disp: 30 capsule, Rfl: 11  •  HYDROcodone-acetaminophen (NORCO)  MG per tablet, TK 1 T PO Q 6 H PRN FOR PAIN, Disp: , Rfl: 0  •  metFORMIN (GLUCOPHAGE) 500 MG tablet, Take 1,000 mg by mouth 2 (Two) Times a Day With Meals., Disp: , Rfl:   •  Multiple Vitamins-Minerals (CENTRUM WOMEN PO), Take 1 capsule by mouth Daily. gummies, Disp: , Rfl:   •  pregabalin (LYRICA) 150 MG capsule, TK ONE C PO BID, Disp: , Rfl: 5  •  SUMAtriptan (IMITREX) 50 MG tablet, Take 50 mg by mouth Every 2 (Two) Hours As Needed for Migraine. Take one tablet at onset of headache. May repeat dose one time in 2 hours if headache not relieved., Disp: , Rfl:   •  traZODone (DESYREL) 100 MG tablet, Take 1 tablet by mouth Every Night., Disp: 30 tablet, Rfl: 2  •  valsartan (DIOVAN) 40 MG tablet, Take 0.5 tablets by mouth Daily., Disp: 30 tablet, Rfl: 11  •  vitamin D (ERGOCALCIFEROL) 59276 UNITS capsule capsule, Take 50,000 Units by mouth 1 (One) Time Per Week. Wednesday, Disp: , Rfl:     Current Facility-Administered Medications:   •  meloxicam (MOBIC) tablet 7.5 mg, 7.5 mg, Oral, Daily PRN, Wallys-Bahena, Joan B, APRN      Allergies   Allergen Reactions   • Lisinopril Cough         BP (!) 168/108 (BP Location: Left arm, Patient Position: Sitting)   Temp 97.7 °F (36.5 °C) (Temporal)   Ht 180.3 cm (71\")   Wt 130 kg (286 lb)   LMP 10/31/2014 (Approximate)   BMI 39.89 kg/m²       Physical Exam:  Constitutional: Patient is oriented to person, place, and time.   Patient appears well-developed and well-nourished.   HEENT: Head: Normocephalic and atraumatic. Eyes: Conjunctivae and lids are normal. Pupils: Equal, round, reactive to light.   Neck: Trachea normal. Neck supple. No JVD present.   Lymphatic: No cervical adenopathy  Pulmonary Respiratory effort: No increased work of breathing or signs of respiratory distress. Auscultation of lungs: Clear to auscultation.   Cardiovascular Auscultation of " "heart: Normal rate and rhythm, normal S1 and S2, no murmurs.   Musculoskeletal   Gait and station: Gait evaluation demonstrated a normal gait   Cervical spine: Passive and active range of motion are full and without pain. Extension, flexion, lateral flexion, rotation of the cervical spine did not increase or reproduce pain. Cervical facet joint loading maneuvers are negative.   Muscles: Presence of active trigger points: None  Neurological:   Patient is alert and oriented to person, place, and time.   Speech: speech is normal.   Cortical function: Normal mental status.   Cranial nerves: Cranial nerves 2-12 intact.   Reflex Scores:  Right brachioradialis: 2+  Left brachioradialis: 2+  Right biceps: 2+  Left biceps: 2+  Right triceps: 2+  Left triceps: 2+  Right patellar: 1+  Left patellar: 1+  Right Achilles: 1+  Left Achilles: 1+  Motor strength: 5/5  Motor Tone: normal tone.   Involuntary movements: none.   Superficial/Primitive Reflexes: primitive reflexes were absent.   Right Barakat: absent  Left Barakat: absent  Right ankle clonus: absent  Left ankle clonus: absent   Babinsky: absent  Spurling sign is negative. Neck tornado test is negative. Lhermitte sign is negative. Negative long tract signs.   Sensation: No sensory loss. Sensory exam: intact to light touch, intact pain and temperature sensation, intact vibration sensation and normal proprioception. There is hyperalgesia, hyperesthesia, and allodynia around the surgical wound of her pacemaker site.  The surgical wound is healed with a little bit of a keloid.  It is really hard to palpate and mobilized the pacemaker generator to assess if there is pain at the pocket site.  The \"superficial pain\" is overwhelming and patient has a very hard time cooperating with this part of the physical exam. The rest of the exam was negative.  Coordination: Normal finger to nose and heel to shin. Normal balance and  negative Romberg's sign   Skin and subcutaneous tissue: Skin " is warm and intact. No rash noted. No cyanosis.  There is a keloid at the surgical site.  There is no erythema, drainage, or apparent fluid accumulation at the generator site  Psychiatric: Judgment and insight: Normal. Orientation to person, place and time: Normal. Recent and remote memory: Intact. Mood and affect: Normal.     ASSESSMENT:   1. Complex regional pain syndrome type 1 affecting other site    2. Entrapment neuropathy    3. Pain in pacemaker pocket    4. AV block, complete (S/P PPM 7/2019)    5. Pacemaker    6. Coronary artery disease of native artery of native heart with stable angina pectoris (CMS/[facility])    7. Chronic anticoagulation    8. Type 2 diabetes mellitus without complication, without long-term current use of insulin (CMS/[facility])    9. ALYSIA on CPAP    10. Morbid obesity (CMS/[facility])          PLAN/MEDICAL DECISION MAKING: I had a lengthy conversation with Ms. Kellen Esparza regarding her chronic pain condition and potential therapeutic options including risks, benefits, alternative therapies, to name a few.  Patient presents with an formal history of unrelenting pain at that pacemaker generator placement site, which started 1 week after surgery.  Patient has developed severe hyperalgesia, hyperesthesia and allodynia at the generator site that is spreading towards her left shoulder and is consistent with neuropathic pain, particularly CRPS of her chest wall.   I have reviewed all available patient's medical records as well as previous therapies as referenced above.  Patient has failed to obtain pain relief with conservative measures and interventional pain management measures,  as referenced above.  The treatment of her current condition requires of a multidisciplinary and multimodal approach. Therefore, I have proposed the following plan:  1. Diagnostic studies: will get CBC with differential, ESR, CRP and Procalcitonin  2. Pharmacological measures: Reviewed. Discussed.  I have discussed with the  patient that treatment should be based on medications for treatment of neuropathic pain.  Therefore, I have made some adjustments:  A.  Patient takes Norco 7.5/325, Lyrica 150 mg BID, Topical Compounded Cream.  Patient also takes Lexapro  B. Trial with nortriptyline 10 mg 1-2 tablets at bedtime  C. Trial with baclofen 10 mg 1/2 to 1 tablet BID times a day as needed muscle spasm  D. Trial with Rheumate one tablet twice daily  E. Start pyridoxine 100 mg one tablet by mouth daily  F. Start alpha lipoid acid 7314-0927 mg per day divided into 3 doses  G. Trial with prilocaine 2%, lidocaine 10%, imipramine 3%, capsaicin 0.001% and mannitol 20%  cream, apply 1 to 2 grams of cream to the affected areas every 4 to 6 hours as needed  3. Interventional pain management measures: Patient will need to stop apixaban (Eliquis) at least 48 hours between last dose and procedure (Dr. Gay has agreed with this) to proceed with diagnostic left stellate ganglion block.  If patient experiences resolution of her hyperalgesia, hyperesthesia and allodynia, then, we may proceed with a series of left posterior ganglion blocks with local anesthetic and steroids combined with infiltration of her surgical scar to expedite desensitization of the area.  Patient understands that the treatment of her condition requires of a multi-model and multidisciplinary approach.  4. Long-term rehabilitation efforts:  A. Patient will start a comprehensive physical therapy program for desensitization techniques and myofascial release   B. Contrast therapy: Apply ice-packs for 15-20 minutes, followed by heating pads for 15-20 minutes to affected area   C. Referral to Dr. Joes Lam for psychological evaluation, cognitive behavioral therapy and biofeedback.  D. Referral to [facility]  5. The patient and her family have been instructed to contact my office with any questions or difficulties. The patient understands the  plan and agrees to proceed accordingly.      I spent more than 60 minutes face-to-face with the patient and family, of which more than 50% of the time were spent counseling regarding evaluation, diagnosis, prognosis, diagnostic testing, potential referrals, coordination of care with other providers involved in patient's care, long-term management of concurrent comorbidities affecting effective pain control, risk and benefits of different interventions and alternative therapies      Patient Care Team:  Ryne Puente DO as PCP - General (Family Medicine)  Johny Sommer MD as Consulting Physician (Cardiology)  Sonya Gay MD as Consulting Physician (Cardiology)  Deb Guajardo APRN as Nurse Practitioner (Pulmonary Disease)  Otis Choi MD as Consulting Physician (Pain Medicine)     No orders of the defined types were placed in this encounter.        Future Appointments   Date Time Provider Department Center   11/18/2019  9:00 AM Deb Guajardo APRN [facility] None   12/4/2019  9:15 AM Sonya Gay MD [facility] None   1/6/2020 10:00 AM Johny Sommer MD [facility] None   1/30/2020  2:30 PM Ryne Puente DO [facility] None         Otis Choi MD     EMR Dragon/Transcription disclaimer:  Much of this encounter note is an electronic transcription of spoken language to printed text. Electronic transcription of spoken language may permit erroneous, or at times, nonsensical words or phrases to be inadvertently transcribed. Although I have reviewed the note for such errors, some may still exist.

## 2019-11-19 ENCOUNTER — APPOINTMENT (OUTPATIENT)
Dept: LAB | Facility: HOSPITAL | Age: 54
End: 2019-11-19

## 2019-11-19 PROCEDURE — 84145 PROCALCITONIN (PCT): CPT | Performed by: ANESTHESIOLOGY

## 2019-11-19 PROCEDURE — 85652 RBC SED RATE AUTOMATED: CPT | Performed by: ANESTHESIOLOGY

## 2019-11-19 PROCEDURE — 86140 C-REACTIVE PROTEIN: CPT | Performed by: ANESTHESIOLOGY

## 2019-11-19 PROCEDURE — 85025 COMPLETE CBC W/AUTO DIFF WBC: CPT | Performed by: ANESTHESIOLOGY

## 2019-11-19 PROCEDURE — 36415 COLL VENOUS BLD VENIPUNCTURE: CPT | Performed by: ANESTHESIOLOGY

## 2019-11-20 ENCOUNTER — OUTSIDE FACILITY SERVICE (OUTPATIENT)
Dept: PAIN MEDICINE | Facility: CLINIC | Age: 54
End: 2019-11-20

## 2019-11-20 DIAGNOSIS — G90.59 COMPLEX REGIONAL PAIN SYNDROME TYPE 1 AFFECTING OTHER SITE: Primary | ICD-10-CM

## 2019-11-20 LAB
BASOPHILS # BLD AUTO: 0.04 10*3/MM3 (ref 0–0.2)
BASOPHILS NFR BLD AUTO: 0.5 % (ref 0–1.5)
CRP SERPL-MCNC: 1.4 MG/DL (ref 0–0.5)
DEPRECATED RDW RBC AUTO: 42.1 FL (ref 37–54)
EOSINOPHIL # BLD AUTO: 0.16 10*3/MM3 (ref 0–0.4)
EOSINOPHIL NFR BLD AUTO: 1.9 % (ref 0.3–6.2)
ERYTHROCYTE [DISTWIDTH] IN BLOOD BY AUTOMATED COUNT: 14.4 % (ref 12.3–15.4)
ERYTHROCYTE [SEDIMENTATION RATE] IN BLOOD: 25 MM/HR (ref 0–30)
HCT VFR BLD AUTO: 36.5 % (ref 34–46.6)
HGB BLD-MCNC: 12 G/DL (ref 12–15.9)
IMM GRANULOCYTES # BLD AUTO: 0.04 10*3/MM3 (ref 0–0.05)
IMM GRANULOCYTES NFR BLD AUTO: 0.5 % (ref 0–0.5)
LYMPHOCYTES # BLD AUTO: 3.07 10*3/MM3 (ref 0.7–3.1)
LYMPHOCYTES NFR BLD AUTO: 35.7 % (ref 19.6–45.3)
MCH RBC QN AUTO: 26.3 PG (ref 26.6–33)
MCHC RBC AUTO-ENTMCNC: 32.9 G/DL (ref 31.5–35.7)
MCV RBC AUTO: 80 FL (ref 79–97)
MONOCYTES # BLD AUTO: 0.38 10*3/MM3 (ref 0.1–0.9)
MONOCYTES NFR BLD AUTO: 4.4 % (ref 5–12)
NEUTROPHILS # BLD AUTO: 4.91 10*3/MM3 (ref 1.7–7)
NEUTROPHILS NFR BLD AUTO: 57 % (ref 42.7–76)
NRBC BLD AUTO-RTO: 0 /100 WBC (ref 0–0.2)
PLATELET # BLD AUTO: 205 10*3/MM3 (ref 140–450)
PMV BLD AUTO: 12 FL (ref 6–12)
PROCALCITONIN SERPL-MCNC: 0.04 NG/ML (ref 0.1–0.25)
RBC # BLD AUTO: 4.56 10*6/MM3 (ref 3.77–5.28)
WBC NRBC COR # BLD: 8.6 10*3/MM3 (ref 3.4–10.8)

## 2019-11-20 PROCEDURE — 99152 MOD SED SAME PHYS/QHP 5/>YRS: CPT | Performed by: ANESTHESIOLOGY

## 2019-11-20 PROCEDURE — 64510 N BLOCK STELLATE GANGLION: CPT | Performed by: ANESTHESIOLOGY

## 2019-11-21 ENCOUNTER — TELEPHONE (OUTPATIENT)
Dept: PAIN MEDICINE | Facility: CLINIC | Age: 54
End: 2019-11-21

## 2019-11-21 NOTE — TELEPHONE ENCOUNTER
Patient had dx stellate ganglion block on 11/20/2019. Spoke with patient. She states that she is doing okay

## 2019-11-25 ENCOUNTER — OFFICE VISIT (OUTPATIENT)
Dept: PULMONOLOGY | Facility: CLINIC | Age: 54
End: 2019-11-25

## 2019-11-25 VITALS
WEIGHT: 282.5 LBS | RESPIRATION RATE: 18 BRPM | SYSTOLIC BLOOD PRESSURE: 140 MMHG | HEART RATE: 83 BPM | HEIGHT: 71 IN | OXYGEN SATURATION: 98 % | TEMPERATURE: 97.6 F | DIASTOLIC BLOOD PRESSURE: 88 MMHG | BODY MASS INDEX: 39.55 KG/M2

## 2019-11-25 DIAGNOSIS — I48.0 PAROXYSMAL ATRIAL FIBRILLATION (HCC): ICD-10-CM

## 2019-11-25 DIAGNOSIS — G47.33 OSA ON CPAP: Primary | ICD-10-CM

## 2019-11-25 DIAGNOSIS — Z99.89 OSA ON CPAP: Primary | ICD-10-CM

## 2019-11-25 DIAGNOSIS — R06.09 DYSPNEA ON EXERTION: ICD-10-CM

## 2019-11-25 PROCEDURE — 99213 OFFICE O/P EST LOW 20 MIN: CPT | Performed by: NURSE PRACTITIONER

## 2019-11-25 NOTE — PROGRESS NOTES
Subjective   Kellen Esparza is a 53 y.o. female.     Chief Complaint   Patient presents with   • Follow-up     diabetes        History of Present Illness     Kellen Esparza presents today for   Chief Complaint   Patient presents with   • Follow-up     diabetes         she is in need of chronic followup. she is currently stable. Compliance with prescribed medications is adequate. she reports no side effects from medications. she has been adherent to changes as prescribed in last OP visit.    This patient is accompanied by their self who contributes to the history of their care.    The following portions of the patient's history were reviewed and updated as appropriate: allergies, current medications, past family history, past medical history, past social history, past surgical history and problem list.    Active Ambulatory Problems     Diagnosis Date Noted   • Chest pain 07/21/2017   • Dyspnea on exertion 07/21/2017   • Essential hypertension 07/21/2017   • Pain of right breast 07/21/2017   • Lower extremity edema 07/21/2017   • Diabetes mellitus (CMS/[facility]) 07/21/2017   • Abnormal stress test 08/03/2017   • Migraine 08/10/2017   • History of pulmonary embolus (PE) 01/01/1997   • Hyperlipidemia 08/10/2017   • DDD (degenerative disc disease), cervical 09/06/2017   • Coronary artery disease of native artery of native heart with stable angina pectoris (CMS/[facility]) 09/11/2017   • AV block, complete (S/P PPM 7/2019) 04/30/2019   • Restrictive pattern on PFT's without evidence of ILD 08/02/2019   • Obesity, Class II, BMI 35-39.9 08/02/2019   • ALYSIA on CPAP 08/02/2019   • Pain in pacemaker pocket 11/13/2019   • Paroxysmal atrial fibrillation (CMS/[facility]) 11/13/2019   • Complex regional pain syndrome type I 11/14/2019   • Chronic anticoagulation 11/14/2019   • Pacemaker 11/14/2019   • Morbid obesity (CMS/[facility]) 11/14/2019   • Entrapment neuropathy 11/14/2019     Resolved Ambulatory Problems     Diagnosis Date Noted   • No Resolved  Ambulatory Problems     Past Medical History:   Diagnosis Date   • Arthritis    • Diabetes mellitus (CMS/HCC)    • Hypertension    • Migraine    • Musculoligamentous strain    • Pulmonary embolism (CMS/HCC) 1997   • Sleep apnea    • Wears eyeglasses      Past Surgical History:   Procedure Laterality Date   • BREAST BIOPSY Left 2014   • CARDIAC CATHETERIZATION Left 8/10/2017    Procedure: Cardiac Catheterization/Vascular Study;  Surgeon: Johny Sommer MD;  Location:  [facility] CATH INVASIVE LOCATION;  Service:    • CARDIAC ELECTROPHYSIOLOGY PROCEDURE N/A 7/19/2019    Procedure: Pacemaker DC new;  Surgeon: Sonya Gay MD;  Location:  [facility] CATH INVASIVE LOCATION;  Service: Cardiology   • CHOLECYSTECTOMY     • COLONOSCOPY     • ENDOSCOPY     • HYSTERECTOMY  11/2014   • INSERT / REPLACE / REMOVE PACEMAKER     • JOINT REPLACEMENT Right     knee   • KNEE SURGERY Right 12/2017    total replacement   • NASAL SINUS SURGERY     • OOPHORECTOMY  11/2014   • PULMONARY EMBOLISM SURGERY      right lung   • TUBAL ABDOMINAL LIGATION       Family History   Problem Relation Age of Onset   • Breast cancer Mother 42   • Heart failure Mother    • No Known Problems Father    • Hypertension Brother    • Hypertension Maternal Grandmother    • Hypertension Maternal Grandfather    • No Known Problems Paternal Grandmother    • No Known Problems Paternal Grandfather    • Heart failure Son    • Hypertension Sister    • Ovarian cancer Neg Hx      Social History     Socioeconomic History   • Marital status: Single     Spouse name: Not on file   • Number of children: Not on file   • Years of education: Not on file   • Highest education level: Not on file   Tobacco Use   • Smoking status: Never Smoker   • Smokeless tobacco: Never Used   Substance and Sexual Activity   • Alcohol use: No     Frequency: Never     Comment: rare   • Drug use: No   • Sexual activity: Defer   Social History Narrative    Patient consumes NO CAFFEINE     Patient  "lives at home with partner, Donald.         Lifelong non-smoker    Does not drink alcohol    Works as a        Review of Systems  Review of Systems -  General ROS: negative for - chills, fever or night sweats  Cardiovascular ROS: no chest pain or dyspnea on exertion  Gastrointestinal ROS: no abdominal pain, change in bowel habits, or black or bloody stools  Genito-Urinary ROS: no trouble voiding or gross hematuria    Objective   Blood pressure 120/80, pulse 73, height 180.3 cm (70.98\"), weight 132 kg (290 lb), last menstrual period 10/31/2014, SpO2 98 %, not currently breastfeeding.  Nursing note reviewed  Physical Exam  Const: NAD, A&Ox4, Pleasant, Cooperative  Eyes: EOMI, no conjunctivitis  ENT: No nasal discharge present, neck supple  Cardiac: Regular rate and rhythm, no cyanosis  Resp: Respiratory rate within normal limits, no increased work of breathing, no audible wheezing or retractions noted  GI: No distention or ascites  Neurologic: CN II-XII grossly intact  Psych: Appropriate mood and behavior.  Skin: Warm, dry  Procedures  Assessment/Plan     Problem List Items Addressed This Visit        Cardiovascular and Mediastinum    Essential hypertension    Overview     · 24-hour ambulatory blood pressure monitor 4/3/2019: Overall average 135/89, 82 bpm.  Awake average 138/92, 83 bpm.  Sleep average 126/77, 80 bpm.            Endocrine    Diabetes mellitus (CMS/[facility]) - Primary    Relevant Orders    POC Glycosylated Hemoglobin (Hb A1C) (Completed)      Other Visit Diagnoses     Dyssomnia        Relevant Medications    traZODone (DESYREL) 100 MG tablet          See patient diagnoses and orders along with patient instructions for assessment, plan, and changes to care for patient.    Patient Instructions   1.  Continue medications and supplements - as listed.    2.  Continue well-balanced diet - low in calories and low in added sugar.  - A1c up to 6.9%    3.  Maintain a routine " exercise program - every week.    4.  A letter will be sent with your test results.      Return in about 3 months (around 1/31/2020).    Ambulatory progress note signed and attested to by Ryne Puente D.O.

## 2019-11-25 NOTE — PROGRESS NOTES
[facility] Pulmonary Follow up    CHIEF COMPLAINT    ALYSIA    HISTORY OF PRESENT ILLNESS    Kellen Esparza is a 53 y.o.female here today for follow-up of her obstructive sleep apnea.  She was last seen in the office in September by Dr. Quispe.  She had a sleep study performed on 9/24 that revealed moderate obstructive sleep apnea with oxygen desaturations.  She was set up with CPAP therapy and has had her CPAP for over 2 months.  She also had a HRCT performed and a VQ scan and is here today for the results.    She continues to wear her CPAP every night for an average of 5 to 8 hours.  She does occasionally have difficulty with her nasal pillow and states that occasionally she will have a leak.  She is curious if she needs a new mask.  She has not noticed any improvement in her daytime somnolence or fatigue.  She does not feel well rested in the mornings.    She continues to have significant shortness of breath with any activity.  She is unable to walk 1 block without shortness of breath.  She states that she does recover fairly quickly with rest.    She continues to follow with cardiology and it was recently diagnosed with proximal atrial fibrillation.  She was started on Eliquis twice daily.  She has a follow-up with Dr. Gay next week.    She denies a fever, chills, sputum production, hemoptysis, night sweats, weight loss, chest pain or palpitations.  She denies any lower extremity edema or calf tenderness.  She denies any sinus or allergy symptoms.  She denies reflux symptoms currently.    She continues to have difficulty with pain management and follows Dr. Choi.      She refuses to receive the influenza vaccination.    Patient Active Problem List   Diagnosis   • Chest pain   • Dyspnea on exertion   • Essential hypertension   • Pain of right breast   • Lower extremity edema   • Diabetes mellitus (CMS/HCC)   • Abnormal stress test   • Migraine   • History of pulmonary embolus (PE)   • Hyperlipidemia    • DDD (degenerative disc disease), cervical   • Coronary artery disease of native artery of native heart with stable angina pectoris (CMS/HCC)   • AV block, complete (S/P PPM 7/2019)   • Restrictive pattern on PFT's without evidence of ILD   • Obesity, Class II, BMI 35-39.9   • ALYSIA on CPAP   • Pain in pacemaker pocket   • Paroxysmal atrial fibrillation (CMS/HCC)   • Complex regional pain syndrome type I   • Chronic anticoagulation   • Pacemaker   • Morbid obesity (CMS/HCC)   • Entrapment neuropathy       Allergies   Allergen Reactions   • Lisinopril Cough       Current Outpatient Medications:   •  apixaban (ELIQUIS) 5 MG tablet tablet, Take 1 tablet by mouth 2 (Two) Times a Day., Disp: 180 tablet, Rfl: 3  •  atorvastatin (LIPITOR) 80 MG tablet, Take 1 tablet by mouth Daily., Disp: 90 tablet, Rfl: 3  •  baclofen (LIORESAL) 10 MG tablet, Take half to 1 tablet BID times a day as needed muscle spasm, Disp: 60 tablet, Rfl: 5  •  Dietary Management Product (RHEUMATE PO), Take  by mouth., Disp: , Rfl:   •  Dietary Management Product (RHEUMATE) capsule, Take 1 capsule twice daily, Disp: 180 capsule, Rfl: 1  •  estradiol (ESTRACE) 1 MG tablet, Take 1 mg by mouth Daily., Disp: , Rfl:   •  Gel Base gel, CAPSAICIN 0.001% IMIPRAMINE 3% LIDOCAINE 10% MANNITOL 20% MELOXICAM 0.1% PRILOCAINE 2%. APPLY 1-2 G TO AREA 3-4 TIMES DAILY, Disp: 30 g, Rfl: PRN  •  hydrochlorothiazide (MICROZIDE) 12.5 MG capsule, Take 1 capsule by mouth Daily., Disp: 30 capsule, Rfl: 11  •  HYDROcodone-acetaminophen (NORCO)  MG per tablet, TK 1 T PO Q 6 H PRN FOR PAIN, Disp: , Rfl: 0  •  metFORMIN (GLUCOPHAGE) 500 MG tablet, Take 1,000 mg by mouth 2 (Two) Times a Day With Meals., Disp: , Rfl:   •  Multiple Vitamins-Minerals (CENTRUM WOMEN PO), Take 1 capsule by mouth Daily. gummies, Disp: , Rfl:   •  nortriptyline (PAMELOR) 10 MG capsule, 1-2 TABLETS AT BEDTIME, Disp: 60 capsule, Rfl: 5  •  pregabalin (LYRICA) 150 MG capsule, TK ONE C PO BID, Disp: ,  Rfl: 5  •  traZODone (DESYREL) 100 MG tablet, Take 1 tablet by mouth Every Night., Disp: 30 tablet, Rfl: 2  •  valsartan (DIOVAN) 40 MG tablet, Take 0.5 tablets by mouth Daily., Disp: 30 tablet, Rfl: 11  •  vitamin B-6 (PYRIDOXINE) 100 MG tablet, Take 1 tablet by mouth Daily., Disp: 30 tablet, Rfl: 5  •  vitamin D (ERGOCALCIFEROL) 99754 UNITS capsule capsule, Take 50,000 Units by mouth 1 (One) Time Per Week. Wednesday, Disp: , Rfl:   •  aspirin 81 MG tablet, Take 1 tablet by mouth Daily., Disp: 30 tablet, Rfl: 11  •  escitalopram (LEXAPRO) 10 MG tablet, Take 10 mg by mouth Daily., Disp: , Rfl: 5  •  SUMAtriptan (IMITREX) 50 MG tablet, Take 50 mg by mouth Every 2 (Two) Hours As Needed for Migraine. Take one tablet at onset of headache. May repeat dose one time in 2 hours if headache not relieved., Disp: , Rfl:     Current Facility-Administered Medications:   •  meloxicam (MOBIC) tablet 7.5 mg, 7.5 mg, Oral, Daily PRN, Kuns-Bahena, Joan B, APRN  MEDICATION LIST AND ALLERGIES REVIEWED.    Social History     Tobacco Use   • Smoking status: Never Smoker   • Smokeless tobacco: Never Used   Substance Use Topics   • Alcohol use: No     Frequency: Never     Comment: rare   • Drug use: No       FAMILY AND SOCIAL HISTORY REVIEWED.    Review of Systems   Constitutional: Positive for activity change and fatigue. Negative for appetite change, fever and unexpected weight change.   HENT: Negative for congestion, postnasal drip, rhinorrhea, sinus pressure, sore throat and voice change.    Eyes: Negative for visual disturbance.   Respiratory: Positive for shortness of breath. Negative for cough, chest tightness and wheezing.    Cardiovascular: Negative for chest pain, palpitations and leg swelling.   Gastrointestinal: Negative for abdominal distention, abdominal pain, nausea and vomiting.   Endocrine: Negative for cold intolerance and heat intolerance.   Genitourinary: Negative for difficulty urinating and urgency.  "  Musculoskeletal: Negative for arthralgias, back pain and neck pain.   Skin: Negative for color change and pallor.   Allergic/Immunologic: Negative for environmental allergies and food allergies.   Neurological: Negative for dizziness, syncope, weakness and light-headedness.   Hematological: Negative for adenopathy. Does not bruise/bleed easily.   Psychiatric/Behavioral: Negative for agitation and behavioral problems.   .    /88 (BP Location: Left arm, Patient Position: Sitting, Cuff Size: Adult)   Pulse 83   Temp 97.6 °F (36.4 °C)   Resp 18   Ht 180.3 cm (71\")   Wt 128 kg (282 lb 8 oz)   LMP 10/31/2014 (Approximate)   SpO2 98% Comment: room air at rest  BMI 39.40 kg/m²       There is no immunization history on file for this patient.    Physical Exam   Constitutional: She is oriented to person, place, and time. She appears well-developed and well-nourished.   HENT:   Head: Normocephalic and atraumatic.   Eyes: Pupils are equal, round, and reactive to light.   Neck: Normal range of motion. Neck supple. No thyromegaly present.   Cardiovascular: Normal rate, regular rhythm, normal heart sounds and intact distal pulses. Exam reveals no gallop and no friction rub.   No murmur heard.  Pulmonary/Chest: Effort normal and breath sounds normal. No respiratory distress. She has no wheezes. She has no rales. She exhibits no tenderness.   Abdominal: Soft. Bowel sounds are normal. There is no tenderness.   Musculoskeletal: Normal range of motion.   Lymphadenopathy:     She has no cervical adenopathy.   Neurological: She is alert and oriented to person, place, and time.   Skin: Skin is warm and dry. Capillary refill takes less than 2 seconds. She is not diaphoretic.   Psychiatric: She has a normal mood and affect. Her behavior is normal.   Nursing note and vitals reviewed.        RESULTS  Nm Lung Ventilation Perfusion    Result Date: 9/30/2019  Very low probability for pulmonary arterial embolus.   D:  09/30/2019 E: "  09/30/2019  This report was finalized on 9/30/2019 4:27 PM by Dr. Rojas Pierre MD.      Ct Chest Hi Resolution    Result Date: 9/30/2019  1.  No evidence of pulmonary fibrosis or interstitial lung disease. 2.  Very mild nonspecific lower lobe bronchial wall thickening and scarring noted.  D:  09/30/2019 E:  09/30/2019  This report was finalized on 9/30/2019 4:34 PM by Dr. Rojas Pierre MD.        PROBLEM LIST    Problem List Items Addressed This Visit        Cardiovascular and Mediastinum    Paroxysmal atrial fibrillation (CMS/HCC)       Respiratory    Dyspnea on exertion    Overview     · PFT, 12/5/18: No air way obstruction, moderate restrictive lung disease         ALYSIA on CPAP - Primary            DISCUSSION    Ms. Esparza was here for follow-up of her obstructive sleep apnea.  She will remain on her CPAP every night for obstructive sleep apnea.  We were not able to obtain her CPAP download today in the office however I will call her [employer] company and get this for our records.  If her settings and download look normal I am going to order a CPAP with titration study to see if we can get her settings adjusted better for her.  I am also going to order her a facemask to use instead of a nasal pillow to see if she has improvement with her CPAP as well.  Hopefully we can get her CPAP adjusted and she can have an improvement in her symptoms.  She continues to have daytime somnolence, fatigue and nonrestorative sleep.    We reviewed her high-resolution CT scan which did not show any evidence of pulmonary fibrosis or interstitial lung disease.  She does have some nonspecific lower lobe bronchial wall thickening and scarring.  Her VQ scan also showed a low probability for pulmonary embolus.  We did discuss the possibility of ordering a CPX to rule out any other causes of her shortness of breath however she states that she is unable to walk 1 block without severe shortness of breath and not be able to perform the  testing at this time.    She will continue close follow-up with cardiology for her proximal atrial fibrillation.  She has an appointment with Dr. Gay next week.    She will follow-up in 3 months or sooner if her symptoms worsen.  She will call with any additional concerns or questions.  I spent 15 minutes with the patient. I spent > 50% percent of this time counseling and discussing diagnosis, prognosis, diagnostic testing, evaluation, current status, treatment options and management.    Deb Guajardo, APRN  11/25/20193:45 PM  Electronically signed     Please note that portions of this note were completed with a voice recognition program. Efforts were made to edit the dictations, but occasionally words are mistranscribed.      CC: Ryne Puente, DO

## 2019-12-04 ENCOUNTER — OFFICE VISIT (OUTPATIENT)
Dept: CARDIOLOGY | Facility: CLINIC | Age: 54
End: 2019-12-04

## 2019-12-04 VITALS
WEIGHT: 285 LBS | HEART RATE: 78 BPM | SYSTOLIC BLOOD PRESSURE: 136 MMHG | HEIGHT: 71 IN | BODY MASS INDEX: 39.9 KG/M2 | OXYGEN SATURATION: 98 % | DIASTOLIC BLOOD PRESSURE: 78 MMHG

## 2019-12-04 DIAGNOSIS — E78.2 MIXED HYPERLIPIDEMIA: ICD-10-CM

## 2019-12-04 DIAGNOSIS — I44.2 AV BLOCK, COMPLETE (HCC): ICD-10-CM

## 2019-12-04 DIAGNOSIS — R52 PAIN IN PACEMAKER POCKET: Primary | ICD-10-CM

## 2019-12-04 DIAGNOSIS — I10 ESSENTIAL HYPERTENSION: ICD-10-CM

## 2019-12-04 DIAGNOSIS — I48.0 PAROXYSMAL ATRIAL FIBRILLATION (HCC): ICD-10-CM

## 2019-12-04 PROCEDURE — 99213 OFFICE O/P EST LOW 20 MIN: CPT | Performed by: INTERNAL MEDICINE

## 2019-12-04 NOTE — PROGRESS NOTES
[facility] Cardiology  Kellen Esparza  1965  53 y.o.  [phone]      Date: 12/04/2019    PCP: Ryne Puente DO    Chief Complaint   Patient presents with   • pain in pacemaker pocket       Problem List:  1. Small-vessel coronary artery disease:  a. MPS, 07/26/2017: EF 70%. Medium-sized infarct pattern located in the anterior wall with no significant ischemia noted. This finding is worse on resting images which is suggestive of possible breast attenuation artifact instead of an infarction.  b. LHC, 08/10/2017: Mild-to-moderate diffuse tortuosity of the vessels. Evidence of small vessel disease in the distal portion of a small RPL S. No obstructive CAD.  2.  High degree AV Block:  amrit. Fátima, 04/29/2019: Only 1.5 days of monitoring. High grade AV block for 4.4 seconds at 7:23 am. 2nd degree AV block for 10 seconds at 8:53 pm. Average HR 86 bpm.  dotty Shine, 06/18/2019: Only one day of monitoring. 2.9% PACs. 1st degree AV block, brief 2nd degree type I AVB.   c. MCOT, 06/24/2019: High degree AV block.  d. Pacemaker implant, 07/19/2019, Dr. Gay: [employer] Accolade MRI DR model L311 serial #[id number]. DDDR .  e. Significant neuropathic pain at the pacemaker site.  3. Mild diastolic dysfunction:  a. Echo, 11/28/2018: EF 65% with mild concentric LVH. Grade I a diastolic dysfunction. Left atrium borderline dilated.  4. Paroxysmal atrial fibrillation:  a. Noted on device interrogation, 11/13/2019.   b. CHADS-VASc = 5 (HTN, DM, Female, Vasc disease, history of PE).    c. Multiple episodes of atrial fibrillation all less than 1 minute.  5. Diabetes  6. Hypertension:  a. 24h BP monitor, 04/09/2019: 45% > 140 mmHg systolic, max 164, min 103. Average HR 82 max 111, min 56.  7. Hyperlipidemia  8. Migraines  9. History of PE  10. ALYSIA, on CPAP.    Allergies   Allergen Reactions   • Lisinopril Cough       Current Medications:      Current Outpatient Medications:   •  apixaban  (ELIQUIS) 5 MG tablet tablet, Take 1 tablet by mouth 2 (Two) Times a Day., Disp: 180 tablet, Rfl: 3  •  aspirin 81 MG tablet, Take 1 tablet by mouth Daily., Disp: 30 tablet, Rfl: 11  •  atorvastatin (LIPITOR) 80 MG tablet, Take 1 tablet by mouth Daily., Disp: 90 tablet, Rfl: 3  •  baclofen (LIORESAL) 10 MG tablet, Take half to 1 tablet BID times a day as needed muscle spasm, Disp: 60 tablet, Rfl: 5  •  Dietary Management Product (RHEUMATE PO), Take  by mouth., Disp: , Rfl:   •  Dietary Management Product (RHEUMATE) capsule, Take 1 capsule twice daily, Disp: 180 capsule, Rfl: 1  •  escitalopram (LEXAPRO) 10 MG tablet, Take 10 mg by mouth Daily., Disp: , Rfl: 5  •  estradiol (ESTRACE) 1 MG tablet, Take 1 mg by mouth Daily., Disp: , Rfl:   •  Gel Base gel, CAPSAICIN 0.001% IMIPRAMINE 3% LIDOCAINE 10% MANNITOL 20% MELOXICAM 0.1% PRILOCAINE 2%. APPLY 1-2 G TO AREA 3-4 TIMES DAILY, Disp: 30 g, Rfl: PRN  •  hydrochlorothiazide (MICROZIDE) 12.5 MG capsule, Take 1 capsule by mouth Daily., Disp: 30 capsule, Rfl: 11  •  HYDROcodone-acetaminophen (NORCO)  MG per tablet, TK 1 T PO Q 6 H PRN FOR PAIN, Disp: , Rfl: 0  •  metFORMIN (GLUCOPHAGE) 500 MG tablet, Take 1,000 mg by mouth 2 (Two) Times a Day With Meals., Disp: , Rfl:   •  Multiple Vitamins-Minerals (CENTRUM WOMEN PO), Take 1 capsule by mouth Daily. gummies, Disp: , Rfl:   •  nortriptyline (PAMELOR) 10 MG capsule, 1-2 TABLETS AT BEDTIME, Disp: 60 capsule, Rfl: 5  •  pregabalin (LYRICA) 150 MG capsule, TK ONE C PO BID, Disp: , Rfl: 5  •  SUMAtriptan (IMITREX) 50 MG tablet, Take 50 mg by mouth Every 2 (Two) Hours As Needed for Migraine. Take one tablet at onset of headache. May repeat dose one time in 2 hours if headache not relieved., Disp: , Rfl:   •  traZODone (DESYREL) 100 MG tablet, Take 1 tablet by mouth Every Night., Disp: 30 tablet, Rfl: 2  •  valsartan (DIOVAN) 40 MG tablet, Take 0.5 tablets by mouth Daily., Disp: 30 tablet, Rfl: 11  •  vitamin B-6  "(PYRIDOXINE) 100 MG tablet, Take 1 tablet by mouth Daily., Disp: 30 tablet, Rfl: 5  •  vitamin D (ERGOCALCIFEROL) 59188 UNITS capsule capsule, Take 50,000 Units by mouth 1 (One) Time Per Week. Wednesday, Disp: , Rfl:     Current Facility-Administered Medications:   •  meloxicam (MOBIC) tablet 7.5 mg, 7.5 mg, Oral, Daily PRN, Joan Ahuja, CORRIE    HPI    Kellen Esparza is a 53 y.o. female who presents today for 3 week follow up of her pacemaker site pain. Since last visit, she was seen by Dr. Choi in pain medicine, who is treating her for complex regional pain syndrome type 1. Pt was given nerve blocks by Dr. Choi, which she reports are ineffective in managing her pain. She has not seen him since her appointment on 11/14/19, and has not called his office to let them know her pain has been persisting. She has continued to experience difficulty sleeping, only about 4-5 hours per night, which is interrupted by waking up frequently. Her pain originates in the area of her pacemaker pocket in her left chest, with radiation to her left side and shoulder.      The following portions of the patient's history were reviewed and updated as appropriate: allergies, current medications and problem list.    Pertinent positives as listed in the HPI.  All other systems reviewed are negative.    Vitals:    12/04/19 0930   BP: 136/78   BP Location: Right arm   Patient Position: Sitting   Pulse: 78   SpO2: 98%   Weight: 129 kg (285 lb)   Height: 180.3 cm (71\")       Physical Exam:    General: Alert and oriented.  Neck: Jugular venous pressure is within normal limits. Carotids have normal upstrokes without bruits.   Cardiovascular: Heart has a nondisplaced focal PMI. Regular rate and rhythm without murmur, gallop or rub.  Lungs: Clear without rales or wheezes. Equal expansion is noted.   Extremities: Show no edema.  Good left radial and ulnar pulses  Skin: Warm and dry.  Neurologic: Nonfocal.     Diagnostic Data:  Lab " Results   Component Value Date    GLUCOSE 144 (H) 10/08/2019    BUN 10 10/08/2019    CREATININE 0.70 10/08/2019    EGFRIFAFRI 105 10/08/2019    BCR 14.3 10/08/2019     10/08/2019    K 3.5 10/08/2019    CL 94 (L) 10/08/2019    CO2 33.0 (H) 10/08/2019    CALCIUM 10.1 10/08/2019    ALBUMIN 4.50 10/08/2019    AST 15 10/08/2019    ALT 15 10/08/2019     Lab Results   Component Value Date    CHOL 114 07/26/2019    TRIG 100 07/26/2019    HDL 55 07/26/2019    LDL 39 07/26/2019      Lab Results   Component Value Date    WBC 8.60 11/19/2019    HGB 12.0 11/19/2019    HCT 36.5 11/19/2019    MCV 80.0 11/19/2019     11/19/2019     Lab Results   Component Value Date    TSH 1.314 04/03/2019       Procedures    Assessment:      ICD-10-CM ICD-9-CM   1. Pain in pacemaker pocket R52 338.19   2. AV block, complete (S/P PPM 7/2019) I44.2 426.0   3. Paroxysmal atrial fibrillation (CMS/HCC) I48.0 427.31   4. Essential hypertension I10 401.9   5. Mixed hyperlipidemia E78.2 272.2     Lab results found above were reviewed with the patient.  As per Dr. Choi the patient has developed severe hyperalgesia, hyperesthesia and allodynia at the generator site that is spreading towards her left shoulder and is consistent with neuropathic pain, particularly CRPS of her chest wall.    I do not think that the discomfort is related to the pacemaker and I do not think that removing the pacemaker or revising the pacemaker pocket would be beneficial.    Plan:    1. Pt advised to call Dr. Choi's office for follow-up. We informed her that our system shows she has an appointment scheduled with him on 12/11/19 at 11:30 AM.   2. Continue Eliquis 5 mg BID for stroke prophylaxis with PAF.  3. Continue valsartan for hypertension.  4. Continue atorvastatin 80 mg for hyperlipidemia.  5. Continue all other current medications.  6. F/up with me as needed.  7. F/up with Dr. Sommer as scheduled on 01/06/2020        Scribed for Sonya Gay MD  by Jessa Barry. 12/4/2019  9:41 AM     I Sonya Gay MD personally performed the services described in this documentation as scribed by the above individual in my presence, and it is both accurate and complete.    Sonya Gay MD, FACC

## 2019-12-06 DIAGNOSIS — G47.33 OSA ON CPAP: Primary | ICD-10-CM

## 2019-12-06 DIAGNOSIS — Z99.89 OSA ON CPAP: Primary | ICD-10-CM

## 2019-12-11 ENCOUNTER — OUTSIDE FACILITY SERVICE (OUTPATIENT)
Dept: PAIN MEDICINE | Facility: CLINIC | Age: 54
End: 2019-12-11

## 2019-12-11 ENCOUNTER — TELEPHONE (OUTPATIENT)
Dept: PAIN MEDICINE | Facility: CLINIC | Age: 54
End: 2019-12-11

## 2019-12-11 PROCEDURE — 64510 N BLOCK STELLATE GANGLION: CPT | Performed by: ANESTHESIOLOGY

## 2019-12-11 PROCEDURE — 64450 NJX AA&/STRD OTHER PN/BRANCH: CPT | Performed by: ANESTHESIOLOGY

## 2019-12-11 PROCEDURE — 99152 MOD SED SAME PHYS/QHP 5/>YRS: CPT | Performed by: ANESTHESIOLOGY

## 2019-12-11 NOTE — TELEPHONE ENCOUNTER
I was informed that Mrs. Esparza had some questions after her procedure. Patient had a left stellate ganglion block and did very well during and after her procedure.  I went to the bedside to check on her temperature probes for assessment of a successful block as well as her pain levels.  I touched her pacemaker area.  She denied any pain.  There was no hyperesthesia, hyperalgesia, or allodynia. This was reflected on her procedure note.  Before her procedure today, I asked her if she had any questions and she confirmed that she did not have any questions.  I inquired about her physical therapy program.  I voiced my concerns that she had not started physical therapy despite my strong recommendation during her initial visit with me.  I explained to her that the treatment of her condition is multidisciplinary including physical therapy on a frequent basis to promote progressive desensitization, cognitive behavioral therapy and biofeedback, as well as modification of any comorbid factors that may contribute to her overall pain experience.  Also, my CMA went to the bedside to remind the patient of her physical therapy appointment as well assess her condition.  I called her today 3:40 PM and again at 3:50 PM.  Patient did not answer the phone. I left a message on her voicemail (her greeting included her full name) with instructions to call us back with any questions of concerns.   Patient called me back at 3:59 PM.  Her only question was in regards of perspectives for her treatment in the future.  Apparently, there has been some sort of miscommunication regarding her physical therapy.  Therefore, I called physical therapy and made her an appointment for today at 5 PM for her initial evaluation.  Patient verbalized understanding.  Crystal my nurse gave her specific directions on how to get to physical therapy.

## 2019-12-12 ENCOUNTER — TELEPHONE (OUTPATIENT)
Dept: PAIN MEDICINE | Facility: CLINIC | Age: 54
End: 2019-12-12

## 2019-12-12 NOTE — TELEPHONE ENCOUNTER
Patient had series of left stellate ganglion blocks and infiltration of peripheral neuroma of surgical scar on 12/11/19. Called patient to f/u post procedure. Reached voicemail. Left message for return call

## 2019-12-12 NOTE — TELEPHONE ENCOUNTER
I called Mrs. Esparza to check as to whether she had attended her physical therapy appointment on 12/11/2019.  She confirms that she underwent her initial evaluation and that she is scheduled for therapy starting on Tuesday.  She reports that she had complete pain relief from the block until about this morning.  Her pain level when I talked to her yesterday afternoon was 0/10.  She is now reporting a pain level of 8/10.  She feels sore across her chest.  She denies any new symptoms.  I explained to her that her blocks yesterday were done with the addition of steroids, and consequently, the initial relief was provided by the effect of the local anesthetics and now we will have to wait for the steroids to take full effect-which may take up to about a week or so-.  Also explained to her that if she does not respond to conventional therapies for CRPS including conservative measures, pharmacological measures, intensive physical therapy, then, we will have to look into the possibility of a spinal cord stimulator trial or an intrathecal pump trial.  Patient understood.  She will see Janett Regalado, my nurse practitioner at the end of December for follow-up evaluation of her pain.  Initial response to the blocks, then, we will continue with a series of blocks if she does not, we will look into other alternatives.

## 2019-12-16 ENCOUNTER — TELEPHONE (OUTPATIENT)
Dept: PAIN MEDICINE | Facility: CLINIC | Age: 54
End: 2019-12-16

## 2019-12-16 ENCOUNTER — OFFICE VISIT (OUTPATIENT)
Dept: FAMILY MEDICINE CLINIC | Facility: CLINIC | Age: 54
End: 2019-12-16

## 2019-12-16 VITALS
HEIGHT: 71 IN | SYSTOLIC BLOOD PRESSURE: 120 MMHG | TEMPERATURE: 97.3 F | WEIGHT: 281 LBS | BODY MASS INDEX: 39.34 KG/M2 | HEART RATE: 81 BPM | DIASTOLIC BLOOD PRESSURE: 80 MMHG | OXYGEN SATURATION: 98 %

## 2019-12-16 DIAGNOSIS — G56.40 COMPLEX REGIONAL PAIN SYNDROME TYPE 2, AFFECTING UNSPECIFIED SITE: ICD-10-CM

## 2019-12-16 DIAGNOSIS — M79.89 PAIN AND SWELLING OF LEFT UPPER EXTREMITY: ICD-10-CM

## 2019-12-16 DIAGNOSIS — R52 PAIN IN PACEMAKER POCKET: Primary | ICD-10-CM

## 2019-12-16 DIAGNOSIS — G90.59 COMPLEX REGIONAL PAIN SYNDROME TYPE 1 AFFECTING OTHER SITE: ICD-10-CM

## 2019-12-16 DIAGNOSIS — G56.40 COMPLEX REGIONAL PAIN SYNDROME TYPE 2, AFFECTING UNSPECIFIED SITE: Primary | ICD-10-CM

## 2019-12-16 DIAGNOSIS — Z95.0 PACEMAKER: ICD-10-CM

## 2019-12-16 DIAGNOSIS — M79.602 PAIN AND SWELLING OF LEFT UPPER EXTREMITY: ICD-10-CM

## 2019-12-16 PROCEDURE — 99214 OFFICE O/P EST MOD 30 MIN: CPT | Performed by: FAMILY MEDICINE

## 2019-12-16 RX ORDER — LEVETIRACETAM 250 MG/1
TABLET ORAL
Qty: 60 TABLET | Refills: 1 | Status: SHIPPED | OUTPATIENT
Start: 2019-12-16 | End: 2020-02-20

## 2019-12-16 NOTE — TELEPHONE ENCOUNTER
I spoke to Mrs Esparza at 1 PM after multiple attempts between procedures. She didn't answer the phone. Janett called her too. She reports exactly the same pain as she did the first day I saw her. She reported that her pain was also worse after she had a block of her scar with Dr. Leblanc. However, after the diagnostic stellate ganglion block and her therapeutic block, she reported complete pain relief. I was able to touch her chest and she reported no pain at all. Her hyperesthesia, hyperalgesia and allodynia all resolved after each block. Unfortunately, she started experiencing the pain again when the effect of the LA dissipated over the following 24-48 hours. I explained to Mrs. Esparza the need to wait a bit longer after her block to work (the first one was diagnostic, the second one had steroids) and to start PT and intensive CBT. She appears to struggle with anxiety to the extent that she mentioned the possibility of getting her pacemaker removed, which likely won't resolve the issue. She reports that she is still dealing with severe chest wall pain. Unfortunately, she did not start an intensive PT program for desensitization. I myself had to call a physical therapist and make her an appointment last week. She did not go back to Dr Lam for intensive CBT and biofeedback. I have explained Mrs Esparza several times that the tx of her condition is challenging and involves a multimodal and multidisciplinary approach. I also told her that I would look into her meds to see if we could find something that might help with her pain. She agreed. Will try this:  1. Will Continue Lyrica BID, baclofen, and cream as prescribed  2. I have e-prescribed Keppra 250 mg BID   3. I e-prescribed tapentadol 50 mg to take 1 hour before PT (she didn't have any more Norcos -which wasn't helping anyway--)  4. We may add BusPar 5 mg BID PRN to help her relax a little. I have not sent this Rx, yet.     I encourage her to try the proposed plan: meds,  PT (for desensitization), CBT/biofeedback. If the block does not help, then, we won't repeat them. If they do, will resume. Otherwise, we might have to look into SCS or IT --although I have concerns due to her anxiety, unrealistic expectations, and understanding of her current condition-.  Will communicate this to Dr. Lam in the meantime. I have instructed my staff to call her tomorrow to check on her response to the new meds     Dr. Choi

## 2019-12-17 ENCOUNTER — DOCUMENTATION (OUTPATIENT)
Dept: PAIN MEDICINE | Facility: CLINIC | Age: 54
End: 2019-12-17

## 2019-12-17 ENCOUNTER — TELEPHONE (OUTPATIENT)
Dept: PAIN MEDICINE | Facility: CLINIC | Age: 54
End: 2019-12-17

## 2019-12-17 NOTE — PROGRESS NOTES
Subjective   Kellen Esparza is a 54 y.o. female.     Chief Complaint   Patient presents with   • Cardiac Stress Test     She is unhappy with her current plan of care with her current cardiologist. She would like to consult with a PCP and is requesting a referral to another provider       History of Present Illness     Kellen Esparza presents today for   Chief Complaint   Patient presents with   • Cardiac Stress Test     She is unhappy with her current plan of care with her current cardiologist. She would like to consult with a PCP and is requesting a referral to another provider     She reports that she had a pacemaker placed within the last year, on 7/19/2019.  Since that time she has developed significant neuropathic pain at the pacemaker site, and diagnosed with complex regional pain syndrome 1.  She is under the care of Dr. Choi.  She has received nerve blocks which have not been effective.  The pain is in the area of her pacemaker pocket over the left chest, with radiation to her left shoulder and arm.  She has very little swelling in her arm, but does report that it occasionally feels swollen.  She reports that she does not feel like she has gotten any satisfactory answers from her cardiothoracic surgeon and is looking to change cardiologists.  She had asked for the pacemaker to be removed, it has been felt that this would not likely affect her pain.    This patient is accompanied by their self who contributes to the history of their care.    The following portions of the patient's history were reviewed and updated as appropriate: allergies, current medications, past family history, past medical history, past social history, past surgical history and problem list.    Active Ambulatory Problems     Diagnosis Date Noted   • Chest pain 07/21/2017   • Dyspnea on exertion 07/21/2017   • Essential hypertension 07/21/2017   • Pain of right breast 07/21/2017   • Lower extremity edema 07/21/2017   • Diabetes  mellitus (CMS/[facility]) 07/21/2017   • Abnormal stress test 08/03/2017   • Migraine 08/10/2017   • History of pulmonary embolus (PE) 01/01/1997   • Hyperlipidemia 08/10/2017   • DDD (degenerative disc disease), cervical 09/06/2017   • Coronary artery disease of native artery of native heart with stable angina pectoris (CMS/[facility]) 09/11/2017   • AV block, complete (S/P PPM 7/2019) 04/30/2019   • Restrictive pattern on PFT's without evidence of ILD 08/02/2019   • Obesity, Class II, BMI 35-39.9 08/02/2019   • ALYSIA on CPAP 08/02/2019   • Pain in pacemaker pocket 11/13/2019   • Paroxysmal atrial fibrillation (CMS/[facility]) 11/13/2019   • Complex regional pain syndrome type I 11/14/2019   • Chronic anticoagulation 11/14/2019   • Pacemaker 11/14/2019   • Morbid obesity (CMS/[facility]) 11/14/2019   • Entrapment neuropathy 11/14/2019     Resolved Ambulatory Problems     Diagnosis Date Noted   • No Resolved Ambulatory Problems     Past Medical History:   Diagnosis Date   • Arthritis    • Hypertension    • Musculoligamentous strain    • Pulmonary embolism (CMS/[facility]) 1997   • Sleep apnea    • Wears eyeglasses      Past Surgical History:   Procedure Laterality Date   • BREAST BIOPSY Left 2014   • CARDIAC CATHETERIZATION Left 8/10/2017    Procedure: Cardiac Catheterization/Vascular Study;  Surgeon: Johny Sommer MD;  Location:  [facility] CATH INVASIVE LOCATION;  Service:    • CARDIAC ELECTROPHYSIOLOGY PROCEDURE N/A 7/19/2019    Procedure: Pacemaker DC new;  Surgeon: Sonya Gay MD;  Location:  [facility] CATH INVASIVE LOCATION;  Service: Cardiology   • CHOLECYSTECTOMY     • COLONOSCOPY     • ENDOSCOPY     • HYSTERECTOMY  11/2014   • INSERT / REPLACE / REMOVE PACEMAKER     • JOINT REPLACEMENT Right     knee   • KNEE SURGERY Right 12/2017    total replacement   • NASAL SINUS SURGERY     • OOPHORECTOMY  11/2014   • PULMONARY EMBOLISM SURGERY      right lung   • TUBAL ABDOMINAL LIGATION       Family History   Problem Relation Age of Onset   •  "Breast cancer Mother 42   • Heart failure Mother    • No Known Problems Father    • Hypertension Brother    • Hypertension Maternal Grandmother    • Hypertension Maternal Grandfather    • No Known Problems Paternal Grandmother    • No Known Problems Paternal Grandfather    • Heart failure Son    • Hypertension Sister    • Ovarian cancer Neg Hx      Social History     Socioeconomic History   • Marital status: Single     Spouse name: Not on file   • Number of children: Not on file   • Years of education: Not on file   • Highest education level: Not on file   Tobacco Use   • Smoking status: Never Smoker   • Smokeless tobacco: Never Used   Substance and Sexual Activity   • Alcohol use: No     Frequency: Never     Comment: rare   • Drug use: No   • Sexual activity: Defer   Social History Narrative    Patient consumes NO CAFFEINE     Patient lives at home with partner, Donald.         Lifelong non-smoker    Does not drink alcohol    Works as a        Review of Systems  Review of Systems -  General ROS: negative for - chills, fever or night sweats  Cardiovascular ROS: Positive for chest pain, negative for dyspnea on exertion  Gastrointestinal ROS: no abdominal pain, change in bowel habits, or black or bloody stools  Genito-Urinary ROS: no dysuria, trouble voiding, or hematuria    Objective   Blood pressure 120/80, pulse 81, temperature 97.3 °F (36.3 °C), height 180.3 cm (71\"), weight 127 kg (281 lb), last menstrual period 10/31/2014, SpO2 98 %, not currently breastfeeding.  Nursing note reviewed  Physical Exam  Const: NAD, A&Ox4, Pleasant, Cooperative  Eyes: EOMI, no conjunctivitis  ENT: No nasal discharge present, neck supple  Cardiac: Regular rate and rhythm, no cyanosis.  There is tenderness over the left chest wall  Resp: Respiratory rate within normal limits, no increased work of breathing, no audible wheezing or retractions noted  Procedures  Assessment/Plan   Problem List Items " Addressed This Visit        Cardiovascular and Mediastinum    Pacemaker    Relevant Orders    Ambulatory Referral to Cardiothoracic Surgery (Completed)       Nervous and Auditory    Pain in pacemaker pocket - Primary    Relevant Orders    Ambulatory Referral to Cardiothoracic Surgery (Completed)    Complex regional pain syndrome type I    Relevant Orders    Ambulatory Referral to Cardiothoracic Surgery (Completed)      Other Visit Diagnoses     Pain and swelling of left upper extremity        Relevant Orders    Duplex Venous Upper Extremity - Left CAR        #1 chest pain with radiation to left arm  There has been a small amount of swelling, agree with Dr. Choi to rule out upper extremity blood clot.  Duplex has been ordered today.  -She has been diagnosed with complex regional pain syndrome, and should continue to follow-up with Dr. Choi.  He has called in medication for her to take before physical therapy, she should attend physical therapy starting tomorrow, this has been ordered for her previously but she has not been yet.  A referral to a new cardiothoracic surgeon has been placed at the request of the patient for a second opinion.    See patient diagnoses and orders along with patient instructions for assessment, plan, and changes to care for patient.    There are no Patient Instructions on file for this visit.    No follow-ups on file.    Ambulatory progress note signed and attested to by Ryne Puente D.O.

## 2019-12-17 NOTE — TELEPHONE ENCOUNTER
I just spoke with Mrs. Esparza  She reports that she is doing much better. Pain level 4-5/10  I have explained to her that perhaps the effect of the block is starting to kick in  She started Keppra last night and took one dose. No side effects  She was about to take the tapentadol, as instructed, one hour before her PT  I asked her to report back to us tomorrow to continue assessing her progress  Buspar is an option if anxiety continues to be an issue  Dr. Choi

## 2019-12-19 ENCOUNTER — APPOINTMENT (OUTPATIENT)
Dept: DIABETES SERVICES | Facility: HOSPITAL | Age: 54
End: 2019-12-19

## 2019-12-23 ENCOUNTER — APPOINTMENT (OUTPATIENT)
Dept: CARDIOLOGY | Facility: HOSPITAL | Age: 54
End: 2019-12-23

## 2020-01-05 NOTE — PROGRESS NOTES
"Chief Complaint: \"I still have severe skin sensitivity in the left upper chest that now is spreading into the left shoulder and left arm.\"      History of Present Illness:  Ms. Kellen Esparza, 54 y.o. female, originally referred by Dr. Sonya Gay in consultation for chronic intractable left upper chest wall pacemaker insertion site pain.   Pain History: Patient reports a 7-month history of pain, which began after the pacemaker was implanted. Patient reports that 1 week after implantation of her pacemaker device, she started experiencing her current pain. Patient reports that she had some blistering when they remove the dressings that resolved within the following 2 weeks. Unfortunately, her pain has persisted. Patient saw Dr. Johny Leblanc who performed a steroid injection in her wound that apparently caused more pain. Records are not available for review. Patient underwent diagnostic stellate ganglion block followed by therapeutic stellate ganglion block. I personally examined her after each procedure and patient reported complete pain relief to the extent that I was able to touch her pacemaker site and patient reported no pain whatsoever. Her hyperesthesia, hyperalgesia and allodynia all resolved after each block. Also, objective measures to assess success of the block (skin temperature measurement) revealed a significant increase (more than 2 degrees Celsius increase) in the skin temperature at the affected side. Unfortunately, she reports that she started experiencing pain again when the effect of the LA dissipated over the following 24-48 hours. I explained to Mrs. Esparza the need to wait a bit longer after her block to work (the first one was diagnostic, the second one had steroids) and start an intensive PT and intensive CBT programs. Neither she participated in physical therapy nor did she participate in CBT despite medical advice. I myself had to call a physical therapist and make her first " appointment for her. She didn't go back to Dr Jose Lam for intensive CBT and biofeedback. I have explained Mrs Esparza several times that the tx of her condition is challenging and involves a multimodal and multidisciplinary approach. She agreed with the plan. The last time I spoke with her over-the-phone, she mentioned that she was struggling with anxiety and mentioned the possibility of getting her pacemaker removed. She then saw her PCP and discussed the possibility of seeing a new cardiologist. I spent a long time explaining to Mrs. Esparza her diagnosis and that removing her pacemaker would likely not resolve her pain. She underwent follow-up cardiology consultation with Dr. Johny Sommer on January 6, 2020.  Patient is has a complete AV block with paroxysmal atrial fibrillation. S/p [employer] pacemaker. Patient discussed the possibility of a referral to a different cardiology team for second opinion. She also discussed the possibility of a second opinion for pain management. At her last visit, I adjusted her medications, as follows:  Continue Lyrica BID, baclofen, and cream as prescribed  Started Keppra 250 mg BID   Prescribed tapentadol 50 mg to take 1 hour before PT (she didn't have Norco -which wasn't helping anyway--)  I also discussed a trial with BusPar 5 mg BID PRN to help with her anxiety   She tells me that she has been taking her medications without significant benefit. She denies side effects. She told me that she has not been using Tapentadol because it was prescribed to be taken 1 hour before therapy and she has not been doing therapy. She reports that she has trouble with transportation. We have previously discussed the importance of combining her blocks with intensive physical therapy for progressive desensitization techniques. We have discussed the possibility of a spinal cord stimulator trial.  She has already obtain psychological clearance for this. I invited a representative from  "[employer], after obtaining patient's permission, to show her the SCS device and provide her with more technical details of a potential trial and implant.      Pain Description: Constant pain with intermittent exacerbation, described as burning, sharp and \"worse than shingles sensation.\"   Radiation of pain: The pain radiates from the pacemaker site on the left upper quadrant of her chest wall into the anterior aspect of the left shoulder and proximal arm and into the left side of her neck  Pain intensity today: 10/10   Average pain intensity last week: 9/10  Pain intensity ranges from: 9/10 to 10/10  Aggravating factors: Pain increases with laying on the left side, light touch, when water touches the skin when showering. Patient describes severe hypersensitivity around the area of her pacemaker generator to the extent that she has been homebound. She feels significant pain every time she tries to perform any activities. She stopped going shopping. She has severe pain when water touches the skin when showering.  Alleviating factors: Pain decreases with analgesics and topical compounded cream, opiods, Lyrica     Associated Symptoms:   Patient denies pain, numbness or weakness in the upper or lower extremities  Patient denies any new bladder or bowel problems.   Patient denies difficulties with her balance or falls  Pain interferes with her sleep causing fragmentation.    Review of previous therapies and additional medical records:  Kellen Esparza has already failed the following measures, including:   Conservative measures: Oral analgesics, opioids and topical analgesics   Interventional measures: Steroid/Lidocaine Injection by Dr. Leblanc- Records requested- not yet available   Diagnostic and therapeutic stellate ganglion blocks, as referenced under HPI  Surgical measures: Left chest wall pacemaker placement, 07/19/2019 by Dr. Gay  Patient underwent psychological consultation with Dr. Jose Lam " "on December 3, 2019: \"From a psychological perspective, patient appears to be able to tolerate interventional pain procedures or a spinal cord stimulator at this time.  Patient meets the criteria for adjustment disorder with mixed anxiety and depressed mood.  This indicates she is having difficulty coping with her current life situation which is causing significant reactive anxiety and depression.\"  Kellen Esparza presents with significant comorbidities including insomnia, obesity, hypertension, non-insulin dependent diabetes, PE 21 years ago, ALYSIA on CPAP, complete AV block s/p pacemaker placement ([employer]), coronary artery disease of native artery of native heart with stable angina pectoris, on Eliquis, mixed hyperlipidemia, engaged in treatment.  In terms of current analgesics, Kellen Esparza takes: Meloxicam, Norco 7.5/325, Lyrica 150 mg BID, Topical Compounded Cream.  Patient also takes trazodone, Lexapro  I have reviewed Smith Report #[id number] consistent to medication reconciliation.     Global Pain Scale 11-14  2019 01-09 2020               Pain  20  24               Feelings    6  15               Clinical outcomes  20  15               Activities  23  25               GPS Total:  69  79                  Review of Diagnostic Studies:    Xray Chest, 10/08/2019: Heart size and pulmonary vascularity are within normal limits. The lungs are expanded and clear. Dual-chamber cardiac pacemaker. No pleural effusion.  CT Chest, 09/30/2019: no evidence of pulmonary fibrosis. No evidence for subpleural reticulation or bronchiolectasis. Very mild lower lobe bronchial wall thickening is identified with bilateral lower lobe (right greater than left) scarring/atelectasis. No evidence of focal airspace disease to suggest pneumonia. No pleural effusion or pneumothorax identified. The thyroid does not demonstrate abnormality. No axillary or mediastinal lymphadenopathy identified. No pericardial effusion. Cardiac " conduction device is identified. The visualized upper abdomen is unrevealing. The surrounding soft tissues do not demonstrate abnormality.  Xray Chest, 07/20/2019: Left-sided dual-lead pacemaker has been placed. The heart and vasculature are normal in size. Lungs appear well-expanded and clear.       Review of Systems   Respiratory: Positive for apnea.    Psychiatric/Behavioral: Positive for sleep disturbance.   All other systems reviewed and are negative.        Patient Active Problem List   Diagnosis   • Chest pain   • Dyspnea on exertion   • Essential hypertension   • Pain of right breast   • Lower extremity edema   • Diabetes mellitus (CMS/HCC)   • Abnormal stress test   • Migraine   • History of pulmonary embolus (PE)   • Hyperlipidemia   • DDD (degenerative disc disease), cervical   • Coronary artery disease of native artery of native heart with stable angina pectoris (CMS/HCC)   • AV block, complete (S/P PPM 7/2019)   • Restrictive pattern on PFT's without evidence of ILD   • Obesity, Class II, BMI 35-39.9   • ALYSIA on CPAP   • Pain in pacemaker pocket   • Paroxysmal atrial fibrillation (CMS/HCC)   • Complex regional pain syndrome type I   • Chronic anticoagulation   • Pacemaker   • Morbid obesity (CMS/HCC)   • Entrapment neuropathy       Past Medical History:   Diagnosis Date   • Arthritis    • Diabetes mellitus (CMS/HCC)     doesnt check sugar    • Hypertension    • Migraine    • Musculoligamentous strain    • Pulmonary embolism (CMS/HCC) 1997   • Sleep apnea    • Wears eyeglasses          Past Surgical History:   Procedure Laterality Date   • BREAST BIOPSY Left 2014   • CARDIAC CATHETERIZATION Left 8/10/2017    Procedure: Cardiac Catheterization/Vascular Study;  Surgeon: Johny Sommer MD;  Location:  [facility] CATH INVASIVE LOCATION;  Service:    • CARDIAC ELECTROPHYSIOLOGY PROCEDURE N/A 7/19/2019    Procedure: Pacemaker DC new;  Surgeon: Sonya Gay MD;  Location:  [facility] CATH INVASIVE LOCATION;   Service: Cardiology   • CHOLECYSTECTOMY     • COLONOSCOPY     • ENDOSCOPY     • HYSTERECTOMY  11/2014   • INSERT / REPLACE / REMOVE PACEMAKER     • JOINT REPLACEMENT Right     knee   • KNEE SURGERY Right 12/2017    total replacement   • NASAL SINUS SURGERY     • OOPHORECTOMY  11/2014   • PULMONARY EMBOLISM SURGERY      right lung   • TUBAL ABDOMINAL LIGATION           Family History   Problem Relation Age of Onset   • Breast cancer Mother 42   • Heart failure Mother    • No Known Problems Father    • Hypertension Brother    • Hypertension Maternal Grandmother    • Hypertension Maternal Grandfather    • No Known Problems Paternal Grandmother    • No Known Problems Paternal Grandfather    • Heart failure Son    • Hypertension Sister    • Ovarian cancer Neg Hx          Social History     Socioeconomic History   • Marital status: Single     Spouse name: Not on file   • Number of children: Not on file   • Years of education: Not on file   • Highest education level: Not on file   Tobacco Use   • Smoking status: Never Smoker   • Smokeless tobacco: Never Used   Substance and Sexual Activity   • Alcohol use: No     Frequency: Never     Comment: rare   • Drug use: No   • Sexual activity: Defer   Social History Narrative    Patient consumes NO CAFFEINE     Patient lives at home with partner, Donald.         Lifelong non-smoker    Does not drink alcohol    Works as a            Current Outpatient Medications:   •  pregabalin (LYRICA) 150 MG capsule, TK ONE C PO BID, Disp: , Rfl: 5  •  SUMAtriptan (IMITREX) 50 MG tablet, Take 50 mg by mouth Every 2 (Two) Hours As Needed for Migraine. Take one tablet at onset of headache. May repeat dose one time in 2 hours if headache not relieved., Disp: , Rfl:   •  valsartan (DIOVAN) 40 MG tablet, Take 0.5 tablets by mouth Daily., Disp: 30 tablet, Rfl: 11  •  vitamin B-6 (PYRIDOXINE) 100 MG tablet, Take 1 tablet by mouth Daily., Disp: 30 tablet, Rfl: 5  •  " vitamin D (ERGOCALCIFEROL) 27665 UNITS capsule capsule, Take 50,000 Units by mouth 1 (One) Time Per Week. Wednesday, Disp: , Rfl:   •  apixaban (ELIQUIS) 5 MG tablet tablet, Take 1 tablet by mouth 2 (Two) Times a Day., Disp: 180 tablet, Rfl: 3  •  aspirin 81 MG tablet, Take 1 tablet by mouth Daily., Disp: 30 tablet, Rfl: 11  •  atorvastatin (LIPITOR) 80 MG tablet, Take 1 tablet by mouth Daily., Disp: 90 tablet, Rfl: 3  •  baclofen (LIORESAL) 10 MG tablet, Take half to 1 tablet BID times a day as needed muscle spasm, Disp: 60 tablet, Rfl: 5  •  Dietary Management Product (RHEUMATE PO), Take  by mouth., Disp: , Rfl:   •  estradiol (ESTRACE) 1 MG tablet, Take 1 mg by mouth Daily., Disp: , Rfl:   •  Gel Base gel, CAPSAICIN 0.001% IMIPRAMINE 3% LIDOCAINE 10% MANNITOL 20% MELOXICAM 0.1% PRILOCAINE 2%. APPLY 1-2 G TO AREA 3-4 TIMES DAILY, Disp: 30 g, Rfl: PRN  •  hydrochlorothiazide (MICROZIDE) 12.5 MG capsule, Take 1 capsule by mouth Daily., Disp: 30 capsule, Rfl: 11  •  levETIRAcetam (KEPPRA) 250 MG tablet, Tab PO BID, Disp: 60 tablet, Rfl: 1  •  metFORMIN (GLUCOPHAGE) 500 MG tablet, Take 1,000 mg by mouth 2 (Two) Times a Day With Meals., Disp: , Rfl:   •  Multiple Vitamins-Minerals (CENTRUM WOMEN PO), Take 1 capsule by mouth Daily. gummies, Disp: , Rfl:   •  nortriptyline (PAMELOR) 10 MG capsule, 1-2 TABLETS AT BEDTIME, Disp: 60 capsule, Rfl: 5  •  tapentadol (NUCYNTA) 50 MG tablet, Take 1 hour before physical therapy, Disp: 15 tablet, Rfl: 0  No current facility-administered medications for this visit.       Allergies   Allergen Reactions   • Lisinopril Cough         /78   Temp 97.7 °F (36.5 °C)   Ht 180.3 cm (71\")   Wt 125 kg (276 lb)   LMP 10/31/2014 (Approximate)   BMI 38.49 kg/m²       Physical Exam:  Constitutional: Patient is oriented to person, place, and time.   Patient appears well-developed and well-nourished.   HEENT: Head: Normocephalic and atraumatic. Eyes: Conjunctivae and lids are normal. " Pupils: Equal, round, reactive to light.   Neck: Trachea normal. Neck supple. No JVD present.   Lymphatic: No cervical adenopathy  Pulmonary Respiratory effort: No increased work of breathing or signs of respiratory distress. Auscultation of lungs: Clear to auscultation.   Cardiovascular Auscultation of heart: Normal rate and rhythm, normal S1 and S2, no murmurs.   Musculoskeletal   Gait and station: Gait evaluation demonstrated a normal gait   Cervical spine: Passive and active range of motion are full and without pain. Extension, flexion, lateral flexion, rotation of the cervical spine did not increase or reproduce pain. Cervical facet joint loading maneuvers are negative.   Muscles: Presence of active trigger points: None  Neurological:   Patient is alert and oriented to person, place, and time.   Speech: speech is normal.   Cortical function: Normal mental status.   Cranial nerves: Cranial nerves 2-12 intact.   Reflex Scores:  Right brachioradialis: 2+  Left brachioradialis: 2+  Right biceps: 2+  Left biceps: 2+  Right triceps: 2+  Left triceps: 2+  Right patellar: 1+  Left patellar: 1+  Right Achilles: 1+  Left Achilles: 1+  Motor strength: 5/5  Motor Tone: normal tone.   Involuntary movements: none.   Superficial/Primitive Reflexes: primitive reflexes were absent.   Right Barakat: absent  Left Barakat: absent  Right ankle clonus: absent  Left ankle clonus: absent   Babinsky: absent  Spurling sign is negative. Neck tornado test is negative. Lhermitte sign is negative. Negative long tract signs.   Sensation: No sensory loss. Sensory exam: intact to light touch, intact pain and temperature sensation, intact vibration sensation and normal proprioception. There is hyperalgesia, hyperesthesia, and allodynia around the surgical wound of her pacemaker site and the superior aspect of the left chest wall, her anterior fusion. The surgical wound is healed with a little bit of a keloid.  It is really hard to palpate and  "mobilized the pacemaker generator to assess if there is pain at the pocket site. The \"superficial pain\" is overwhelming and patient has a very hard time cooperating with this part of the physical exam. The rest of the exam was negative.  Coordination: Normal finger to nose and heel to shin. Normal balance and  negative Romberg's sign   Skin and subcutaneous tissue: Skin is warm and intact. No rash noted. No cyanosis.  There is a keloid at the surgical site.  There is no erythema, drainage, or apparent fluid accumulation at the generator site  Psychiatric: Judgment and insight: Normal. Orientation to person, place and time: Normal. Recent and remote memory: Intact. Mood and affect: Normal.     ASSESSMENT:   1. Complex regional pain syndrome type 1 affecting other site    2. Entrapment neuropathy    3. Pain in pacemaker pocket    4. Pacemaker    5. Chronic anticoagulation    6. Paroxysmal atrial fibrillation (CMS/[facility])    7. AV block, complete (S/P PPM 7/2019)    8. Coronary artery disease of native artery of native heart with stable angina pectoris (CMS/[facility])    9. History of pulmonary embolus (PE)    10. Morbid obesity (CMS/[facility])    11. ALYSIA on CPAP    12. Type 2 diabetes mellitus without complication, without long-term current use of insulin (CMS/[facility])          PLAN/MEDICAL DECISION MAKING: I had a lengthy conversation with Ms. Kellen Mccartney Isaias regarding her chronic pain condition and potential therapeutic options including risks, benefits, alternative therapies, to name a few. Patient presents with an formal history of unrelenting pain at that pacemaker generator placement site, which started 1 week after surgery. Patient developed severe hyperalgesia, hyperesthesia and allodynia at the generator site that it is spreading towards her left shoulder and proximal aspect of her left arm and is consistent with neuropathic pain, particularly CRPS of her chest wall. I have reviewed all available patient's medical records as " well as previous therapies as referenced above. Patient has failed to obtain pain relief with conservative measures and interventional pain management measures, as referenced above. The treatment of her condition requires of a multidisciplinary and multimodal approach. Therefore, I have proposed the following plan:  1. Diagnostics:   A. MRI of the cervical and thoracic spine without contrast for assessment of spinal canal and epidural space prior to SCS trial and implant (will check MRI compatibility of her pacemaker. Otherwise, we may obtain a CT scan)  B. CBC, PT, PTT before SCS trial  2. Interventional pain management measures: Patient will need to stop apixaban (Eliquis) at least 48 hours between last dose and procedure and remain off of her Eliquis for 4 additional days (Dr. Gay) in order to proceed with a spinal cord stimulator trial with [employer]. Patient understands that the treatment of her condition requires of a multi-model and multidisciplinary approach.   3. Pharmacological measures: Reviewed. Discussed. I have discussed with the patient that treatment should be based on medications for treatment of neuropathic pain. Therefore, I have made some adjustments:  A.  Patient takes Lyrica 150 mg BID, Topical Compounded Cream. Patient also takes Lexapro  B. Increase nortriptyline to 25 mg 1-2 tablets at bedtime  C. Continue baclofen 10 mg 1/2 to 1 tablet BID times a day as needed muscle spasm  D. Continue Keppra 250 mg BID  E. Continue Rheumate one tablet twice daily  F. Continue pyridoxine 100 mg one tablet by mouth daily  G. Continue alpha lipoid acid 1632-3686 mg per day divided into 3 doses  H. Continue prilocaine 2%, lidocaine 10%, imipramine 3%, capsaicin 0.001% and mannitol 20%  cream, apply 1 to 2 grams of cream to the affected areas every 4 to 6 hours as needed  4. Long-term rehabilitation efforts:  A. Patient will start a comprehensive physical therapy program for desensitization  techniques and myofascial release   B. Contrast therapy: Apply ice-packs for 15-20 minutes, followed by heating pads for 15-20 minutes to affected area   C. Referral to Dr. Jose Lam for psychological evaluation, cognitive behavioral therapy and biofeedback.  D. Referral to [facility]  4. The patient and her family have been instructed to contact my office with any questions or difficulties. The patient understands the plan and agrees to proceed accordingly.      I spent more than 30 minutes face-to-face with the patient and family, of which more than 50% of the time were spent counseling regarding evaluation, diagnosis, prognosis, diagnostic testing, potential referrals, treatment options for chronic pain condition and overall rehabilitation, implications of compliance with medical care, providing websites to search for research trials for treatment of current condition, providing links and reading materials applicable to treatment of current condition, coordination of care with other providers involved in patient's care, long-term management of concurrent comorbidities affecting effective pain control, risk and benefits of different interventions, alternative therapies, risks and benefits as it relates to spinal cord stimulator devices for trial and implantation and long-term management and functional goals of spinal cord stimulation       Patient Care Team:  Ryne Puente DO as PCP - General (Family Medicine)  Johny Sommer MD as Consulting Physician (Cardiology)  Sonya Gay MD as Consulting Physician (Cardiology)  Deb Guajardo APRN as Nurse Practitioner (Pulmonary Disease)  Otis Choi MD as Consulting Physician (Pain Medicine)     No orders of the defined types were placed in this encounter.        Future Appointments   Date Time Provider Department Center   1/10/2020  3:30 PM [facility] ECHO/VASC CART [facility]  [facility]   1/25/2020  8:00 PM   [facility] SLP RM, STATION 1 - [facility] SLEEP [facility]   1/30/2020  2:30 PM Ryne Puente,  [facility] None   2/24/2020  1:00 PM Deb Guajardo APRN [facility] None         Otis Choi MD     EMR Dragon/Transcription disclaimer:  Much of this encounter note is an electronic transcription of spoken language to printed text. Electronic transcription of spoken language may permit erroneous, or at times, nonsensical words or phrases to be inadvertently transcribed. Although I have reviewed the note for such errors, some may still exist.

## 2020-01-06 ENCOUNTER — OFFICE VISIT (OUTPATIENT)
Dept: CARDIOLOGY | Facility: CLINIC | Age: 55
End: 2020-01-06

## 2020-01-06 VITALS
HEART RATE: 72 BPM | WEIGHT: 284 LBS | OXYGEN SATURATION: 95 % | BODY MASS INDEX: 39.76 KG/M2 | DIASTOLIC BLOOD PRESSURE: 84 MMHG | HEIGHT: 71 IN | SYSTOLIC BLOOD PRESSURE: 126 MMHG

## 2020-01-06 DIAGNOSIS — I48.0 PAROXYSMAL ATRIAL FIBRILLATION (HCC): Primary | ICD-10-CM

## 2020-01-06 DIAGNOSIS — I44.2 AV BLOCK, COMPLETE (HCC): ICD-10-CM

## 2020-01-06 DIAGNOSIS — R52 PAIN IN PACEMAKER POCKET: ICD-10-CM

## 2020-01-06 DIAGNOSIS — I25.118 CORONARY ARTERY DISEASE OF NATIVE ARTERY OF NATIVE HEART WITH STABLE ANGINA PECTORIS (HCC): ICD-10-CM

## 2020-01-06 DIAGNOSIS — R06.09 DYSPNEA ON EXERTION: ICD-10-CM

## 2020-01-06 DIAGNOSIS — I10 ESSENTIAL HYPERTENSION: ICD-10-CM

## 2020-01-06 PROCEDURE — 99213 OFFICE O/P EST LOW 20 MIN: CPT | Performed by: INTERNAL MEDICINE

## 2020-01-06 NOTE — PROGRESS NOTES
[facility] AT [address], [address]  [place], [place], [address]    [phone]  [email or web address]           OUTPATIENT CLINIC FOLLOW UP NOTE    Patient Care Team:  Patient Care Team:  Ryne Puente DO as PCP - General (Family Medicine)  Johny Sommer MD as Consulting Physician (Cardiology)  Sonya Gay MD as Consulting Physician (Cardiology)  Deb Guajardo APRN as Nurse Practitioner (Pulmonary Disease)  Otis Choi MD as Consulting Physician (Pain Medicine)    Subjective:     Chief complaint: Dyspnea, AV block, pacemaker site pain    HPI:    Kellen Esparza is a 54 y.o. female.  Cardiac Problem List:  1. Small-vessel coronary artery disease:  a. MPS, 07/26/2017: EF 70%. Medium-sized infarct pattern located in the anterior wall with no significant ischemia noted. This finding is worse on resting images which is suggestive of possible breast attenuation artifact instead of an infarction.  b. LHC, 08/10/2017: Mild-to-moderate diffuse tortuosity of the vessels. Evidence of small vessel disease in the distal portion of a small RPL S. No obstructive CAD.  2.  High degree AV Block:  a. Fátima, 04/29/2019: Only 1.5 days of monitoring. High grade AV block for 4.4 seconds at 7:23 am. 2nd degree AV block for 10 seconds at 8:53 pm. Average HR 86 bpm.  bDina Shine, 06/18/2019: Only one day of monitoring. 2.9% PACs. 1st degree AV block, brief 2nd degree type I AVB.   c. MCOT, 06/24/2019: High degree AV block.  d. Pacemaker implant, 07/19/2019, Dr. Gay: [employer] Accolade MRI DR model L311 serial #[id number]. DDDR .  e. Significant neuropathic pain at the pacemaker site.  3. Mild diastolic dysfunction:  a. Echo, 11/28/2018: EF 65% with mild concentric LVH. Grade I a diastolic dysfunction. Left atrium borderline dilated.  4. Paroxysmal atrial fibrillation:  a. Noted on device interrogation, 11/13/2019.   b. CHADS-VASc = 5 (HTN, DM, Female, Vasc disease,  history of PE).    c. Multiple episodes of atrial fibrillation all less than 1 minute.  5. Diabetes  6. Hypertension:  a. 24h BP monitor, 04/09/2019: 45% > 140 mmHg systolic, max 164, min 103. Average HR 82 max 111, min 56.  7. Hyperlipidemia  8. Migraines  9. History of PE  10. ALYSIA, on CPAP.  a. Followed by Dr. Quispe.    Today she presents for follow-up.      She continues to have significant left chest wall pain at the pacemaker site.  She is being followed by Dr. Choi and pain management.  Despite multiple medications and intervention she still has significant pain.  This is associated with left arm numbness at times.  She also reports chronic shortness of breath and fatigue.  She notes that she has a repeat sleep study scheduled for January 25.  She also wants to consider a second opinion for her pain.    Review of Systems:  Positive for pacemaker site pain, left arm numbness, dyspnea, fatigue, lower extremity edema  Negative for chest pain, palpitations, orthopnea, PND,lightheadedness, syncope.     PFSH:  Patient Active Problem List   Diagnosis   • Chest pain   • Dyspnea on exertion   • Essential hypertension   • Pain of right breast   • Lower extremity edema   • Diabetes mellitus (CMS/HCC)   • Abnormal stress test   • Migraine   • History of pulmonary embolus (PE)   • Hyperlipidemia   • DDD (degenerative disc disease), cervical   • Coronary artery disease of native artery of native heart with stable angina pectoris (CMS/HCC)   • AV block, complete (S/P PPM 7/2019)   • Restrictive pattern on PFT's without evidence of ILD   • Obesity, Class II, BMI 35-39.9   • ALYSIA on CPAP   • Pain in pacemaker pocket   • Paroxysmal atrial fibrillation (CMS/HCC)   • Complex regional pain syndrome type I   • Chronic anticoagulation   • Pacemaker   • Morbid obesity (CMS/HCC)   • Entrapment neuropathy         Current Outpatient Medications:   •  apixaban (ELIQUIS) 5 MG tablet tablet, Take 1 tablet by mouth 2 (Two) Times a  Day., Disp: 180 tablet, Rfl: 3  •  aspirin 81 MG tablet, Take 1 tablet by mouth Daily., Disp: 30 tablet, Rfl: 11  •  atorvastatin (LIPITOR) 80 MG tablet, Take 1 tablet by mouth Daily., Disp: 90 tablet, Rfl: 3  •  baclofen (LIORESAL) 10 MG tablet, Take half to 1 tablet BID times a day as needed muscle spasm, Disp: 60 tablet, Rfl: 5  •  Dietary Management Product (RHEUMATE PO), Take  by mouth., Disp: , Rfl:   •  estradiol (ESTRACE) 1 MG tablet, Take 1 mg by mouth Daily., Disp: , Rfl:   •  Gel Base gel, CAPSAICIN 0.001% IMIPRAMINE 3% LIDOCAINE 10% MANNITOL 20% MELOXICAM 0.1% PRILOCAINE 2%. APPLY 1-2 G TO AREA 3-4 TIMES DAILY, Disp: 30 g, Rfl: PRN  •  hydrochlorothiazide (MICROZIDE) 12.5 MG capsule, Take 1 capsule by mouth Daily., Disp: 30 capsule, Rfl: 11  •  levETIRAcetam (KEPPRA) 250 MG tablet, Tab PO BID, Disp: 60 tablet, Rfl: 1  •  metFORMIN (GLUCOPHAGE) 500 MG tablet, Take 1,000 mg by mouth 2 (Two) Times a Day With Meals., Disp: , Rfl:   •  Multiple Vitamins-Minerals (CENTRUM WOMEN PO), Take 1 capsule by mouth Daily. gummies, Disp: , Rfl:   •  nortriptyline (PAMELOR) 10 MG capsule, 1-2 TABLETS AT BEDTIME, Disp: 60 capsule, Rfl: 5  •  pregabalin (LYRICA) 150 MG capsule, TK ONE C PO BID, Disp: , Rfl: 5  •  SUMAtriptan (IMITREX) 50 MG tablet, Take 50 mg by mouth Every 2 (Two) Hours As Needed for Migraine. Take one tablet at onset of headache. May repeat dose one time in 2 hours if headache not relieved., Disp: , Rfl:   •  tapentadol (NUCYNTA) 50 MG tablet, Take 1 hour before physical therapy, Disp: 15 tablet, Rfl: 0  •  valsartan (DIOVAN) 40 MG tablet, Take 0.5 tablets by mouth Daily., Disp: 30 tablet, Rfl: 11  •  vitamin B-6 (PYRIDOXINE) 100 MG tablet, Take 1 tablet by mouth Daily., Disp: 30 tablet, Rfl: 5  •  vitamin D (ERGOCALCIFEROL) 21807 UNITS capsule capsule, Take 50,000 Units by mouth 1 (One) Time Per Week. Wednesday, Disp: , Rfl:     Current Facility-Administered Medications:   •  meloxicam (MOBIC) tablet  "7.5 mg, 7.5 mg, Oral, Daily PRN, Kuns-Bahena, Joan B, APRN    Allergies   Allergen Reactions   • Lisinopril Cough        reports that she has never smoked. She has never used smokeless tobacco.    Family History   Problem Relation Age of Onset   • Breast cancer Mother 42   • Heart failure Mother    • No Known Problems Father    • Hypertension Brother    • Hypertension Maternal Grandmother    • Hypertension Maternal Grandfather    • No Known Problems Paternal Grandmother    • No Known Problems Paternal Grandfather    • Heart failure Son    • Hypertension Sister    • Ovarian cancer Neg Hx          Objective:   /84 (BP Location: Right arm, Patient Position: Sitting)   Pulse 72   Ht 180.3 cm (71\")   Wt 129 kg (284 lb)   LMP 10/31/2014 (Approximate)   SpO2 95%   BMI 39.61 kg/m²   CONSTITUTIONAL: No acute distress, normal affect      Labs:  Lab Results   Component Value Date    ALT 15 10/08/2019    AST 15 10/08/2019     Lab Results   Component Value Date    CHOL 114 07/26/2019    TRIG 100 07/26/2019    HDL 55 07/26/2019    CREATININE 0.70 10/08/2019       Diagnostic Data:    Procedures    PFTs 11/2018 - moderate restrictive lung disease     "[employer]SL" heart monitor 6/2019  -High degree symptomatic AV Block    Zio Patch 4/2019  -Only 1.5 days of monitoring.  -High-grade AV block for 4.4 seconds at 7:23 AM.  -Second-degree AV block for 10 seconds at 8:53 PM.  -Average heart rate 86.    TTE 11/2018  · Left ventricular systolic function is normal. Estimated EF = 65%.  · Left ventricular wall thickness is consistent with mild concentric hypertrophy.  · Left ventricular diastolic dysfunction (grade I a) consistent with impaired relaxation.  · Left atrial cavity size is borderline dilated.  · Trace-to-mild aortic valve regurgitation is present.    TTE 7/2017  · Estimated EF appears to be in the range of 66 - 70%.  · Left ventricular wall thickness is consistent with moderate asymmetric hypertrophy.  · Left ventricular " diastolic dysfunction (grade I) consistent with impaired relaxation.    [facility] 7/2017  · There is mild to moderate diffuse tortuosity of the vessels.  There is evidence of small vessel disease in the distal portion of a small RPL S.  There is no obstructive coronary artery disease.  · Normal left ventricular ejection fraction with no regional wall motion abnormalities    CT PE Protocol 7/2017 - No evidence of pulmonary embolus or other acute chest disease.    DEVICE INTERROGATION:  [employer], Interrogation date 9/23/19-   RA pacing 1%%, RV pacing <1%. P wave is 4.2 mV with a threshold of 0.7 V at 0.5 msec and an impedance of 565 ohms. R wave is 14.3 mV with a threshold of 0.9 V at 0.5 msec and an impedance of 660 ohms. Battery voltage is 10yrs.  RA and RV outputs decreased to 2.0 at 0.5.      Assessment and Plan:   Kellen was seen today for hypertension and hyperlipidemia.    Diagnoses and all orders for this visit:    Complete AV block  Paroxysmal atrial fibrillation  Chest wall pain  -s/p [employer] DC PPM 7/2019, Dr Gay  -Paroxysmal atrial fibrillation noted on device interrogation 11/2019.  -Continue apixaban.  -Followed by Dr. Choi for neuropathic pain at PPM site.  Patient is considering getting a second opinion.  She notes that she has not participated in physical therapy as recommended.  If she wishes to see another cardiology team, will be happy to connect her with a cardiology team of her choice.  We discussed the option of going to [facility] in [place]  -Device interrogation at the time of next visit in approximately May 2020.    Small vessel, coronary artery disease of native artery of native heart with stable angina pectoris  -CCS class 0 at this current time.  No clear anginal-like symptoms.  -Continue current related medications.    Essential hypertension  -Stable  -Continue current related medications.    Dyspnea, sleep disturbance, fatigue  -Followed by Dr Quispe, pulmonary  -Has  restrictive lung disease due to obesity.  -Scheduled for repeat sleep study on January 25.      - Return in about 4 months (around 5/6/2020).      Scribed for Johny Sommer MD by Cindy Arechiga, APRN  . 1/6/2020  11:26 AM     I, Johny Sommer MD, personally performed the services as scribed by the above named individual. I have made any necessary edits and it is both accurate and complete.     Johny Sommer MD, MSc, [facility]  Interventional Cardiology  [facility] at [facility]

## 2020-01-07 ENCOUNTER — TELEPHONE (OUTPATIENT)
Dept: PAIN MEDICINE | Facility: CLINIC | Age: 55
End: 2020-01-07

## 2020-01-07 NOTE — TELEPHONE ENCOUNTER
I spoke with Mrs. Esparza this morning regarding her lack of compliance with the proposed treatment plan.  She has recently undergone follow-up with her cardiologist.  Patient had some idea that removing the pacemaker will resolve her problem.  I have explained to her that the pathophysiology of her pain is such that requires of an intensive multidisciplinary approach.  Unfortunately, she did not go back to physical therapy.  She did not go back to Dr. Jose Lam for intensive cognitive behavioral therapy.  She tells me that she is taking her medications without significant benefit.  She denies side effects.  She told me that she has not been using Tapentadol because it was prescribed to be taken 1 hour before therapy and she has not been doing therapy.  She reports that she has trouble with transportation.  We have previously discussed the importance of combining her blocks with intensive physical therapy for progressive desensitization techniques.  She appears to be confused about her current condition.  She confirmed that she will come to her follow-up appointment with me on Thursday.  Hopefully, she will come with a family member, her daughter.  We discussed the possibility of a spinal cord stimulator trial.  She has already obtain psychological clearance for this.  I will have a representative from [employer] present at the time of her visit to show her the device and going to more technical details of a potential trial and implant.  Patient was very appreciative of my call and verbalized understanding.

## 2020-01-09 ENCOUNTER — OFFICE VISIT (OUTPATIENT)
Dept: PAIN MEDICINE | Facility: CLINIC | Age: 55
End: 2020-01-09

## 2020-01-09 ENCOUNTER — APPOINTMENT (OUTPATIENT)
Dept: LAB | Facility: HOSPITAL | Age: 55
End: 2020-01-09

## 2020-01-09 VITALS
SYSTOLIC BLOOD PRESSURE: 146 MMHG | BODY MASS INDEX: 38.64 KG/M2 | DIASTOLIC BLOOD PRESSURE: 78 MMHG | WEIGHT: 276 LBS | TEMPERATURE: 97.7 F | HEIGHT: 71 IN

## 2020-01-09 DIAGNOSIS — Z95.0 PACEMAKER: ICD-10-CM

## 2020-01-09 DIAGNOSIS — E66.01 MORBID OBESITY (HCC): ICD-10-CM

## 2020-01-09 DIAGNOSIS — Z01.818 PRE-OP TESTING: Primary | ICD-10-CM

## 2020-01-09 DIAGNOSIS — Z86.711 HISTORY OF PULMONARY EMBOLUS (PE): ICD-10-CM

## 2020-01-09 DIAGNOSIS — E11.9 TYPE 2 DIABETES MELLITUS WITHOUT COMPLICATION, WITHOUT LONG-TERM CURRENT USE OF INSULIN (HCC): ICD-10-CM

## 2020-01-09 DIAGNOSIS — G58.9 ENTRAPMENT NEUROPATHY: ICD-10-CM

## 2020-01-09 DIAGNOSIS — R52 PAIN IN PACEMAKER POCKET: ICD-10-CM

## 2020-01-09 DIAGNOSIS — I25.118 CORONARY ARTERY DISEASE OF NATIVE ARTERY OF NATIVE HEART WITH STABLE ANGINA PECTORIS (HCC): ICD-10-CM

## 2020-01-09 DIAGNOSIS — G90.59 COMPLEX REGIONAL PAIN SYNDROME TYPE 1 AFFECTING OTHER SITE: ICD-10-CM

## 2020-01-09 DIAGNOSIS — I44.2 AV BLOCK, COMPLETE (HCC): ICD-10-CM

## 2020-01-09 DIAGNOSIS — Z99.89 OSA ON CPAP: ICD-10-CM

## 2020-01-09 DIAGNOSIS — Z79.01 CHRONIC ANTICOAGULATION: ICD-10-CM

## 2020-01-09 DIAGNOSIS — I48.0 PAROXYSMAL ATRIAL FIBRILLATION (HCC): ICD-10-CM

## 2020-01-09 DIAGNOSIS — G47.33 OSA ON CPAP: ICD-10-CM

## 2020-01-09 LAB
APTT PPP: 30.9 SECONDS (ref 24–37)
BASOPHILS # BLD AUTO: 0.04 10*3/MM3 (ref 0–0.2)
BASOPHILS NFR BLD AUTO: 0.4 % (ref 0–1.5)
DEPRECATED RDW RBC AUTO: 39.4 FL (ref 37–54)
EOSINOPHIL # BLD AUTO: 0.16 10*3/MM3 (ref 0–0.4)
EOSINOPHIL NFR BLD AUTO: 1.6 % (ref 0.3–6.2)
ERYTHROCYTE [DISTWIDTH] IN BLOOD BY AUTOMATED COUNT: 13.6 % (ref 12.3–15.4)
HCT VFR BLD AUTO: 36.9 % (ref 34–46.6)
HGB BLD-MCNC: 12.2 G/DL (ref 12–15.9)
IMM GRANULOCYTES # BLD AUTO: 0.03 10*3/MM3 (ref 0–0.05)
IMM GRANULOCYTES NFR BLD AUTO: 0.3 % (ref 0–0.5)
INR PPP: 1.23 (ref 0.85–1.16)
LYMPHOCYTES # BLD AUTO: 3.52 10*3/MM3 (ref 0.7–3.1)
LYMPHOCYTES NFR BLD AUTO: 35.6 % (ref 19.6–45.3)
MCH RBC QN AUTO: 27 PG (ref 26.6–33)
MCHC RBC AUTO-ENTMCNC: 33.1 G/DL (ref 31.5–35.7)
MCV RBC AUTO: 81.6 FL (ref 79–97)
MONOCYTES # BLD AUTO: 0.44 10*3/MM3 (ref 0.1–0.9)
MONOCYTES NFR BLD AUTO: 4.4 % (ref 5–12)
NEUTROPHILS # BLD AUTO: 5.71 10*3/MM3 (ref 1.7–7)
NEUTROPHILS NFR BLD AUTO: 57.7 % (ref 42.7–76)
NRBC BLD AUTO-RTO: 0 /100 WBC (ref 0–0.2)
PLATELET # BLD AUTO: 194 10*3/MM3 (ref 140–450)
PMV BLD AUTO: 12.5 FL (ref 6–12)
PROTHROMBIN TIME: 14.9 SECONDS (ref 11.2–14.3)
RBC # BLD AUTO: 4.52 10*6/MM3 (ref 3.77–5.28)
WBC NRBC COR # BLD: 9.9 10*3/MM3 (ref 3.4–10.8)

## 2020-01-09 PROCEDURE — 36415 COLL VENOUS BLD VENIPUNCTURE: CPT | Performed by: ANESTHESIOLOGY

## 2020-01-09 PROCEDURE — 85730 THROMBOPLASTIN TIME PARTIAL: CPT | Performed by: ANESTHESIOLOGY

## 2020-01-09 PROCEDURE — 85025 COMPLETE CBC W/AUTO DIFF WBC: CPT | Performed by: ANESTHESIOLOGY

## 2020-01-09 PROCEDURE — 99214 OFFICE O/P EST MOD 30 MIN: CPT | Performed by: ANESTHESIOLOGY

## 2020-01-09 PROCEDURE — 85610 PROTHROMBIN TIME: CPT | Performed by: ANESTHESIOLOGY

## 2020-01-09 RX ORDER — NORTRIPTYLINE HYDROCHLORIDE 25 MG/1
CAPSULE ORAL
Qty: 60 CAPSULE | Refills: 5 | Status: SHIPPED | OUTPATIENT
Start: 2020-01-09 | End: 2020-07-27

## 2020-01-10 ENCOUNTER — TELEPHONE (OUTPATIENT)
Dept: CARDIOLOGY | Facility: CLINIC | Age: 55
End: 2020-01-10

## 2020-01-10 NOTE — TELEPHONE ENCOUNTER
Patient is having a spinal cord stimulator per Dr. Choi. They are requesting pre-procedure cardiac risk assessment and medication recommendations.     Thanks.

## 2020-01-25 ENCOUNTER — HOSPITAL ENCOUNTER (OUTPATIENT)
Dept: SLEEP MEDICINE | Facility: HOSPITAL | Age: 55
Discharge: HOME | End: 2020-01-25
Admitting: NURSE PRACTITIONER

## 2020-01-25 VITALS
DIASTOLIC BLOOD PRESSURE: 77 MMHG | HEIGHT: 71 IN | WEIGHT: 281.97 LBS | OXYGEN SATURATION: 94 % | SYSTOLIC BLOOD PRESSURE: 127 MMHG | BODY MASS INDEX: 39.48 KG/M2 | HEART RATE: 80 BPM

## 2020-01-25 DIAGNOSIS — Z99.89 OSA ON CPAP: ICD-10-CM

## 2020-01-25 DIAGNOSIS — G47.33 OSA ON CPAP: ICD-10-CM

## 2020-01-25 PROCEDURE — 95811 POLYSOM 6/>YRS CPAP 4/> PARM: CPT | Performed by: INTERNAL MEDICINE

## 2020-01-25 PROCEDURE — 95811 POLYSOM 6/>YRS CPAP 4/> PARM: CPT

## 2020-01-27 ENCOUNTER — APPOINTMENT (OUTPATIENT)
Dept: DIABETES SERVICES | Facility: HOSPITAL | Age: 55
End: 2020-01-27

## 2020-01-28 DIAGNOSIS — G56.40 COMPLEX REGIONAL PAIN SYNDROME TYPE 2, AFFECTING UNSPECIFIED SITE: ICD-10-CM

## 2020-01-29 ENCOUNTER — TELEPHONE (OUTPATIENT)
Dept: PAIN MEDICINE | Facility: CLINIC | Age: 55
End: 2020-01-29

## 2020-01-29 NOTE — TELEPHONE ENCOUNTER
Spoke with patient advised that a prescription had been called in for her increased pain.   Patient advises that the pharmacy had notified her. Encouraged to follow POC. Patient reports that she has been using heat- reminded her to use a barrier between that and her skin. Patient verbalizes understanding and has talked with MRI regarding the delay because of her pacemaker.

## 2020-02-20 ENCOUNTER — CLINICAL SUPPORT NO REQUIREMENTS (OUTPATIENT)
Dept: CARDIOLOGY | Facility: CLINIC | Age: 55
End: 2020-02-20

## 2020-02-20 DIAGNOSIS — I48.0 PAROXYSMAL ATRIAL FIBRILLATION (HCC): ICD-10-CM

## 2020-02-20 DIAGNOSIS — I44.2 AV BLOCK, COMPLETE (HCC): Primary | ICD-10-CM

## 2020-02-20 PROCEDURE — 93296 REM INTERROG EVL PM/IDS: CPT | Performed by: PHYSICIAN ASSISTANT

## 2020-02-20 PROCEDURE — 93294 REM INTERROG EVL PM/LDLS PM: CPT | Performed by: PHYSICIAN ASSISTANT

## 2020-02-20 RX ORDER — LEVETIRACETAM 250 MG/1
TABLET ORAL
Qty: 60 TABLET | Refills: 1 | Status: SHIPPED | OUTPATIENT
Start: 2020-02-20 | End: 2020-05-04

## 2020-02-24 ENCOUNTER — OFFICE VISIT (OUTPATIENT)
Dept: FAMILY MEDICINE CLINIC | Facility: CLINIC | Age: 55
End: 2020-02-24

## 2020-02-24 ENCOUNTER — OFFICE VISIT (OUTPATIENT)
Dept: PULMONOLOGY | Facility: CLINIC | Age: 55
End: 2020-02-24

## 2020-02-24 VITALS
HEIGHT: 71 IN | TEMPERATURE: 98.4 F | HEART RATE: 74 BPM | OXYGEN SATURATION: 97 % | DIASTOLIC BLOOD PRESSURE: 80 MMHG | WEIGHT: 284 LBS | SYSTOLIC BLOOD PRESSURE: 130 MMHG | BODY MASS INDEX: 39.76 KG/M2

## 2020-02-24 VITALS
DIASTOLIC BLOOD PRESSURE: 84 MMHG | OXYGEN SATURATION: 98 % | WEIGHT: 283 LBS | HEART RATE: 83 BPM | HEIGHT: 71 IN | BODY MASS INDEX: 39.62 KG/M2 | SYSTOLIC BLOOD PRESSURE: 130 MMHG

## 2020-02-24 DIAGNOSIS — I48.0 PAROXYSMAL ATRIAL FIBRILLATION (HCC): Primary | ICD-10-CM

## 2020-02-24 DIAGNOSIS — E11.9 TYPE 2 DIABETES MELLITUS WITHOUT COMPLICATION, WITHOUT LONG-TERM CURRENT USE OF INSULIN (HCC): Primary | ICD-10-CM

## 2020-02-24 DIAGNOSIS — R06.09 DYSPNEA ON EXERTION: ICD-10-CM

## 2020-02-24 DIAGNOSIS — G47.33 OSA ON CPAP: ICD-10-CM

## 2020-02-24 DIAGNOSIS — Z99.89 OSA ON CPAP: ICD-10-CM

## 2020-02-24 LAB — HBA1C MFR BLD: 7.7 %

## 2020-02-24 PROCEDURE — 99213 OFFICE O/P EST LOW 20 MIN: CPT | Performed by: NURSE PRACTITIONER

## 2020-02-24 PROCEDURE — 83036 HEMOGLOBIN GLYCOSYLATED A1C: CPT | Performed by: FAMILY MEDICINE

## 2020-02-24 PROCEDURE — 99213 OFFICE O/P EST LOW 20 MIN: CPT | Performed by: FAMILY MEDICINE

## 2020-02-25 ENCOUNTER — HOSPITAL ENCOUNTER (OUTPATIENT)
Dept: MRI IMAGING | Facility: HOSPITAL | Age: 55
Discharge: HOME | End: 2020-02-25
Admitting: ANESTHESIOLOGY

## 2020-02-25 VITALS — HEART RATE: 100 BPM | RESPIRATION RATE: 18 BRPM | OXYGEN SATURATION: 93 %

## 2020-02-25 PROCEDURE — 72146 MRI CHEST SPINE W/O DYE: CPT

## 2020-02-25 PROCEDURE — 72141 MRI NECK SPINE W/O DYE: CPT

## 2020-02-25 NOTE — NURSING NOTE
Pt here for MRI with pacemaker, pt tolerated procedure well, vital signs stable throughout the procedure.

## 2020-02-26 ENCOUNTER — DOCUMENTATION (OUTPATIENT)
Dept: PULMONOLOGY | Facility: CLINIC | Age: 55
End: 2020-02-26

## 2020-02-26 DIAGNOSIS — G56.40 COMPLEX REGIONAL PAIN SYNDROME TYPE 2, AFFECTING UNSPECIFIED SITE: ICD-10-CM

## 2020-02-26 NOTE — PROGRESS NOTES
I reviewed Ms. Esparza's CPAP download today that I received from her [employer].  She is only been wearing her CPAP 40% in the last 30 days.  Her average usage was 2 hours and 43 minutes.  I do suspect that this is contributing to her fatigue at this time.  She is currently on an auto CPAP and has an AHI of 1.4.  I will place the download in the records for our review.

## 2020-03-19 DIAGNOSIS — G56.40 COMPLEX REGIONAL PAIN SYNDROME TYPE 2, AFFECTING UNSPECIFIED SITE: ICD-10-CM

## 2020-03-23 ENCOUNTER — OFFICE VISIT (OUTPATIENT)
Dept: FAMILY MEDICINE CLINIC | Facility: CLINIC | Age: 55
End: 2020-03-23

## 2020-03-23 VITALS
DIASTOLIC BLOOD PRESSURE: 84 MMHG | BODY MASS INDEX: 39.7 KG/M2 | HEART RATE: 76 BPM | OXYGEN SATURATION: 98 % | HEIGHT: 71 IN | SYSTOLIC BLOOD PRESSURE: 132 MMHG | TEMPERATURE: 98.1 F | RESPIRATION RATE: 16 BRPM | WEIGHT: 283.6 LBS

## 2020-03-23 DIAGNOSIS — G47.9 DYSSOMNIA: ICD-10-CM

## 2020-03-23 DIAGNOSIS — E11.9 TYPE 2 DIABETES MELLITUS WITHOUT COMPLICATION, WITHOUT LONG-TERM CURRENT USE OF INSULIN (HCC): Primary | ICD-10-CM

## 2020-03-23 LAB — GLUCOSE BLDC GLUCOMTR-MCNC: 117 MG/DL (ref 70–130)

## 2020-03-23 PROCEDURE — 99214 OFFICE O/P EST MOD 30 MIN: CPT | Performed by: FAMILY MEDICINE

## 2020-03-23 PROCEDURE — 82947 ASSAY GLUCOSE BLOOD QUANT: CPT | Performed by: FAMILY MEDICINE

## 2020-03-23 RX ORDER — TRAZODONE HYDROCHLORIDE 100 MG/1
100 TABLET ORAL NIGHTLY
Qty: 30 TABLET | Refills: 2 | Status: SHIPPED | OUTPATIENT
Start: 2020-03-23 | End: 2020-06-29

## 2020-03-23 NOTE — PROGRESS NOTES
Subjective   Kellen Esparza is a 54 y.o. female.     Chief Complaint   Patient presents with   • Diabetes       History of Present Illness     Acute complaints:  Kellen Esparza is a 54 y.o. female who presents today for 1-month follow-up on her diabetes.  She has hypertension and hyperlipidemia, which have been stable on diet, exercise, and atorvastatin 80 mg.  She is a diabetic on metformin monotherapy but has been unable to tolerate it on a regular basis due to GI side effects.  She was started on Ozempic 0.25 mg weekly and Januvia 50 mg daily on 2/24/2020.  She had a meeting with the diabetic educator scheduled on 2/28/2020 and this went well.    This patient is accompanied by their self who contributes to the history of their care.    The following portions of the patient's history were reviewed and updated as appropriate: allergies, current medications, past family history, past medical history, past social history, past surgical history and problem list.    Active Ambulatory Problems     Diagnosis Date Noted   • Chest pain 07/21/2017   • Dyspnea on exertion 07/21/2017   • Essential hypertension 07/21/2017   • Pain of right breast 07/21/2017   • Lower extremity edema 07/21/2017   • Diabetes mellitus (CMS/HCC) 07/21/2017   • Abnormal stress test 08/03/2017   • Migraine 08/10/2017   • History of pulmonary embolus (PE) 01/01/1997   • Hyperlipidemia 08/10/2017   • DDD (degenerative disc disease), cervical 09/06/2017   • Coronary artery disease of native artery of native heart with stable angina pectoris (CMS/[facility]) 09/11/2017   • AV block, complete (S/P PPM 7/2019) 04/30/2019   • Restrictive pattern on PFT's without evidence of ILD 08/02/2019   • Obesity, Class II, BMI 35-39.9 08/02/2019   • ALYSIA on CPAP 08/02/2019   • Pain in pacemaker pocket 11/13/2019   • Paroxysmal atrial fibrillation (CMS/HCC) 11/13/2019   • Complex regional pain syndrome type I 11/14/2019   • Chronic anticoagulation 11/14/2019   • Pacemaker  11/14/2019   • Morbid obesity (CMS/HCC) 11/14/2019   • Entrapment neuropathy 11/14/2019     Resolved Ambulatory Problems     Diagnosis Date Noted   • No Resolved Ambulatory Problems     Past Medical History:   Diagnosis Date   • Arthritis    • Hypertension    • Musculoligamentous strain    • Pulmonary embolism (CMS/[facility]) 1997   • Sleep apnea    • Wears eyeglasses      Past Surgical History:   Procedure Laterality Date   • BREAST BIOPSY Left 2014   • CARDIAC CATHETERIZATION Left 8/10/2017    Procedure: Cardiac Catheterization/Vascular Study;  Surgeon: Johny Sommer MD;  Location:  [facility] INVASIVE LOCATION;  Service:    • CARDIAC ELECTROPHYSIOLOGY PROCEDURE N/A 7/19/2019    Procedure: Pacemaker DC new;  Surgeon: Sonya Gay MD;  Location:  [facility] CATH INVASIVE LOCATION;  Service: Cardiology   • CHOLECYSTECTOMY     • COLONOSCOPY     • ENDOSCOPY     • HYSTERECTOMY  11/2014   • INSERT / REPLACE / REMOVE PACEMAKER     • JOINT REPLACEMENT Right     knee   • KNEE SURGERY Right 12/2017    total replacement   • NASAL SINUS SURGERY     • OOPHORECTOMY  11/2014   • PULMONARY EMBOLISM SURGERY      right lung   • TUBAL ABDOMINAL LIGATION       Family History   Problem Relation Age of Onset   • Breast cancer Mother 42   • Heart failure Mother    • No Known Problems Father    • Hypertension Brother    • Hypertension Maternal Grandmother    • Hypertension Maternal Grandfather    • No Known Problems Paternal Grandmother    • No Known Problems Paternal Grandfather    • Heart failure Son    • Hypertension Sister    • Ovarian cancer Neg Hx      Social History     Socioeconomic History   • Marital status: Single     Spouse name: Not on file   • Number of children: Not on file   • Years of education: Not on file   • Highest education level: Not on file   Tobacco Use   • Smoking status: Never Smoker   • Smokeless tobacco: Never Used   Substance and Sexual Activity   • Alcohol use: No     Frequency: Never     Comment: rare  "  • Drug use: No   • Sexual activity: Defer   Social History Narrative    Patient consumes NO CAFFEINE     Patient lives at home with partner, Donald.         Lifelong non-smoker    Does not drink alcohol    Works as a          Review of Systems   Constitutional: Negative.    HENT: Negative.    Eyes: Negative.    Respiratory: Negative for cough, chest tightness, shortness of breath and wheezing.    Cardiovascular: Negative for chest pain and palpitations.   Gastrointestinal: Negative for abdominal distention, abdominal pain, constipation, diarrhea, nausea and vomiting.   Musculoskeletal: Negative for gait problem and joint swelling.   Skin: Positive for rash. Negative for color change and wound.   Psychiatric/Behavioral: Negative for agitation and hallucinations.       Objective   Blood pressure 132/84, pulse 76, temperature 98.1 °F (36.7 °C), temperature source Oral, resp. rate 16, height 180.5 cm (71.06\"), weight 129 kg (283 lb 9.6 oz), last menstrual period 10/31/2014, SpO2 98 %, not currently breastfeeding.  Nursing note reviewed  Physical Exam  Const: NAD, A&Ox4, Pleasant, Cooperative  Eyes: EOMI, no conjunctivitis  ENT: No nasal discharge present, neck supple  Cardiac: Regular rate and rhythm, no cyanosis  Resp: Respiratory rate within normal limits, no increased work of breathing, no audible wheezing or retractions noted  GI: No distention or ascites  MSK: Motor and sensation grossly intact in bilateral upper extremities  Neurologic: CN II-XII grossly intact  Psych: Appropriate mood and behavior.  Skin: Rash noted on right forearm  Procedures  Assessment/Plan   Problem List Items Addressed This Visit        Endocrine    Diabetes mellitus (CMS/[facility]) - Primary    Overview     Diabetic educator meeting on Friday 2/28/20, he was supposed to have a follow-up next week but is not sure whether this will be scheduled.  She was also supposed to have a yearly diabetic eye exam " "tomorrow but this has been rescheduled as well.  Did not tolerate metformin.  She was started on Ozempic 0.25 mg weekly and Januvia 50 mg daily on 2/24/2020.  She is tolerating both of these medications well without side effects.  She will increase to 0.5 mg weekly of the Ozempic starting tomorrow.  Fingerstick blood sugar today is adequate at 117.         Relevant Orders    POCT Glucose (Completed)      Other Visit Diagnoses     Dyssomnia        Relevant Medications    traZODone (DESYREL) 100 MG tablet          We will plan to obtain previous records both for chronic preventative care as well as those related to the current episode of care.  Any records that the patient brought with her today were reviewed personally by me during the visit today and will be scanned into the chart for posterity.    Patient Instructions   1.  Continue Ozempic, now at 0.5 mg weekly.  Continue Januvia 50 mg daily.  Continue work on diet and exercise as tolerated.    2.  Change amitriptyline to trazodone at night.  Let your pain doctor know you made this change.    Information about novel coronavirus    STAY HOME! This is the best thing we can all do to prevent the spread of disease. You'll hear the term \"social distancing\" a lot, but what we mean is \"physical distancing\". Respiratory droplets can typically travel 6 feet in the air, which is where the 6 foot rule comes from. Grocery shop once per week, and use a [employer] service if available. Do not have visitors to your home. Get your flu shot yearly if you can. Stop touching your face. Wash your hands often with soap for at least 20 seconds.          What if I think I have COVID-19?    Since there is no curative medication for viral illnesses including COVID-19, much of the care is symptomatic and supportive. You do not need to come into clinic to be seen unless you worsen, and while there is a shortage of tests you would unfortunately not currently qualify for testing unless you are " profoundly symptomatic and requiring hospitalization. Here is what I recommend:  -You should take your temperature twice per day (or every 2 hours for the first 48 hours if you have a known exposure to SARS-CoV2/COVID-19) and let us know if you develop any fever over 101.5.  If you do not have a thermometer at home, that is ok. Monitor your chest and back and if you feel hot to touch in these areas, consider this a fever. Also monitor for continuous cough (coughing a lot for more than an hour, or more than 3 episodes in 24 hours), shortness of breath, and muscle aches.  -It is okay to take Tylenol every 6 hours throughout the day for fever or muscle aches, but keep daily Tylenol amount below 2,000mg.  -Other than that you should use DayQuil cold and flu or equivalent for symptoms.  A teaspoon of honey helps a cough about as much as most cough medicines. Supplements which may be helpful are zinc and Elderberry, although the evidence for these is limited.  High doses of vitamin C may also help, and this can be obtained with citrus juice as easily as with a supplement. A humidifier (such as Damion's) may help some of the nighttime symptoms of cough.  -Intranasal fluticasone 2 sprays each nostril BID and intranasal ipratropium 0.06% 2 sprays 4 times daily as needed may be prescribed for inflammation and sinus symptoms.   -As long as your symptoms are tolerable, you should stay at home and self isolate as much as possible.  If you are coughing or have sinus drainage do not go in public, and at all other times maintain 6 feet from others if possible.  If you develop a fever (>100.5), you should stay quarantined at home for at least 7 days after your last episode of fever.  If you worsen, develop shortness of breath or chest pain, or your fever spikes above 103, you should call back immediately.  -If you have symptoms of coronavirus, you'll need to stay at home for 7 days. If you live with someone who has symptoms, you'll  need to stay at home for 14 days from the day the first person in the home started having symptoms.            Return in about 2 months (around 5/23/2020) for with A1c;.    Ambulatory progress note signed and attested to by Ryne Puente D.O.

## 2020-03-23 NOTE — PATIENT INSTRUCTIONS
"1.  Continue Ozempic, now at 0.5 mg weekly.  Continue Januvia 50 mg daily.  Continue work on diet and exercise as tolerated.    2.  Change amitriptyline to trazodone at night.  Let your pain doctor know you made this change.    Information about novel coronavirus    STAY HOME! This is the best thing we can all do to prevent the spread of disease. You'll hear the term \"social distancing\" a lot, but what we mean is \"physical distancing\". Respiratory droplets can typically travel 6 feet in the air, which is where the 6 foot rule comes from. Grocery shop once per week, and use a [employer] service if available. Do not have visitors to your home. Get your flu shot yearly if you can. Stop touching your face. Wash your hands often with soap for at least 20 seconds.          What if I think I have COVID-19?    Since there is no curative medication for viral illnesses including COVID-19, much of the care is symptomatic and supportive. You do not need to come into clinic to be seen unless you worsen, and while there is a shortage of tests you would unfortunately not currently qualify for testing unless you are profoundly symptomatic and requiring hospitalization. Here is what I recommend:  -You should take your temperature twice per day (or every 2 hours for the first 48 hours if you have a known exposure to SARS-CoV2/COVID-19) and let us know if you develop any fever over 101.5.  If you do not have a thermometer at home, that is ok. Monitor your chest and back and if you feel hot to touch in these areas, consider this a fever. Also monitor for continuous cough (coughing a lot for more than an hour, or more than 3 episodes in 24 hours), shortness of breath, and muscle aches.  -It is okay to take Tylenol every 6 hours throughout the day for fever or muscle aches, but keep daily Tylenol amount below 2,000mg.  -Other than that you should use DayQuil cold and flu or equivalent for symptoms.  A teaspoon of honey helps a cough about " as much as most cough medicines. Supplements which may be helpful are zinc and Elderberry, although the evidence for these is limited.  High doses of vitamin C may also help, and this can be obtained with citrus juice as easily as with a supplement. A humidifier (such as Dam[employer]'s) may help some of the nighttime symptoms of cough.  -Intranasal fluticasone 2 sprays each nostril BID and intranasal ipratropium 0.06% 2 sprays 4 times daily as needed may be prescribed for inflammation and sinus symptoms.   -As long as your symptoms are tolerable, you should stay at home and self isolate as much as possible.  If you are coughing or have sinus drainage do not go in public, and at all other times maintain 6 feet from others if possible.  If you develop a fever (>100.5), you should stay quarantined at home for at least 7 days after your last episode of fever.  If you worsen, develop shortness of breath or chest pain, or your fever spikes above 103, you should call back immediately.  -If you have symptoms of coronavirus, you'll need to stay at home for 7 days. If you live with someone who has symptoms, you'll need to stay at home for 14 days from the day the first person in the home started having symptoms.

## 2020-03-24 DIAGNOSIS — E11.9 TYPE 2 DIABETES MELLITUS WITHOUT COMPLICATION, WITHOUT LONG-TERM CURRENT USE OF INSULIN (HCC): ICD-10-CM

## 2020-03-24 RX ORDER — SEMAGLUTIDE 1.34 MG/ML
INJECTION, SOLUTION SUBCUTANEOUS
Qty: 1.5 ML | OUTPATIENT
Start: 2020-03-24

## 2020-03-31 ENCOUNTER — TELEPHONE (OUTPATIENT)
Dept: PAIN MEDICINE | Facility: CLINIC | Age: 55
End: 2020-03-31

## 2020-04-01 NOTE — TELEPHONE ENCOUNTER
Patient reports that her pacemaker is working properly.   She reports that she has new onset bilateral feet swelling x 1 week. Denies to legs.   She report that her blood glucose is good right now with Januvia and an injection. She reports that last Monday it was 133.

## 2020-04-01 NOTE — TELEPHONE ENCOUNTER
LVM with patient to follow with PCP or cardiologist regarding lower extremity edema and to continue currently prescribed medications.

## 2020-04-13 DIAGNOSIS — G56.40 COMPLEX REGIONAL PAIN SYNDROME TYPE 2, AFFECTING UNSPECIFIED SITE: ICD-10-CM

## 2020-05-04 RX ORDER — LEVETIRACETAM 250 MG/1
TABLET ORAL
Qty: 60 TABLET | Refills: 1 | Status: SHIPPED | OUTPATIENT
Start: 2020-05-04 | End: 2020-06-30

## 2020-05-06 RX ORDER — PREGABALIN 150 MG/1
CAPSULE ORAL
Qty: 60 CAPSULE | OUTPATIENT
Start: 2020-05-06

## 2020-06-01 RX ORDER — BACLOFEN 10 MG/1
TABLET ORAL
Qty: 60 TABLET | Refills: 0 | Status: SHIPPED | OUTPATIENT
Start: 2020-06-01 | End: 2020-06-01

## 2020-06-01 RX ORDER — BACLOFEN 10 MG/1
TABLET ORAL
Qty: 60 TABLET | Refills: 1 | Status: SHIPPED | OUTPATIENT
Start: 2020-06-01 | End: 2020-11-23

## 2020-06-01 RX ORDER — BACLOFEN 10 MG/1
TABLET ORAL
Qty: 60 TABLET | Refills: 5 | Status: SHIPPED | OUTPATIENT
Start: 2020-06-01 | End: 2020-06-01

## 2020-06-01 RX ORDER — PYRIDOXINE HCL (VITAMIN B6) 100 MG
TABLET ORAL
Qty: 30 TABLET | Refills: 5 | Status: SHIPPED | OUTPATIENT
Start: 2020-06-01 | End: 2020-07-13 | Stop reason: SDUPTHER

## 2020-06-17 ENCOUNTER — TELEPHONE (OUTPATIENT)
Dept: PAIN MEDICINE | Facility: CLINIC | Age: 55
End: 2020-06-17

## 2020-06-17 DIAGNOSIS — G56.40 COMPLEX REGIONAL PAIN SYNDROME TYPE 2, AFFECTING UNSPECIFIED SITE: ICD-10-CM

## 2020-06-17 NOTE — TELEPHONE ENCOUNTER
Patient called into the office today with complaints of increasing pain. She states that she is ok dealing without the stimulator trial because of COVID-19. She states that she is wanting other treatment options besides going to an ED for evaluation. She states that she has not been taking the Nucyenta due to running out and is willing to give that another try if that is what Dr. Choi recommends. She states that she thinks this is because she is having to sit longer hours at work than normal. It is disrupting her sleep as well. She states that she will come in for an appointment if that is needed as well.     If refill is sent please send to [facility] as listed in chart.     Call back number is [phone].

## 2020-06-28 DIAGNOSIS — G47.9 DYSSOMNIA: ICD-10-CM

## 2020-06-28 NOTE — PROGRESS NOTES
"Chief Complaint: \"I still have severe skin sensitivity in the left upper chest that now is spreading into the left shoulder and left arm.\"      History of Present Illness:  Ms. Kellen Esparza, 54 y.o. female, originally referred by Dr. Sonya Gay in consultation for chronic intractable left upper chest wall pacemaker insertion site pain.   Pain History: Patient reports a now over 1- year history of pain, which began after her pacemaker was implanted. Patient reports that 1 week after implantation of her pacemaker device, she started experiencing her current pain. Patient reported that she had some blistering when they removed the dressings around the insertion site, but those resolved within the following 2 weeks. Unfortunately, her pain has persisted. Patient saw Dr. Johny Leblanc who performed a steroid injection in her wound that apparently caused her more pain. Records are not available for review.  Patient was last seen on January 9, 2020, by Dr. Choi for follow-up evaluation.  Patient underwent diagnostic stellate ganglion block followed by therapeutic stellate ganglion block. She was examined after each procedure by Dr. Choi in the recovery room and she reported complete pain relief that touching her pacemaker site did not cause her any pain. Her hyperesthesia, hyperalgesia and allodynia all resolved after each block. Unfortunately, she reports that she started experiencing pain again when the effect of the local anesthetic had worn off within the next 24-48 hours. Despite continuous recommendations to start an intensive PT and intensive CBT programs to assist in controlling her pain condition, she has neither participated in physical therapy nor did she participate in CBT despite medical advice. She continuously no showed or did not answer her phone for PT appointments. In addition, she didn't go back to Dr Jose Lam for intensive CBT and biofeedback. It has been explained Mrs Esparza " "several times that the treatment of her condition is challenging and involves a multimodal and multidisciplinary approach.  She has canceled numerous follow-up appointments at this practice due to COVID-19, and most recently no-show to her last appointment in May.  She understands the approach to her condition, her pain has just became more debilitating. She underwent follow-up cardiology consultation with Dr. Johny Sommer on January 6, 2020.  Patient has a complete AV block with paroxysmal atrial fibrillation. S/p [employer] pacemaker.  At her last visit, it was recommended she follow through with a spinal cord stimulator trial for her continued CRPS.  She returns today to consult regarding further interventional recommendations, medication refill, and follow-up evaluation. She denies any changes in her medical history.  Pain Description: Constant pain with intermittent exacerbation, described as burning, sharp and \"worse than shingles sensation.\"   Radiation of pain: The pain radiates from the pacemaker site on the left upper quadrant of her chest wall into the anterior aspect of the left shoulder and proximal arm and into the left side of her neck. In addition, today she reports pain \"all over my spine.\"  Pain intensity today: 10/10   Average pain intensity last week: 9/10  Pain intensity ranges from: 8/10 to 10/10  Aggravating factors: Pain increases with laying on the left side, light touch, when water touches the skin when showering. Patient describes severe hypersensitivity around the area of her pacemaker generator to the extent that she has been homebound. She feels significant pain every time she tries to perform any activities. She stopped going shopping. She has severe pain when water touches the skin when showering.  Alleviating factors: Pain decreases with analgesics and topical compounded cream, opiods, Lyrica     Associated Symptoms:   Patient denies pain, numbness or weakness in the upper or " "lower extremities  Patient denies any new bladder or bowel problems.   Patient denies difficulties with her balance or falls  Pain interferes with her sleep causing fragmentation.    Review of previous therapies and additional medical records:  Kellen Esparza has already failed the following measures, including:   Conservative measures: Oral analgesics, opioids and topical analgesics   Interventional measures: Steroid/Lidocaine Injection by Dr. Leblanc- Records requested- not yet available   Diagnostic and therapeutic stellate ganglion blocks, as referenced 11/20/2019 and 12/11/2019.  Surgical measures: Left chest wall pacemaker placement, 07/19/2019 by Dr. Gay  Patient underwent psychological consultation with Dr. Jose Lam on December 3, 2019: \"From a psychological perspective, patient appears to be able to tolerate interventional pain procedures or a spinal cord stimulator at this time.  Patient meets the criteria for adjustment disorder with mixed anxiety and depressed mood.  This indicates she is having difficulty coping with her current life situation which is causing significant reactive anxiety and depression.\"  Kellen Esparza presents with significant comorbidities including insomnia, obesity, hypertension, non-insulin dependent diabetes, PE 21 years ago, ALYSIA on CPAP, complete AV block s/p pacemaker placement ([employer]), coronary artery disease of native artery of native heart with stable angina pectoris, on Eliquis, mixed hyperlipidemia, engaged in treatment.  In terms of current analgesics, Kellen Esparza takes: Meloxicam, Lyrica 150 mg BID, Topical Compounded Cream, Keppra, baclofen, nortriptyline.  Patient also takes trazodone, Lexapro, trazodone, and Imitrex.  I have reviewed [facility] Report #[id number] consistent to medication reconciliation.     Global Pain Scale 11-14  2019 01-09 2020 06-30 2020             Pain  20  24  25             Feelings    6  15  20           "   Clinical outcomes  20  15  21             Activities  23 25  25             GPS Total:  69  79  91                Review of New Diagnostic Studies:  MRI CERVICAL SPINE WO CONTRAST-02/25/2020:  Grossly normal caliber and contour of the cervical cord and cervical spinal canal without evidence for congenital narrowing demonstrating multilevel spondylitic changes greatest at the C5-C6 level and C7-T1 level. At the C5-C6 level, there is combined disc osteophyte complex with moderate bilateral uncovertebral spurring and hypertrophy producing  mild to moderate left paracentral spinal canal stenosis with mild to moderate right and mild left neuroforaminal stenoses. At the C7-T1 level, there is moderate to severe left neuroforaminal stenosis.  C2-C3: Mild to moderate disc osteophyte complex with a left lateral predominance of irregularity along with moderate uncovertebral spurring and hypertrophy left greater than right producing mild left neuroforaminal stenosis.  C3-C4: Moderate to large disc osteophyte complex of a slight right lateral predominance along with fairly symmetric moderate to severe uncovertebral spurring and hypertrophy producing mild anterior thecal  sac effacement and spinal canal stenosis with a slight right paracentral and right lateral predominance along with moderate right and mild to moderate left neuroforaminal stenoses.  C4-C5: Moderate disc osteophyte complex along with moderate to severe uncovertebral spurring and hypertrophy producing mild anterior thecal sac effacement and spinal canal stenosis with mild bilateral  neuroforaminal stenoses.  C5-C6: Moderate to large disc osteophyte complex with a slight left paracentral predominance of irregularity and involvement along with moderate bilateral uncovertebral spurring and hypertrophy fairly symmetric in appearance producing mild to moderate left paracentral spinal canal stenosis with mild to moderate right and mild left neuroforaminal  stenoses.  C6-C7: Mild disc osteophyte complex with a mild anterior thecal sac effacement and spinal canal stenosis, however no neuroforaminal stenosis.  C7-T1: Moderate to large disc osteophyte complex fairly symmetric in appearance along with moderate uncovertebral spurring and hypertrophy producing mild anterior thecal sac effacement with mild right and moderate to severe left neuroforaminal stenoses.  MRI THORACIC SPINE WO CONTRAST-02/25/2020: heights are preserved without compression deformity or fracture evident and no aggressive bone marrow signal findings with a couple areas of T1  and T2 increased signal consistent of benign spinal hemangiomas. Normal caliber and contour of the thoracic cord without intrinsic signal abnormality.  No degenerative changes of spinal canal or significant neuroforaminal stenosis evident throughout the thoracic segments. No advanced ligamentum flavum thickening or posterior element irregularity throughout the segments as well. Thoracic spine without spinal canal or neuroforaminal stenosis of significance and no degenerative changes as the intracanalicular space and overall spinal canal is widely patent.      Review of Diagnostic Studies:    Xray Chest, 10/08/2019: Heart size and pulmonary vascularity are within normal limits. The lungs are expanded and clear. Dual-chamber cardiac pacemaker. No pleural effusion.  CT Chest, 09/30/2019: no evidence of pulmonary fibrosis. No evidence for subpleural reticulation or bronchiolectasis. Very mild lower lobe bronchial wall thickening is identified with bilateral lower lobe (right greater than left) scarring/atelectasis. No evidence of focal airspace disease to suggest pneumonia. No pleural effusion or pneumothorax identified. The thyroid does not demonstrate abnormality. No axillary or mediastinal lymphadenopathy identified. No pericardial effusion. Cardiac conduction device is identified. The visualized upper abdomen is unrevealing. The  surrounding soft tissues do not demonstrate abnormality.  Xray Chest, 07/20/2019: Left-sided dual-lead pacemaker has been placed. The heart and vasculature are normal in size. Lungs appear well-expanded and clear.       Review of Systems   Respiratory: Positive for apnea.    Psychiatric/Behavioral: Positive for sleep disturbance.   All other systems reviewed and are negative.        Patient Active Problem List   Diagnosis   • Chest pain   • Dyspnea on exertion   • Essential hypertension   • Pain of right breast   • Lower extremity edema   • Diabetes mellitus (CMS/HCC)   • Abnormal stress test   • Migraine   • History of pulmonary embolus (PE)   • Hyperlipidemia   • DDD (degenerative disc disease), cervical   • Coronary artery disease of native artery of native heart with stable angina pectoris (CMS/HCC)   • AV block, complete (S/P PPM 7/2019)   • Restrictive pattern on PFT's without evidence of ILD   • Obesity, Class II, BMI 35-39.9   • ALYSIA on CPAP   • Pain in pacemaker pocket   • Paroxysmal atrial fibrillation (CMS/HCC)   • Complex regional pain syndrome type I   • Chronic anticoagulation   • Pacemaker   • Morbid obesity (CMS/HCC)   • Entrapment neuropathy       Past Medical History:   Diagnosis Date   • Arthritis    • Diabetes mellitus (CMS/HCC)     doesnt check sugar    • Hypertension    • Migraine    • Musculoligamentous strain    • Pulmonary embolism (CMS/HCC) 1997   • Sleep apnea    • Wears eyeglasses          Past Surgical History:   Procedure Laterality Date   • BREAST BIOPSY Left 2014   • CARDIAC CATHETERIZATION Left 8/10/2017    Procedure: Cardiac Catheterization/Vascular Study;  Surgeon: Johny Sommer MD;  Location:  [facility];  Service:    • CARDIAC ELECTROPHYSIOLOGY PROCEDURE N/A 7/19/2019    Procedure: Pacemaker DC new;  Surgeon: Sonya Gay MD;  Location:  [facility];  Service: Cardiology   • CHOLECYSTECTOMY     • COLONOSCOPY     • ENDOSCOPY     •  HYSTERECTOMY  11/2014   • INSERT / REPLACE / REMOVE PACEMAKER     • JOINT REPLACEMENT Right     knee   • KNEE SURGERY Right 12/2017    total replacement   • NASAL SINUS SURGERY     • OOPHORECTOMY  11/2014   • PULMONARY EMBOLISM SURGERY      right lung   • TUBAL ABDOMINAL LIGATION           Family History   Problem Relation Age of Onset   • Breast cancer Mother 42   • Heart failure Mother    • No Known Problems Father    • Hypertension Brother    • Hypertension Maternal Grandmother    • Hypertension Maternal Grandfather    • No Known Problems Paternal Grandmother    • No Known Problems Paternal Grandfather    • Heart failure Son    • Hypertension Sister    • Ovarian cancer Neg Hx          Social History     Socioeconomic History   • Marital status: Single     Spouse name: Not on file   • Number of children: Not on file   • Years of education: Not on file   • Highest education level: Not on file   Tobacco Use   • Smoking status: Never Smoker   • Smokeless tobacco: Never Used   Substance and Sexual Activity   • Alcohol use: No     Frequency: Never     Comment: rare   • Drug use: No   • Sexual activity: Defer   Social History Narrative    Patient consumes NO CAFFEINE     Patient lives at home with partner, Donald.         Lifelong non-smoker    Does not drink alcohol    Works as a            Current Outpatient Medications:   •  apixaban (ELIQUIS) 5 MG tablet tablet, Take 1 tablet by mouth 2 (Two) Times a Day., Disp: 180 tablet, Rfl: 3  •  aspirin 81 MG tablet, Take 1 tablet by mouth Daily., Disp: 30 tablet, Rfl: 11  •  atorvastatin (LIPITOR) 80 MG tablet, Take 1 tablet by mouth Daily., Disp: 90 tablet, Rfl: 3  •  baclofen (LIORESAL) 10 MG tablet, TAKE 1/2 TO 1 TABLET BY MOUTH TWICE DAILY AS NEEDED FOR MUSCLE SPASMS, Disp: 60 tablet, Rfl: 1  •  Dietary Management Product (RHEUMATE PO), Take  by mouth., Disp: , Rfl:   •  estradiol (ESTRACE) 1 MG tablet, Take 1 mg by mouth Daily.,  Disp: , Rfl:   •  Gel Base gel, CAPSAICIN 0.001% IMIPRAMINE 3% LIDOCAINE 10% MANNITOL 20% MELOXICAM 0.1% PRILOCAINE 2%. APPLY 1-2 G TO AREA 3-4 TIMES DAILY, Disp: 30 g, Rfl: PRN  •  hydrochlorothiazide (MICROZIDE) 12.5 MG capsule, Take 1 capsule by mouth Daily., Disp: 30 capsule, Rfl: 11  •  levETIRAcetam (KEPPRA) 250 MG tablet, TAKE 1 TABLET BY MOUTH TWICE DAILY, Disp: 60 tablet, Rfl: 1  •  nortriptyline (PAMELOR) 25 MG capsule, TAKE 1-2 CAPSULES AT BEDTIME, Disp: 60 capsule, Rfl: 5  •  pregabalin (LYRICA) 150 MG capsule, TK ONE C PO BID, Disp: , Rfl: 5  •  pyridoxine (VITAMIN B-6) 100 MG tablet tablet, TAKE 1 TABLET BY MOUTH EVERY DAY, Disp: 30 tablet, Rfl: 5  •  Semaglutide,0.25 or 0.5MG/DOS, (Ozempic, 0.25 or 0.5 MG/DOSE,) 2 MG/1.5ML solution pen-injector, Inject 0.5 mg under the skin into the appropriate area as directed 1 (One) Time Per Week., Disp: 3 pen, Rfl: 3  •  SITagliptin (JANUVIA) 50 MG tablet, Take 1 tablet by mouth Daily., Disp: 30 tablet, Rfl: 5  •  tapentadol (NUCYNTA) 50 MG tablet, Take 1 hour before physical therapy, Disp: 15 tablet, Rfl: 0  •  valsartan (DIOVAN) 40 MG tablet, Take 0.5 tablets by mouth Daily., Disp: 45 tablet, Rfl: 0  •  meloxicam (MOBIC) 15 MG tablet, , Disp: , Rfl:   •  Multiple Vitamins-Minerals (CENTRUM WOMEN PO), Take 1 capsule by mouth Daily. gummies, Disp: , Rfl:   •  SUMAtriptan (IMITREX) 50 MG tablet, Take 50 mg by mouth Every 2 (Two) Hours As Needed for Migraine. Take one tablet at onset of headache. May repeat dose one time in 2 hours if headache not relieved., Disp: , Rfl:   •  traZODone (DESYREL) 100 MG tablet, TAKE 1 TABLET BY MOUTH EVERY NIGHT, Disp: 30 tablet, Rfl: 2  •  vitamin D (ERGOCALCIFEROL) 51144 UNITS capsule capsule, Take 50,000 Units by mouth 1 (One) Time Per Week. Wednesday, Disp: , Rfl:       Allergies   Allergen Reactions   • Metformin GI Intolerance   • Lisinopril Cough         /70 (BP Location: Right arm, Patient Position: Sitting)   Pulse 79  "  Temp 97.8 °F (36.6 °C)   Resp 16   Ht 180.3 cm (71\")   Wt 113 kg (249 lb 9.6 oz)   LMP 10/31/2014 (LMP Unknown)   SpO2 98%   BMI 34.81 kg/m²       Physical Exam:  Constitutional: Patient is oriented to person, place, and time.   Patient appears well-developed and well-nourished.   HEENT: Head: Normocephalic and atraumatic. Eyes: Conjunctivae and lids are normal. Pupils: Equal, round, reactive to light.   Neck: Trachea normal. Neck supple. No JVD present.   Lymphatic: No cervical adenopathy  Pulmonary Respiratory effort: No increased work of breathing or signs of respiratory distress. Auscultation of lungs: Clear to auscultation.   Cardiovascular Auscultation of heart: Normal rate and rhythm, normal S1 and S2, no murmurs.   Musculoskeletal   Gait and station: Gait evaluation demonstrated a normal gait   Cervical spine: Passive and active range of motion are limited secondary to pain. Extension, flexion, lateral flexion, rotation of the cervical spine increased and reproduced pain. Cervical facet joint loading maneuvers are positive.   Muscles: Presence of active trigger points: Bilateral trapezius and bilateral levator scapulae  Neurological:   Patient is alert and oriented to person, place, and time.   Speech: speech is normal.   Cortical function: Normal mental status.   Cranial nerves: Cranial nerves 2-12 intact.   Reflex Scores:  Right brachioradialis: 2+  Left brachioradialis: 2+  Right biceps: 2+  Left biceps: 2+  Right triceps: 2+  Left triceps: 2+  Right patellar: 1+  Left patellar: 1+  Right Achilles: 1+  Left Achilles: 1+  Motor strength: 5/5  Motor Tone: normal tone.   Involuntary movements: none.   Superficial/Primitive Reflexes: primitive reflexes were absent.   Right Barakat: absent  Left Barakat: absent  Right ankle clonus: absent  Left ankle clonus: absent   Babinsky: absent  Spurling sign is negative. Neck tornado test is negative. Lhermitte sign is negative. Negative long tract signs. " "  Sensation: No sensory loss. Sensory exam: intact to light touch, intact pain and temperature sensation, intact vibration sensation and normal proprioception. There is hyperalgesia, hyperesthesia, and allodynia around the surgical wound of her pacemaker site and the superior aspect of the left chest wall, her anterior fusion. The surgical wound is healed with a little bit of a keloid.  It is really hard to palpate and mobilized the pacemaker generator to assess if there is pain at the pocket site. The \"superficial pain\" is overwhelming. The rest of the exam remains negative.  Coordination: Normal finger to nose and heel to shin. Normal balance and  negative Romberg's sign   Skin and subcutaneous tissue: Skin is warm and intact. No rash noted. No cyanosis.  There is a keloid at the surgical site.  There is no erythema, drainage, or apparent fluid accumulation at the generator site  Psychiatric: Judgment and insight: Normal. Orientation to person, place and time: Normal. Recent and remote memory: Intact. Mood and affect: Normal.     ASSESSMENT:   1. Complex regional pain syndrome type 1 affecting other site    2. Entrapment neuropathy    3. Pain in pacemaker pocket    4. Pacemaker    5. Chronic anticoagulation    6. Paroxysmal atrial fibrillation (CMS/HCC)    7. AV block, complete (S/P PPM 7/2019)    8. Coronary artery disease of native artery of native heart with stable angina pectoris (CMS/HCC)    9. Obesity, Class II, BMI 35-39.9    10. Type 2 diabetes mellitus without complication, without long-term current use of insulin (CMS/HCC)          PLAN/MEDICAL DECISION MAKING: I had a lengthy conversation with Ms. Kellen Esparza regarding her chronic pain condition and potential therapeutic options including risks, benefits, alternative therapies, to name a few. Patient presents with a history of unrelenting pain at that pacemaker generator insertion site, which started 1 week after surgical implantation. Patient " developed severe hyperalgesia, hyperesthesia and allodynia at the generator site that it is spreading towards her left shoulder and proximal aspect of her left arm and is consistent with neuropathic pain, particularly CRPS of her chest wall.  She discontinued her Lyrica, due to not obtaining refills.  I will refill this today. I have reviewed all available patient's medical records as well as previous therapies as referenced above. Patient has failed to obtain pain relief with conservative measures and interventional pain management measures, as referenced above. The treatment of her condition requires of a multidisciplinary and multimodal approach. Therefore, I have proposed the following plan:  1. Diagnostics:   A. CBC, PT, PTT before SCS trial  B. Random Urine drug screening  2. Interventional pain management measures: Patient will need to stop apixaban (Eliquis) at least 72 hours between last dose and procedure and remain off of her Eliquis for 4 additional days (Dr. Gay) in order to proceed with a spinal cord stimulator trial with [employer]. Patient understands that the treatment of her condition requires of a multi-model and multidisciplinary approach.   3. Pharmacological measures: Reviewed. Discussed.   A.  Patient takes Topical Compounded Cream. Patient also takes Lexapro, Imitrex, and trazodone.  B.  Continue nortriptyline to 25 mg 1-2 tablets at bedtime  C. Continue baclofen 10 mg 1/2 to 1 tablet BID times a day as needed muscle spasm  D. Continue Keppra 250 mg BID  E. Continue Rheumate one tablet twice daily  F. Continue pyridoxine 100 mg one tablet by mouth daily  G. Continue alpha lipoid acid 8217-3485 mg per day divided into 3 doses  H. Continue prilocaine 2%, lidocaine 10%, imipramine 3%, capsaicin 0.001% and mannitol 20%  cream, apply 1 to 2 grams of cream to the affected areas every 4 to 6 hours as needed  I.  Continue tapentadol 50 mg PRN for severe pain.   J. Resume Lyrica 150  mg BID.  4. Long-term rehabilitation efforts:  A. Patient will start a comprehensive physical therapy program for desensitization techniques and myofascial release   B. Contrast therapy: Apply ice-packs for 15-20 minutes, followed by heating pads for 15-20 minutes to affected area   C. Referral to Dr. Jose Lam for psychological evaluation, cognitive behavioral therapy and biofeedback.  D. Referral to [facility]  4. The patient has been instructed to contact my office with any questions or difficulties. The patient understands the plan and agrees to proceed accordingly.      Patient Care Team:  Ryne Puente DO as PCP - General (Family Medicine)  Johny Sommer MD as Consulting Physician (Cardiology)  Sonya Gay MD as Consulting Physician (Cardiology)  Deb Guajardo APRN as Nurse Practitioner (Pulmonary Disease)  Otis Choi MD as Consulting Physician (Pain Medicine)     No orders of the defined types were placed in this encounter.        Future Appointments   Date Time Provider Department Center   7/2/2020  1:45 PM Ryne Puente DO [facility] None   8/3/2020  1:45 PM Johny Sommer MD [facility] None         CORRIE Maloney     EMR Dragon/Transcription disclaimer:  Much of this encounter note is an electronic transcription of spoken language to printed text. Electronic transcription of spoken language may permit erroneous, or at times, nonsensical words or phrases to be inadvertently transcribed. Although I have reviewed the note for such errors, some may still exist.

## 2020-06-29 RX ORDER — TRAZODONE HYDROCHLORIDE 100 MG/1
100 TABLET ORAL NIGHTLY
Qty: 30 TABLET | Refills: 2 | Status: SHIPPED | OUTPATIENT
Start: 2020-06-29 | End: 2020-09-21 | Stop reason: SDUPTHER

## 2020-06-30 ENCOUNTER — OFFICE VISIT (OUTPATIENT)
Dept: PAIN MEDICINE | Facility: CLINIC | Age: 55
End: 2020-06-30

## 2020-06-30 ENCOUNTER — LAB (OUTPATIENT)
Dept: LAB | Facility: HOSPITAL | Age: 55
End: 2020-06-30

## 2020-06-30 VITALS
HEIGHT: 71 IN | TEMPERATURE: 97.8 F | DIASTOLIC BLOOD PRESSURE: 70 MMHG | WEIGHT: 249.6 LBS | OXYGEN SATURATION: 98 % | HEART RATE: 79 BPM | BODY MASS INDEX: 34.94 KG/M2 | RESPIRATION RATE: 16 BRPM | SYSTOLIC BLOOD PRESSURE: 120 MMHG

## 2020-06-30 DIAGNOSIS — G90.59 COMPLEX REGIONAL PAIN SYNDROME TYPE 1 AFFECTING OTHER SITE: ICD-10-CM

## 2020-06-30 DIAGNOSIS — E66.9 OBESITY, CLASS II, BMI 35-39.9: ICD-10-CM

## 2020-06-30 DIAGNOSIS — Z79.01 CHRONIC ANTICOAGULATION: ICD-10-CM

## 2020-06-30 DIAGNOSIS — I48.0 PAROXYSMAL ATRIAL FIBRILLATION (HCC): ICD-10-CM

## 2020-06-30 DIAGNOSIS — R52 PAIN IN PACEMAKER POCKET: ICD-10-CM

## 2020-06-30 DIAGNOSIS — E11.9 TYPE 2 DIABETES MELLITUS WITHOUT COMPLICATION, WITHOUT LONG-TERM CURRENT USE OF INSULIN (HCC): ICD-10-CM

## 2020-06-30 DIAGNOSIS — G58.9 ENTRAPMENT NEUROPATHY: ICD-10-CM

## 2020-06-30 DIAGNOSIS — I44.2 AV BLOCK, COMPLETE (HCC): ICD-10-CM

## 2020-06-30 DIAGNOSIS — Z95.0 PACEMAKER: ICD-10-CM

## 2020-06-30 DIAGNOSIS — I25.118 CORONARY ARTERY DISEASE OF NATIVE ARTERY OF NATIVE HEART WITH STABLE ANGINA PECTORIS (HCC): ICD-10-CM

## 2020-06-30 DIAGNOSIS — I10 ESSENTIAL HYPERTENSION: ICD-10-CM

## 2020-06-30 LAB
AMPHET+METHAMPHET UR QL: NEGATIVE
AMPHETAMINES UR QL: NEGATIVE
APTT PPP: 27.3 SECONDS (ref 24–37)
BARBITURATES UR QL SCN: NEGATIVE
BENZODIAZ UR QL SCN: NEGATIVE
BUPRENORPHINE SERPL-MCNC: NEGATIVE NG/ML
CANNABINOIDS SERPL QL: NEGATIVE
COCAINE UR QL: NEGATIVE
DEPRECATED RDW RBC AUTO: 41.1 FL (ref 37–54)
ERYTHROCYTE [DISTWIDTH] IN BLOOD BY AUTOMATED COUNT: 14.2 % (ref 12.3–15.4)
HCT VFR BLD AUTO: 37.9 % (ref 34–46.6)
HGB BLD-MCNC: 12.8 G/DL (ref 12–15.9)
INR PPP: 1.08 (ref 0.85–1.16)
MCH RBC QN AUTO: 27.3 PG (ref 26.6–33)
MCHC RBC AUTO-ENTMCNC: 33.8 G/DL (ref 31.5–35.7)
MCV RBC AUTO: 80.8 FL (ref 79–97)
METHADONE UR QL SCN: NEGATIVE
OPIATES UR QL: NEGATIVE
OXYCODONE UR QL SCN: NEGATIVE
PCP UR QL SCN: NEGATIVE
PLATELET # BLD AUTO: 176 10*3/MM3 (ref 140–450)
PMV BLD AUTO: 13.6 FL (ref 6–12)
PROPOXYPH UR QL: NEGATIVE
PROTHROMBIN TIME: 13.7 SECONDS (ref 11.5–14)
RBC # BLD AUTO: 4.69 10*6/MM3 (ref 3.77–5.28)
TRICYCLICS UR QL SCN: NEGATIVE
WBC # BLD AUTO: 9.18 10*3/MM3 (ref 3.4–10.8)

## 2020-06-30 PROCEDURE — 85610 PROTHROMBIN TIME: CPT

## 2020-06-30 PROCEDURE — 36415 COLL VENOUS BLD VENIPUNCTURE: CPT | Performed by: NURSE PRACTITIONER

## 2020-06-30 PROCEDURE — 85007 BL SMEAR W/DIFF WBC COUNT: CPT

## 2020-06-30 PROCEDURE — 85730 THROMBOPLASTIN TIME PARTIAL: CPT | Performed by: NURSE PRACTITIONER

## 2020-06-30 PROCEDURE — 99214 OFFICE O/P EST MOD 30 MIN: CPT | Performed by: NURSE PRACTITIONER

## 2020-06-30 PROCEDURE — 80306 DRUG TEST PRSMV INSTRMNT: CPT | Performed by: NURSE PRACTITIONER

## 2020-06-30 PROCEDURE — 85025 COMPLETE CBC W/AUTO DIFF WBC: CPT

## 2020-06-30 RX ORDER — PREGABALIN 150 MG/1
150 CAPSULE ORAL 2 TIMES DAILY
Qty: 60 CAPSULE | Refills: 0 | Status: SHIPPED | OUTPATIENT
Start: 2020-06-30 | End: 2020-07-30 | Stop reason: SDUPTHER

## 2020-06-30 RX ORDER — LEVETIRACETAM 250 MG/1
TABLET ORAL
Qty: 60 TABLET | Refills: 1 | Status: SHIPPED | OUTPATIENT
Start: 2020-06-30 | End: 2020-08-24 | Stop reason: SDUPTHER

## 2020-06-30 RX ORDER — MELOXICAM 15 MG/1
TABLET ORAL
COMMUNITY
Start: 2020-06-29 | End: 2020-09-08

## 2020-06-30 RX ORDER — VALSARTAN 40 MG/1
20 TABLET ORAL DAILY
Qty: 45 TABLET | Refills: 0
Start: 2020-06-30 | End: 2020-08-07 | Stop reason: SDUPTHER

## 2020-07-01 LAB
ANISOCYTOSIS BLD QL: ABNORMAL
EOSINOPHIL # BLD MANUAL: 0.09 10*3/MM3 (ref 0–0.4)
EOSINOPHIL NFR BLD MANUAL: 1 % (ref 0.3–6.2)
LYMPHOCYTES # BLD MANUAL: 3.15 10*3/MM3 (ref 0.7–3.1)
LYMPHOCYTES NFR BLD MANUAL: 3 % (ref 5–12)
LYMPHOCYTES NFR BLD MANUAL: 34.3 % (ref 19.6–45.3)
MICROCYTES BLD QL: ABNORMAL
MONOCYTES # BLD AUTO: 0.28 10*3/MM3 (ref 0.1–0.9)
NEUTROPHILS # BLD AUTO: 5.65 10*3/MM3 (ref 1.7–7)
NEUTROPHILS NFR BLD MANUAL: 61.6 % (ref 42.7–76)
PLAT MORPH BLD: NORMAL
POLYCHROMASIA BLD QL SMEAR: ABNORMAL
WBC MORPH BLD: NORMAL

## 2020-07-13 ENCOUNTER — TELEPHONE (OUTPATIENT)
Dept: PAIN MEDICINE | Facility: CLINIC | Age: 55
End: 2020-07-13

## 2020-07-13 DIAGNOSIS — Z79.01 CHRONIC ANTICOAGULATION: Primary | ICD-10-CM

## 2020-07-13 DIAGNOSIS — G90.59 COMPLEX REGIONAL PAIN SYNDROME TYPE 1 AFFECTING OTHER SITE: Primary | ICD-10-CM

## 2020-07-13 RX ORDER — PYRIDOXINE HCL (VITAMIN B6) 100 MG
100 TABLET ORAL DAILY
Qty: 30 TABLET | Refills: 5 | Status: SHIPPED | OUTPATIENT
Start: 2020-07-13 | End: 2021-11-17

## 2020-07-13 NOTE — TELEPHONE ENCOUNTER
I called and spoke with Mrs. Esparza today, I informed her of the approval for her spinal cord stimulator trial.  We will have her scheduled on July 27, she is aware of the requirement of the COVID-19 testing.  She is also aware we will await blood thinner clearance from Dr. Gay, she will stop her Eliquis 72 hours prior to her procedure, and remain off her Eliquis for 4 days after during the trial.  In addition she is aware she will obtain labs on Sunday, the day before her scheduled trial.  She had no further questions at this time, in addition, I will mail her details.  I informed her to call the office if she has any other questions. She was very thankful for the call.

## 2020-07-15 ENCOUNTER — TELEPHONE (OUTPATIENT)
Dept: PAIN MEDICINE | Facility: CLINIC | Age: 55
End: 2020-07-15

## 2020-07-15 DIAGNOSIS — G56.40 COMPLEX REGIONAL PAIN SYNDROME TYPE 2, AFFECTING UNSPECIFIED SITE: ICD-10-CM

## 2020-07-15 NOTE — TELEPHONE ENCOUNTER
Request # : [id number]  01/22/2020 Gabapentin 0 1965 1 4 Sonya Gay [facility]  [facility]arm[facility]  [place] 1  01/22/2020 Ketamine Hcl 0 1965 3 4 Sonya Gay [facility]  Pharmacy  [place] 1  01/27/2020 Pregabalin 150MG 1963 60 30 Sonya Gay [place] [employer] [place] 2  01/31/2020 Nucynta 50MG 1965 15 15 Vascello,Otis [place] [employer] [employer] [place] 2  02/26/2020 Nucynta 50MG 1963 15 15 Vascello,Otis [place] [employer] [place] 2  03/10/2020 Pregabalin 150MG 1963 60 30 Sonya Gay [place] [employer] [place] 2  03/19/2020 Nucynta 50MG 1963 15 15 Vascello,Tois [place] [employer] [place] 2  03/24/2020 Gabapentin 0 1965 1 4 Sonya Gay [facility]  Pharmacy  [place] 1  03/24/2020 Ketamine Hcl 0 1965 3 4 Sonya Gay [facility]  Pharmacy  [place] 1  04/13/2020 Nucynta 50MG 1963 15 15 Vascello,Otis [place] [employer] [place] 2  06/17/2020 Nucynta 50MG 1963 15 30 Vascello,Otis [place] [employer] [place]  06/30/2020 Pregabalin 150MG 1965 60 30 Janett Regalado [place] [employer] [employer] [place]

## 2020-07-15 NOTE — TELEPHONE ENCOUNTER
----- Message from Sonya Gay MD sent at 7/15/2020  7:52 AM EDT -----    I have not seen Kellen since December however it should not be a problem for her to be off the Eliquis briefly for procedure.  Her risk for stroke on any given day is a fraction of a percent.    Sonya Gay MD, [facility]    ----- Message -----  From: Cari Saldivar RN  Sent: 7/13/2020   3:51 PM EDT  To: Sonya Gay MD    7/13/2020        Dr. Sonya Gay     Kellen Young 1965 is a mutual patient, who needs to be scheduled for a procedure that requires her to be off anticoagulant therapy for a period of time. She will need to stop apixaban (Eliquis) at least 72 hours between last dose and procedure (longer in renal impairment) and 4 days after the procedure.   The patient is currently scheduled for her spinal cord stimulator trial on 07/27/2020.   I would appreciate your input and confirmation that this is feasible for this patient.  Thank you very much for your help in this matter.     Personal and professional regards,       Otis Choi MD/CORRIE Crawley/Cari Saldivar RN

## 2020-07-15 NOTE — TELEPHONE ENCOUNTER
Patient notified to hold blood thinner Eliquis for 72 hours prior and throughout procedure. She requested a refill of Nucyenta. See refill encounter.

## 2020-07-17 ENCOUNTER — OFFICE VISIT (OUTPATIENT)
Dept: FAMILY MEDICINE CLINIC | Facility: CLINIC | Age: 55
End: 2020-07-17

## 2020-07-17 VITALS
SYSTOLIC BLOOD PRESSURE: 132 MMHG | HEIGHT: 71 IN | WEIGHT: 252 LBS | OXYGEN SATURATION: 95 % | DIASTOLIC BLOOD PRESSURE: 90 MMHG | BODY MASS INDEX: 35.28 KG/M2 | HEART RATE: 77 BPM

## 2020-07-17 DIAGNOSIS — E11.9 TYPE 2 DIABETES MELLITUS WITHOUT COMPLICATION, WITHOUT LONG-TERM CURRENT USE OF INSULIN (HCC): Primary | ICD-10-CM

## 2020-07-17 LAB — HBA1C MFR BLD: 6 %

## 2020-07-17 PROCEDURE — 99214 OFFICE O/P EST MOD 30 MIN: CPT | Performed by: FAMILY MEDICINE

## 2020-07-17 PROCEDURE — 83036 HEMOGLOBIN GLYCOSYLATED A1C: CPT | Performed by: FAMILY MEDICINE

## 2020-07-24 ENCOUNTER — APPOINTMENT (OUTPATIENT)
Dept: PREADMISSION TESTING | Facility: HOSPITAL | Age: 55
End: 2020-07-24

## 2020-07-24 DIAGNOSIS — G90.59 COMPLEX REGIONAL PAIN SYNDROME TYPE 1 AFFECTING OTHER SITE: Primary | ICD-10-CM

## 2020-07-24 PROCEDURE — U0004 COV-19 TEST NON-CDC HGH THRU: HCPCS

## 2020-07-24 PROCEDURE — C9803 HOPD COVID-19 SPEC COLLECT: HCPCS

## 2020-07-24 PROCEDURE — U0002 COVID-19 LAB TEST NON-CDC: HCPCS

## 2020-07-25 LAB
REF LAB TEST METHOD: NORMAL
SARS-COV-2 RNA RESP QL NAA+PROBE: NOT DETECTED

## 2020-07-26 ENCOUNTER — LAB (OUTPATIENT)
Dept: LAB | Facility: HOSPITAL | Age: 55
End: 2020-07-26

## 2020-07-26 DIAGNOSIS — E11.9 TYPE 2 DIABETES MELLITUS WITHOUT COMPLICATION, WITHOUT LONG-TERM CURRENT USE OF INSULIN (HCC): ICD-10-CM

## 2020-07-26 LAB
APTT PPP: 24.5 SECONDS (ref 24–37)
BASOPHILS # BLD AUTO: 0.04 10*3/MM3 (ref 0–0.2)
BASOPHILS NFR BLD AUTO: 0.5 % (ref 0–1.5)
DEPRECATED RDW RBC AUTO: 39.5 FL (ref 37–54)
EOSINOPHIL # BLD AUTO: 0.14 10*3/MM3 (ref 0–0.4)
EOSINOPHIL NFR BLD AUTO: 1.9 % (ref 0.3–6.2)
ERYTHROCYTE [DISTWIDTH] IN BLOOD BY AUTOMATED COUNT: 13.5 % (ref 12.3–15.4)
HCT VFR BLD AUTO: 36 % (ref 34–46.6)
HGB BLD-MCNC: 12.1 G/DL (ref 12–15.9)
INR PPP: 1.05 (ref 0.85–1.16)
LYMPHOCYTES # BLD AUTO: 2.61 10*3/MM3 (ref 0.7–3.1)
LYMPHOCYTES NFR BLD AUTO: 35.6 % (ref 19.6–45.3)
MCH RBC QN AUTO: 27.2 PG (ref 26.6–33)
MCHC RBC AUTO-ENTMCNC: 33.6 G/DL (ref 31.5–35.7)
MCV RBC AUTO: 80.9 FL (ref 79–97)
MONOCYTES # BLD AUTO: 0.3 10*3/MM3 (ref 0.1–0.9)
MONOCYTES NFR BLD AUTO: 4.1 % (ref 5–12)
NEUTROPHILS NFR BLD AUTO: 4.2 10*3/MM3 (ref 1.7–7)
NEUTROPHILS NFR BLD AUTO: 57.4 % (ref 42.7–76)
PLATELET # BLD AUTO: 184 10*3/MM3 (ref 140–450)
PMV BLD AUTO: 12.6 FL (ref 6–12)
PROTHROMBIN TIME: 13.4 SECONDS (ref 11.5–14)
RBC # BLD AUTO: 4.45 10*6/MM3 (ref 3.77–5.28)
WBC # BLD AUTO: 7.33 10*3/MM3 (ref 3.4–10.8)

## 2020-07-26 PROCEDURE — 36415 COLL VENOUS BLD VENIPUNCTURE: CPT | Performed by: NURSE PRACTITIONER

## 2020-07-26 PROCEDURE — 85730 THROMBOPLASTIN TIME PARTIAL: CPT | Performed by: NURSE PRACTITIONER

## 2020-07-26 PROCEDURE — 85025 COMPLETE CBC W/AUTO DIFF WBC: CPT | Performed by: NURSE PRACTITIONER

## 2020-07-26 PROCEDURE — 85610 PROTHROMBIN TIME: CPT | Performed by: NURSE PRACTITIONER

## 2020-07-27 ENCOUNTER — OUTSIDE FACILITY SERVICE (OUTPATIENT)
Dept: PAIN MEDICINE | Facility: CLINIC | Age: 55
End: 2020-07-27

## 2020-07-27 ENCOUNTER — TELEPHONE (OUTPATIENT)
Dept: PAIN MEDICINE | Facility: CLINIC | Age: 55
End: 2020-07-27

## 2020-07-27 PROBLEM — Z46.2 ENCOUNTER FOR FITTING AND ADJUSTMENT OF NEUROPACEMAKER OF SPINAL CORD: Status: ACTIVE | Noted: 2020-07-27

## 2020-07-27 PROCEDURE — 63650 IMPLANT NEUROELECTRODES: CPT | Performed by: ANESTHESIOLOGY

## 2020-07-27 PROCEDURE — 99152 MOD SED SAME PHYS/QHP 5/>YRS: CPT | Performed by: ANESTHESIOLOGY

## 2020-07-27 RX ORDER — ATORVASTATIN CALCIUM 80 MG/1
80 TABLET, FILM COATED ORAL DAILY
Qty: 90 TABLET | Refills: 0 | Status: SHIPPED | OUTPATIENT
Start: 2020-07-27 | End: 2020-10-27 | Stop reason: SDUPTHER

## 2020-07-27 RX ORDER — SEMAGLUTIDE 1.34 MG/ML
INJECTION, SOLUTION SUBCUTANEOUS
Qty: 4 PEN | Refills: 2 | Status: SHIPPED | OUTPATIENT
Start: 2020-07-27 | End: 2020-10-23 | Stop reason: SDUPTHER

## 2020-07-27 RX ORDER — TIZANIDINE 2 MG/1
TABLET ORAL
Qty: 30 TABLET | Refills: 0 | Status: SHIPPED | OUTPATIENT
Start: 2020-07-27 | End: 2020-08-10

## 2020-07-27 RX ORDER — NORTRIPTYLINE HYDROCHLORIDE 25 MG/1
CAPSULE ORAL
Qty: 60 CAPSULE | Refills: 5 | Status: SHIPPED | OUTPATIENT
Start: 2020-07-27 | End: 2021-01-18

## 2020-07-27 NOTE — TELEPHONE ENCOUNTER
Spoke with patient. Advised to continue to hold Eliquis until after the leads were removed on Thursday. Advised that a different muscle relaxer was sent in to [facility] this am. Advised to take regularly prescribed medications except Eliquis and to avoid Nucyenta if possible and to take only one muscle relaxer at a time. Patient verbalized understanding. No further needs expressed.

## 2020-07-27 NOTE — PROGRESS NOTES
"Chief Complaint: \"I did very well during the stimulator trial.\"      Brief History: Mrs. Kellen Esparza is a 54 y.o. female, who returns to the clinic for possible spinal cord stimulator reprogramming and removal of spinal cord stimulator trial leads placed on 07/27/2020. Ms. Kellen Esparza reports almost 100% pain relief of her chronic chest wall pain along with remarkable functional improvement with the use of her stimulator device.  She reports a remarkable decrease in her hyperalgesia, hyperesthesia, and allodynia of her chest wall.  She has been able to significantly increase her activity levels. She went back to work during her trial. She was able to touch her chest without feeling any pain. In addition, patient reports improvement of her nocturnal pain with the use of the SCS device.  Patient did exceedingly well through her trial I did not require of spinal cord stimulator reprogramming.  Pain level is rated as 0/10 with the stimulator \"turned on.”   Patient level ranges from 0/10 to 0/10 with the use of the spinal cord stimulator.    Patient did not take additional analgesics throughout her spinal cord stimulator trial. She has remained afebrile throughout the trial. Dressings are dry and intact. Patient denies any complications related to the procedure.   Patient denies pain, numbness and weakness in the upper or lower extremities.  Patient denies any new bladder or bowel problems.   Patient is very satisfied with the trial and would like to move forward with implantation of a spinal cord stimulator device.     Pain History:  Ms. Kellen Esparza originally referred by Dr. Sonya Gay in consultation for chronic intractable left upper chest wall pacemaker insertion site pain. Patient reports a now over 1- year history of pain, which began after her pacemaker was implanted. Patient reports that 1 week after implantation of her pacemaker device, she started experiencing her current pain. Patient " "reported that she had some blistering when they removed the dressings around the insertion site, but those resolved within the following 2 weeks. Unfortunately, her pain has persisted. Patient saw Dr. Johny Leblanc who performed a steroid injection in her wound that apparently caused her more pain. Patient underwent diagnostic stellate ganglion block followed by therapeutic stellate ganglion block. She was examined after each procedure in the recovery room and she reported complete pain relief  with resolution of her hyperesthesia, hyperalgesia and allodynia in the chest wall region in the area of her pacemaker after each block. Unfortunately, she reports that she started experiencing pain again when the effect of the local anesthetic had worn off within the next 48 hours. Despite continuous recommendations to start an intensive PT and intensive CBT programs to assist in controlling her pain condition, she has neither participated in physical therapy nor did she participate in CBT despite medical advice. In addition, she didn't go back to Dr Jose Lam for intensive CBT and biofeedback. She has canceled numerous follow-up appointments at this practice due to COVID-19, and most recently no-show to her last appointment in May.  She understands the approach to her condition, her pain has just became more debilitating. She underwent follow-up cardiology consultation with Dr. Johny Sommer on January 6, 2020.  Patient has a complete AV block with paroxysmal atrial fibrillation. S/p [employer] pacemaker.  At her last visit, it was recommended she follow through with a spinal cord stimulator trial for her continued CRPS.    Pain Description: Constant pain with intermittent exacerbation, described as burning, sharp and \"worse than shingles sensation.\"   Radiation of pain: The pain radiates from the pacemaker site on the left upper quadrant of her chest wall into the anterior aspect of the left shoulder and proximal " "arm and into the left side of her neck. In addition, today she reports pain \"all over my spine.\"  Pain intensity today: 10/10   Average pain intensity last week: 9/10  Pain intensity ranges from: 8/10 to 10/10  Aggravating factors: Pain increases with laying on the left side, light touch, when water touches the skin when showering. Patient describes severe hypersensitivity around the area of her pacemaker generator to the extent that she has been homebound. She feels significant pain every time she tries to perform any activities. She stopped going shopping. She has severe pain when water touches the skin when showering.  Alleviating factors: Pain decreases with analgesics and topical compounded cream, opiods, Lyrica   Associated Symptoms:   Patient denies pain, numbness or weakness in the upper or lower extremities  Patient denies any new bladder or bowel problems.   Patient denies difficulties with her balance or falls  Pain interferes with her sleep causing fragmentation.     Review of previous therapies and additional medical records:  Kellen Esparza has already failed the following measures, including:   Conservative measures: Oral analgesics, opioids and topical analgesics   Interventional measures: Steroid/Lidocaine Injection by Dr. Leblanc- Records requested- not yet available   Diagnostic and therapeutic stellate ganglion blocks, as referenced 11/20/2019 and 12/11/2019.  Surgical measures: Left chest wall pacemaker placement, 07/19/2019 by Dr. Gay  Patient underwent psychological consultation with Dr. Joes Lam on December 3, 2019: \"From a psychological perspective, patient appears to be able to tolerate interventional pain procedures or a spinal cord stimulator at this time.  Patient meets the criteria for adjustment disorder with mixed anxiety and depressed mood.  This indicates she is having difficulty coping with her current life situation which is causing significant reactive anxiety and " "depression.\"  Kellen Esparza presents with significant comorbidities including insomnia, obesity, hypertension, non-insulin dependent diabetes, PE 21 years ago, ALYSIA on CPAP, complete AV block s/p pacemaker placement ([employer]), coronary artery disease of native artery of native heart with stable angina pectoris, on Eliquis, mixed hyperlipidemia, engaged in treatment.  In terms of current analgesics, Kellen Esparza takes: Meloxicam, Lyrica 150 mg BID, Topical Compounded Cream, Keppra, baclofen, nortriptyline.  Patient also takes trazodone, Lexapro, trazodone, and Imitrex.  I have reviewed Smith Report #[id number] consistent to medication reconciliation    Global Pain Scale 11-14  2019 01-09 2020 06-30 2020 07-30 2020           Pain  20  24  25  7           Feelings    6  15  20  0           Clinical outcomes  20  15  21  3           Activities  23  25  25  24           GPS Total:  69  79  91  34              Diagnostic Studies:  MRI CERVICAL SPINE WO CONTRAST-02/25/2020:  Grossly normal caliber and contour of the cervical cord and cervical spinal canal without evidence for congenital narrowing demonstrating multilevel spondylitic changes greatest at the C5-C6 level and C7-T1 level. At the C5-C6 level, there is combined disc osteophyte complex with moderate bilateral uncovertebral spurring and hypertrophy producing  mild to moderate left paracentral spinal canal stenosis with mild to moderate right and mild left neuroforaminal stenoses. At the C7-T1 level, there is moderate to severe left neuroforaminal stenosis.  C2-C3: Mild to moderate disc osteophyte complex with a left lateral predominance of irregularity along with moderate uncovertebral spurring and hypertrophy left greater than right producing mild left neuroforaminal stenosis.  C3-C4: Moderate to large disc osteophyte complex of a slight right lateral predominance along with fairly symmetric moderate to severe uncovertebral spurring and hypertrophy " producing mild anterior thecal  sac effacement and spinal canal stenosis with a slight right paracentral and right lateral predominance along with moderate right and mild to moderate left neuroforaminal stenoses.  C4-C5: Moderate disc osteophyte complex along with moderate to severe uncovertebral spurring and hypertrophy producing mild anterior thecal sac effacement and spinal canal stenosis with mild bilateral  neuroforaminal stenoses.  C5-C6: Moderate to large disc osteophyte complex with a slight left paracentral predominance of irregularity and involvement along with moderate bilateral uncovertebral spurring and hypertrophy fairly symmetric in appearance producing mild to moderate left paracentral spinal canal stenosis with mild to moderate right and mild left neuroforaminal stenoses.  C6-C7: Mild disc osteophyte complex with a mild anterior thecal sac effacement and spinal canal stenosis, however no neuroforaminal stenosis.  C7-T1: Moderate to large disc osteophyte complex fairly symmetric in appearance along with moderate uncovertebral spurring and hypertrophy producing mild anterior thecal sac effacement with mild right and moderate to severe left neuroforaminal stenoses.  MRI THORACIC SPINE WO CONTRAST-02/25/2020: heights are preserved without compression deformity or fracture evident and no aggressive bone marrow signal findings with a couple areas of T1 and T2 increased signal consistent of benign spinal hemangiomas. Normal caliber and contour of the thoracic cord without intrinsic signal abnormality.  No degenerative changes of spinal canal or significant neuroforaminal stenosis evident throughout the thoracic segments. No advanced ligamentum flavum thickening or posterior element irregularity throughout the segments as well. Thoracic spine without spinal canal or neuroforaminal stenosis of significance and no degenerative changes as the intracanalicular space and overall spinal canal is widely  "patent.  Xray Chest, 10/08/2019: Heart size and pulmonary vascularity are within normal limits. The lungs are expanded and clear. Dual-chamber cardiac pacemaker. No pleural effusion.  CT Chest, 09/30/2019: no evidence of pulmonary fibrosis. No evidence for subpleural reticulation or bronchiolectasis. Very mild lower lobe bronchial wall thickening is identified with bilateral lower lobe (right greater than left) scarring/atelectasis. No evidence of focal airspace disease to suggest pneumonia. No pleural effusion or pneumothorax identified. The thyroid does not demonstrate abnormality. No axillary or mediastinal lymphadenopathy identified. No pericardial effusion. Cardiac conduction device is identified. The visualized upper abdomen is unrevealing. The surrounding soft tissues do not demonstrate abnormality.  Xray Chest, 07/20/2019: Left-sided dual-lead pacemaker has been placed. The heart and vasculature are normal in size. Lungs appear well-expanded and clear.       The following portions of the patient's history were reviewed and updated as appropriate: problem list, past medical history, past surgery history, social history, family history, medications, and allergies    Review of Systems    /100   Pulse 77   Temp 97.1 °F (36.2 °C)   Resp 18   Ht 180.3 cm (71\")   Wt 117 kg (258 lb 12.8 oz)   LMP 10/31/2014 (LMP Unknown)   SpO2 98%   BMI 36.10 kg/m²       Physical Exam   Constitutional: Patient is oriented to person, place, and time.   Patient appears well-developed and well-nourished.   HEENT: Head: Normocephalic and atraumatic.   Eyes: Conjunctivae and lids are normal.   Pupils: Equal, round, reactive to light.   Neck: Trachea normal. Neck supple. No JVD present.   Lymphatic: No cervical adenopathy  Pulmonary Respiratory effort: No increased work of breathing or signs of respiratory distress. Auscultation of lungs: Clear to auscultation.   Cardiovascular Auscultation of heart: Normal rate and rhythm, " "normal S1 and S2, no murmurs.   Musculoskeletal   Gait and station: Gait evaluation demonstrated a normal gait   Cervical spine: Passive and active range of motion are limited secondary to pain. Extension, flexion, lateral flexion, rotation of the cervical spine increased and reproduced pain. Cervical facet joint loading maneuvers are positive.   Muscles: Presence of active trigger points: Bilateral trapezius and bilateral levator scapulae  Neurological:   Patient is alert and oriented to person, place, and time.   Speech: speech is normal.   Cortical function: Normal mental status.   Cranial nerves: Cranial nerves 2-12 intact.   Reflex Scores:  Right brachioradialis: 2+  Left brachioradialis: 2+  Right biceps: 2+  Left biceps: 2+  Right triceps: 2+  Left triceps: 2+  Right patellar: 1+  Left patellar: 1+  Right Achilles: 1+  Left Achilles: 1+  Motor strength: 5/5  Motor Tone: normal tone.   Involuntary movements: none.   Superficial/Primitive Reflexes: primitive reflexes were absent.   Right Barakat: absent  Left Barakat: absent  Right ankle clonus: absent  Left ankle clonus: absent   Babinsky: absent  Spurling sign is negative. Neck tornado test is negative. Lhermitte sign is negative. Negative long tract signs.   Sensation: No sensory loss. Sensory exam: intact to light touch, intact pain and temperature sensation, intact vibration sensation and normal proprioception. There is hyperalgesia, hyperesthesia, and allodynia around the surgical wound of her pacemaker site and the superior aspect of the left chest wall, her anterior fusion. The surgical wound is healed with a little bit of a keloid.  It is really hard to palpate and mobilized the pacemaker generator to assess if there is pain at the pocket site. The \"superficial pain\" is overwhelming. The rest of the exam remains negative.  Coordination: Normal finger to nose and heel to shin. Normal balance and  negative Romberg's sign   Skin and subcutaneous tissue: " Skin is warm and intact. No rash noted. No cyanosis.  There is a keloid at the surgical site.  There is no erythema, drainage, or apparent fluid accumulation at the generator site  Psychiatric: Judgment and insight: Normal. Orientation to person, place and time: Normal. Recent and remote memory: Intact. Mood and affect: Normal.      PROCEDURES:   Procedure #1: Analysis of the spinal cord stimulator device without spinal cord stimulator reprogramming   Patient used the Ditto spinal cord stimulator device 100% of the time since initiation of the trial phase. There are four programs    Program THREE (Best Program):    Electrode polarities: 3-, 4+, 5+, 6+, 7-, 8+, 26+, 27+, 29-, 30-, 32+  Amplitude: 1.7 mA     Pulse width: 310 mcs  Rate: 40 Hz    A copy of the telemetry report will be scanned in the patient's chart.    Procedure #2: Removal of spinal cord stimulator trial leads.   Two leads were removed with tips intact. There is no erythema, drainage, or fluid accumulation at the site. The area was cleansed with chlorhexidine.  A small Covaderm was applied.     ASSESSMENT:   1. Complex regional pain syndrome type 1 affecting other site    2. Entrapment neuropathy    3. Pain in pacemaker pocket    4. Pacemaker    5. Paroxysmal atrial fibrillation (CMS/HCC)    6. AV block, complete (S/P PPM 7/2019)    7. Coronary artery disease of native artery of native heart with stable angina pectoris (CMS/HCC)    8. Obesity, Class II, BMI 35-39.9    9. Type 2 diabetes mellitus without complication, without long-term current use of insulin (CMS/HCC)    10. ALYSIA on CPAP    11. Encounter for fitting and adjustment of neuropacemaker of spinal cord        PLAN/MEDICAL DECISION MAKING: Patient's chronic pain condition has been significantly improved during her SCS trial. Patient presents with a longstanding history of unrelenting pain due to CRPS in the region of her pacemaker generator insertion site. Pain started 1 week after  surgical implantation. Patient developed severe hyperalgesia, hyperesthesia and allodynia at the generator site that progressively encompassed a larger area including her left shoulder and left arm, and her chest wall consistent with criteria for CRPS of her chest wall. I have reviewed all available patient's medical records as well as previous therapies as referenced above. Patient has failed to obtain pain relief with conservative measures and interventional pain management measures, as referenced under HPI and on previous notes. Patient is very satisfied with the trial and would like to move forward with implantation of a spinal cord stimulator device. I had a lengthy conversation with Ms. Kellen Esparza regarding her chronic pain condition and potential therapeutic options including risks, benefits, alternative therapies, to name a few. The treatment of her condition requires of a multidisciplinary and multimodal approach. Therefore, I have proposed the following plan:  1. Referral to Dr. Victor Manuel Geiger in consultation for implantation of a spinal cord stimulator device. I have recommended Photos to Photos Artisan lead with the top electrodes projecting at the level of the superior endplate of the T1 vertebral level. I have recommended Photos to Photos Precision Montage IPG (full body MRI compatible). Patient will follow-up with me thereaftter.   2. Resume Eliquis this evening  3. Pharmacological measures: Reviewed. Discussed.   A. Continue Topical Compounded Cream. Patient also takes Lexapro, Imitrex, and trazodone.  B.  Continue nortriptyline to 25 mg 1-2 tablets at bedtime  C. Continue baclofen 10 mg 1/2 to 1 tablet BID times a day as needed muscle spasm  D. Continue Keppra 250 mg BID  E. Continue Rheumate one tablet twice daily  F. Continue pyridoxine 100 mg one tablet by mouth daily  G. Continue alpha lipoid acid 8721-3950 mg per day divided into 3 doses  H. Continue prilocaine 2%, lidocaine 10%,  imipramine 3%, capsaicin 0.001% and mannitol 20%  cream, apply 1 to 2 grams of cream to the affected areas every 4 to 6 hours as needed  I.  Continue tapentadol 50 mg PRN for severe pain, refilled today  J. Continue Lyrica 150 mg BID, refilled today  4. Long-term rehabilitation efforts:  A. Patient will start a comprehensive physical therapy program for desensitization techniques and myofascial release  after implantation of her spinal cord stimulator device  B. Continue contrast therapy: Apply ice-packs for 15-20 minutes, followed by heating pads for 15-20 minutes to affected area   C. Follow-up with Dr. Jose Lam for intensive cognitive behavioral therapy and biofeedback.  D. Referral to [facility]  5. The patient has been instructed to contact my office with any questions or difficulties. The patient understands the plan and agrees to proceed accordingly.         Patient Care Team:  Ryne Puente DO as PCP - General (Family Medicine)  Johny Sommer MD as Consulting Physician (Cardiology)  Sonya Gya MD as Consulting Physician (Cardiology)  Deb Guajardo APRN as Nurse Practitioner (Pulmonary Disease)  Otis Choi MD as Consulting Physician (Pain Medicine)     No orders of the defined types were placed in this encounter.        Future Appointments   Date Time Provider Department Center   8/3/2020  1:45 PM Johny Sommer MD [facility] [place] None         Otis Choi MD      EMR Dragon/Transcription disclaimer:  Much of this encounter note is an electronic transcription of spoken language to printed text. Electronic transcription of spoken language may permit erroneous, or at times, nonsensical words or phrases to be inadvertently transcribed. Although I have reviewed the note for such errors, some may still exist.

## 2020-07-28 ENCOUNTER — TELEPHONE (OUTPATIENT)
Dept: PAIN MEDICINE | Facility: CLINIC | Age: 55
End: 2020-07-28

## 2020-07-28 NOTE — TELEPHONE ENCOUNTER
Called and spoke to pt to find out how she is feeling post procedure. Pt states she is doing a little better. States pain is 5 out of 10 on pain scale. Denies any s/s of infection to injection site. Informed that she can apply heat or ice to area QID and may take Tylenol 500mg PO QID PRN and to continue taking all prescribed meds as ordered. Informed pt to return thurs for lead pull at 0845. Pt voiced understanding. Had no further questions/concerns at this time.

## 2020-07-30 ENCOUNTER — OFFICE VISIT (OUTPATIENT)
Dept: PAIN MEDICINE | Facility: CLINIC | Age: 55
End: 2020-07-30

## 2020-07-30 VITALS
WEIGHT: 258.8 LBS | SYSTOLIC BLOOD PRESSURE: 140 MMHG | HEIGHT: 71 IN | TEMPERATURE: 97.1 F | BODY MASS INDEX: 36.23 KG/M2 | RESPIRATION RATE: 18 BRPM | OXYGEN SATURATION: 98 % | DIASTOLIC BLOOD PRESSURE: 100 MMHG | HEART RATE: 77 BPM

## 2020-07-30 DIAGNOSIS — G56.40 COMPLEX REGIONAL PAIN SYNDROME TYPE 2, AFFECTING UNSPECIFIED SITE: ICD-10-CM

## 2020-07-30 DIAGNOSIS — E11.9 TYPE 2 DIABETES MELLITUS WITHOUT COMPLICATION, WITHOUT LONG-TERM CURRENT USE OF INSULIN (HCC): ICD-10-CM

## 2020-07-30 DIAGNOSIS — R52 PAIN IN PACEMAKER POCKET: ICD-10-CM

## 2020-07-30 DIAGNOSIS — I44.2 AV BLOCK, COMPLETE (HCC): ICD-10-CM

## 2020-07-30 DIAGNOSIS — I48.0 PAROXYSMAL ATRIAL FIBRILLATION (HCC): ICD-10-CM

## 2020-07-30 DIAGNOSIS — Z95.0 PACEMAKER: ICD-10-CM

## 2020-07-30 DIAGNOSIS — G47.33 OSA ON CPAP: ICD-10-CM

## 2020-07-30 DIAGNOSIS — G58.9 ENTRAPMENT NEUROPATHY: ICD-10-CM

## 2020-07-30 DIAGNOSIS — Z99.89 OSA ON CPAP: ICD-10-CM

## 2020-07-30 DIAGNOSIS — E66.9 OBESITY, CLASS II, BMI 35-39.9: ICD-10-CM

## 2020-07-30 DIAGNOSIS — I25.118 CORONARY ARTERY DISEASE OF NATIVE ARTERY OF NATIVE HEART WITH STABLE ANGINA PECTORIS (HCC): ICD-10-CM

## 2020-07-30 DIAGNOSIS — G90.59 COMPLEX REGIONAL PAIN SYNDROME TYPE 1 AFFECTING OTHER SITE: ICD-10-CM

## 2020-07-30 DIAGNOSIS — Z46.2 ENCOUNTER FOR FITTING AND ADJUSTMENT OF NEUROPACEMAKER OF SPINAL CORD: ICD-10-CM

## 2020-07-30 PROCEDURE — 95970 ALYS NPGT W/O PRGRMG: CPT | Performed by: ANESTHESIOLOGY

## 2020-07-30 PROCEDURE — 99024 POSTOP FOLLOW-UP VISIT: CPT | Performed by: ANESTHESIOLOGY

## 2020-07-30 RX ORDER — PREGABALIN 150 MG/1
150 CAPSULE ORAL 2 TIMES DAILY
Qty: 60 CAPSULE | Refills: 4 | Status: SHIPPED | OUTPATIENT
Start: 2020-07-30 | End: 2021-02-03

## 2020-07-30 RX ORDER — ME-TETRAHYDROFOLATE/B12/HRB236 1-1-500 MG
1 CAPSULE ORAL 2 TIMES DAILY
Qty: 60 CAPSULE | Refills: 11 | Status: SHIPPED | OUTPATIENT
Start: 2020-07-30 | End: 2020-07-30 | Stop reason: SDUPTHER

## 2020-07-30 RX ORDER — ME-TETRAHYDROFOLATE/B12/HRB236 1-1-500 MG
1 CAPSULE ORAL 2 TIMES DAILY
Qty: 60 CAPSULE | Refills: 11 | Status: SHIPPED | OUTPATIENT
Start: 2020-07-30 | End: 2021-04-05

## 2020-07-30 NOTE — PROGRESS NOTES
Subjective   Kellen Esparza is a 54 y.o. female.     Chief Complaint   Patient presents with   • Follow-up     follow up check up       History of Present Illness     Kellen Esparza presents today for   Chief Complaint   Patient presents with   • Follow-up     follow up check up     she is in need of chronic disease followup. she denies acute complaints today.    This patient is accompanied by their self who contributes to the history of their care.    The following portions of the patient's history were reviewed and updated as appropriate: allergies, current medications, past family history, past medical history, past social history, past surgical history and problem list.    Active Ambulatory Problems     Diagnosis Date Noted   • Chest pain 07/21/2017   • Dyspnea on exertion 07/21/2017   • Essential hypertension 07/21/2017   • Pain of right breast 07/21/2017   • Lower extremity edema 07/21/2017   • Diabetes mellitus (CMS/[facility]) 07/21/2017   • Abnormal stress test 08/03/2017   • Migraine 08/10/2017   • History of pulmonary embolus (PE) 01/01/1997   • Hyperlipidemia 08/10/2017   • DDD (degenerative disc disease), cervical 09/06/2017   • Coronary artery disease of native artery of native heart with stable angina pectoris (CMS/[facility]) 09/11/2017   • AV block, complete (S/P PPM 7/2019) 04/30/2019   • Restrictive pattern on PFT's without evidence of ILD 08/02/2019   • Obesity, Class II, BMI 35-39.9 08/02/2019   • ALYSIA on CPAP 08/02/2019   • Pain in pacemaker pocket 11/13/2019   • Paroxysmal atrial fibrillation (CMS/[facility]) 11/13/2019   • Complex regional pain syndrome type I 11/14/2019   • Chronic anticoagulation 11/14/2019   • Pacemaker 11/14/2019   • Morbid obesity (CMS/[facility]) 11/14/2019   • Entrapment neuropathy 11/14/2019   • Encounter for fitting and adjustment of neuropacemaker of spinal cord 07/27/2020     Resolved Ambulatory Problems     Diagnosis Date Noted   • No Resolved Ambulatory Problems     Past Medical History:    Diagnosis Date   • Arthritis    • Hypertension    • Musculoligamentous strain    • Pulmonary embolism (CMS/HCC) 1997   • Sleep apnea    • Wears eyeglasses      Past Surgical History:   Procedure Laterality Date   • BREAST BIOPSY Left 2014   • CARDIAC CATHETERIZATION Left 8/10/2017    Procedure: Cardiac Catheterization/Vascular Study;  Surgeon: Johny Sommer MD;  Location:  [facility] CATH INVASIVE LOCATION;  Service:    • CARDIAC ELECTROPHYSIOLOGY PROCEDURE N/A 7/19/2019    Procedure: Pacemaker DC new;  Surgeon: Sonya Gay MD;  Location:  [facility] CATH INVASIVE LOCATION;  Service: Cardiology   • CHOLECYSTECTOMY     • COLONOSCOPY     • ENDOSCOPY     • HYSTERECTOMY  11/2014   • INSERT / REPLACE / REMOVE PACEMAKER     • JOINT REPLACEMENT Right     knee   • KNEE SURGERY Right 12/2017    total replacement   • NASAL SINUS SURGERY     • OOPHORECTOMY  11/2014   • PULMONARY EMBOLISM SURGERY      right lung   • TUBAL ABDOMINAL LIGATION       Family History   Problem Relation Age of Onset   • Breast cancer Mother 42   • Heart failure Mother    • No Known Problems Father    • Hypertension Brother    • Hypertension Maternal Grandmother    • Hypertension Maternal Grandfather    • No Known Problems Paternal Grandmother    • No Known Problems Paternal Grandfather    • Heart failure Son    • Hypertension Sister    • Ovarian cancer Neg Hx      Social History     Socioeconomic History   • Marital status: Single     Spouse name: Not on file   • Number of children: Not on file   • Years of education: Not on file   • Highest education level: Not on file   Tobacco Use   • Smoking status: Never Smoker   • Smokeless tobacco: Never Used   Substance and Sexual Activity   • Alcohol use: No     Frequency: Never     Comment: rare   • Drug use: No   • Sexual activity: Defer   Social History Narrative    Patient consumes NO CAFFEINE     Patient lives at home with partner, Donald.         Lifelong non-smoker    Does not drink  "alcohol    Works as a        Review of Systems  Review of Systems -  General ROS: negative for - chills, fever or night sweats  Cardiovascular ROS: no chest pain or dyspnea on exertion  Gastrointestinal ROS: no abdominal pain, change in bowel habits, or black or bloody stools  Genito-Urinary ROS: no trouble voiding or gross hematuria    Objective   Blood pressure 132/90, pulse 77, height 180.3 cm (71\"), weight 114 kg (252 lb), last menstrual period 10/31/2014, SpO2 95 %, not currently breastfeeding.  Nursing note reviewed  Physical Exam  Const: NAD, A&Ox4, Pleasant, Cooperative  Eyes: EOMI, no conjunctivitis  ENT: No nasal discharge present, neck supple  Cardiac: Regular rate and rhythm, no cyanosis  Resp: Respiratory rate within normal limits, no increased work of breathing, no audible wheezing or retractions noted  GI: No distention or ascites  Procedures  Assessment/Plan     Problem List Items Addressed This Visit        Endocrine    Diabetes mellitus (CMS/[facility]) - Primary    Overview     Diabetic educator meeting on Friday 2/28/20, he was supposed to have a follow-up next week but is not sure whether this will be scheduled.  She was also supposed to have a yearly diabetic eye exam tomorrow but this has been rescheduled as well.  Did not tolerate metformin.  She was started on Ozempic 0.25 mg weekly and Januvia 50 mg daily on 2/24/2020.  She is tolerating both of these medications well without side effects.  She will increase to 0.5 mg weekly of the Ozempic starting tomorrow.  Fingerstick blood sugar today is adequate at 117.         Relevant Orders    POC Glycosylated Hemoglobin (Hb A1C) (Completed)        See patient diagnoses and orders along with patient instructions for assessment, plan, and changes to care for patient.    There are no Patient Instructions on file for this visit.    Return in about 3 months (around 10/17/2020) for weight loss, with A1c;.    Ambulatory progress " note signed and attested to by Ryne Puente D.O.

## 2020-08-07 DIAGNOSIS — I10 ESSENTIAL HYPERTENSION: ICD-10-CM

## 2020-08-07 DIAGNOSIS — E11.9 TYPE 2 DIABETES MELLITUS WITHOUT COMPLICATION, WITHOUT LONG-TERM CURRENT USE OF INSULIN (HCC): ICD-10-CM

## 2020-08-07 RX ORDER — VALSARTAN 40 MG/1
20 TABLET ORAL DAILY
Qty: 45 TABLET | Refills: 1
Start: 2020-08-07 | End: 2020-08-25 | Stop reason: SDUPTHER

## 2020-08-10 DIAGNOSIS — I10 ESSENTIAL HYPERTENSION: ICD-10-CM

## 2020-08-10 DIAGNOSIS — E11.9 TYPE 2 DIABETES MELLITUS WITHOUT COMPLICATION, WITHOUT LONG-TERM CURRENT USE OF INSULIN (HCC): ICD-10-CM

## 2020-08-10 RX ORDER — TIZANIDINE 2 MG/1
TABLET ORAL
Qty: 30 TABLET | Refills: 0 | Status: SHIPPED | OUTPATIENT
Start: 2020-08-10 | End: 2020-08-26 | Stop reason: SDUPTHER

## 2020-08-11 ENCOUNTER — TELEPHONE (OUTPATIENT)
Dept: CARDIOLOGY | Facility: CLINIC | Age: 55
End: 2020-08-11

## 2020-08-11 RX ORDER — VALSARTAN 40 MG/1
TABLET ORAL
Qty: 30 TABLET | OUTPATIENT
Start: 2020-08-11

## 2020-08-11 NOTE — TELEPHONE ENCOUNTER
"We received a refill request for Valsartan. While reviewing her chart, I noticed it was \"approved\" by Trina Strickland on 8/7. However, the escribing class was set to \"no print\" therefore it was not sent to the pharmacy. Please resend.   "

## 2020-08-18 ENCOUNTER — PREP FOR SURGERY (OUTPATIENT)
Dept: OTHER | Facility: HOSPITAL | Age: 55
End: 2020-08-18

## 2020-08-18 ENCOUNTER — OFFICE VISIT (OUTPATIENT)
Dept: NEUROSURGERY | Facility: CLINIC | Age: 55
End: 2020-08-18

## 2020-08-18 VITALS — TEMPERATURE: 97.5 F | HEIGHT: 71 IN | WEIGHT: 257 LBS | BODY MASS INDEX: 35.98 KG/M2

## 2020-08-18 DIAGNOSIS — M54.9 MECHANICAL BACK PAIN: Primary | ICD-10-CM

## 2020-08-18 DIAGNOSIS — M54.59 MECHANICAL LOW BACK PAIN: Primary | ICD-10-CM

## 2020-08-18 PROCEDURE — 99244 OFF/OP CNSLTJ NEW/EST MOD 40: CPT | Performed by: NEUROLOGICAL SURGERY

## 2020-08-18 RX ORDER — TIZANIDINE 2 MG/1
TABLET ORAL
Qty: 30 TABLET | Refills: 0 | OUTPATIENT
Start: 2020-08-18

## 2020-08-18 RX ORDER — FAMOTIDINE 20 MG/1
20 TABLET, FILM COATED ORAL
Status: CANCELLED | OUTPATIENT
Start: 2020-08-18

## 2020-08-18 RX ORDER — CEFAZOLIN SODIUM IN 0.9 % NACL 3 G/100 ML
3 INTRAVENOUS SOLUTION, PIGGYBACK (ML) INTRAVENOUS ONCE
Status: CANCELLED | OUTPATIENT
Start: 2020-08-18 | End: 2020-08-18

## 2020-08-18 NOTE — PROGRESS NOTES
Patient: Kellen Esparza  : 1965    Primary Care Provider: Ryne Puente DO    Requesting Provider: As above        History    Chief Complaint:   1.  Mid and low back pain.  2.  Neck pain and stiffness.  3.  Left chest wall pain.    History of Present Illness: Ms. Esparza is a 54-year-old woman who is a  who describes an approximately 1 year history of the above-noted complaints.  Some these complaints began after her pacemaker was placed in July of last year.  Of note she has significant cardiac morbidities and is anticoagulated given a history of atrial fibrillation.  Given the above-noted pain she underwent a trial of a [employer] epidural spinal cord stimulator.  Leads were placed at the T1/T2 level and help significantly with some of her symptoms.  She is referred here for formal spinal cord stimulator placement.  Her pain is worse with walking and driving.  Nothing has helped outside of the spinal cord stimulator trial.    Review of Systems   Constitutional: Positive for activity change, appetite change and unexpected weight change. Negative for chills, diaphoresis, fatigue and fever.   HENT: Negative for congestion, dental problem, drooling, ear discharge, ear pain, facial swelling, hearing loss, mouth sores, nosebleeds, postnasal drip, rhinorrhea, sinus pressure, sinus pain, sneezing, sore throat, tinnitus, trouble swallowing and voice change.    Eyes: Negative for photophobia, pain, discharge, redness, itching and visual disturbance.   Respiratory: Negative for apnea, cough, choking, chest tightness, shortness of breath, wheezing and stridor.    Cardiovascular: Negative for chest pain, palpitations and leg swelling.   Gastrointestinal: Negative for abdominal distention, abdominal pain, anal bleeding, blood in stool, constipation, diarrhea, nausea, rectal pain and vomiting.   Endocrine: Negative for cold intolerance, heat intolerance, polydipsia,  "polyphagia and polyuria.   Genitourinary: Negative for decreased urine volume, difficulty urinating, dyspareunia, dysuria, enuresis, flank pain, frequency, genital sores, hematuria, menstrual problem, pelvic pain, urgency, vaginal bleeding, vaginal discharge and vaginal pain.   Musculoskeletal: Positive for back pain, neck pain and neck stiffness. Negative for arthralgias, gait problem, joint swelling and myalgias.   Skin: Negative for color change, pallor, rash and wound.   Allergic/Immunologic: Negative for environmental allergies, food allergies and immunocompromised state.   Neurological: Negative for dizziness, tremors, seizures, syncope, facial asymmetry, speech difficulty, weakness, light-headedness, numbness and headaches.   Hematological: Negative for adenopathy. Does not bruise/bleed easily.   Psychiatric/Behavioral: Positive for sleep disturbance. Negative for agitation, behavioral problems, confusion, decreased concentration, dysphoric mood, hallucinations, self-injury and suicidal ideas. The patient is not nervous/anxious and is not hyperactive.        The patient's past medical history, past surgical history, family history, and social history have been reviewed at length in the electronic medical record.    Physical Exam:   Temp 97.5 °F (36.4 °C)   Ht 180.3 cm (71\")   Wt 117 kg (257 lb)   LMP 10/31/2014 (LMP Unknown)   BMI 35.84 kg/m²   CONSTITUTIONAL: Patient is well-nourished, pleasant and appears stated age.  CV: Heart regular rate and rhythm without murmur, rub, or gallop.  PULMONARY: Lungs are clear to ascultation.  MUSCULOSKELETAL:  Neck tenderness to palpation is not observed.   ROM in neck is normal.  NEUROLOGICAL:  Orientation, memory, attention span, language function, and cognition have been examined and are intact.  Strength is intact in the upper and lower extremities to direct testing.  Muscle tone is normal throughout.  Station and gait are normal.  Sensation is intact to light " touch testing throughout.  Deep tendon reflexes are 1+ and symmetrical.  Barney's Sign is negative bilaterally. No clonus is elicited.  Coordination is intact.  CRANIAL NERVES:  Cranial Nerve II:  Visual fields are full to confrontation.  Cranial Nerve III, IV, and VI: PERRLADC. Extraocular movements are intact.  Nystagmus is not present.  Cranial Nerve V: Facial sensation is intact to light touch.  Cranial Nerve VII: Muscles of facial expression demonstate no weakness or asymmetry.  Cranial Nerve VIII: Hearing is intact to finger rub bilaterally.  Cranial Nerve IX and X: Palate elevates symmetrically.  Cranial Nerve XI: Shoulder shrug is intact bilaterally.  Cranial Nerve XII: Tongue is midline without evidence of atrophy or fasciculation.    Medical Decision Making    Data Review:   MRI the cervical spine demonstrates some modest diffuse degenerative disc disease.  Her canal appears capacious.  Thoracic MRI reveals some very mild narrowing at the top but this is not clinically significant in my opinion.    Diagnosis:   1.  Complex regional pain syndrome affecting the left chest wall.  2.  Mechanical mid and low back pain.    Treatment Options:   Dr. Choi has recommended [employer] epidural spinal cord stimulator placement with the MRI compatible lead projecting to the superior T1 endplate level.  She will need cardiac clearance.  She will also need to come off of her Eliquis in the perioperative period.  The nature of the procedure as well as the potential risks, complications, limitations, and alternatives to the procedure were discussed at length with the patient and the patient has agreed to proceed with surgery.       Diagnosis Plan   1. Mechanical neck/back pain         Scribed for Victor Manuel Geiger MD by Veena Rodriguez CMA on 8/18/2020 15:45       I, Dr. Geiger, personally performed the services described in the documentation, as scribed in my presence, and it is both accurate and  complete.

## 2020-08-18 NOTE — H&P
Patient: Kellen Esparza  : 1965     Primary Care Provider: Ryne Puente DO     Requesting Provider: As above           History     Chief Complaint:   1.  Mid and low back pain.  2.  Neck pain and stiffness.  3.  Left chest wall pain.     History of Present Illness: Ms. Esparza is a 54-year-old woman who is a  who describes an approximately 1 year history of the above-noted complaints.  Some these complaints began after her pacemaker was placed in July of last year.  Of note she has significant cardiac morbidities and is anticoagulated given a history of atrial fibrillation.  Given the above-noted pain she underwent a trial of a [employer] epidural spinal cord stimulator.  Leads were placed at the T1/T2 level and help significantly with some of her symptoms.  She is referred here for formal spinal cord stimulator placement.  Her pain is worse with walking and driving.  Nothing has helped outside of the spinal cord stimulator trial.     Review of Systems   Constitutional: Positive for activity change, appetite change and unexpected weight change. Negative for chills, diaphoresis, fatigue and fever.   HENT: Negative for congestion, dental problem, drooling, ear discharge, ear pain, facial swelling, hearing loss, mouth sores, nosebleeds, postnasal drip, rhinorrhea, sinus pressure, sinus pain, sneezing, sore throat, tinnitus, trouble swallowing and voice change.    Eyes: Negative for photophobia, pain, discharge, redness, itching and visual disturbance.   Respiratory: Negative for apnea, cough, choking, chest tightness, shortness of breath, wheezing and stridor.    Cardiovascular: Negative for chest pain, palpitations and leg swelling.   Gastrointestinal: Negative for abdominal distention, abdominal pain, anal bleeding, blood in stool, constipation, diarrhea, nausea, rectal pain and vomiting.   Endocrine: Negative for cold intolerance, heat intolerance, polydipsia,  polyphagia and polyuria.   Genitourinary: Negative for decreased urine volume, difficulty urinating, dyspareunia, dysuria, enuresis, flank pain, frequency, genital sores, hematuria, menstrual problem, pelvic pain, urgency, vaginal bleeding, vaginal discharge and vaginal pain.   Musculoskeletal: Positive for back pain, neck pain and neck stiffness. Negative for arthralgias, gait problem, joint swelling and myalgias.   Skin: Negative for color change, pallor, rash and wound.   Allergic/Immunologic: Negative for environmental allergies, food allergies and immunocompromised state.   Neurological: Negative for dizziness, tremors, seizures, syncope, facial asymmetry, speech difficulty, weakness, light-headedness, numbness and headaches.   Hematological: Negative for adenopathy. Does not bruise/bleed easily.   Psychiatric/Behavioral: Positive for sleep disturbance. Negative for agitation, behavioral problems, confusion, decreased concentration, dysphoric mood, hallucinations, self-injury and suicidal ideas. The patient is not nervous/anxious and is not hyperactive.          The patient's past medical history, past surgical history, family history, and social history have been reviewed at length in the electronic medical record.     Past Medical History:   Diagnosis Date   • Arthritis    • Diabetes mellitus (CMS/[facility])     doesnt check sugar    • Hypertension    • Migraine    • Musculoligamentous strain    • Pulmonary embolism (CMS/[facility]) 1997   • Sleep apnea    • Wears eyeglasses      Past Surgical History:   Procedure Laterality Date   • BREAST BIOPSY Left 2014   • CARDIAC CATHETERIZATION Left 8/10/2017    Procedure: Cardiac Catheterization/Vascular Study;  Surgeon: Johny Sommer MD;  Location:  [email or web address] CATH INVASIVE LOCATION;  Service:    • CARDIAC ELECTROPHYSIOLOGY PROCEDURE N/A 7/19/2019    Procedure: Pacemaker DC new;  Surgeon: Sonya Gay MD;  Location:  [email or web address] CATH INVASIVE LOCATION;  Service: Cardiology   •  CHOLECYSTECTOMY     • COLONOSCOPY     • ENDOSCOPY     • HYSTERECTOMY  11/2014   • INSERT / REPLACE / REMOVE PACEMAKER     • JOINT REPLACEMENT Right     knee   • KNEE SURGERY Right 12/2017    total replacement   • NASAL SINUS SURGERY     • OOPHORECTOMY  11/2014   • PULMONARY EMBOLISM SURGERY      right lung   • TUBAL ABDOMINAL LIGATION       Family History   Problem Relation Age of Onset   • Breast cancer Mother 42   • Heart failure Mother    • No Known Problems Father    • Hypertension Brother    • Hypertension Maternal Grandmother    • Hypertension Maternal Grandfather    • No Known Problems Paternal Grandmother    • No Known Problems Paternal Grandfather    • Heart failure Son    • Hypertension Sister    • Ovarian cancer Neg Hx      Social History     Socioeconomic History   • Marital status: Single     Spouse name: Not on file   • Number of children: Not on file   • Years of education: Not on file   • Highest education level: Not on file   Tobacco Use   • Smoking status: Never Smoker   • Smokeless tobacco: Never Used   Substance and Sexual Activity   • Alcohol use: No     Frequency: Never     Comment: rare   • Drug use: No   • Sexual activity: Defer   Social History Narrative    Patient consumes NO CAFFEINE     Patient lives at home with partner, Donald.         Lifelong non-smoker    Does not drink alcohol    Works as a        Allergies   Allergen Reactions   • Metformin GI Intolerance   • Lisinopril Cough       Current Outpatient Medications on File Prior to Visit   Medication Sig Dispense Refill   • apixaban (ELIQUIS) 5 MG tablet tablet Take 1 tablet by mouth 2 (Two) Times a Day. 180 tablet 3   • aspirin 81 MG tablet Take 1 tablet by mouth Daily. 30 tablet 11   • atorvastatin (LIPITOR) 80 MG tablet TAKE 1 TABLET BY MOUTH DAILY 90 tablet 0   • baclofen (LIORESAL) 10 MG tablet TAKE 1/2 TO 1 TABLET BY MOUTH TWICE DAILY AS NEEDED FOR MUSCLE SPASMS 60 tablet 1   • Dietary  Management Product (RHEUMATE) capsule Take 1 capsule by mouth 2 (two) times a day. 60 capsule 11   • estradiol (ESTRACE) 1 MG tablet Take 1 mg by mouth Daily.     • Gel Base gel CAPSAICIN 0.001% IMIPRAMINE 3% LIDOCAINE 10% MANNITOL 20% MELOXICAM 0.1% PRILOCAINE 2%. APPLY 1-2 G TO AREA 3-4 TIMES DAILY 30 g PRN   • hydrochlorothiazide (MICROZIDE) 12.5 MG capsule Take 1 capsule by mouth Daily. 30 capsule 11   • levETIRAcetam (KEPPRA) 250 MG tablet TAKE 1 TABLET BY MOUTH TWICE DAILY 60 tablet 1   • meloxicam (MOBIC) 15 MG tablet      • Multiple Vitamins-Minerals (CENTRUM WOMEN PO) Take 1 capsule by mouth Daily. gummies     • nortriptyline (PAMELOR) 25 MG capsule TAKE 1 TO 2 CAPSULES BY MOUTH AT BEDTIME 60 capsule 5   • OZEMPIC, 0.25 OR 0.5 MG/DOSE, 2 MG/1.5ML solution pen-injector INJECT 0.5MG UNDER THE SKIN INTO THE APPROPRIATE MELANIE AS DIRECTED 1 WEEK 4 pen 2   • pregabalin (LYRICA) 150 MG capsule Take 1 capsule by mouth 2 (Two) Times a Day. 60 capsule 4   • pyridoxine (VITAMIN B-6) 100 MG tablet tablet Take 1 tablet by mouth Daily. 30 tablet 5   • SITagliptin (JANUVIA) 50 MG tablet Take 1 tablet by mouth Daily. 30 tablet 5   • SUMAtriptan (IMITREX) 50 MG tablet Take 50 mg by mouth Every 2 (Two) Hours As Needed for Migraine. Take one tablet at onset of headache. May repeat dose one time in 2 hours if headache not relieved.     • tapentadol (NUCYNTA) 50 MG tablet Take 1 hour before physical therapy 15 tablet 0   • tiZANidine (ZANAFLEX) 2 MG tablet TAKE 1/2 TO 1 TABLET BY MOUTH FOUR TIMES DAILY AS NEEDED FOR MUSCLE SPASMS. DO NOT TAKE OTHER MUSCLE RELAXER WHILE ON TIZANIDINE 30 tablet 0   • traZODone (DESYREL) 100 MG tablet TAKE 1 TABLET BY MOUTH EVERY NIGHT 30 tablet 2   • valsartan (DIOVAN) 40 MG tablet Take 0.5 tablets by mouth Daily. 45 tablet 1   • vitamin D (ERGOCALCIFEROL) 85514 UNITS capsule capsule Take 50,000 Units by mouth 1 (One) Time Per Week. Wednesday       No current facility-administered medications on  "file prior to visit.         Physical Exam:   Temp 97.5 °F (36.4 °C)   Ht 180.3 cm (71\")   Wt 117 kg (257 lb)   LMP 10/31/2014 (LMP Unknown)   BMI 35.84 kg/m²   CONSTITUTIONAL: Patient is well-nourished, pleasant and appears stated age.  CV: Heart regular rate and rhythm without murmur, rub, or gallop.  PULMONARY: Lungs are clear to ascultation.  MUSCULOSKELETAL:  Neck tenderness to palpation is not observed.   ROM in neck is normal.  NEUROLOGICAL:  Orientation, memory, attention span, language function, and cognition have been examined and are intact.  Strength is intact in the upper and lower extremities to direct testing.  Muscle tone is normal throughout.  Station and gait are normal.  Sensation is intact to light touch testing throughout.  Deep tendon reflexes are 1+ and symmetrical.  Barney's Sign is negative bilaterally. No clonus is elicited.  Coordination is intact.  CRANIAL NERVES:  Cranial Nerve II:  Visual fields are full to confrontation.  Cranial Nerve III, IV, and VI: PERRLADC. Extraocular movements are intact.  Nystagmus is not present.  Cranial Nerve V: Facial sensation is intact to light touch.  Cranial Nerve VII: Muscles of facial expression demonstate no weakness or asymmetry.  Cranial Nerve VIII: Hearing is intact to finger rub bilaterally.  Cranial Nerve IX and X: Palate elevates symmetrically.  Cranial Nerve XI: Shoulder shrug is intact bilaterally.  Cranial Nerve XII: Tongue is midline without evidence of atrophy or fasciculation.     Medical Decision Making     Data Review:   MRI the cervical spine demonstrates some modest diffuse degenerative disc disease.  Her canal appears capacious.  Thoracic MRI reveals some very mild narrowing at the top but this is not clinically significant in my opinion.     Diagnosis:   1.  Complex regional pain syndrome affecting the left chest wall.  2.  Mechanical mid and low back pain.     Treatment Options:   Dr. Choi has recommended [employer] epidural spinal cord stimulator placement with the MRI compatible lead projecting to the superior T1 endplate level.  She will need cardiac clearance.  She will also need to come off of her Eliquis in the perioperative period.  The nature of the procedure as well as the potential risks, complications, limitations, and alternatives to the procedure were discussed at length with the patient and the patient has agreed to proceed with surgery.          Diagnosis Plan   1. Mechanical neck/back pain

## 2020-08-21 ENCOUNTER — TELEPHONE (OUTPATIENT)
Dept: CARDIOLOGY | Facility: CLINIC | Age: 55
End: 2020-08-21

## 2020-08-21 NOTE — TELEPHONE ENCOUNTER
Dr. Geiger requesting clearance for upcoming placement of epidural spinal cord stimulator under general anesthesia- also requesting recommendations for [place]-     Glory [phone] ph

## 2020-08-21 NOTE — TELEPHONE ENCOUNTER
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  Okay to hold Eliquis starting 2 full days prior to the procedure.

## 2020-08-24 RX ORDER — LEVETIRACETAM 250 MG/1
TABLET ORAL
Qty: 60 TABLET | Refills: 1 | Status: SHIPPED | OUTPATIENT
Start: 2020-08-24 | End: 2020-10-21

## 2020-08-25 DIAGNOSIS — E11.9 TYPE 2 DIABETES MELLITUS WITHOUT COMPLICATION, WITHOUT LONG-TERM CURRENT USE OF INSULIN (HCC): ICD-10-CM

## 2020-08-25 DIAGNOSIS — I10 ESSENTIAL HYPERTENSION: ICD-10-CM

## 2020-08-25 RX ORDER — VALSARTAN 40 MG/1
20 TABLET ORAL DAILY
Qty: 45 TABLET | Refills: 1
Start: 2020-08-25 | End: 2020-08-26 | Stop reason: SDUPTHER

## 2020-08-26 DIAGNOSIS — E11.9 TYPE 2 DIABETES MELLITUS WITHOUT COMPLICATION, WITHOUT LONG-TERM CURRENT USE OF INSULIN (HCC): ICD-10-CM

## 2020-08-26 DIAGNOSIS — I10 ESSENTIAL HYPERTENSION: ICD-10-CM

## 2020-08-26 RX ORDER — TIZANIDINE 2 MG/1
TABLET ORAL
Qty: 30 TABLET | Refills: 0 | Status: SHIPPED | OUTPATIENT
Start: 2020-08-26 | End: 2021-05-11

## 2020-08-26 RX ORDER — SITAGLIPTIN 50 MG/1
TABLET, FILM COATED ORAL
Qty: 30 TABLET | Refills: 5 | Status: SHIPPED | OUTPATIENT
Start: 2020-08-26 | End: 2021-02-12 | Stop reason: SDUPTHER

## 2020-08-26 RX ORDER — VALSARTAN 40 MG/1
20 TABLET ORAL DAILY
Qty: 45 TABLET | Refills: 1 | Status: SHIPPED | OUTPATIENT
Start: 2020-08-26 | End: 2021-02-23 | Stop reason: SDUPTHER

## 2020-08-27 ENCOUNTER — TELEPHONE (OUTPATIENT)
Dept: NEUROSURGERY | Facility: CLINIC | Age: 55
End: 2020-08-27

## 2020-09-08 ENCOUNTER — APPOINTMENT (OUTPATIENT)
Dept: PREADMISSION TESTING | Facility: HOSPITAL | Age: 55
End: 2020-09-08

## 2020-09-08 VITALS — WEIGHT: 262.35 LBS | HEIGHT: 71 IN | BODY MASS INDEX: 36.73 KG/M2

## 2020-09-08 LAB
DEPRECATED RDW RBC AUTO: 39.9 FL (ref 37–54)
ERYTHROCYTE [DISTWIDTH] IN BLOOD BY AUTOMATED COUNT: 13.2 % (ref 12.3–15.4)
HCT VFR BLD AUTO: 36.2 % (ref 34–46.6)
HGB BLD-MCNC: 12 G/DL (ref 12–15.9)
MCH RBC QN AUTO: 27.6 PG (ref 26.6–33)
MCHC RBC AUTO-ENTMCNC: 33.1 G/DL (ref 31.5–35.7)
MCV RBC AUTO: 83.2 FL (ref 79–97)
MRSA DNA SPEC QL NAA+PROBE: NEGATIVE
PLATELET # BLD AUTO: 193 10*3/MM3 (ref 140–450)
PMV BLD AUTO: 11.5 FL (ref 6–12)
POTASSIUM SERPL-SCNC: 4.2 MMOL/L (ref 3.5–5.2)
RBC # BLD AUTO: 4.35 10*6/MM3 (ref 3.77–5.28)
WBC # BLD AUTO: 7.63 10*3/MM3 (ref 3.4–10.8)

## 2020-09-08 PROCEDURE — 85027 COMPLETE CBC AUTOMATED: CPT | Performed by: NEUROLOGICAL SURGERY

## 2020-09-08 PROCEDURE — U0004 COV-19 TEST NON-CDC HGH THRU: HCPCS

## 2020-09-08 PROCEDURE — 84132 ASSAY OF SERUM POTASSIUM: CPT | Performed by: NEUROLOGICAL SURGERY

## 2020-09-08 PROCEDURE — 93010 ELECTROCARDIOGRAM REPORT: CPT | Performed by: INTERNAL MEDICINE

## 2020-09-08 PROCEDURE — C9803 HOPD COVID-19 SPEC COLLECT: HCPCS

## 2020-09-08 PROCEDURE — 87641 MR-STAPH DNA AMP PROBE: CPT | Performed by: NEUROLOGICAL SURGERY

## 2020-09-08 PROCEDURE — 36415 COLL VENOUS BLD VENIPUNCTURE: CPT

## 2020-09-08 PROCEDURE — 93005 ELECTROCARDIOGRAM TRACING: CPT

## 2020-09-09 ENCOUNTER — ANESTHESIA EVENT (OUTPATIENT)
Dept: PERIOP | Facility: HOSPITAL | Age: 55
End: 2020-09-09

## 2020-09-09 LAB — SARS-COV-2 RNA NOSE QL NAA+PROBE: NOT DETECTED

## 2020-09-09 RX ORDER — FAMOTIDINE 10 MG/ML
20 INJECTION, SOLUTION INTRAVENOUS ONCE
Status: CANCELLED | OUTPATIENT
Start: 2020-09-09 | End: 2020-09-09

## 2020-09-10 ENCOUNTER — ANESTHESIA (OUTPATIENT)
Dept: PERIOP | Facility: HOSPITAL | Age: 55
End: 2020-09-10

## 2020-09-10 ENCOUNTER — HOSPITAL ENCOUNTER (OUTPATIENT)
Facility: HOSPITAL | Age: 55
Discharge: HOME | End: 2020-09-10
Attending: NEUROLOGICAL SURGERY | Admitting: NEUROLOGICAL SURGERY

## 2020-09-10 ENCOUNTER — APPOINTMENT (OUTPATIENT)
Dept: GENERAL RADIOLOGY | Facility: HOSPITAL | Age: 55
End: 2020-09-10

## 2020-09-10 VITALS
BODY MASS INDEX: 36.73 KG/M2 | DIASTOLIC BLOOD PRESSURE: 88 MMHG | HEART RATE: 72 BPM | TEMPERATURE: 97.4 F | OXYGEN SATURATION: 93 % | RESPIRATION RATE: 16 BRPM | WEIGHT: 262.35 LBS | HEIGHT: 71 IN | SYSTOLIC BLOOD PRESSURE: 139 MMHG

## 2020-09-10 DIAGNOSIS — G90.512 COMPLEX REGIONAL PAIN SYNDROME TYPE 1 OF LEFT UPPER EXTREMITY: Primary | ICD-10-CM

## 2020-09-10 DIAGNOSIS — M54.59 MECHANICAL LOW BACK PAIN: ICD-10-CM

## 2020-09-10 LAB — GLUCOSE BLDC GLUCOMTR-MCNC: 96 MG/DL (ref 70–130)

## 2020-09-10 PROCEDURE — 25010000002 VANCOMYCIN 1 G RECONSTITUTED SOLUTION: Performed by: NEUROLOGICAL SURGERY

## 2020-09-10 PROCEDURE — 25010000002 FENTANYL CITRATE (PF) 100 MCG/2ML SOLUTION: Performed by: NURSE ANESTHETIST, CERTIFIED REGISTERED

## 2020-09-10 PROCEDURE — 94799 UNLISTED PULMONARY SVC/PX: CPT

## 2020-09-10 PROCEDURE — C1820 GENERATOR NEURO RECHG BAT SY: HCPCS | Performed by: NEUROLOGICAL SURGERY

## 2020-09-10 PROCEDURE — 82962 GLUCOSE BLOOD TEST: CPT

## 2020-09-10 PROCEDURE — 25010000002 PROPOFOL 10 MG/ML EMULSION: Performed by: NURSE ANESTHETIST, CERTIFIED REGISTERED

## 2020-09-10 PROCEDURE — 25010000002 PHENYLEPHRINE PER 1 ML: Performed by: NURSE ANESTHETIST, CERTIFIED REGISTERED

## 2020-09-10 PROCEDURE — C1787 PATIENT PROGR, NEUROSTIM: HCPCS | Performed by: NEUROLOGICAL SURGERY

## 2020-09-10 PROCEDURE — 63655 IMPLANT NEUROELECTRODES: CPT | Performed by: PHYSICIAN ASSISTANT

## 2020-09-10 PROCEDURE — L8689 EXTERNAL RECHARG SYS INTERN: HCPCS | Performed by: NEUROLOGICAL SURGERY

## 2020-09-10 PROCEDURE — 63655 IMPLANT NEUROELECTRODES: CPT | Performed by: NEUROLOGICAL SURGERY

## 2020-09-10 PROCEDURE — 25010000002 ONDANSETRON PER 1 MG: Performed by: NURSE ANESTHETIST, CERTIFIED REGISTERED

## 2020-09-10 PROCEDURE — C1778 LEAD, NEUROSTIMULATOR: HCPCS | Performed by: NEUROLOGICAL SURGERY

## 2020-09-10 PROCEDURE — 63685 INS/RPLC SPI NPG/RCVR POCKET: CPT | Performed by: PHYSICIAN ASSISTANT

## 2020-09-10 PROCEDURE — 25010000002 DEXAMETHASONE PER 1 MG: Performed by: NURSE ANESTHETIST, CERTIFIED REGISTERED

## 2020-09-10 PROCEDURE — 63685 INS/RPLC SPI NPG/RCVR POCKET: CPT | Performed by: NEUROLOGICAL SURGERY

## 2020-09-10 PROCEDURE — 25010000002 HYDROMORPHONE PER 4 MG: Performed by: NURSE ANESTHETIST, CERTIFIED REGISTERED

## 2020-09-10 PROCEDURE — 76000 FLUOROSCOPY <1 HR PHYS/QHP: CPT

## 2020-09-10 DEVICE — FLOSEAL HEMOSTATIC MATRIX, 10ML
Type: IMPLANTABLE DEVICE | Site: SPINE THORACIC | Status: FUNCTIONAL
Brand: FLOSEAL HEMOSTATIC MATRIX

## 2020-09-10 DEVICE — HEMOST ABS SURGIFOAM SZ100 8X12 10MM: Type: IMPLANTABLE DEVICE | Site: SPINE THORACIC | Status: FUNCTIONAL

## 2020-09-10 DEVICE — 70CM 2X8 SURGICAL LEAD KIT
Type: IMPLANTABLE DEVICE | Site: SPINE THORACIC | Status: FUNCTIONAL
Brand: ARTISAN™ MRI

## 2020-09-10 DEVICE — IMPLANTABLE PULSE GENERATOR KIT
Type: IMPLANTABLE DEVICE | Site: SPINE THORACIC | Status: FUNCTIONAL
Brand: PRECISION™ MONTAGE™ MRI

## 2020-09-10 RX ORDER — ROCURONIUM BROMIDE 10 MG/ML
INJECTION, SOLUTION INTRAVENOUS AS NEEDED
Status: DISCONTINUED | OUTPATIENT
Start: 2020-09-10 | End: 2020-09-10 | Stop reason: SURG

## 2020-09-10 RX ORDER — LIDOCAINE HYDROCHLORIDE 10 MG/ML
0.5 INJECTION, SOLUTION EPIDURAL; INFILTRATION; INTRACAUDAL; PERINEURAL ONCE AS NEEDED
Status: COMPLETED | OUTPATIENT
Start: 2020-09-10 | End: 2020-09-10

## 2020-09-10 RX ORDER — FENTANYL CITRATE 50 UG/ML
INJECTION, SOLUTION INTRAMUSCULAR; INTRAVENOUS AS NEEDED
Status: DISCONTINUED | OUTPATIENT
Start: 2020-09-10 | End: 2020-09-10 | Stop reason: SURG

## 2020-09-10 RX ORDER — SODIUM CHLORIDE 0.9 % (FLUSH) 0.9 %
10 SYRINGE (ML) INJECTION AS NEEDED
Status: DISCONTINUED | OUTPATIENT
Start: 2020-09-10 | End: 2020-09-10 | Stop reason: HOSPADM

## 2020-09-10 RX ORDER — LIDOCAINE HYDROCHLORIDE 10 MG/ML
INJECTION, SOLUTION EPIDURAL; INFILTRATION; INTRACAUDAL; PERINEURAL AS NEEDED
Status: DISCONTINUED | OUTPATIENT
Start: 2020-09-10 | End: 2020-09-10 | Stop reason: SURG

## 2020-09-10 RX ORDER — VANCOMYCIN HYDROCHLORIDE 1 G/20ML
INJECTION, POWDER, LYOPHILIZED, FOR SOLUTION INTRAVENOUS AS NEEDED
Status: DISCONTINUED | OUTPATIENT
Start: 2020-09-10 | End: 2020-09-10 | Stop reason: HOSPADM

## 2020-09-10 RX ORDER — CEFAZOLIN SODIUM IN 0.9 % NACL 3 G/100 ML
3 INTRAVENOUS SOLUTION, PIGGYBACK (ML) INTRAVENOUS ONCE
Status: COMPLETED | OUTPATIENT
Start: 2020-09-10 | End: 2020-09-10

## 2020-09-10 RX ORDER — SODIUM CHLORIDE, SODIUM LACTATE, POTASSIUM CHLORIDE, CALCIUM CHLORIDE 600; 310; 30; 20 MG/100ML; MG/100ML; MG/100ML; MG/100ML
9 INJECTION, SOLUTION INTRAVENOUS CONTINUOUS
Status: DISCONTINUED | OUTPATIENT
Start: 2020-09-10 | End: 2020-09-10 | Stop reason: HOSPADM

## 2020-09-10 RX ORDER — SODIUM CHLORIDE 9 MG/ML
INJECTION, SOLUTION INTRAVENOUS AS NEEDED
Status: DISCONTINUED | OUTPATIENT
Start: 2020-09-10 | End: 2020-09-10 | Stop reason: HOSPADM

## 2020-09-10 RX ORDER — SODIUM CHLORIDE 0.9 % (FLUSH) 0.9 %
10 SYRINGE (ML) INJECTION EVERY 12 HOURS SCHEDULED
Status: DISCONTINUED | OUTPATIENT
Start: 2020-09-10 | End: 2020-09-10 | Stop reason: HOSPADM

## 2020-09-10 RX ORDER — ALBUTEROL SULFATE 90 UG/1
AEROSOL, METERED RESPIRATORY (INHALATION) AS NEEDED
Status: DISCONTINUED | OUTPATIENT
Start: 2020-09-10 | End: 2020-09-10 | Stop reason: SURG

## 2020-09-10 RX ORDER — PROPOFOL 10 MG/ML
VIAL (ML) INTRAVENOUS AS NEEDED
Status: DISCONTINUED | OUTPATIENT
Start: 2020-09-10 | End: 2020-09-10 | Stop reason: SURG

## 2020-09-10 RX ORDER — ONDANSETRON 2 MG/ML
INJECTION INTRAMUSCULAR; INTRAVENOUS AS NEEDED
Status: DISCONTINUED | OUTPATIENT
Start: 2020-09-10 | End: 2020-09-10 | Stop reason: SURG

## 2020-09-10 RX ORDER — FAMOTIDINE 20 MG/1
20 TABLET, FILM COATED ORAL ONCE
Status: COMPLETED | OUTPATIENT
Start: 2020-09-10 | End: 2020-09-10

## 2020-09-10 RX ORDER — OXYCODONE AND ACETAMINOPHEN 7.5; 325 MG/1; MG/1
1-2 TABLET ORAL EVERY 6 HOURS PRN
Qty: 25 TABLET | Refills: 0 | Status: SHIPPED | OUTPATIENT
Start: 2020-09-10 | End: 2020-09-25 | Stop reason: SDUPTHER

## 2020-09-10 RX ORDER — HYDROMORPHONE HYDROCHLORIDE 1 MG/ML
0.5 INJECTION, SOLUTION INTRAMUSCULAR; INTRAVENOUS; SUBCUTANEOUS
Status: DISCONTINUED | OUTPATIENT
Start: 2020-09-10 | End: 2020-09-10 | Stop reason: HOSPADM

## 2020-09-10 RX ORDER — DEXAMETHASONE SODIUM PHOSPHATE 4 MG/ML
INJECTION, SOLUTION INTRA-ARTICULAR; INTRALESIONAL; INTRAMUSCULAR; INTRAVENOUS; SOFT TISSUE AS NEEDED
Status: DISCONTINUED | OUTPATIENT
Start: 2020-09-10 | End: 2020-09-10 | Stop reason: SURG

## 2020-09-10 RX ORDER — FENTANYL CITRATE 50 UG/ML
50 INJECTION, SOLUTION INTRAMUSCULAR; INTRAVENOUS
Status: DISCONTINUED | OUTPATIENT
Start: 2020-09-10 | End: 2020-09-10 | Stop reason: HOSPADM

## 2020-09-10 RX ORDER — MAGNESIUM HYDROXIDE 1200 MG/15ML
LIQUID ORAL AS NEEDED
Status: DISCONTINUED | OUTPATIENT
Start: 2020-09-10 | End: 2020-09-10 | Stop reason: HOSPADM

## 2020-09-10 RX ORDER — OXYCODONE AND ACETAMINOPHEN 7.5; 325 MG/1; MG/1
1 TABLET ORAL ONCE AS NEEDED
Status: COMPLETED | OUTPATIENT
Start: 2020-09-10 | End: 2020-09-10

## 2020-09-10 RX ORDER — LABETALOL HYDROCHLORIDE 5 MG/ML
INJECTION, SOLUTION INTRAVENOUS AS NEEDED
Status: DISCONTINUED | OUTPATIENT
Start: 2020-09-10 | End: 2020-09-10 | Stop reason: SURG

## 2020-09-10 RX ADMIN — FAMOTIDINE 20 MG: 20 TABLET ORAL at 08:27

## 2020-09-10 RX ADMIN — PHENYLEPHRINE HYDROCHLORIDE 160 MCG: 10 INJECTION INTRAVENOUS at 09:49

## 2020-09-10 RX ADMIN — PHENYLEPHRINE HYDROCHLORIDE 80 MCG: 10 INJECTION INTRAVENOUS at 09:38

## 2020-09-10 RX ADMIN — ONDANSETRON 4 MG: 2 INJECTION INTRAMUSCULAR; INTRAVENOUS at 10:33

## 2020-09-10 RX ADMIN — ROCURONIUM BROMIDE 40 MG: 10 INJECTION INTRAVENOUS at 09:17

## 2020-09-10 RX ADMIN — FENTANYL CITRATE 50 MCG: 50 INJECTION, SOLUTION INTRAMUSCULAR; INTRAVENOUS at 09:17

## 2020-09-10 RX ADMIN — LABETALOL HYDROCHLORIDE 10 MG: 5 INJECTION, SOLUTION INTRAVENOUS at 09:28

## 2020-09-10 RX ADMIN — ROCURONIUM BROMIDE 10 MG: 10 INJECTION INTRAVENOUS at 09:28

## 2020-09-10 RX ADMIN — PHENYLEPHRINE HYDROCHLORIDE 80 MCG: 10 INJECTION INTRAVENOUS at 09:52

## 2020-09-10 RX ADMIN — ALBUTEROL SULFATE 3 PUFF: 90 AEROSOL, METERED RESPIRATORY (INHALATION) at 09:47

## 2020-09-10 RX ADMIN — SUGAMMADEX 238 MG: 100 INJECTION, SOLUTION INTRAVENOUS at 10:59

## 2020-09-10 RX ADMIN — FENTANYL CITRATE 50 MCG: 0.05 INJECTION, SOLUTION INTRAMUSCULAR; INTRAVENOUS at 11:45

## 2020-09-10 RX ADMIN — FENTANYL CITRATE 50 MCG: 0.05 INJECTION, SOLUTION INTRAMUSCULAR; INTRAVENOUS at 11:15

## 2020-09-10 RX ADMIN — HYDROMORPHONE HYDROCHLORIDE 0.5 MG: 1 INJECTION, SOLUTION INTRAMUSCULAR; INTRAVENOUS; SUBCUTANEOUS at 11:33

## 2020-09-10 RX ADMIN — PHENYLEPHRINE HYDROCHLORIDE 80 MCG: 10 INJECTION INTRAVENOUS at 09:56

## 2020-09-10 RX ADMIN — PHENYLEPHRINE HYDROCHLORIDE 80 MCG: 10 INJECTION INTRAVENOUS at 10:26

## 2020-09-10 RX ADMIN — PROPOFOL 200 MG: 10 INJECTION, EMULSION INTRAVENOUS at 09:17

## 2020-09-10 RX ADMIN — PHENYLEPHRINE HYDROCHLORIDE 80 MCG: 10 INJECTION INTRAVENOUS at 09:47

## 2020-09-10 RX ADMIN — DEXAMETHASONE SODIUM PHOSPHATE 8 MG: 4 INJECTION, SOLUTION INTRAMUSCULAR; INTRAVENOUS at 09:25

## 2020-09-10 RX ADMIN — SODIUM CHLORIDE, POTASSIUM CHLORIDE, SODIUM LACTATE AND CALCIUM CHLORIDE 9 ML/HR: 600; 310; 30; 20 INJECTION, SOLUTION INTRAVENOUS at 08:27

## 2020-09-10 RX ADMIN — LIDOCAINE HYDROCHLORIDE 50 MG: 10 INJECTION, SOLUTION EPIDURAL; INFILTRATION; INTRACAUDAL; PERINEURAL at 09:17

## 2020-09-10 RX ADMIN — LIDOCAINE HYDROCHLORIDE 0.5 ML: 10 INJECTION, SOLUTION EPIDURAL; INFILTRATION; INTRACAUDAL; PERINEURAL at 08:27

## 2020-09-10 RX ADMIN — FENTANYL CITRATE 50 MCG: 0.05 INJECTION, SOLUTION INTRAMUSCULAR; INTRAVENOUS at 11:25

## 2020-09-10 RX ADMIN — OXYCODONE HYDROCHLORIDE AND ACETAMINOPHEN 1 TABLET: 7.5; 325 TABLET ORAL at 12:05

## 2020-09-10 RX ADMIN — PROPOFOL 100 MG: 10 INJECTION, EMULSION INTRAVENOUS at 10:57

## 2020-09-10 RX ADMIN — Medication 3 G: at 09:13

## 2020-09-10 NOTE — ANESTHESIA POSTPROCEDURE EVALUATION
"Patient: Kellen Esparza    Procedure Summary     Date:  09/10/20 Room / Location:   [facility]  [facility]    Anesthesia Start:  0913 Anesthesia Stop:      Procedure:  SPINAL CORD STIMULATOR INSERTION PHASE 1 [employer] Upper thoracic placement (N/A Back) Diagnosis:       Mechanical low back pain      (Mechanical low back pain [M54.5])    Surgeon:  Victor Manuel Geiger MD Provider:  Ervin Mora MD    Anesthesia Type:  general ASA Status:  3          Anesthesia Type: general    Vitals  No vitals data found for the desired time range.          Post Anesthesia Care and Evaluation    Patient location during evaluation: PACU  Patient participation: complete - patient participated  Level of consciousness: awake and responsive to verbal stimuli  Pain score: 2  Pain management: adequate  Airway patency: patent  Anesthetic complications: No anesthetic complications    Cardiovascular status: acceptable  Respiratory status: acceptable  Hydration status: acceptable    Comments: Pt awake and responsive. SV. VSS. Report to RN. Patient Vitals in the past 24 hrs:  09/10/20 0815, BP:137/91, Temp:97 °F (36.1 °C), Temp src:Temporal, Pulse:72, Resp:16, SpO2:99 %, Height:180.3 cm (71\"), Weight:119 kg (262 lb 5.6 oz)  133/78. p 72. r 16. t 98.1        199/83 sat 99 temp 97 pulse 84 resp 14           "

## 2020-09-10 NOTE — ANESTHESIA PREPROCEDURE EVALUATION
Anesthesia Evaluation                  Airway   Mallampati: I  TM distance: >3 FB  Neck ROM: full  No difficulty expected  Dental      Pulmonary    (+) pulmonary embolism, shortness of breath, sleep apnea,   Cardiovascular     ECG reviewed    (+) pacemaker pacemaker, hypertension, CAD, dysrhythmias, angina, hyperlipidemia,       Neuro/Psych  GI/Hepatic/Renal/Endo    (+) obesity,   diabetes mellitus,     Musculoskeletal     Abdominal    Substance History      OB/GYN          Other                        Anesthesia Plan    ASA 3     general     intravenous induction     Anesthetic plan, all risks, benefits, and alternatives have been provided, discussed and informed consent has been obtained with: patient.    Plan discussed with CRNA.

## 2020-09-10 NOTE — ANESTHESIA PROCEDURE NOTES
Airway  Urgency: elective    Date/Time: 9/10/2020 9:18 AM  Airway not difficult    General Information and Staff    Patient location during procedure: OR  CRNA: Epifanio Bhatt CRNA    Indications and Patient Condition  Indications for airway management: airway protection    Preoxygenated: yes  MILS not maintained throughout  Mask difficulty assessment: 1 - vent by mask    Final Airway Details  Final airway type: endotracheal airway      Successful airway: ETT  Cuffed: yes   Successful intubation technique: direct laryngoscopy  Facilitating devices/methods: intubating stylet  Endotracheal tube insertion site: oral  Blade: Darvin  Blade size: 3  ETT size (mm): 7.5  Cormack-Lehane Classification: grade IIa - partial view of glottis  Placement verified by: chest auscultation and capnometry   Measured from: lips  ETT/EBT  to lips (cm): 20  Number of attempts at approach: 1  Assessment: lips, teeth, and gum same as pre-op and atraumatic intubation    Additional Comments  Negative epigastric sounds, Breath sound equal bilaterally with symmetric chest rise and fall

## 2020-09-17 ENCOUNTER — TELEPHONE (OUTPATIENT)
Dept: NEUROSURGERY | Facility: CLINIC | Age: 55
End: 2020-09-17

## 2020-09-17 RX ORDER — CYCLOBENZAPRINE HCL 10 MG
10 TABLET ORAL 3 TIMES DAILY PRN
Qty: 30 TABLET | Refills: 1 | Status: SHIPPED | OUTPATIENT
Start: 2020-09-17 | End: 2021-05-11

## 2020-09-17 NOTE — TELEPHONE ENCOUNTER
S/p SCS with Dr. Geiger on 9/10/20    Pt left a message stating she is having worsening pain. She c/o swelling/bruising. Pt states she has not gotten any relief since having the stimulator placed. She is having pain when sitting/standing/lying down and walking. She is using ice and tylenol but this doesn't seem to be helping.

## 2020-09-17 NOTE — TELEPHONE ENCOUNTER
Called and spoke with pt.   - called in new muscle relaxer  - pt. Was thankful for the call and will keep her scheduled FU in the office

## 2020-09-21 DIAGNOSIS — G47.9 DYSSOMNIA: ICD-10-CM

## 2020-09-21 RX ORDER — HYDROCHLOROTHIAZIDE 12.5 MG/1
12.5 CAPSULE, GELATIN COATED ORAL DAILY
Qty: 30 CAPSULE | Refills: 1 | Status: SHIPPED | OUTPATIENT
Start: 2020-09-21 | End: 2020-11-30 | Stop reason: SDUPTHER

## 2020-09-21 RX ORDER — TRAZODONE HYDROCHLORIDE 100 MG/1
100 TABLET ORAL NIGHTLY
Qty: 30 TABLET | Refills: 1 | Status: SHIPPED | OUTPATIENT
Start: 2020-09-21 | End: 2020-11-30 | Stop reason: SDUPTHER

## 2020-09-21 NOTE — TELEPHONE ENCOUNTER
Caller: [facility] #[id number] [facility] [address]  [place] [facility] [phone] [facility] [phone] FX    Relationship: Pharmacy    Best call back number: [phone]    Medication needed:   Requested Prescriptions     Pending Prescriptions Disp Refills   • traZODone (DESYREL) 100 MG tablet 30 tablet 2     Sig: Take 1 tablet by mouth Every Night.   • hydroCHLOROthiazide (MICROZIDE) 12.5 MG capsule 30 capsule 11     Sig: Take 1 capsule by mouth Daily.       When do you need the refill by: 09/21/2020    What details did the patient provide when requesting the medication: N/A    Does the patient have less than a 3 day supply:  [] Yes  [x] No    What is the patient's preferred pharmacy: [facility] #[id number] [facility] [address]  [place] [facility] [place] [phone] [facility] [phone] FX

## 2020-09-25 ENCOUNTER — OFFICE VISIT (OUTPATIENT)
Dept: NEUROSURGERY | Facility: CLINIC | Age: 55
End: 2020-09-25

## 2020-09-25 ENCOUNTER — TELEPHONE (OUTPATIENT)
Dept: NEUROSURGERY | Facility: CLINIC | Age: 55
End: 2020-09-25

## 2020-09-25 VITALS — BODY MASS INDEX: 36.88 KG/M2 | TEMPERATURE: 97.3 F | WEIGHT: 263.4 LBS | HEIGHT: 71 IN

## 2020-09-25 DIAGNOSIS — M54.9 MECHANICAL BACK PAIN: Primary | ICD-10-CM

## 2020-09-25 PROCEDURE — 99024 POSTOP FOLLOW-UP VISIT: CPT | Performed by: PHYSICIAN ASSISTANT

## 2020-09-25 RX ORDER — OXYCODONE AND ACETAMINOPHEN 7.5; 325 MG/1; MG/1
1 TABLET ORAL EVERY 8 HOURS PRN
Qty: 20 TABLET | Refills: 0 | Status: SHIPPED | OUTPATIENT
Start: 2020-09-25 | End: 2020-10-07

## 2020-09-25 NOTE — TELEPHONE ENCOUNTER
Yes! We will get a refill of her pain medication. Her  can go ahead and come pick it up. I will get it ready

## 2020-09-25 NOTE — TELEPHONE ENCOUNTER
Patient called and is awaiting your call back after you are able to talk with Dr. Geiger.    Her number is [phone]

## 2020-09-25 NOTE — PROGRESS NOTES
Patient: Kellen Esparza  : 1965  Chart #: [id number]    Date of Service: 2020    CHIEF COMPLAINT:   1.Complex regional pain syndrome affecting the left chest wall  2.  Mechanical mid and low back pain    History of Present Illness Ms. Esparza is a pleasant 54-year-old woman with a history of chronic neck and diffuse spinal pain.  She also has severe left chest wall pain that has become unremitting.  Recently a trial of a [employer] epidural spinal cord stimulator provided significant relief in her chest wall pain.  As such on 9/10/2020 she underwent an uncomplicated T3 laminotomy for placement of [employer] epidural spinal cord stimulator.    Today patient is 2 weeks postop.  She complains of significant pain in the upper thoracic spine since surgery.  She is very tearful because the pain has been so bad.  The pain radiates around the thorax on both sides.  She does not have much low back pain or pain at the generator site. Her pre-operative symptoms have improved.  No new symptoms into her legs.  She finished Percocet a couple days ago.  She has been taking Tylenol, Aleve, and a muscle relaxer to no avail.  She has tried ice.  She is miserable and is at the point she would like to have the entire device removed.  No incisional issues.  No fevers.      Past Medical History:   Diagnosis Date   • Arthritis    • CPAP (continuous positive airway pressure) dependence    • Diabetes mellitus (CMS/[facility])     doesnt check sugar    • Hypertension    • Migraine    • Musculoligamentous strain    • Pacemaker    • Pulmonary embolism (CMS/[facility])    • Sleep apnea    • Wears eyeglasses          Current Outpatient Medications:   •  apixaban (ELIQUIS) 5 MG tablet tablet, Take 1 tablet by mouth 2 (Two) Times a Day., Disp: 180 tablet, Rfl: 3  •  aspirin 81 MG tablet, Take 1 tablet by mouth Daily., Disp: 30 tablet, Rfl: 11  •  atorvastatin (LIPITOR) 80 MG tablet, TAKE 1 TABLET BY MOUTH DAILY, Disp: 90 tablet,  Rfl: 0  •  baclofen (LIORESAL) 10 MG tablet, TAKE 1/2 TO 1 TABLET BY MOUTH TWICE DAILY AS NEEDED FOR MUSCLE SPASMS, Disp: 60 tablet, Rfl: 1  •  cyclobenzaprine (FLEXERIL) 10 MG tablet, Take 1 tablet by mouth 3 (Three) Times a Day As Needed for Muscle Spasms., Disp: 30 tablet, Rfl: 1  •  Dietary Management Product (RHEUMATE) capsule, Take 1 capsule by mouth 2 (two) times a day., Disp: 60 capsule, Rfl: 11  •  estradiol (ESTRACE) 1 MG tablet, Take 1 mg by mouth Daily., Disp: , Rfl:   •  Gel Base gel, CAPSAICIN 0.001% IMIPRAMINE 3% LIDOCAINE 10% MANNITOL 20% MELOXICAM 0.1% PRILOCAINE 2%. APPLY 1-2 G TO AREA 3-4 TIMES DAILY, Disp: 30 g, Rfl: PRN  •  hydroCHLOROthiazide (MICROZIDE) 12.5 MG capsule, Take 1 capsule by mouth Daily., Disp: 30 capsule, Rfl: 1  •  JANUVIA 50 MG tablet, TAKE 1 TABLET BY MOUTH DAILY (Patient taking differently: Take 50 mg by mouth Daily.), Disp: 30 tablet, Rfl: 5  •  levETIRAcetam (KEPPRA) 250 MG tablet, TAKE 1 TABLET BY MOUTH TWICE DAILY (Patient taking differently: Take 250 mg by mouth 2 (Two) Times a Day. TAKE 1 TABLET BY MOUTH TWICE DAILY), Disp: 60 tablet, Rfl: 1  •  Multiple Vitamins-Minerals (CENTRUM WOMEN PO), Take 1 capsule by mouth Daily. gummies, Disp: , Rfl:   •  nortriptyline (PAMELOR) 25 MG capsule, TAKE 1 TO 2 CAPSULES BY MOUTH AT BEDTIME (Patient taking differently: Take 25 mg by mouth Every Night. TAKE 1 TO 2 CAPSULES BY MOUTH AT BEDTIME), Disp: 60 capsule, Rfl: 5  •  OZEMPIC, 0.25 OR 0.5 MG/DOSE, 2 MG/1.5ML solution pen-injector, INJECT 0.5MG UNDER THE SKIN INTO THE APPROPRIATE MELANIE AS DIRECTED 1 WEEK (Patient taking differently: Inject 0.25 mg under the skin into the appropriate area as directed 1 (One) Time Per Week. Wednesdays), Disp: 4 pen, Rfl: 2  •  pregabalin (LYRICA) 150 MG capsule, Take 1 capsule by mouth 2 (Two) Times a Day., Disp: 60 capsule, Rfl: 4  •  pyridoxine (VITAMIN B-6) 100 MG tablet tablet, Take 1 tablet by mouth Daily., Disp: 30 tablet, Rfl: 5  •  SUMAtriptan  (IMITREX) 50 MG tablet, Take 50 mg by mouth Every 2 (Two) Hours As Needed for Migraine. Take one tablet at onset of headache. May repeat dose one time in 2 hours if headache not relieved., Disp: , Rfl:   •  tapentadol (NUCYNTA) 50 MG tablet, Take 1 hour before physical therapy (Patient taking differently: Take 50 mg by mouth. Take 1 hour before physical therapy), Disp: 15 tablet, Rfl: 0  •  tiZANidine (ZANAFLEX) 2 MG tablet, TAKE 1/2 TO 1 TABLET BY MOUTH FOUR TIMES DAILY AS NEEDED FOR MUSCLE SPASMS. DO NOT TAKE OTHER MUSCLE RELAXER WHILE ON TIZANIDINE (Patient taking differently: Take 2 mg by mouth Every Night. TAKE 1/2 TO 1 TABLET BY MOUTH FOUR TIMES DAILY AS NEEDED FOR MUSCLE SPASMS. DO NOT TAKE OTHER MUSCLE RELAXER WHILE ON TIZANIDINE), Disp: 30 tablet, Rfl: 0  •  traZODone (DESYREL) 100 MG tablet, Take 1 tablet by mouth Every Night., Disp: 30 tablet, Rfl: 1  •  valsartan (DIOVAN) 40 MG tablet, Take 0.5 tablets by mouth Daily. (Patient taking differently: Take 20 mg by mouth Every Morning.), Disp: 45 tablet, Rfl: 1  •  vitamin D (ERGOCALCIFEROL) 96585 UNITS capsule capsule, Take 50,000 Units by mouth 1 (One) Time Per Week. Wednesday, Disp: , Rfl:   •  oxyCODONE-acetaminophen (Percocet) 7.5-325 MG per tablet, Take 1 tablet by mouth Every 8 (Eight) Hours As Needed for Moderate Pain ., Disp: 20 tablet, Rfl: 0    Past Surgical History:   Procedure Laterality Date   • BREAST BIOPSY Left 2014   • CARDIAC CATHETERIZATION Left 8/10/2017    Procedure: Cardiac Catheterization/Vascular Study;  Surgeon: Johny Sommer MD;  Location:  [facility];  Service:    • CARDIAC ELECTROPHYSIOLOGY PROCEDURE N/A 7/19/2019    Procedure: Pacemaker DC new;  Surgeon: Sonya Gay MD;  Location:  [facility];  Service: Cardiology   • CHOLECYSTECTOMY     • COLONOSCOPY     • ENDOSCOPY     • HYSTERECTOMY  11/2014   • INSERT / REPLACE / REMOVE PACEMAKER     • JOINT REPLACEMENT Right     knee   • KNEE  SURGERY Right 12/2017    total replacement   • NASAL SINUS SURGERY     • OOPHORECTOMY  11/2014   • PACEMAKER IMPLANTATION     • PULMONARY EMBOLISM SURGERY      right lung   • SPINAL CORD STIMULATOR IMPLANT N/A 9/10/2020    Procedure: SPINAL CORD STIMULATOR INSERTION PHASE 1, UPPER THORACIC PLACEMENT ;  Surgeon: Victor Manuel Geiger MD;  Location: [facility];  Service: Neurosurgery;  Laterality: N/A;   • TUBAL ABDOMINAL LIGATION         Social History     Socioeconomic History   • Marital status: Single     Spouse name: Not on file   • Number of children: Not on file   • Years of education: Not on file   • Highest education level: Not on file   Tobacco Use   • Smoking status: Never Smoker   • Smokeless tobacco: Never Used   Substance and Sexual Activity   • Alcohol use: No     Frequency: Never     Comment: rare   • Drug use: No   • Sexual activity: Defer   Social History Narrative    Patient consumes NO CAFFEINE     Patient lives at home with partner, Donald.         Lifelong non-smoker    Does not drink alcohol    Works as a          Review of Systems   Constitutional: Negative for activity change, appetite change, chills, diaphoresis, fatigue, fever and unexpected weight change.   HENT: Negative for congestion, dental problem, drooling, ear discharge, ear pain, facial swelling, hearing loss, mouth sores, nosebleeds, postnasal drip, rhinorrhea, sinus pressure, sneezing, sore throat, tinnitus, trouble swallowing and voice change.    Eyes: Negative for photophobia, pain, discharge, redness, itching and visual disturbance.   Respiratory: Negative for apnea, cough, choking, chest tightness, shortness of breath, wheezing and stridor.    Cardiovascular: Negative for chest pain, palpitations and leg swelling.   Gastrointestinal: Negative for abdominal distention, abdominal pain, anal bleeding, blood in stool, constipation, diarrhea, nausea, rectal pain and vomiting.   Endocrine: Negative  "for cold intolerance, heat intolerance, polydipsia, polyphagia and polyuria.   Genitourinary: Negative for decreased urine volume, difficulty urinating, dysuria, enuresis, flank pain, frequency, genital sores, hematuria and urgency.   Musculoskeletal: Positive for back pain. Negative for arthralgias, gait problem, joint swelling, myalgias, neck pain and neck stiffness.   Skin: Negative for color change, pallor, rash and wound.   Allergic/Immunologic: Negative for environmental allergies, food allergies and immunocompromised state.   Neurological: Negative for dizziness, tremors, seizures, syncope, facial asymmetry, speech difficulty, weakness, light-headedness, numbness and headaches.   Hematological: Negative for adenopathy. Does not bruise/bleed easily.   Psychiatric/Behavioral: Negative for agitation, behavioral problems, confusion, decreased concentration, dysphoric mood, hallucinations, self-injury, sleep disturbance and suicidal ideas. The patient is not nervous/anxious and is not hyperactive.    All other systems reviewed and are negative.      Objective   Vital Signs: Temperature 97.3 °F (36.3 °C), temperature source Infrared, height 180.3 cm (71\"), weight 119 kg (263 lb 6.4 oz), last menstrual period 10/31/2014, not currently breastfeeding.  Physical Exam  Skin:     Comments: Incision is intact in the upper thoracic spine.  The entire area surrounding the incision is tender to light touch.  No evidence of drainage, no significant erythema or induration.  There is some mild firm swelling in the left soft tissues.  Left buttock incision is well-healed.  No signs or symptoms of infection.     Musculoskeletal:     Strength is intact in upper and lower extremities to direct testing.     Station and gait are normal.  Neurologic:     Muscle tone is normal throughout.     Sensation is intact to light touch throughout.     Patient is oriented to person, place, and time.    Assessment/Plan   Diagnosis:  Complex " regional pain syndrome affecting the left chest wall and mechanical mid and low back pain.  Status post placement of [employer] epidural spinal cord stimulator    Medical Decision Making: Ms. Esparza is 2 weeks postop and is really struggling with pain in the upper thoracic spine where the leads were placed.  The device seems to be helping with her pre-operative symptoms. I reassured her that this should settle down with time. I did renew her pain medication and would like to check on her in 2 weeks.       -Percocet 7.5/325 1 tab p.o. 3 times daily #20 no refill    Kellen was seen today for post-op and wound check.    Diagnoses and all orders for this visit:    Mechanical neck/back pain                          Ayla Schwartz PA-C  Patient Care Team:  Ryne Puente DO as PCP - General (Family Medicine)  Johny Sommer MD as Consulting Physician (Cardiology)  Sonya Gay MD as Consulting Physician (Cardiology)  Deb Guajardo APRN as Nurse Practitioner (Pulmonary Disease)  Otis Choi MD as Consulting Physician (Pain Medicine)

## 2020-10-07 ENCOUNTER — OFFICE VISIT (OUTPATIENT)
Dept: NEUROSURGERY | Facility: CLINIC | Age: 55
End: 2020-10-07

## 2020-10-07 VITALS — HEIGHT: 71 IN | BODY MASS INDEX: 37.35 KG/M2 | TEMPERATURE: 96.9 F | WEIGHT: 266.8 LBS | RESPIRATION RATE: 15 BRPM

## 2020-10-07 DIAGNOSIS — M54.9 MECHANICAL BACK PAIN: Primary | ICD-10-CM

## 2020-10-07 PROCEDURE — 99213 OFFICE O/P EST LOW 20 MIN: CPT | Performed by: PHYSICIAN ASSISTANT

## 2020-10-07 RX ORDER — MELOXICAM 15 MG/1
TABLET ORAL
COMMUNITY
Start: 2020-09-24 | End: 2021-04-05

## 2020-10-07 NOTE — PROGRESS NOTES
Patient: Kellen Esparza  : 1965  Chart #: [id number]    Date of Service: 10/07/2020    CHIEF COMPLAINT: 1.Complex regional pain syndrome affecting the left chest wall  2.  Mechanical mid and low back pain    History of Present Illness Ms. Esparza is seen in follow-up.  She is a 54-year-old woman with a history of chronic neck and diffuse spinal pain.  She also complains of severe left chest wall pain that has been unremitting.  After a trial of an epidural spinal cord stimulator with improvement in symptoms, on 9/10/2020 she underwent T3 laminotomy for placement of [employer] epidural spinal cord stimulator.  I saw patient at her first postoperative visit and she complained of severe upper back pain at the site where the leads were placed.  I renewed her pain medicine and encouraged her to give this time.  Today she is 3.5 weeks postop and feeling much better.  She still has some pain and sensitivity at the site of the incision in the upper back but this is much improved.  Her low back pain is much better with the use of the device.  She continues with some sensitivity in the left chest particularly when showering.  No new complaints today.      Past Medical History:   Diagnosis Date   • Arthritis    • CPAP (continuous positive airway pressure) dependence    • Diabetes mellitus (CMS/[facility])     doesnt check sugar    • Hypertension    • Migraine    • Musculoligamentous strain    • Pacemaker    • Pulmonary embolism (CMS/[facility])    • Sleep apnea    • Wears eyeglasses          Current Outpatient Medications:   •  apixaban (ELIQUIS) 5 MG tablet tablet, Take 1 tablet by mouth 2 (Two) Times a Day., Disp: 180 tablet, Rfl: 3  •  aspirin 81 MG tablet, Take 1 tablet by mouth Daily., Disp: 30 tablet, Rfl: 11  •  atorvastatin (LIPITOR) 80 MG tablet, TAKE 1 TABLET BY MOUTH DAILY, Disp: 90 tablet, Rfl: 0  •  baclofen (LIORESAL) 10 MG tablet, TAKE 1/2 TO 1 TABLET BY MOUTH TWICE DAILY AS NEEDED FOR MUSCLE SPASMS, Disp:  60 tablet, Rfl: 1  •  cyclobenzaprine (FLEXERIL) 10 MG tablet, Take 1 tablet by mouth 3 (Three) Times a Day As Needed for Muscle Spasms., Disp: 30 tablet, Rfl: 1  •  Dietary Management Product (RHEUMATE) capsule, Take 1 capsule by mouth 2 (two) times a day., Disp: 60 capsule, Rfl: 11  •  estradiol (ESTRACE) 1 MG tablet, Take 1 mg by mouth Daily., Disp: , Rfl:   •  Gel Base gel, CAPSAICIN 0.001% IMIPRAMINE 3% LIDOCAINE 10% MANNITOL 20% MELOXICAM 0.1% PRILOCAINE 2%. APPLY 1-2 G TO AREA 3-4 TIMES DAILY, Disp: 30 g, Rfl: PRN  •  hydroCHLOROthiazide (MICROZIDE) 12.5 MG capsule, Take 1 capsule by mouth Daily., Disp: 30 capsule, Rfl: 1  •  JANUVIA 50 MG tablet, TAKE 1 TABLET BY MOUTH DAILY (Patient taking differently: Take 50 mg by mouth Daily.), Disp: 30 tablet, Rfl: 5  •  levETIRAcetam (KEPPRA) 250 MG tablet, TAKE 1 TABLET BY MOUTH TWICE DAILY (Patient taking differently: Take 250 mg by mouth 2 (Two) Times a Day. TAKE 1 TABLET BY MOUTH TWICE DAILY), Disp: 60 tablet, Rfl: 1  •  meloxicam (MOBIC) 15 MG tablet, , Disp: , Rfl:   •  Multiple Vitamins-Minerals (CENTRUM WOMEN PO), Take 1 capsule by mouth Daily. gummies, Disp: , Rfl:   •  nortriptyline (PAMELOR) 25 MG capsule, TAKE 1 TO 2 CAPSULES BY MOUTH AT BEDTIME (Patient taking differently: Take 25 mg by mouth Every Night. TAKE 1 TO 2 CAPSULES BY MOUTH AT BEDTIME), Disp: 60 capsule, Rfl: 5  •  OZEMPIC, 0.25 OR 0.5 MG/DOSE, 2 MG/1.5ML solution pen-injector, INJECT 0.5MG UNDER THE SKIN INTO THE APPROPRIATE MELANIE AS DIRECTED 1 WEEK (Patient taking differently: Inject 0.25 mg under the skin into the appropriate area as directed 1 (One) Time Per Week. Wednesdays), Disp: 4 pen, Rfl: 2  •  pregabalin (LYRICA) 150 MG capsule, Take 1 capsule by mouth 2 (Two) Times a Day., Disp: 60 capsule, Rfl: 4  •  pyridoxine (VITAMIN B-6) 100 MG tablet tablet, Take 1 tablet by mouth Daily., Disp: 30 tablet, Rfl: 5  •  SUMAtriptan (IMITREX) 50 MG tablet, Take 50 mg by mouth Every 2 (Two) Hours As  Needed for Migraine. Take one tablet at onset of headache. May repeat dose one time in 2 hours if headache not relieved., Disp: , Rfl:   •  tapentadol (NUCYNTA) 50 MG tablet, Take 1 hour before physical therapy (Patient taking differently: Take 50 mg by mouth. Take 1 hour before physical therapy), Disp: 15 tablet, Rfl: 0  •  tiZANidine (ZANAFLEX) 2 MG tablet, TAKE 1/2 TO 1 TABLET BY MOUTH FOUR TIMES DAILY AS NEEDED FOR MUSCLE SPASMS. DO NOT TAKE OTHER MUSCLE RELAXER WHILE ON TIZANIDINE (Patient taking differently: Take 2 mg by mouth Every Night. TAKE 1/2 TO 1 TABLET BY MOUTH FOUR TIMES DAILY AS NEEDED FOR MUSCLE SPASMS. DO NOT TAKE OTHER MUSCLE RELAXER WHILE ON TIZANIDINE), Disp: 30 tablet, Rfl: 0  •  traZODone (DESYREL) 100 MG tablet, Take 1 tablet by mouth Every Night., Disp: 30 tablet, Rfl: 1  •  valsartan (DIOVAN) 40 MG tablet, Take 0.5 tablets by mouth Daily. (Patient taking differently: Take 20 mg by mouth Every Morning.), Disp: 45 tablet, Rfl: 1  •  vitamin D (ERGOCALCIFEROL) 62712 UNITS capsule capsule, Take 50,000 Units by mouth 1 (One) Time Per Week. Wednesday, Disp: , Rfl:     Past Surgical History:   Procedure Laterality Date   • BREAST BIOPSY Left 2014   • CARDIAC CATHETERIZATION Left 8/10/2017    Procedure: Cardiac Catheterization/Vascular Study;  Surgeon: Johny Sommer MD;  Location:  [facility] C[facility] INVASIVE LOC[facility];  Service:    • CARDIAC ELECTROPHYSIOLOGY PROCEDURE N/A 7/19/2019    Procedure: Pacemaker DC new;  Surgeon: Sonya Gay MD;  Location:  [facility] CATH INVASIVE LOCATION;  Service: Cardiology   • CHOLECYSTECTOMY     • COLONOSCOPY     • ENDOSCOPY     • HYSTERECTOMY  11/2014   • INSERT / REPLACE / REMOVE PACEMAKER     • JOINT REPLACEMENT Right     knee   • KNEE SURGERY Right 12/2017    total replacement   • NASAL SINUS SURGERY     • OOPHORECTOMY  11/2014   • PACEMAKER IMPLANTATION     • PULMONARY EMBOLISM SURGERY      right lung   • SPINAL CORD STIMULATOR IMPLANT N/A 9/10/2020     Procedure: SPINAL CORD STIMULATOR INSERTION PHASE 1, UPPER THORACIC PLACEMENT ;  Surgeon: Victor Manuel Geiger MD;  Location: [facility];  Service: Neurosurgery;  Laterality: N/A;   • TUBAL ABDOMINAL LIGATION         Social History     Socioeconomic History   • Marital status: Single     Spouse name: Not on file   • Number of children: Not on file   • Years of education: Not on file   • Highest education level: Not on file   Tobacco Use   • Smoking status: Never Smoker   • Smokeless tobacco: Never Used   Substance and Sexual Activity   • Alcohol use: No     Frequency: Never     Comment: rare   • Drug use: No   • Sexual activity: Defer   Social History Narrative    Patient consumes NO CAFFEINE     Patient lives at home with partner, Donald.         Lifelong non-smoker    Does not drink alcohol    Works as a          Review of Systems   Constitutional: Negative for activity change, appetite change, chills, diaphoresis, fatigue, fever and unexpected weight change.   HENT: Negative for congestion, dental problem, drooling, ear discharge, ear pain, facial swelling, hearing loss, mouth sores, nosebleeds, postnasal drip, rhinorrhea, sinus pressure, sneezing, sore throat, tinnitus, trouble swallowing and voice change.    Eyes: Negative for photophobia, pain, discharge, redness, itching and visual disturbance.   Respiratory: Negative for apnea, cough, choking, chest tightness, shortness of breath, wheezing and stridor.    Cardiovascular: Negative for chest pain, palpitations and leg swelling.   Gastrointestinal: Negative for abdominal distention, abdominal pain, anal bleeding, blood in stool, constipation, diarrhea, nausea, rectal pain and vomiting.   Endocrine: Negative for cold intolerance, heat intolerance, polydipsia, polyphagia and polyuria.   Genitourinary: Negative for decreased urine volume, difficulty urinating, dysuria, enuresis, flank pain, frequency, genital sores, hematuria and  "urgency.   Musculoskeletal: Positive for back pain and neck pain. Negative for arthralgias, gait problem, joint swelling, myalgias and neck stiffness.   Skin: Negative for color change, pallor, rash and wound.   Allergic/Immunologic: Negative for environmental allergies, food allergies and immunocompromised state.   Neurological: Negative for dizziness, tremors, seizures, syncope, facial asymmetry, speech difficulty, weakness, light-headedness, numbness and headaches.   Hematological: Negative for adenopathy. Does not bruise/bleed easily.   Psychiatric/Behavioral: Negative for agitation, behavioral problems, confusion, decreased concentration, dysphoric mood, hallucinations, self-injury, sleep disturbance and suicidal ideas. The patient is not nervous/anxious and is not hyperactive.    All other systems reviewed and are negative.      Objective   Vital Signs: Temperature 96.9 °F (36.1 °C), temperature source Infrared, resp. rate 15, height 180.3 cm (71\"), weight 121 kg (266 lb 12.8 oz), last menstrual period 10/31/2014, not currently breastfeeding.  Physical Exam  Vitals signs and nursing note reviewed.   Constitutional:       Appearance: She is obese.   Pulmonary:      Effort: Pulmonary effort is normal.   Psychiatric:         Mood and Affect: Mood normal.         Behavior: Behavior normal.         Thought Content: Thought content normal.     Skin:     Comments: Incision is intact in the upper thoracic spine.  Mildly tender to touch. No evidence of drainage, no significant erythema or induration.  There is some mild firm swelling in the left soft tissues.  Left buttock incision is well-healed.  No signs or symptoms of infection.    Musculoskeletal:     Strength is intact in upper and lower extremities to direct testing.     Station and gait are normal.  Neurologic:     Muscle tone is normal throughout.     Sensation is intact to light touch throughout.     Patient is oriented to person, place, and time.     "   Independent review of radiographic imaging: no new studies.     Assessment/Plan   Diagnosis: Complex regional pain syndrome affecting the left chest wall and mechanical mid and low back pain, s/p placement of [employer] epidural spinal cord stimulator       Medical Decision Making: When last seen, patient complained of severe upper back pain at the site of the lead placement.  Today she is feeling much better.  Her pain has dialed back quite significantly and is only mildly bothersome.  She continues to take a muscle relaxer at nighttime.  For the most part, her device is addressing the pain she had pre-operatively. She will continue to follow-up with Dr. Choi for adjustments.  Follow-up in our office as needed.  Please not hesitate to call with any questions or concerns.        Diagnoses and all orders for this visit:    Mechanical neck/back pain                    Ayla Schwartz PA-C  Patient Care Team:  Ryne Puente DO as PCP - General (Family Medicine)  Johny Sommer MD as Consulting Physician (Cardiology)  Sonya Gay MD as Consulting Physician (Cardiology)  Deb Guajardo APRN as Nurse Practitioner (Pulmonary Disease)  Otis Choi MD as Consulting Physician (Pain Medicine)

## 2020-10-13 ENCOUNTER — TELEPHONE (OUTPATIENT)
Dept: NEUROSURGERY | Facility: CLINIC | Age: 55
End: 2020-10-13

## 2020-10-13 NOTE — TELEPHONE ENCOUNTER
Provider:  Noble VALLADARES  Caller: patient  Time of call:   2:23  Phone #:  [phone]  Surgery:  SCS placement  Surgery Date:  09/10/20  Last visit:   10/07/20  Next visit: ROMINA KOWALSKI:         Reason for call:     Patient called and said she has some bruising from her surgery.  She said her back feels swollen and wants to know if someone needs to look at it, or could they please call her.  If need, she said she could come in.

## 2020-10-13 NOTE — TELEPHONE ENCOUNTER
Called and spoke with pt.     Id like her to come in tomorrow for a skin check,    Can we please add her to my schedule around 11 or 11:30

## 2020-10-14 NOTE — TELEPHONE ENCOUNTER
Patient called and said she cannot be here at 11:00 because she does not have a ride.  She can be here around 2:00, or anytime after.    Is there any way to change her time today?

## 2020-10-15 ENCOUNTER — OFFICE VISIT (OUTPATIENT)
Dept: NEUROSURGERY | Facility: CLINIC | Age: 55
End: 2020-10-15

## 2020-10-15 ENCOUNTER — LAB (OUTPATIENT)
Dept: LAB | Facility: HOSPITAL | Age: 55
End: 2020-10-15

## 2020-10-15 VITALS
SYSTOLIC BLOOD PRESSURE: 148 MMHG | HEIGHT: 71 IN | BODY MASS INDEX: 38.14 KG/M2 | DIASTOLIC BLOOD PRESSURE: 72 MMHG | WEIGHT: 272.4 LBS | TEMPERATURE: 97.5 F

## 2020-10-15 DIAGNOSIS — M54.59 MECHANICAL LOW BACK PAIN: ICD-10-CM

## 2020-10-15 DIAGNOSIS — M54.59 MECHANICAL LOW BACK PAIN: Primary | ICD-10-CM

## 2020-10-15 LAB
BASOPHILS # BLD AUTO: 0.03 10*3/MM3 (ref 0–0.2)
BASOPHILS NFR BLD AUTO: 0.4 % (ref 0–1.5)
DEPRECATED RDW RBC AUTO: 39.4 FL (ref 37–54)
EOSINOPHIL # BLD AUTO: 0.28 10*3/MM3 (ref 0–0.4)
EOSINOPHIL NFR BLD AUTO: 3.5 % (ref 0.3–6.2)
ERYTHROCYTE [DISTWIDTH] IN BLOOD BY AUTOMATED COUNT: 13.5 % (ref 12.3–15.4)
HCT VFR BLD AUTO: 34.9 % (ref 34–46.6)
HGB BLD-MCNC: 11.4 G/DL (ref 12–15.9)
IMM GRANULOCYTES # BLD AUTO: 0.02 10*3/MM3 (ref 0–0.05)
IMM GRANULOCYTES NFR BLD AUTO: 0.2 % (ref 0–0.5)
LYMPHOCYTES # BLD AUTO: 3.67 10*3/MM3 (ref 0.7–3.1)
LYMPHOCYTES NFR BLD AUTO: 45.5 % (ref 19.6–45.3)
MCH RBC QN AUTO: 26.2 PG (ref 26.6–33)
MCHC RBC AUTO-ENTMCNC: 32.7 G/DL (ref 31.5–35.7)
MCV RBC AUTO: 80.2 FL (ref 79–97)
MONOCYTES # BLD AUTO: 0.47 10*3/MM3 (ref 0.1–0.9)
MONOCYTES NFR BLD AUTO: 5.8 % (ref 5–12)
NEUTROPHILS NFR BLD AUTO: 3.6 10*3/MM3 (ref 1.7–7)
NEUTROPHILS NFR BLD AUTO: 44.6 % (ref 42.7–76)
NRBC BLD AUTO-RTO: 0 /100 WBC (ref 0–0.2)
PLATELET # BLD AUTO: 189 10*3/MM3 (ref 140–450)
PMV BLD AUTO: 12.1 FL (ref 6–12)
RBC # BLD AUTO: 4.35 10*6/MM3 (ref 3.77–5.28)
WBC # BLD AUTO: 8.07 10*3/MM3 (ref 3.4–10.8)

## 2020-10-15 PROCEDURE — 99213 OFFICE O/P EST LOW 20 MIN: CPT | Performed by: PHYSICIAN ASSISTANT

## 2020-10-15 PROCEDURE — 36415 COLL VENOUS BLD VENIPUNCTURE: CPT

## 2020-10-15 PROCEDURE — 85025 COMPLETE CBC W/AUTO DIFF WBC: CPT

## 2020-10-15 PROCEDURE — 86140 C-REACTIVE PROTEIN: CPT

## 2020-10-15 PROCEDURE — 85652 RBC SED RATE AUTOMATED: CPT

## 2020-10-15 NOTE — TELEPHONE ENCOUNTER
Patient called at 9:20 requesting a call back about her apt.  Please schedule for the first available.

## 2020-10-15 NOTE — PROGRESS NOTES
Patient: Kellen Esparza  : 1965  Gender: female    Primary Care Provider: Ryne Puente DO      Chief Complaint:   Chief Complaint   Patient presents with   • Post-op       History of Present Illness:  Kellen Esparza is a 54-year-old woman with a chronic history of neck and diffuse spinal pain.  She underwent a trial of an epidural spinal cord stimulator with improvement of symptoms.  As such she underwent a T3 laminotomy for placement of a [employer] epidural spinal cord stimulator on 9/10/2020.  Postoperatively patient has complained of severe upper back pain where the leads were placed.  She has slowly weaned from her medication last week.  Shortly after she contacted our clinic with complaints of bruising and swelling around her upper thoracic incision.  It was recommended that she come today for a wound check.    Presently, Ms. Esparza states that her upper thoracic pain remains severe in nature.  Her pain radiates to her intrascapular and lateral flank region bilaterally.  She denies numbness in this distribution or in her abdomen.  No lower extremity symptoms.  No gait disturbance, bowel or bladder difficulties.  She denies fever, chills or flulike symptoms.  No incisional drainage.  She is taking Lyrica, Motrin/Tylenol and baclofen with minimal relief.      Past Medical and Surgical History:  Past Medical History:   Diagnosis Date   • Arthritis    • CPAP (continuous positive airway pressure) dependence    • Diabetes mellitus (CMS/[facility])     doesnt check sugar    • Hypertension    • Migraine    • Musculoligamentous strain    • Pacemaker    • Pulmonary embolism (CMS/[facility])    • Sleep apnea    • Wears eyeglasses      Past Surgical History:   Procedure Laterality Date   • BREAST BIOPSY Left    • CARDIAC CATHETERIZATION Left 8/10/2017    Procedure: Cardiac Catheterization/Vascular Study;  Surgeon: Johny Sommer MD;  Location: [facility] CATH INVASIVE LOCATION;  Service:    • CARDIAC  ELECTROPHYSIOLOGY PROCEDURE N/A 7/19/2019    Procedure: Pacemaker DC new;  Surgeon: Sonya Gay MD;  Location:  [facility] CATH INVASIVE LOCATION;  Service: Cardiology   • CHOLECYSTECTOMY     • COLONOSCOPY     • ENDOSCOPY     • HYSTERECTOMY  11/2014   • INSERT / REPLACE / REMOVE PACEMAKER     • JOINT REPLACEMENT Right     knee   • KNEE SURGERY Right 12/2017    total replacement   • NASAL SINUS SURGERY     • OOPHORECTOMY  11/2014   • PACEMAKER IMPLANTATION     • PULMONARY EMBOLISM SURGERY      right lung   • SPINAL CORD STIMULATOR IMPLANT N/A 9/10/2020    Procedure: SPINAL CORD STIMULATOR INSERTION PHASE 1, UPPER THORACIC PLACEMENT ;  Surgeon: Victor Manuel Geiger MD;  Location:  [facility];  Service: Neurosurgery;  Laterality: N/A;   • TUBAL ABDOMINAL LIGATION         Current Medications:    Current Outpatient Medications:   •  apixaban (ELIQUIS) 5 MG tablet tablet, Take 1 tablet by mouth 2 (Two) Times a Day., Disp: 180 tablet, Rfl: 3  •  aspirin 81 MG tablet, Take 1 tablet by mouth Daily., Disp: 30 tablet, Rfl: 11  •  atorvastatin (LIPITOR) 80 MG tablet, TAKE 1 TABLET BY MOUTH DAILY, Disp: 90 tablet, Rfl: 0  •  baclofen (LIORESAL) 10 MG tablet, TAKE 1/2 TO 1 TABLET BY MOUTH TWICE DAILY AS NEEDED FOR MUSCLE SPASMS, Disp: 60 tablet, Rfl: 1  •  cyclobenzaprine (FLEXERIL) 10 MG tablet, Take 1 tablet by mouth 3 (Three) Times a Day As Needed for Muscle Spasms., Disp: 30 tablet, Rfl: 1  •  Dietary Management Product (RHEUMATE) capsule, Take 1 capsule by mouth 2 (two) times a day., Disp: 60 capsule, Rfl: 11  •  estradiol (ESTRACE) 1 MG tablet, Take 1 mg by mouth Daily., Disp: , Rfl:   •  Gel Base gel, CAPSAICIN 0.001% IMIPRAMINE 3% LIDOCAINE 10% MANNITOL 20% MELOXICAM 0.1% PRILOCAINE 2%. APPLY 1-2 G TO AREA 3-4 TIMES DAILY, Disp: 30 g, Rfl: PRN  •  hydroCHLOROthiazide (MICROZIDE) 12.5 MG capsule, Take 1 capsule by mouth Daily., Disp: 30 capsule, Rfl: 1  •  JANUVIA 50 MG tablet, TAKE 1 TABLET BY MOUTH DAILY (Patient  taking differently: Take 50 mg by mouth Daily.), Disp: 30 tablet, Rfl: 5  •  levETIRAcetam (KEPPRA) 250 MG tablet, TAKE 1 TABLET BY MOUTH TWICE DAILY (Patient taking differently: Take 250 mg by mouth 2 (Two) Times a Day. TAKE 1 TABLET BY MOUTH TWICE DAILY), Disp: 60 tablet, Rfl: 1  •  meloxicam (MOBIC) 15 MG tablet, , Disp: , Rfl:   •  Multiple Vitamins-Minerals (CENTRUM WOMEN PO), Take 1 capsule by mouth Daily. gummies, Disp: , Rfl:   •  nortriptyline (PAMELOR) 25 MG capsule, TAKE 1 TO 2 CAPSULES BY MOUTH AT BEDTIME (Patient taking differently: Take 25 mg by mouth Every Night. TAKE 1 TO 2 CAPSULES BY MOUTH AT BEDTIME), Disp: 60 capsule, Rfl: 5  •  OZEMPIC, 0.25 OR 0.5 MG/DOSE, 2 MG/1.5ML solution pen-injector, INJECT 0.5MG UNDER THE SKIN INTO THE APPROPRIATE MELANIE AS DIRECTED 1 WEEK (Patient taking differently: Inject 0.25 mg under the skin into the appropriate area as directed 1 (One) Time Per Week. Wednesdays), Disp: 4 pen, Rfl: 2  •  pregabalin (LYRICA) 150 MG capsule, Take 1 capsule by mouth 2 (Two) Times a Day., Disp: 60 capsule, Rfl: 4  •  pyridoxine (VITAMIN B-6) 100 MG tablet tablet, Take 1 tablet by mouth Daily., Disp: 30 tablet, Rfl: 5  •  SUMAtriptan (IMITREX) 50 MG tablet, Take 50 mg by mouth Every 2 (Two) Hours As Needed for Migraine. Take one tablet at onset of headache. May repeat dose one time in 2 hours if headache not relieved., Disp: , Rfl:   •  tapentadol (NUCYNTA) 50 MG tablet, Take 1 hour before physical therapy (Patient taking differently: Take 50 mg by mouth. Take 1 hour before physical therapy), Disp: 15 tablet, Rfl: 0  •  tiZANidine (ZANAFLEX) 2 MG tablet, TAKE 1/2 TO 1 TABLET BY MOUTH FOUR TIMES DAILY AS NEEDED FOR MUSCLE SPASMS. DO NOT TAKE OTHER MUSCLE RELAXER WHILE ON TIZANIDINE (Patient taking differently: Take 2 mg by mouth Every Night. TAKE 1/2 TO 1 TABLET BY MOUTH FOUR TIMES DAILY AS NEEDED FOR MUSCLE SPASMS. DO NOT TAKE OTHER MUSCLE RELAXER WHILE ON TIZANIDINE), Disp: 30 tablet,  "Rfl: 0  •  traZODone (DESYREL) 100 MG tablet, Take 1 tablet by mouth Every Night., Disp: 30 tablet, Rfl: 1  •  valsartan (DIOVAN) 40 MG tablet, Take 0.5 tablets by mouth Daily. (Patient taking differently: Take 20 mg by mouth Every Morning.), Disp: 45 tablet, Rfl: 1  •  vitamin D (ERGOCALCIFEROL) 24741 UNITS capsule capsule, Take 50,000 Units by mouth 1 (One) Time Per Week. Wednesday, Disp: , Rfl:     Allergies:  Allergies   Allergen Reactions   • Metformin GI Intolerance   • Lisinopril Cough         Review of Systems   Musculoskeletal: Positive for back pain.   All other systems reviewed and are negative.        Physical Exam  AXOX3  Strength and range of motion equal and intact in bilateral upper and lower extremities  Left upper buttock incision is well-healed without evidence of erythema, fluctuance or drainage  Upper thoracic incision is clean, dry and intact.  There is mild soft tissue swelling in the upper thoracic area.  This area does not appear fluctuant in nature.  The incision is intact without evidence of dehiscence or drainage.    Vitals:    10/15/20 1405   BP: 148/72   BP Location: Left arm   Patient Position: Sitting   Cuff Size: Adult   Temp: 97.5 °F (36.4 °C)   TempSrc: Infrared   Weight: 124 kg (272 lb 6.4 oz)   Height: 180.3 cm (71\")           Imaging Review:  No imaging    Assessment:  1.  Mechanical back and neck pain  2.  Status post T3 laminotomy [employer] epidural spinal cord stimulator    Plan:  Ms. Esparza is seen today for a wound check.  She continues to complain of severe upper back pain at the site of the her lead placement.  The area is very tender to palpation.  There is no evidence of dehiscence, erythema, fluctuance or drainage. She has no fever.  I have ordered inflammatory labs just to rule this out.  Advised patient to continue symptomatic treatment with ice to the back. I have called in a short course of tramadol. She is aware that this is the last pain med script we will " write. We will arrange follow up in 1-2 weeks to check her wound again. She will closely monitor her symptoms and call with any concerns.       Pennie Vazquez PA-C

## 2020-10-16 ENCOUNTER — TELEPHONE (OUTPATIENT)
Dept: NEUROSURGERY | Facility: CLINIC | Age: 55
End: 2020-10-16

## 2020-10-16 LAB
CRP SERPL-MCNC: 0.3 MG/DL (ref 0–0.5)
ERYTHROCYTE [SEDIMENTATION RATE] IN BLOOD: 26 MM/HR (ref 0–30)

## 2020-10-16 NOTE — TELEPHONE ENCOUNTER
I called in tramadol 50 mg 1 tab p.o. every 6 hours as needed pain.  #[phone] no refill to [facility]

## 2020-10-16 NOTE — TELEPHONE ENCOUNTER
"Provider:  Juanjo  Caller: Patient  Time of call:   1:06pm  Phone #:  [phone]  Surgery:  SCS Insertion  Surgery Date:  09/10/2020  Last visit:   10/15/2020  Next visit: 10/21/2020    DEX:         Reason for call:   Patient called LVM stating she was under the impression Pennie was going to call her in something for pain yesterday. Patient stated nothing has been received by her pharmacy at this time. Please advise.    Per Pennie's 10/15/20 Office Note: \"I have called in a short course of tramadol. She is aware that this is the last pain med script we will write.\"           "

## 2020-10-21 ENCOUNTER — OFFICE VISIT (OUTPATIENT)
Dept: NEUROSURGERY | Facility: CLINIC | Age: 55
End: 2020-10-21

## 2020-10-21 VITALS
TEMPERATURE: 96.1 F | DIASTOLIC BLOOD PRESSURE: 70 MMHG | BODY MASS INDEX: 37.88 KG/M2 | SYSTOLIC BLOOD PRESSURE: 134 MMHG | WEIGHT: 270.6 LBS | HEIGHT: 71 IN

## 2020-10-21 DIAGNOSIS — G90.512 COMPLEX REGIONAL PAIN SYNDROME TYPE 1 OF LEFT UPPER EXTREMITY: Primary | ICD-10-CM

## 2020-10-21 PROCEDURE — 99024 POSTOP FOLLOW-UP VISIT: CPT | Performed by: PHYSICIAN ASSISTANT

## 2020-10-21 RX ORDER — LEVETIRACETAM 250 MG/1
250 TABLET ORAL 2 TIMES DAILY
Qty: 60 TABLET | Refills: 0 | Status: SHIPPED | OUTPATIENT
Start: 2020-10-21 | End: 2020-11-23

## 2020-10-21 RX ORDER — LIDOCAINE 50 MG/G
1 PATCH TOPICAL EVERY 24 HOURS
Qty: 30 PATCH | Refills: 0 | Status: ON HOLD | OUTPATIENT
Start: 2020-10-21 | End: 2022-05-03

## 2020-10-21 NOTE — PROGRESS NOTES
Patient: Kellen Esparza  : 1965  Gender: female    Primary Care Provider: Ryne Puente DO    Chief Complaint: Upper thoracic pain     History of Present Illness:  Kellen Esparza is a 54-year-old woman with a chronic history of neck and diffuse spinal pain.  She underwent a trial of an epidural spinal cord stimulator with improvement of symptoms.  As such she underwent a T3 laminotomy for placement of a [employer] epidural spinal cord stimulator on 9/10/2020.  Postoperatively patient has complained of severe upper back pain where the leads were placed. This has been persistent despite lyrica, muscle relaxers, and PRN tramadol. She was concerned with swelling around her incision therefore inflammatory labs were obtained. These were all WNL. Despite this, she feels that she stimulator is working in regards to preoperative symptoms. She denies issues with the left buttock incision. Today she states that her pain is the same without change or improvement.       Past Medical and Surgical History:  Past Medical History:   Diagnosis Date   • Arthritis    • CPAP (continuous positive airway pressure) dependence    • Diabetes mellitus (CMS/[facility])     doesnt check sugar    • Hypertension    • Migraine    • Musculoligamentous strain    • Pacemaker    • Pulmonary embolism (CMS/[facility])    • Sleep apnea    • Wears eyeglasses      Past Surgical History:   Procedure Laterality Date   • BREAST BIOPSY Left    • CARDIAC CATHETERIZATION Left 8/10/2017    Procedure: Cardiac Catheterization/Vascular Study;  Surgeon: Johny Sommer MD;  Location:  [facility];  Service:    • CARDIAC ELECTROPHYSIOLOGY PROCEDURE N/A 2019    Procedure: Pacemaker DC new;  Surgeon: Sonya Gay MD;  Location:  [facility];  Service: Cardiology   • CHOLECYSTECTOMY     • COLONOSCOPY     • ENDOSCOPY     • HYSTERECTOMY  2014   • INSERT / REPLACE / REMOVE PACEMAKER     • JOINT  REPLACEMENT Right     knee   • KNEE SURGERY Right 12/2017    total replacement   • NASAL SINUS SURGERY     • OOPHORECTOMY  11/2014   • PACEMAKER IMPLANTATION     • PULMONARY EMBOLISM SURGERY      right lung   • SPINAL CORD STIMULATOR IMPLANT N/A 9/10/2020    Procedure: SPINAL CORD STIMULATOR INSERTION PHASE 1, UPPER THORACIC PLACEMENT ;  Surgeon: Victor Manuel Geiger MD;  [facility];  Service: Neurosurgery;  Laterality: N/A;   • TUBAL ABDOMINAL LIGATION         Current Medications:    Current Outpatient Medications:   •  apixaban (ELIQUIS) 5 MG tablet tablet, Take 1 tablet by mouth 2 (Two) Times a Day., Disp: 180 tablet, Rfl: 3  •  aspirin 81 MG tablet, Take 1 tablet by mouth Daily., Disp: 30 tablet, Rfl: 11  •  atorvastatin (LIPITOR) 80 MG tablet, TAKE 1 TABLET BY MOUTH DAILY, Disp: 90 tablet, Rfl: 0  •  baclofen (LIORESAL) 10 MG tablet, TAKE 1/2 TO 1 TABLET BY MOUTH TWICE DAILY AS NEEDED FOR MUSCLE SPASMS, Disp: 60 tablet, Rfl: 1  •  cyclobenzaprine (FLEXERIL) 10 MG tablet, Take 1 tablet by mouth 3 (Three) Times a Day As Needed for Muscle Spasms., Disp: 30 tablet, Rfl: 1  •  Dietary Management Product (RHEUMATE) capsule, Take 1 capsule by mouth 2 (two) times a day., Disp: 60 capsule, Rfl: 11  •  estradiol (ESTRACE) 1 MG tablet, Take 1 mg by mouth Daily., Disp: , Rfl:   •  Gel Base gel, CAPSAICIN 0.001% IMIPRAMINE 3% LIDOCAINE 10% MANNITOL 20% MELOXICAM 0.1% PRILOCAINE 2%. APPLY 1-2 G TO AREA 3-4 TIMES DAILY, Disp: 30 g, Rfl: PRN  •  hydroCHLOROthiazide (MICROZIDE) 12.5 MG capsule, Take 1 capsule by mouth Daily., Disp: 30 capsule, Rfl: 1  •  JANUVIA 50 MG tablet, TAKE 1 TABLET BY MOUTH DAILY (Patient taking differently: Take 50 mg by mouth Daily.), Disp: 30 tablet, Rfl: 5  •  levETIRAcetam (KEPPRA) 250 MG tablet, TAKE 1 TABLET BY MOUTH TWICE DAILY (Patient taking differently: Take 250 mg by mouth 2 (Two) Times a Day. TAKE 1 TABLET BY MOUTH TWICE DAILY), Disp: 60 tablet, Rfl: 1  •  meloxicam (MOBIC) 15 MG  tablet, , Disp: , Rfl:   •  Multiple Vitamins-Minerals (CENTRUM WOMEN PO), Take 1 capsule by mouth Daily. gummies, Disp: , Rfl:   •  nortriptyline (PAMELOR) 25 MG capsule, TAKE 1 TO 2 CAPSULES BY MOUTH AT BEDTIME (Patient taking differently: Take 25 mg by mouth Every Night. TAKE 1 TO 2 CAPSULES BY MOUTH AT BEDTIME), Disp: 60 capsule, Rfl: 5  •  OZEMPIC, 0.25 OR 0.5 MG/DOSE, 2 MG/1.5ML solution pen-injector, INJECT 0.5MG UNDER THE SKIN INTO THE APPROPRIATE MELANIE AS DIRECTED 1 WEEK (Patient taking differently: Inject 0.25 mg under the skin into the appropriate area as directed 1 (One) Time Per Week. Wednesdays), Disp: 4 pen, Rfl: 2  •  pregabalin (LYRICA) 150 MG capsule, Take 1 capsule by mouth 2 (Two) Times a Day., Disp: 60 capsule, Rfl: 4  •  pyridoxine (VITAMIN B-6) 100 MG tablet tablet, Take 1 tablet by mouth Daily., Disp: 30 tablet, Rfl: 5  •  SUMAtriptan (IMITREX) 50 MG tablet, Take 50 mg by mouth Every 2 (Two) Hours As Needed for Migraine. Take one tablet at onset of headache. May repeat dose one time in 2 hours if headache not relieved., Disp: , Rfl:   •  tapentadol (NUCYNTA) 50 MG tablet, Take 1 hour before physical therapy (Patient taking differently: Take 50 mg by mouth. Take 1 hour before physical therapy), Disp: 15 tablet, Rfl: 0  •  tiZANidine (ZANAFLEX) 2 MG tablet, TAKE 1/2 TO 1 TABLET BY MOUTH FOUR TIMES DAILY AS NEEDED FOR MUSCLE SPASMS. DO NOT TAKE OTHER MUSCLE RELAXER WHILE ON TIZANIDINE (Patient taking differently: Take 2 mg by mouth Every Night. TAKE 1/2 TO 1 TABLET BY MOUTH FOUR TIMES DAILY AS NEEDED FOR MUSCLE SPASMS. DO NOT TAKE OTHER MUSCLE RELAXER WHILE ON TIZANIDINE), Disp: 30 tablet, Rfl: 0  •  traZODone (DESYREL) 100 MG tablet, Take 1 tablet by mouth Every Night., Disp: 30 tablet, Rfl: 1  •  valsartan (DIOVAN) 40 MG tablet, Take 0.5 tablets by mouth Daily. (Patient taking differently: Take 20 mg by mouth Every Morning.), Disp: 45 tablet, Rfl: 1  •  vitamin D (ERGOCALCIFEROL) 68340 UNITS  "capsule capsule, Take 50,000 Units by mouth 1 (One) Time Per Week. Wednesday, Disp: , Rfl:   •  lidocaine (LIDODERM) 5 %, Place 1 patch on the skin as directed by provider Daily. Remove & Discard patch within 12 hours or as directed by MD, Disp: 30 patch, Rfl: 0    Allergies:  Allergies   Allergen Reactions   • Metformin GI Intolerance   • Lisinopril Cough         Review of Systems   Musculoskeletal: Positive for back pain.   All other systems reviewed and are negative.        Physical Exam  AXOX3  Strength and range of motion equal and intact in bilateral upper and lower extremities  Left upper buttock incision is well-healed without evidence of erythema, fluctuance or drainage  Upper thoracic incision is clean, dry and intact. No evidence of wound dehiscence.   Patient is very tender to palpation to her mid thoracic and intrascapular region    Vitals:    10/21/20 1354   BP: 134/70   BP Location: Left arm   Patient Position: Sitting   Cuff Size: Adult   Temp: 96.1 °F (35.6 °C)   TempSrc: Infrared   Weight: 123 kg (270 lb 9.6 oz)   Height: 180.3 cm (71\")         Diagnostic review:  WBC: 8.07  CRP: 0.30 mg/dL  ESR: 26 mm/hr    Assessment:  1.  Mechanical back and neck pain  2.  Status post T3 laminotomy [employer] epidural spinal cord stimulator    Plan:  Ms. Esparza is seen today in follow-up.  Inflammatory markers were reviewed and within normal limits.  She continues to have pain in her upper thoracic/intrascapular area.  I discussed with Dr. Geiger who recommends giving it time to settle down as her stimulator is helping her preoperative pain.  Additionally we have written some Lidoderm patches for her to use on the affected area.  We will arrange follow-up via telephone in around 4 weeks to assess her progress.  She will call with any concerns in the meantime.        Pennie Vazquez PA-C  "

## 2020-10-23 DIAGNOSIS — E11.9 TYPE 2 DIABETES MELLITUS WITHOUT COMPLICATION, WITHOUT LONG-TERM CURRENT USE OF INSULIN (HCC): ICD-10-CM

## 2020-10-23 RX ORDER — SEMAGLUTIDE 1.34 MG/ML
0.25 INJECTION, SOLUTION SUBCUTANEOUS WEEKLY
Qty: 2 PEN | Refills: 11 | Status: SHIPPED | OUTPATIENT
Start: 2020-10-23 | End: 2021-08-02 | Stop reason: SDUPTHER

## 2020-10-27 RX ORDER — ATORVASTATIN CALCIUM 80 MG/1
80 TABLET, FILM COATED ORAL DAILY
Qty: 90 TABLET | Refills: 3 | Status: SHIPPED | OUTPATIENT
Start: 2020-10-27 | End: 2021-10-08

## 2020-11-16 ENCOUNTER — TELEPHONE (OUTPATIENT)
Dept: PAIN MEDICINE | Facility: CLINIC | Age: 55
End: 2020-11-16

## 2020-11-16 NOTE — TELEPHONE ENCOUNTER
Pt called to let us know that she wants her SCS removed and I advised she contacted Juanjo as he was the Implanting

## 2020-11-18 ENCOUNTER — OFFICE VISIT (OUTPATIENT)
Dept: NEUROSURGERY | Facility: CLINIC | Age: 55
End: 2020-11-18

## 2020-11-18 VITALS — HEIGHT: 71 IN | BODY MASS INDEX: 37.8 KG/M2 | WEIGHT: 270 LBS

## 2020-11-18 DIAGNOSIS — M50.30 DDD (DEGENERATIVE DISC DISEASE), CERVICAL: Primary | ICD-10-CM

## 2020-11-18 PROCEDURE — 99024 POSTOP FOLLOW-UP VISIT: CPT | Performed by: PHYSICIAN ASSISTANT

## 2020-11-18 NOTE — PROGRESS NOTES
Patient: Kellen Esparza  : 1965  Gender: female    Primary Care Provider: Ryne Puente DO    Requesting Provider: As above    Chief Complaint:   Chief Complaint   Patient presents with   • Back Pain       History of Present Illness:  Ms. Esparza is a 54-year-old woman with a history of chronic neck, diffuse spinal and severe left chest wall pain.  She recently underwent a trial of an epidural spinal cord stimulator with Dr. Choi that provided substantial relief.  As such she elected for T3 laminotomy for placement of a [employer] epidural spinal cord stimulator on 9/10/2020.  Postoperatively we have followed her for severe upper back pain where the leads were placed.  This has been persistent despite Lyrica, muscle relaxants, tramadol and topical Lidoderm patches.  Inflammatory labs were obtained and within normal limits.  She feels as if her upper thoracic pain is progressing and now affecting her sleep.  Despite this, she stated that the stimulator is working in regards to her preoperative symptoms.  She denies pain within the left buttock incision. She is requesting that the system be removed due to her symptoms.      Past Medical and Surgical History:  Past Medical History:   Diagnosis Date   • Arthritis    • CPAP (continuous positive airway pressure) dependence    • Diabetes mellitus (CMS/HCC)     doesnt check sugar    • Hypertension    • Migraine    • Musculoligamentous strain    • Pacemaker    • Pulmonary embolism (CMS/HCC)    • Sleep apnea    • Wears eyeglasses      Past Surgical History:   Procedure Laterality Date   • BREAST BIOPSY Left    • CARDIAC CATHETERIZATION Left 8/10/2017    Procedure: Cardiac Catheterization/Vascular Study;  Surgeon: Johny Sommer MD;  Location:  [facility] CATH INVASIVE LOCATION;  Service:    • CARDIAC ELECTROPHYSIOLOGY PROCEDURE N/A 2019    Procedure: Pacemaker DC new;  Surgeon: Sonya Gay MD;  Location:  [facility] CATH INVASIVE  LOCATION;  Service: Cardiology   • CHOLECYSTECTOMY     • COLONOSCOPY     • ENDOSCOPY     • HYSTERECTOMY  11/2014   • INSERT / REPLACE / REMOVE PACEMAKER     • JOINT REPLACEMENT Right     knee   • KNEE SURGERY Right 12/2017    total replacement   • NASAL SINUS SURGERY     • OOPHORECTOMY  11/2014   • PACEMAKER IMPLANTATION     • PULMONARY EMBOLISM SURGERY      right lung   • SPINAL CORD STIMULATOR IMPLANT N/A 9/10/2020    Procedure: SPINAL CORD STIMULATOR INSERTION PHASE 1, UPPER THORACIC PLACEMENT ;  Surgeon: Victor Manuel Geiger MD;  Location: [facility];  Service: Neurosurgery;  Laterality: N/A;   • TUBAL ABDOMINAL LIGATION         Current Medications:    Current Outpatient Medications:   •  apixaban (ELIQUIS) 5 MG tablet tablet, Take 1 tablet by mouth 2 (Two) Times a Day., Disp: 180 tablet, Rfl: 3  •  aspirin 81 MG tablet, Take 1 tablet by mouth Daily., Disp: 30 tablet, Rfl: 11  •  atorvastatin (LIPITOR) 80 MG tablet, Take 1 tablet by mouth Daily., Disp: 90 tablet, Rfl: 3  •  baclofen (LIORESAL) 10 MG tablet, TAKE 1/2 TO 1 TABLET BY MOUTH TWICE DAILY AS NEEDED FOR MUSCLE SPASMS, Disp: 60 tablet, Rfl: 1  •  cyclobenzaprine (FLEXERIL) 10 MG tablet, Take 1 tablet by mouth 3 (Three) Times a Day As Needed for Muscle Spasms., Disp: 30 tablet, Rfl: 1  •  Dietary Management Product (RHEUMATE) capsule, Take 1 capsule by mouth 2 (two) times a day., Disp: 60 capsule, Rfl: 11  •  estradiol (ESTRACE) 1 MG tablet, Take 1 mg by mouth Daily., Disp: , Rfl:   •  Gel Base gel, CAPSAICIN 0.001% IMIPRAMINE 3% LIDOCAINE 10% MANNITOL 20% MELOXICAM 0.1% PRILOCAINE 2%. APPLY 1-2 G TO AREA 3-4 TIMES DAILY, Disp: 30 g, Rfl: PRN  •  hydroCHLOROthiazide (MICROZIDE) 12.5 MG capsule, Take 1 capsule by mouth Daily., Disp: 30 capsule, Rfl: 1  •  JANUVIA 50 MG tablet, TAKE 1 TABLET BY MOUTH DAILY (Patient taking differently: Take 50 mg by mouth Daily.), Disp: 30 tablet, Rfl: 5  •  levETIRAcetam (KEPPRA) 250 MG tablet, Take 1 tablet by mouth 2 (Two)  Times a Day. TAKE 1 TABLET BY MOUTH TWICE DAILY, Disp: 60 tablet, Rfl: 0  •  lidocaine (LIDODERM) 5 %, Place 1 patch on the skin as directed by provider Daily. Remove & Discard patch within 12 hours or as directed by MD, Disp: 30 patch, Rfl: 0  •  meloxicam (MOBIC) 15 MG tablet, , Disp: , Rfl:   •  Multiple Vitamins-Minerals (CENTRUM WOMEN PO), Take 1 capsule by mouth Daily. gummies, Disp: , Rfl:   •  nortriptyline (PAMELOR) 25 MG capsule, TAKE 1 TO 2 CAPSULES BY MOUTH AT BEDTIME (Patient taking differently: Take 25 mg by mouth Every Night. TAKE 1 TO 2 CAPSULES BY MOUTH AT BEDTIME), Disp: 60 capsule, Rfl: 5  •  pregabalin (LYRICA) 150 MG capsule, Take 1 capsule by mouth 2 (Two) Times a Day., Disp: 60 capsule, Rfl: 4  •  pyridoxine (VITAMIN B-6) 100 MG tablet tablet, Take 1 tablet by mouth Daily., Disp: 30 tablet, Rfl: 5  •  Semaglutide,0.25 or 0.5MG/DOS, (Ozempic, 0.25 or 0.5 MG/DOSE,) 2 MG/1.5ML solution pen-injector, Inject 0.25 mg under the skin into the appropriate area as directed 1 (One) Time Per Week. Wednesdays, Disp: 2 pen, Rfl: 11  •  SUMAtriptan (IMITREX) 50 MG tablet, Take 50 mg by mouth Every 2 (Two) Hours As Needed for Migraine. Take one tablet at onset of headache. May repeat dose one time in 2 hours if headache not relieved., Disp: , Rfl:   •  tapentadol (NUCYNTA) 50 MG tablet, Take 1 hour before physical therapy (Patient taking differently: Take 50 mg by mouth. Take 1 hour before physical therapy), Disp: 15 tablet, Rfl: 0  •  tiZANidine (ZANAFLEX) 2 MG tablet, TAKE 1/2 TO 1 TABLET BY MOUTH FOUR TIMES DAILY AS NEEDED FOR MUSCLE SPASMS. DO NOT TAKE OTHER MUSCLE RELAXER WHILE ON TIZANIDINE (Patient taking differently: Take 2 mg by mouth Every Night. TAKE 1/2 TO 1 TABLET BY MOUTH FOUR TIMES DAILY AS NEEDED FOR MUSCLE SPASMS. DO NOT TAKE OTHER MUSCLE RELAXER WHILE ON TIZANIDINE), Disp: 30 tablet, Rfl: 0  •  traZODone (DESYREL) 100 MG tablet, Take 1 tablet by mouth Every Night., Disp: 30 tablet, Rfl: 1  •  " valsartan (DIOVAN) 40 MG tablet, Take 0.5 tablets by mouth Daily. (Patient taking differently: Take 20 mg by mouth Every Morning.), Disp: 45 tablet, Rfl: 1  •  vitamin D (ERGOCALCIFEROL) 25313 UNITS capsule capsule, Take 50,000 Units by mouth 1 (One) Time Per Week. Wednesday, Disp: , Rfl:     Allergies:  Allergies   Allergen Reactions   • Metformin GI Intolerance   • Lisinopril Cough         Review of Systems   Musculoskeletal: Positive for back pain and neck pain.         Physical Exam  Physical examination limited secondary to present COVID-19 environment.  Patient was alert, oriented and pleasant during our video/telephone encounter.  She appeared to be in no acute distress.  Speech was fluent and appropriate.    Vitals:    11/18/20 1608   Weight: 122 kg (270 lb)   Height: 180.3 cm (71\")       Patient's Body mass index is 37.66 kg/m². BMI is above normal parameters. Recommendations include: educational material.      Assessment:  1.  Mechanical back and neck pain  2.  Status post T3 laminotomy [employer] epidural spinal cord stimulator    Plan:  Ms. Esparza is seen today via telephone visit regarding severe upper thoracic/intrascapular incisional pain s/p T3 laminotomy for SCS placement.  We have ruled out infection or any other acute process. I reiterated with the patient that the stimulator appears to be helping her preoperative upper extremity and chest wall pain, however she is persistent that the upper thoracic/intrascapular pain is severe and unrelenting. I discussed with Dr. Geiger who recommends follow up in his clinic. We will arrange this in a couple of weeks to discuss further. She will call with any additional concerns in the interim.     This visit has been rescheduled as a phone visit to comply with patient safety concerns in accordance with CDC recommendations. Total time of discussion was 10 minutes.   You have chosen to receive care through a telephone visit. Do you consent to use a " telephone visit for your medical care today? Yes     Pennie Vazquez PA-C

## 2020-11-23 RX ORDER — BACLOFEN 10 MG/1
TABLET ORAL
Qty: 60 TABLET | Refills: 1 | Status: SHIPPED | OUTPATIENT
Start: 2020-11-23 | End: 2021-01-15 | Stop reason: SDUPTHER

## 2020-11-23 RX ORDER — APIXABAN 5 MG/1
TABLET, FILM COATED ORAL
Qty: 180 TABLET | Refills: 0 | Status: SHIPPED | OUTPATIENT
Start: 2020-11-23 | End: 2021-03-03 | Stop reason: SDUPTHER

## 2020-11-23 RX ORDER — LEVETIRACETAM 250 MG/1
TABLET ORAL
Qty: 60 TABLET | Refills: 0 | Status: SHIPPED | OUTPATIENT
Start: 2020-11-23 | End: 2020-12-21 | Stop reason: SDUPTHER

## 2020-11-30 DIAGNOSIS — G47.9 DYSSOMNIA: ICD-10-CM

## 2020-11-30 RX ORDER — HYDROCHLOROTHIAZIDE 12.5 MG/1
12.5 CAPSULE, GELATIN COATED ORAL DAILY
Qty: 30 CAPSULE | Refills: 0 | Status: SHIPPED | OUTPATIENT
Start: 2020-11-30 | End: 2020-12-21

## 2020-11-30 RX ORDER — TRAZODONE HYDROCHLORIDE 100 MG/1
100 TABLET ORAL NIGHTLY
Qty: 30 TABLET | Refills: 0 | Status: SHIPPED | OUTPATIENT
Start: 2020-11-30 | End: 2020-12-21

## 2020-12-04 ENCOUNTER — TRANSCRIBE ORDERS (OUTPATIENT)
Dept: ADMINISTRATIVE | Facility: HOSPITAL | Age: 55
End: 2020-12-04

## 2020-12-04 DIAGNOSIS — Z12.31 VISIT FOR SCREENING MAMMOGRAM: Primary | ICD-10-CM

## 2020-12-07 ENCOUNTER — HOSPITAL ENCOUNTER (OUTPATIENT)
Dept: MAMMOGRAPHY | Facility: HOSPITAL | Age: 55
Discharge: HOME | End: 2020-12-07
Admitting: OBSTETRICS & GYNECOLOGY

## 2020-12-07 DIAGNOSIS — Z12.31 VISIT FOR SCREENING MAMMOGRAM: ICD-10-CM

## 2020-12-07 PROCEDURE — 77063 BREAST TOMOSYNTHESIS BI: CPT

## 2020-12-07 PROCEDURE — 77063 BREAST TOMOSYNTHESIS BI: CPT | Performed by: RADIOLOGY

## 2020-12-07 PROCEDURE — 77067 SCR MAMMO BI INCL CAD: CPT | Performed by: RADIOLOGY

## 2020-12-07 PROCEDURE — 77067 SCR MAMMO BI INCL CAD: CPT

## 2020-12-11 ENCOUNTER — TELEPHONE (OUTPATIENT)
Dept: NEUROSURGERY | Facility: CLINIC | Age: 55
End: 2020-12-11

## 2020-12-11 ENCOUNTER — OFFICE VISIT (OUTPATIENT)
Dept: NEUROSURGERY | Facility: CLINIC | Age: 55
End: 2020-12-11

## 2020-12-11 VITALS — HEIGHT: 71 IN | WEIGHT: 274.8 LBS | BODY MASS INDEX: 38.47 KG/M2 | TEMPERATURE: 97.3 F

## 2020-12-11 DIAGNOSIS — M54.9 MECHANICAL BACK PAIN: Primary | ICD-10-CM

## 2020-12-11 DIAGNOSIS — G90.512 COMPLEX REGIONAL PAIN SYNDROME TYPE 1 OF LEFT UPPER EXTREMITY: ICD-10-CM

## 2020-12-11 DIAGNOSIS — M50.30 DDD (DEGENERATIVE DISC DISEASE), CERVICAL: ICD-10-CM

## 2020-12-11 PROCEDURE — 99213 OFFICE O/P EST LOW 20 MIN: CPT | Performed by: NEUROLOGICAL SURGERY

## 2020-12-11 NOTE — PROGRESS NOTES
Patient: Kellen Esparza  : 1965    Primary Care Provider: Ryne Puente DO    Requesting Provider: As above        History    Chief Complaint:   1.  Upper back pain.  2.  Left chest wall pain.    History of Present Illness: Ms. Esparza is a 54-year-old woman with a history of chronic neck, diffuse spinal and severe left chest wall pain.  She recently underwent a trial of an epidural spinal cord stimulator with Dr. Choi that provided substantial relief.  As such she elected for T3 laminotomy for placement of a [employer] epidural spinal cord stimulator on 9/10/2020.  Postoperatively we have followed her for severe upper back pain where the leads were placed.  This has been persistent despite Lyrica, muscle relaxants, tramadol and topical Lidoderm patches.  Inflammatory labs were obtained and within normal limits.  She feels as if her upper thoracic pain is progressing and now affecting her sleep.  Despite this, she stated that the stimulator is working in regards to her preoperative symptoms.  She denies pain within the left buttock incision.     Review of Systems   Constitutional: Negative for activity change, appetite change, chills, diaphoresis, fatigue, fever and unexpected weight change.   HENT: Negative for congestion, dental problem, drooling, ear discharge, ear pain, facial swelling, hearing loss, mouth sores, nosebleeds, postnasal drip, rhinorrhea, sinus pressure, sneezing, sore throat, tinnitus, trouble swallowing and voice change.    Eyes: Negative for photophobia, pain, discharge, redness, itching and visual disturbance.   Respiratory: Negative for apnea, cough, choking, chest tightness, shortness of breath, wheezing and stridor.    Cardiovascular: Negative for chest pain, palpitations and leg swelling.   Gastrointestinal: Negative for abdominal distention, abdominal pain, anal bleeding, blood in stool, constipation, diarrhea, nausea, rectal pain and vomiting.   Endocrine:  "Negative for cold intolerance, heat intolerance, polydipsia, polyphagia and polyuria.   Genitourinary: Negative for decreased urine volume, difficulty urinating, dysuria, enuresis, flank pain, frequency, genital sores, hematuria and urgency.   Musculoskeletal: Positive for back pain and neck pain. Negative for arthralgias, gait problem, joint swelling, myalgias and neck stiffness.   Skin: Negative for color change, pallor, rash and wound.   Allergic/Immunologic: Negative for environmental allergies, food allergies and immunocompromised state.   Neurological: Negative for dizziness, tremors, seizures, syncope, facial asymmetry, speech difficulty, weakness, light-headedness, numbness and headaches.   Hematological: Negative for adenopathy. Does not bruise/bleed easily.   Psychiatric/Behavioral: Negative for agitation, behavioral problems, confusion, decreased concentration, dysphoric mood, hallucinations, self-injury, sleep disturbance and suicidal ideas. The patient is not nervous/anxious and is not hyperactive.    All other systems reviewed and are negative.      The patient's past medical history, past surgical history, family history, and social history have been reviewed at length in the electronic medical record.    Physical Exam:   Temp 97.3 °F (36.3 °C) (Infrared)   Ht 180.3 cm (71\")   Wt 125 kg (274 lb 12.8 oz)   LMP 10/31/2014 (LMP Unknown) Comment: Mammogram 2014 info given   BMI 38.33 kg/m²   Incision sites are intact.  The patient is exquisitely tender across her dorsal shoulders and upper back to palpation.  Motor function in her upper extremities is normal.  Sensation is intact to light touch testing.    Medical Decision Making    Diagnosis:   1.  Postop upper thoracic pain.  2.  Complex regional pain syndrome status post spinal cord stimulator placement.    Treatment Options:   The patient is going to attend therapy to try and work out some of her discomfort.  If that is not helpful then we may " need to remove the stimulator.  I have cautioned her that even removing the stimulator may not help with some of her upper back pain.  Some of this could be related to the surgical intervention itself.  She will follow-up with me in 5 weeks.         Diagnosis Plan   1. Mechanical neck/back pain  Ambulatory Referral to Physical Therapy Evaluate and treat; Stretching, ROM, Strengthening   2. Complex regional pain syndrome type 1 of left upper extremity     3. DDD (degenerative disc disease), cervical             I, Dr. Geiger, personally performed the services described in the documentation, as scribed in my presence, and it is both accurate and complete.

## 2020-12-15 PROCEDURE — U0004 COV-19 TEST NON-CDC HGH THRU: HCPCS | Performed by: PHYSICIAN ASSISTANT

## 2020-12-16 ENCOUNTER — TELEPHONE (OUTPATIENT)
Dept: URGENT CARE | Facility: CLINIC | Age: 55
End: 2020-12-16

## 2020-12-16 NOTE — TELEPHONE ENCOUNTER
PT CALLED STATING SHE WAS RETURNING OUR CALL ABOUT LAB RESULTS. SO AFTER VERIFYING NAME AND  RESULTS WAS GIVEN

## 2020-12-20 DIAGNOSIS — G47.9 DYSSOMNIA: ICD-10-CM

## 2020-12-21 DIAGNOSIS — G90.59 COMPLEX REGIONAL PAIN SYNDROME TYPE 1 AFFECTING OTHER SITE: Primary | ICD-10-CM

## 2020-12-21 RX ORDER — LEVETIRACETAM 250 MG/1
250 TABLET ORAL 2 TIMES DAILY
Qty: 60 TABLET | Refills: 0 | Status: SHIPPED | OUTPATIENT
Start: 2020-12-21 | End: 2021-01-15

## 2020-12-21 RX ORDER — TRAZODONE HYDROCHLORIDE 100 MG/1
100 TABLET ORAL NIGHTLY
Qty: 30 TABLET | Refills: 0 | Status: SHIPPED | OUTPATIENT
Start: 2020-12-21 | End: 2021-01-19

## 2020-12-21 RX ORDER — HYDROCHLOROTHIAZIDE 12.5 MG/1
12.5 CAPSULE, GELATIN COATED ORAL DAILY
Qty: 30 CAPSULE | Refills: 0 | Status: SHIPPED | OUTPATIENT
Start: 2020-12-21 | End: 2021-01-15

## 2021-01-08 ENCOUNTER — APPOINTMENT (OUTPATIENT)
Dept: GENERAL RADIOLOGY | Facility: HOSPITAL | Age: 56
End: 2021-01-08

## 2021-01-08 ENCOUNTER — HOSPITAL ENCOUNTER (EMERGENCY)
Facility: HOSPITAL | Age: 56
Discharge: HOME | End: 2021-01-08
Attending: EMERGENCY MEDICINE | Admitting: EMERGENCY MEDICINE

## 2021-01-08 ENCOUNTER — APPOINTMENT (OUTPATIENT)
Dept: CT IMAGING | Facility: HOSPITAL | Age: 56
End: 2021-01-08

## 2021-01-08 VITALS
RESPIRATION RATE: 16 BRPM | HEART RATE: 80 BPM | HEIGHT: 71 IN | OXYGEN SATURATION: 96 % | SYSTOLIC BLOOD PRESSURE: 191 MMHG | BODY MASS INDEX: 36.4 KG/M2 | TEMPERATURE: 97.9 F | WEIGHT: 260 LBS | DIASTOLIC BLOOD PRESSURE: 107 MMHG

## 2021-01-08 DIAGNOSIS — S60.222A CONTUSION OF LEFT HAND, INITIAL ENCOUNTER: ICD-10-CM

## 2021-01-08 DIAGNOSIS — Z79.01 ANTICOAGULATED BY ANTICOAGULATION TREATMENT: ICD-10-CM
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
## 2021-01-08 DIAGNOSIS — M54.9 ACUTE BILATERAL BACK PAIN, UNSPECIFIED BACK LOCATION: ICD-10-CM

## 2021-01-08 DIAGNOSIS — R10.10 UPPER ABDOMINAL PAIN: ICD-10-CM

## 2021-01-08 DIAGNOSIS — R07.89 ACUTE CHEST WALL PAIN: ICD-10-CM

## 2021-01-08 LAB
ABO GROUP BLD: NORMAL
ABO GROUP BLD: NORMAL
ALBUMIN SERPL-MCNC: 4.1 G/DL (ref 3.5–5.2)
ALBUMIN/GLOB SERPL: 1.1 G/DL
ALP SERPL-CCNC: 75 U/L (ref 39–117)
ALT SERPL W P-5'-P-CCNC: 24 U/L (ref 1–33)
AMYLASE SERPL-CCNC: 86 U/L (ref 28–100)
ANION GAP SERPL CALCULATED.3IONS-SCNC: 10 MMOL/L (ref 5–15)
AST SERPL-CCNC: 21 U/L (ref 1–32)
BASOPHILS # BLD AUTO: 0.02 10*3/MM3 (ref 0–0.2)
BASOPHILS NFR BLD AUTO: 0.2 % (ref 0–1.5)
BILIRUB SERPL-MCNC: 0.4 MG/DL (ref 0–1.2)
BLD GP AB SCN SERPL QL: NEGATIVE
BUN SERPL-MCNC: 13 MG/DL (ref 6–20)
BUN/CREAT SERPL: 15.3 (ref 7–25)
CALCIUM SPEC-SCNC: 9.9 MG/DL (ref 8.6–10.5)
CHLORIDE SERPL-SCNC: 102 MMOL/L (ref 98–107)
CO2 SERPL-SCNC: 28 MMOL/L (ref 22–29)
CREAT BLDA-MCNC: 0.8 MG/DL (ref 0.6–1.3)
CREAT SERPL-MCNC: 0.85 MG/DL (ref 0.57–1)
DEPRECATED RDW RBC AUTO: 43.1 FL (ref 37–54)
EOSINOPHIL # BLD AUTO: 0.22 10*3/MM3 (ref 0–0.4)
EOSINOPHIL NFR BLD AUTO: 2.6 % (ref 0.3–6.2)
ERYTHROCYTE [DISTWIDTH] IN BLOOD BY AUTOMATED COUNT: 14.4 % (ref 12.3–15.4)
GFR SERPL CREATININE-BSD FRML MDRD: 84 ML/MIN/1.73
GLOBULIN UR ELPH-MCNC: 3.6 GM/DL
GLUCOSE SERPL-MCNC: 111 MG/DL (ref 65–99)
HCT VFR BLD AUTO: 36.9 % (ref 34–46.6)
HGB BLD-MCNC: 11.8 G/DL (ref 12–15.9)
HOLD SPECIMEN: NORMAL
HOLD SPECIMEN: NORMAL
IMM GRANULOCYTES # BLD AUTO: 0.03 10*3/MM3 (ref 0–0.05)
IMM GRANULOCYTES NFR BLD AUTO: 0.3 % (ref 0–0.5)
INR PPP: 0.98 (ref 0.85–1.16)
LIPASE SERPL-CCNC: 38 U/L (ref 13–60)
LYMPHOCYTES # BLD AUTO: 2.31 10*3/MM3 (ref 0.7–3.1)
LYMPHOCYTES NFR BLD AUTO: 26.9 % (ref 19.6–45.3)
MCH RBC QN AUTO: 26.5 PG (ref 26.6–33)
MCHC RBC AUTO-ENTMCNC: 32 G/DL (ref 31.5–35.7)
MCV RBC AUTO: 82.7 FL (ref 79–97)
MONOCYTES # BLD AUTO: 0.38 10*3/MM3 (ref 0.1–0.9)
MONOCYTES NFR BLD AUTO: 4.4 % (ref 5–12)
NEUTROPHILS NFR BLD AUTO: 5.64 10*3/MM3 (ref 1.7–7)
NEUTROPHILS NFR BLD AUTO: 65.6 % (ref 42.7–76)
NRBC BLD AUTO-RTO: 0 /100 WBC (ref 0–0.2)
PLAT MORPH BLD: NORMAL
PLATELET # BLD AUTO: 197 10*3/MM3 (ref 140–450)
PMV BLD AUTO: 11.4 FL (ref 6–12)
POTASSIUM SERPL-SCNC: 4 MMOL/L (ref 3.5–5.2)
PROT SERPL-MCNC: 7.7 G/DL (ref 6–8.5)
PROTHROMBIN TIME: 12.7 SECONDS (ref 11.5–14)
RBC # BLD AUTO: 4.46 10*6/MM3 (ref 3.77–5.28)
RBC MORPH BLD: NORMAL
RH BLD: POSITIVE
RH BLD: POSITIVE
SODIUM SERPL-SCNC: 140 MMOL/L (ref 136–145)
T&S EXPIRATION DATE: NORMAL
WBC # BLD AUTO: 8.6 10*3/MM3 (ref 3.4–10.8)
WBC MORPH BLD: NORMAL
WHOLE BLOOD HOLD SPECIMEN: NORMAL
WHOLE BLOOD HOLD SPECIMEN: NORMAL

## 2021-01-08 PROCEDURE — 85610 PROTHROMBIN TIME: CPT | Performed by: EMERGENCY MEDICINE

## 2021-01-08 PROCEDURE — 25010000002 FENTANYL CITRATE (PF) 100 MCG/2ML SOLUTION: Performed by: EMERGENCY MEDICINE

## 2021-01-08 PROCEDURE — 82565 ASSAY OF CREATININE: CPT

## 2021-01-08 PROCEDURE — 80053 COMPREHEN METABOLIC PANEL: CPT | Performed by: EMERGENCY MEDICINE

## 2021-01-08 PROCEDURE — 86901 BLOOD TYPING SEROLOGIC RH(D): CPT | Performed by: EMERGENCY MEDICINE

## 2021-01-08 PROCEDURE — 96374 THER/PROPH/DIAG INJ IV PUSH: CPT

## 2021-01-08 PROCEDURE — 0 IOPAMIDOL PER 1 ML: Performed by: EMERGENCY MEDICINE

## 2021-01-08 PROCEDURE — 86900 BLOOD TYPING SEROLOGIC ABO: CPT

## 2021-01-08 PROCEDURE — 93005 ELECTROCARDIOGRAM TRACING: CPT | Performed by: EMERGENCY MEDICINE

## 2021-01-08 PROCEDURE — 82150 ASSAY OF AMYLASE: CPT | Performed by: EMERGENCY MEDICINE

## 2021-01-08 PROCEDURE — 99284 EMERGENCY DEPT VISIT MOD MDM: CPT

## 2021-01-08 PROCEDURE — 71275 CT ANGIOGRAPHY CHEST: CPT

## 2021-01-08 PROCEDURE — 73130 X-RAY EXAM OF HAND: CPT

## 2021-01-08 PROCEDURE — 86850 RBC ANTIBODY SCREEN: CPT | Performed by: EMERGENCY MEDICINE

## 2021-01-08 PROCEDURE — 86901 BLOOD TYPING SEROLOGIC RH(D): CPT

## 2021-01-08 PROCEDURE — 85007 BL SMEAR W/DIFF WBC COUNT: CPT | Performed by: EMERGENCY MEDICINE

## 2021-01-08 PROCEDURE — 72125 CT NECK SPINE W/O DYE: CPT

## 2021-01-08 PROCEDURE — 83690 ASSAY OF LIPASE: CPT | Performed by: EMERGENCY MEDICINE

## 2021-01-08 PROCEDURE — 25010000002 DIAZEPAM PER 5 MG: Performed by: EMERGENCY MEDICINE

## 2021-01-08 PROCEDURE — 74177 CT ABD & PELVIS W/CONTRAST: CPT

## 2021-01-08 PROCEDURE — 85025 COMPLETE CBC W/AUTO DIFF WBC: CPT | Performed by: EMERGENCY MEDICINE

## 2021-01-08 PROCEDURE — 86900 BLOOD TYPING SEROLOGIC ABO: CPT | Performed by: EMERGENCY MEDICINE

## 2021-01-08 PROCEDURE — 70450 CT HEAD/BRAIN W/O DYE: CPT

## 2021-01-08 PROCEDURE — 96375 TX/PRO/DX INJ NEW DRUG ADDON: CPT

## 2021-01-08 PROCEDURE — 96376 TX/PRO/DX INJ SAME DRUG ADON: CPT

## 2021-01-08 RX ORDER — HYDROCODONE BITARTRATE AND ACETAMINOPHEN 5; 325 MG/1; MG/1
1-2 TABLET ORAL EVERY 6 HOURS PRN
Qty: 15 TABLET | Refills: 0 | Status: SHIPPED | OUTPATIENT
Start: 2021-01-08 | End: 2021-03-19

## 2021-01-08 RX ORDER — DIAZEPAM 5 MG/ML
5 INJECTION, SOLUTION INTRAMUSCULAR; INTRAVENOUS ONCE
Status: COMPLETED | OUTPATIENT
Start: 2021-01-08 | End: 2021-01-08

## 2021-01-08 RX ORDER — SODIUM CHLORIDE 0.9 % (FLUSH) 0.9 %
10 SYRINGE (ML) INJECTION AS NEEDED
Status: DISCONTINUED | OUTPATIENT
Start: 2021-01-08 | End: 2021-01-08 | Stop reason: HOSPADM

## 2021-01-08 RX ORDER — FENTANYL CITRATE 50 UG/ML
50 INJECTION, SOLUTION INTRAMUSCULAR; INTRAVENOUS ONCE
Status: COMPLETED | OUTPATIENT
Start: 2021-01-08 | End: 2021-01-08

## 2021-01-08 RX ADMIN — FENTANYL CITRATE 50 MCG: 50 INJECTION, SOLUTION INTRAMUSCULAR; INTRAVENOUS at 15:23

## 2021-01-08 RX ADMIN — IOPAMIDOL 100 ML: 755 INJECTION, SOLUTION INTRAVENOUS at 13:51

## 2021-01-08 RX ADMIN — FENTANYL CITRATE 50 MCG: 50 INJECTION, SOLUTION INTRAMUSCULAR; INTRAVENOUS at 13:23

## 2021-01-08 RX ADMIN — DIAZEPAM 5 MG: 5 INJECTION, SOLUTION INTRAMUSCULAR; INTRAVENOUS at 13:24
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
## 2021-01-10 LAB
QT INTERVAL: 372 MS
QTC INTERVAL: 426 MS

## 2021-01-11 ENCOUNTER — OFFICE VISIT (OUTPATIENT)
Dept: FAMILY MEDICINE CLINIC | Facility: CLINIC | Age: 56
End: 2021-01-11

## 2021-01-11 VITALS
RESPIRATION RATE: 18 BRPM | HEIGHT: 71 IN | HEART RATE: 70 BPM | WEIGHT: 275 LBS | SYSTOLIC BLOOD PRESSURE: 142 MMHG | BODY MASS INDEX: 38.5 KG/M2 | DIASTOLIC BLOOD PRESSURE: 84 MMHG | OXYGEN SATURATION: 98 %

## 2021-01-11 DIAGNOSIS — R07.82 INTERCOSTAL PAIN: ICD-10-CM

## 2021-01-11 DIAGNOSIS — M54.2 CERVICALGIA: ICD-10-CM
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
## 2021-01-11 PROCEDURE — 99213 OFFICE O/P EST LOW 20 MIN: CPT | Performed by: FAMILY MEDICINE

## 2021-01-11 NOTE — PATIENT INSTRUCTIONS
1.  Work on light stretching, PT referral will be placed    2.  You can use the pain cream on the chest wall    3.  See Dr. Geiger as scheduled
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
## 2021-01-15 DIAGNOSIS — G90.59 COMPLEX REGIONAL PAIN SYNDROME TYPE 1 AFFECTING OTHER SITE: Primary | ICD-10-CM

## 2021-01-15 DIAGNOSIS — G47.9 DYSSOMNIA: ICD-10-CM

## 2021-01-15 RX ORDER — BACLOFEN 10 MG/1
TABLET ORAL
Qty: 60 TABLET | Refills: 1 | Status: SHIPPED | OUTPATIENT
Start: 2021-01-15 | End: 2021-03-01

## 2021-01-15 RX ORDER — LEVETIRACETAM 250 MG/1
TABLET ORAL
Qty: 60 TABLET | Refills: 0 | Status: SHIPPED | OUTPATIENT
Start: 2021-01-15 | End: 2021-02-11

## 2021-01-15 RX ORDER — HYDROCHLOROTHIAZIDE 12.5 MG/1
12.5 CAPSULE, GELATIN COATED ORAL DAILY
Qty: 30 CAPSULE | Refills: 0 | Status: SHIPPED | OUTPATIENT
Start: 2021-01-15 | End: 2021-02-12 | Stop reason: SDUPTHER

## 2021-01-18 RX ORDER — NORTRIPTYLINE HYDROCHLORIDE 25 MG/1
CAPSULE ORAL
Qty: 60 CAPSULE | Refills: 5 | Status: SHIPPED | OUTPATIENT
Start: 2021-01-18 | End: 2021-03-01

## 2021-01-19 ENCOUNTER — TREATMENT (OUTPATIENT)
Dept: PHYSICAL THERAPY | Facility: CLINIC | Age: 56
End: 2021-01-19

## 2021-01-19 DIAGNOSIS — R07.9 CHEST PAIN, UNSPECIFIED TYPE: ICD-10-CM

## 2021-01-19 DIAGNOSIS — M25.60 DECREASED RANGE OF MOTION: ICD-10-CM

## 2021-01-19 DIAGNOSIS — M54.6 THORACIC SPINE PAIN: Primary | ICD-10-CM

## 2021-01-19 PROCEDURE — 97163 PT EVAL HIGH COMPLEX 45 MIN: CPT | Performed by: PHYSICAL THERAPIST

## 2021-01-19 PROCEDURE — 97110 THERAPEUTIC EXERCISES: CPT | Performed by: PHYSICAL THERAPIST

## 2021-01-19 RX ORDER — TRAZODONE HYDROCHLORIDE 100 MG/1
100 TABLET ORAL NIGHTLY
Qty: 30 TABLET | Refills: 0 | Status: SHIPPED | OUTPATIENT
Start: 2021-01-19 | End: 2021-02-12 | Stop reason: SDUPTHER
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
## 2021-02-02 DIAGNOSIS — G90.59 COMPLEX REGIONAL PAIN SYNDROME TYPE 1 AFFECTING OTHER SITE: ICD-10-CM

## 2021-02-03 RX ORDER — PREGABALIN 150 MG/1
CAPSULE ORAL
Qty: 60 CAPSULE | Refills: 1 | Status: SHIPPED | OUTPATIENT
Start: 2021-02-03 | End: 2021-04-13 | Stop reason: SDUPTHER

## 2021-02-11 DIAGNOSIS — G90.59 COMPLEX REGIONAL PAIN SYNDROME TYPE 1 AFFECTING OTHER SITE: ICD-10-CM

## 2021-02-11 RX ORDER — LEVETIRACETAM 250 MG/1
TABLET ORAL
Qty: 60 TABLET | Refills: 0 | Status: SHIPPED | OUTPATIENT
Start: 2021-02-11 | End: 2021-03-01

## 2021-02-12 DIAGNOSIS — G47.9 DYSSOMNIA: ICD-10-CM

## 2021-02-12 DIAGNOSIS — E11.9 TYPE 2 DIABETES MELLITUS WITHOUT COMPLICATION, WITHOUT LONG-TERM CURRENT USE OF INSULIN (HCC): ICD-10-CM

## 2021-02-12 RX ORDER — TRAZODONE HYDROCHLORIDE 100 MG/1
100 TABLET ORAL NIGHTLY
Qty: 30 TABLET | Refills: 0 | Status: SHIPPED | OUTPATIENT
Start: 2021-02-12 | End: 2021-03-18 | Stop reason: SDUPTHER

## 2021-02-12 RX ORDER — HYDROCHLOROTHIAZIDE 12.5 MG/1
12.5 CAPSULE, GELATIN COATED ORAL DAILY
Qty: 30 CAPSULE | Refills: 0 | Status: SHIPPED | OUTPATIENT
Start: 2021-02-12 | End: 2021-03-08 | Stop reason: SDUPTHER

## 2021-02-18 ENCOUNTER — DOCUMENTATION (OUTPATIENT)
Dept: PHYSICAL THERAPY | Facility: CLINIC | Age: 56
End: 2021-02-18

## 2021-02-18 DIAGNOSIS — M54.6 THORACIC SPINE PAIN: Primary | ICD-10-CM

## 2021-02-18 DIAGNOSIS — M25.60 DECREASED RANGE OF MOTION: ICD-10-CM

## 2021-02-18 DIAGNOSIS — R07.9 CHEST PAIN, UNSPECIFIED TYPE: ICD-10-CM

## 2021-02-18 NOTE — PROGRESS NOTES
Discharge Summary  Discharge Summary from Physical Therapy Report      Date of initial PT visit: 1/19/21    Number of Visits: 1     Goals: Not Met    Discharge Plan: Continue with current home exercise program as instructed  Future need for rehabilitation activities    Comments: The pt was evaluated for chronic pain but was not seen again due to a cancel and 2 NCNS immediately following her eval. She was contacted regarding discharge due to attendance policy.     Date of Discharge: 2/18/21        Wang Kline PT  Physical Therapist

## 2021-02-23 DIAGNOSIS — E11.9 TYPE 2 DIABETES MELLITUS WITHOUT COMPLICATION, WITHOUT LONG-TERM CURRENT USE OF INSULIN (HCC): ICD-10-CM

## 2021-02-23 DIAGNOSIS — I10 ESSENTIAL HYPERTENSION: ICD-10-CM

## 2021-02-23 RX ORDER — VALSARTAN 40 MG/1
20 TABLET ORAL EVERY MORNING
Qty: 15 TABLET | Refills: 0 | Status: SHIPPED | OUTPATIENT
Start: 2021-02-23 | End: 2021-03-19 | Stop reason: SDUPTHER

## 2021-03-01 DIAGNOSIS — G90.59 COMPLEX REGIONAL PAIN SYNDROME TYPE 1 AFFECTING OTHER SITE: ICD-10-CM

## 2021-03-01 RX ORDER — LEVETIRACETAM 250 MG/1
TABLET ORAL
Qty: 60 TABLET | Refills: 0 | Status: SHIPPED | OUTPATIENT
Start: 2021-03-01 | End: 2021-04-12

## 2021-03-01 RX ORDER — NORTRIPTYLINE HYDROCHLORIDE 25 MG/1
CAPSULE ORAL
Qty: 60 CAPSULE | Refills: 5 | Status: SHIPPED | OUTPATIENT
Start: 2021-03-01 | End: 2021-06-22

## 2021-03-01 RX ORDER — BACLOFEN 10 MG/1
TABLET ORAL
Qty: 60 TABLET | Refills: 1 | Status: SHIPPED | OUTPATIENT
Start: 2021-03-01 | End: 2021-05-11

## 2021-03-08 NOTE — TELEPHONE ENCOUNTER
Caller: [facility] #[id number] - [place], [place] - [address] AT [facility] [address] - [phone] [facility] [phone] FX    Relationship: Pharmacy    Best call back number: [phone]    Medication needed:   Requested Prescriptions     Pending Prescriptions Disp Refills   • hydroCHLOROthiazide (MICROZIDE) 12.5 MG capsule 30 capsule 0     Sig: Take 1 capsule by mouth Daily.       When do you need the refill by:     What details did the patient provide when requesting the medication:     Does the patient have less than a 3 day supply:  [] Yes  [] No

## 2021-03-09 RX ORDER — HYDROCHLOROTHIAZIDE 12.5 MG/1
12.5 CAPSULE, GELATIN COATED ORAL DAILY
Qty: 30 CAPSULE | Refills: 0 | Status: SHIPPED | OUTPATIENT
Start: 2021-03-09 | End: 2021-03-19 | Stop reason: SDUPTHER

## 2021-03-18 DIAGNOSIS — G47.9 DYSSOMNIA: ICD-10-CM

## 2021-03-18 RX ORDER — TRAZODONE HYDROCHLORIDE 100 MG/1
100 TABLET ORAL NIGHTLY
Qty: 30 TABLET | Refills: 0 | Status: SHIPPED | OUTPATIENT
Start: 2021-03-18 | End: 2021-03-19

## 2021-03-19 ENCOUNTER — OFFICE VISIT (OUTPATIENT)
Dept: FAMILY MEDICINE CLINIC | Facility: CLINIC | Age: 56
End: 2021-03-19

## 2021-03-19 ENCOUNTER — LAB (OUTPATIENT)
Dept: LAB | Facility: HOSPITAL | Age: 56
End: 2021-03-19

## 2021-03-19 VITALS
BODY MASS INDEX: 37.8 KG/M2 | WEIGHT: 270 LBS | OXYGEN SATURATION: 98 % | HEIGHT: 71 IN | DIASTOLIC BLOOD PRESSURE: 80 MMHG | HEART RATE: 82 BPM | SYSTOLIC BLOOD PRESSURE: 124 MMHG

## 2021-03-19 DIAGNOSIS — Z00.00 PREVENTATIVE HEALTH CARE: ICD-10-CM

## 2021-03-19 DIAGNOSIS — G47.9 DYSSOMNIA: ICD-10-CM

## 2021-03-19 DIAGNOSIS — F32.9 REACTIVE DEPRESSION: ICD-10-CM

## 2021-03-19 DIAGNOSIS — I10 ESSENTIAL HYPERTENSION: ICD-10-CM

## 2021-03-19 DIAGNOSIS — E11.9 TYPE 2 DIABETES MELLITUS WITHOUT COMPLICATION, WITHOUT LONG-TERM CURRENT USE OF INSULIN (HCC): Primary | ICD-10-CM

## 2021-03-19 DIAGNOSIS — F41.9 ANXIETY: ICD-10-CM

## 2021-03-19 LAB — HBA1C MFR BLD: 6 %

## 2021-03-19 PROCEDURE — 99396 PREV VISIT EST AGE 40-64: CPT | Performed by: FAMILY MEDICINE

## 2021-03-19 PROCEDURE — 83036 HEMOGLOBIN GLYCOSYLATED A1C: CPT | Performed by: FAMILY MEDICINE

## 2021-03-19 RX ORDER — CYCLOBENZAPRINE HCL 10 MG
10 TABLET ORAL 3 TIMES DAILY PRN
Qty: 30 TABLET | Refills: 1 | Status: CANCELLED | OUTPATIENT
Start: 2021-03-19

## 2021-03-19 RX ORDER — VALSARTAN 40 MG/1
20 TABLET ORAL EVERY MORNING
Qty: 45 TABLET | Refills: 0 | Status: SHIPPED | OUTPATIENT
Start: 2021-03-19 | End: 2021-04-05

## 2021-03-19 RX ORDER — TRAZODONE HYDROCHLORIDE 100 MG/1
100 TABLET ORAL NIGHTLY
Qty: 30 TABLET | Refills: 0 | Status: CANCELLED | OUTPATIENT
Start: 2021-03-19

## 2021-03-19 RX ORDER — MIRTAZAPINE 15 MG/1
15 TABLET, FILM COATED ORAL NIGHTLY
Qty: 30 TABLET | Refills: 2 | Status: SHIPPED | OUTPATIENT
Start: 2021-03-19 | End: 2021-06-07

## 2021-03-19 RX ORDER — HYDROCHLOROTHIAZIDE 12.5 MG/1
12.5 CAPSULE, GELATIN COATED ORAL DAILY
Qty: 90 CAPSULE | Refills: 0 | Status: SHIPPED | OUTPATIENT
Start: 2021-03-19 | End: 2021-04-05

## 2021-03-19 RX ORDER — PYRIDOXINE HCL (VITAMIN B6) 100 MG
100 TABLET ORAL DAILY
Qty: 30 TABLET | Refills: 5 | Status: CANCELLED | OUTPATIENT
Start: 2021-03-19
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
## 2021-03-19 NOTE — PATIENT INSTRUCTIONS
1.  Start atorvastatin, see the cardiologist as scheduled    2.  Reach out to Deb Guajardo regarding CPAP if you need a renewal    3.  Start mirtazapine at night    4.  You have been referred for specialized imaging and/or specialist office consultation today.  You will be contacted by our referral coordinator or the specialist's office to schedule your appointment over the next 2 to 3 business days.  Please make sure to check your voicemail, and ensure that we have the correct phone number on file for you.  Sometimes, based on insurance requirements, referrals may take longer to schedule. However if you have not heard from anyone within 7 days, please call the office to check on your referral status.

## 2021-04-02 ENCOUNTER — TELEPHONE (OUTPATIENT)
Dept: FAMILY MEDICINE CLINIC | Facility: CLINIC | Age: 56
End: 2021-04-02

## 2021-04-02 NOTE — TELEPHONE ENCOUNTER
I called and let [facility] know that pt is no longer taking the trazadone and is on mirtazapine instead. Pharmacy states they will make a note.

## 2021-04-05 ENCOUNTER — OFFICE VISIT (OUTPATIENT)
Dept: CARDIOLOGY | Facility: CLINIC | Age: 56
End: 2021-04-05

## 2021-04-05 ENCOUNTER — LAB (OUTPATIENT)
Dept: LAB | Facility: HOSPITAL | Age: 56
End: 2021-04-05

## 2021-04-05 VITALS
WEIGHT: 274 LBS | OXYGEN SATURATION: 99 % | HEIGHT: 71 IN | SYSTOLIC BLOOD PRESSURE: 132 MMHG | BODY MASS INDEX: 38.36 KG/M2 | DIASTOLIC BLOOD PRESSURE: 78 MMHG | HEART RATE: 81 BPM

## 2021-04-05 DIAGNOSIS — I44.2 AV BLOCK, COMPLETE (HCC): ICD-10-CM

## 2021-04-05 DIAGNOSIS — R06.09 DYSPNEA ON EXERTION: ICD-10-CM

## 2021-04-05 DIAGNOSIS — I10 ESSENTIAL HYPERTENSION: ICD-10-CM

## 2021-04-05 DIAGNOSIS — Z95.0 PACEMAKER: ICD-10-CM

## 2021-04-05 DIAGNOSIS — I48.0 PAROXYSMAL ATRIAL FIBRILLATION (HCC): ICD-10-CM

## 2021-04-05 DIAGNOSIS — I25.118 CORONARY ARTERY DISEASE OF NATIVE ARTERY OF NATIVE HEART WITH STABLE ANGINA PECTORIS (HCC): Primary | ICD-10-CM

## 2021-04-05 DIAGNOSIS — I25.118 CORONARY ARTERY DISEASE OF NATIVE ARTERY OF NATIVE HEART WITH STABLE ANGINA PECTORIS (HCC): ICD-10-CM

## 2021-04-05 LAB
ARTICHOKE IGE QN: 116 MG/DL (ref 0–100)
NT-PROBNP SERPL-MCNC: 28 PG/ML (ref 0–900)
TSH SERPL DL<=0.05 MIU/L-ACNC: 1.19 UIU/ML (ref 0.27–4.2)

## 2021-04-05 PROCEDURE — 93280 PM DEVICE PROGR EVAL DUAL: CPT | Performed by: INTERNAL MEDICINE

## 2021-04-05 PROCEDURE — 36415 COLL VENOUS BLD VENIPUNCTURE: CPT

## 2021-04-05 PROCEDURE — 83721 ASSAY OF BLOOD LIPOPROTEIN: CPT

## 2021-04-05 PROCEDURE — 99214 OFFICE O/P EST MOD 30 MIN: CPT | Performed by: INTERNAL MEDICINE

## 2021-04-05 PROCEDURE — 84443 ASSAY THYROID STIM HORMONE: CPT

## 2021-04-05 PROCEDURE — 83880 ASSAY OF NATRIURETIC PEPTIDE: CPT

## 2021-04-05 RX ORDER — HYDROCHLOROTHIAZIDE 12.5 MG/1
25 CAPSULE, GELATIN COATED ORAL DAILY
Qty: 180 CAPSULE | Refills: 1 | Status: SHIPPED | OUTPATIENT
Start: 2021-04-05 | End: 2021-08-09

## 2021-04-05 NOTE — PROGRESS NOTES
[facility] AT [address], [address]  [place], [place], [address]    [phone]  [email or web address]           OUTPATIENT CLINIC FOLLOW UP NOTE    Patient Care Team:  Patient Care Team:  Ryne Puente DO as PCP - General (Family Medicine)  Johny Sommer MD as Consulting Physician (Cardiology)  Sonya Gay MD as Consulting Physician (Cardiology)  Deb Guajardo APRN as Nurse Practitioner (Pulmonary Disease)  Otis Choi MD as Consulting Physician (Pain Medicine)    Subjective:     Chief complaint: Dyspnea, AV block, pacemaker site pain    HPI:    Kellen Esparza is a 55 y.o. female.  Cardiac Problem List:  1. Small-vessel coronary artery disease:  a. MPS, 07/26/2017: EF 70%. Medium-sized infarct pattern located in the anterior wall with no significant ischemia noted. This finding is worse on resting images which is suggestive of possible breast attenuation artifact instead of an infarction.  b. LHC, 08/10/2017: Mild-to-moderate diffuse tortuosity of the vessels. Evidence of small vessel disease in the distal portion of a small RPL S. No obstructive CAD.  2.  High degree AV Block:  a. Fátima, 04/29/2019: Only 1.5 days of monitoring. High grade AV block for 4.4 seconds at 7:23 am. 2nd degree AV block for 10 seconds at 8:53 pm. Average HR 86 bpm.  bDina Shine, 06/18/2019: Only one day of monitoring. 2.9% PACs. 1st degree AV block, brief 2nd degree type I AVB.   c. MCOT, 06/24/2019: High degree AV block.  d. Pacemaker implant, 07/19/2019, Dr. Gay: Whi Accolade MRI DR model L311 serial #[id number]. DDDR .  e. Significant neuropathic pain at the pacemaker site.  3. Mild diastolic dysfunction:  a. Echo, 11/28/2018: EF 65% with mild concentric LVH. Grade I a diastolic dysfunction. Left atrium borderline dilated.  4. Paroxysmal atrial fibrillation:  a. Noted on device interrogation, 11/13/2019.   b. CHADS-VASc = 5 (HTN, DM, Female, Vasc disease,  history of PE).    c. Multiple episodes of atrial fibrillation all less than 1 minute.  5. Diabetes  6. Hypertension:  a. 24h BP monitor, 04/09/2019: 45% > 140 mmHg systolic, max 164, min 103. Average HR 82 max 111, min 56.  7. Hyperlipidemia  8. Migraines  9. History of PE  10. ALYSIA, on CPAP.  a. Followed by Dr. Quispe.    Today she presents for follow-up.      Since the patient was last seen she reports she has had continued fatigue and worsening dyspnea with exertion.  She also notes intermittent lightheadedness and ankle edema.  She denies chest pain, syncope, orthopnea, or PND.  She reports she has noticed hair loss with valsartan.    Review of Systems:  Positive for fatigue, dyspnea with exertion, lightheadedness, ankle edema  Negative for chest pain, palpitations, orthopnea, PND,syncope.     PFSH:  Patient Active Problem List   Diagnosis   • Chest pain   • Dyspnea on exertion   • Essential hypertension   • Pain of right breast   • Lower extremity edema   • Diabetes mellitus (CMS/HCC)   • Abnormal stress test   • Migraine   • History of pulmonary embolus (PE)   • Hyperlipidemia   • DDD (degenerative disc disease), cervical   • Coronary artery disease of native artery of native heart with stable angina pectoris (CMS/HCC)   • AV block, complete (S/P PPM 7/2019)   • Restrictive pattern on PFT's without evidence of ILD   • Obesity, Class II, BMI 35-39.9   • ALYSIA on CPAP   • Pain in pacemaker pocket   • Paroxysmal atrial fibrillation (CMS/HCC)   • Complex regional pain syndrome type I   • Chronic anticoagulation   • Pacemaker   • Morbid obesity (CMS/HCC)   • Entrapment neuropathy   • Encounter for fitting and adjustment of neuropacemaker of spinal cord   • Mechanical low back pain         Current Outpatient Medications:   •  apixaban (Eliquis) 5 MG tablet tablet, Take 1 tablet by mouth 2 (Two) Times a Day. PLEASE SCHEDULE APPOINTMENT FOR FURTHER REFILLS, Disp: 180 tablet, Rfl: 0  •  aspirin 81 MG tablet, Take 1  tablet by mouth Daily., Disp: 30 tablet, Rfl: 11  •  atorvastatin (LIPITOR) 80 MG tablet, Take 1 tablet by mouth Daily., Disp: 90 tablet, Rfl: 3  •  baclofen (LIORESAL) 10 MG tablet, TAKE 1 TABLET BY MOUTH TWICE DAILY AS NEEDED FOR MUSCLE SPASMS, Disp: 60 tablet, Rfl: 1  •  cyclobenzaprine (FLEXERIL) 10 MG tablet, Take 1 tablet by mouth 3 (Three) Times a Day As Needed for Muscle Spasms., Disp: 30 tablet, Rfl: 1  •  estradiol (ESTRACE) 1 MG tablet, Take 1 mg by mouth Daily., Disp: , Rfl:   •  Gel Base gel, CAPSAICIN 0.001% IMIPRAMINE 3% LIDOCAINE 10% MANNITOL 20% MELOXICAM 0.1% PRILOCAINE 2%. APPLY 1-2 G TO AREA 3-4 TIMES DAILY, Disp: 30 g, Rfl: PRN  •  hydroCHLOROthiazide (MICROZIDE) 12.5 MG capsule, Take 2 capsules by mouth Daily., Disp: 180 capsule, Rfl: 1  •  levETIRAcetam (KEPPRA) 250 MG tablet, TAKE 1 TABLET BY MOUTH TWICE DAILY, Disp: 60 tablet, Rfl: 0  •  lidocaine (LIDODERM) 5 %, Place 1 patch on the skin as directed by provider Daily. Remove & Discard patch within 12 hours or as directed by MD, Disp: 30 patch, Rfl: 0  •  mirtazapine (Remeron) 15 MG tablet, Take 1 tablet by mouth Every Night., Disp: 30 tablet, Rfl: 2  •  nortriptyline (PAMELOR) 25 MG capsule, TAKE 1 TO 2 CAPSULES BY MOUTH AT BEDTIME, Disp: 60 capsule, Rfl: 5  •  pregabalin (LYRICA) 150 MG capsule, TAKE 1 CAPSULE BY MOUTH TWICE DAILY, Disp: 60 capsule, Rfl: 1  •  pyridoxine (VITAMIN B-6) 100 MG tablet tablet, Take 1 tablet by mouth Daily., Disp: 30 tablet, Rfl: 5  •  Semaglutide,0.25 or 0.5MG/DOS, (Ozempic, 0.25 or 0.5 MG/DOSE,) 2 MG/1.5ML solution pen-injector, Inject 0.25 mg under the skin into the appropriate area as directed 1 (One) Time Per Week. Wednesdays, Disp: 2 pen, Rfl: 11  •  SITagliptin (Januvia) 50 MG tablet, Take 1 tablet by mouth Daily., Disp: 30 tablet, Rfl: 5  •  SUMAtriptan (IMITREX) 50 MG tablet, Take 50 mg by mouth Every 2 (Two) Hours As Needed for Migraine. Take one tablet at onset of headache. May repeat dose one time  "in 2 hours if headache not relieved., Disp: , Rfl:   •  tapentadol (NUCYNTA) 50 MG tablet, Take 1 hour before physical therapy (Patient taking differently: Take 50 mg by mouth. Take 1 hour before physical therapy), Disp: 15 tablet, Rfl: 0  •  tiZANidine (ZANAFLEX) 2 MG tablet, TAKE 1/2 TO 1 TABLET BY MOUTH FOUR TIMES DAILY AS NEEDED FOR MUSCLE SPASMS. DO NOT TAKE OTHER MUSCLE RELAXER WHILE ON TIZANIDINE (Patient taking differently: Take 2 mg by mouth Every Night. TAKE 1/2 TO 1 TABLET BY MOUTH FOUR TIMES DAILY AS NEEDED FOR MUSCLE SPASMS. DO NOT TAKE OTHER MUSCLE RELAXER WHILE ON TIZANIDINE), Disp: 30 tablet, Rfl: 0    Allergies   Allergen Reactions   • Metformin GI Intolerance   • Lisinopril Cough        reports that she has never smoked. She has never used smokeless tobacco.    Family History   Problem Relation Age of Onset   • Breast cancer Mother 42   • Heart failure Mother    • No Known Problems Father    • Hypertension Brother    • Hypertension Maternal Grandmother    • Hypertension Maternal Grandfather    • No Known Problems Paternal Grandmother    • No Known Problems Paternal Grandfather    • Heart failure Son    • Hypertension Sister    • Ovarian cancer Neg Hx          Objective:   /78   Pulse 81   Ht 180.3 cm (70.98\")   Wt 124 kg (274 lb)   LMP 10/31/2014 (LMP Unknown) Comment: Mammogram 2014 info given   SpO2 99%   BMI 38.23 kg/m²   CONSTITUTIONAL: No acute distress  RESPIRATORY: Normal effort. Clear to auscultation bilaterally without wheezing or rales  CARDIOVASCULAR: Regular rate and rhythm with normal S1 and S2. Without murmur, gallop or rub.  PERIPHERAL VASCULAR: Normal radial pulse. There is no lower extremity edema bilaterally.  PSYCH: Normal affect and mood      Labs:  Lab Results   Component Value Date    ALT 24 01/08/2021    AST 21 01/08/2021     Lab Results   Component Value Date    CHOL 114 07/26/2019    TRIG 100 07/26/2019    HDL 55 07/26/2019    CREATININE 0.80 01/08/2021 "       Diagnostic Data:    Procedures    PFTs 11/2018 - moderate restrictive lung disease     Biotel heart monitor 6/2019  -High degree symptomatic AV Block    Zio Patch 4/2019  -Only 1.5 days of monitoring.  -High-grade AV block for 4.4 seconds at 7:23 AM.  -Second-degree AV block for 10 seconds at 8:53 PM.  -Average heart rate 86.    TTE 11/2018  · Left ventricular systolic function is normal. Estimated EF = 65%.  · Left ventricular wall thickness is consistent with mild concentric hypertrophy.  · Left ventricular diastolic dysfunction (grade I a) consistent with impaired relaxation.  · Left atrial cavity size is borderline dilated.  · Trace-to-mild aortic valve regurgitation is present.    TTE 7/2017  · Estimated EF appears to be in the range of 66 - 70%.  · Left ventricular wall thickness is consistent with moderate asymmetric hypertrophy.  · Left ventricular diastolic dysfunction (grade I) consistent with impaired relaxation.    [facility] 7/2017  · There is mild to moderate diffuse tortuosity of the vessels.  There is evidence of small vessel disease in the distal portion of a small RPL S.  There is no obstructive coronary artery disease.  · Normal left ventricular ejection fraction with no regional wall motion abnormalities    CT PE Protocol 7/2017 - No evidence of pulmonary embolus or other acute chest disease.    DEVICE INTERROGATION: [employer], Interrogation date 4/5/2021-   RA pacing 1%, RV pacing less than 1%. P wave is 4.9 mV with a threshold of 0.7 V at 0.5 msec and an impedance of 576 ohms. R wave is less than 20 mV with a threshold of 0.8 V at 0.5 msec and an impedance of 69 ohms. Battery voltage is 8+ years.  Less than 1% mode switch since 2/2020    DEVICE INTERROGATION:  [employer], Interrogation date 9/23/19-   RA pacing 1%%, RV pacing <1%. P wave is 4.2 mV with a threshold of 0.7 V at 0.5 msec and an impedance of 565 ohms. R wave is 14.3 mV with a threshold of 0.9 V at 0.5 msec and an impedance of  660 ohms. Battery voltage is 10yrs.  RA and RV outputs decreased to 2.0 at 0.5.      Assessment and Plan:   Kellen was seen today for hypertension and hyperlipidemia.    Diagnoses and all orders for this visit:    Complete AV block  Paroxysmal atrial fibrillation  Chest wall pain  -s/p Gustine Sci DC PPM 7/2019, Dr Gay  -Paroxysmal atrial fibrillation noted on device interrogation 11/2019.  -Continue Eliquis  -Repeat TSH    Small vessel, coronary artery disease of native artery of native heart with stable angina pectoris  -CCS class 0 at this current time.  No clear anginal-like symptoms.  -Repeat direct LDL    Essential hypertension  -Patient wishes to discontinue valsartan due to hair loss. Will trial having her come off of it for now. If hair loss improves, patient to discuss change with PCP re: alternative therapy since she has diabetes. Prior cough with ACEi. If valsartan is not the culprit for her hair loss, we discussed having her restart it.  -We will increase HCTZ to 25 mg daily to compensate the change from a BP standpoint    Dyspnea, sleep disturbance, fatigue  -Historically followed by Dr Quispe, pulmonary.  Has not seen in a couple of years.  -Has restrictive lung disease due to obesity.  -Repeat PFTs and proBNP due to worsening dyspnea.  -If proBNP elevated, will send the patient for an echo      - Return in about 6 months (around 10/5/2021) for Next scheduled follow up with [facility].    Scribed for Johny Sommer MD by CORRIE Crowe. 4/5/2021  12:21 EDT    I, Johny Sommer MD, personally performed the services as scribed by the above named individual. I have made any necessary edits and it is both accurate and complete.     Johny Sommer MD, MSc, FACC, [facility]  Interventional Cardiology  [facility]

## 2021-04-06 ENCOUNTER — TELEPHONE (OUTPATIENT)
Dept: CARDIOLOGY | Facility: CLINIC | Age: 56
End: 2021-04-06

## 2021-04-06 NOTE — TELEPHONE ENCOUNTER
----- Message from CORRIE Russo sent at 4/6/2021 10:25 AM EDT -----  Can you let her know that her labs did not show evidence of heart failure.  Her LDL was elevated which is up from last year.  She should continue her atorvastatin and work on dietary and lifestyle changes for now. We will reach out to her once her PFTs have been completed.

## 2021-04-09 ENCOUNTER — APPOINTMENT (OUTPATIENT)
Dept: PREADMISSION TESTING | Facility: HOSPITAL | Age: 56
End: 2021-04-09

## 2021-04-09 PROCEDURE — C9803 HOPD COVID-19 SPEC COLLECT: HCPCS

## 2021-04-09 PROCEDURE — U0004 COV-19 TEST NON-CDC HGH THRU: HCPCS

## 2021-04-10 LAB — SARS-COV-2 RNA NOSE QL NAA+PROBE: NOT DETECTED

## 2021-04-11 DIAGNOSIS — G90.59 COMPLEX REGIONAL PAIN SYNDROME TYPE 1 AFFECTING OTHER SITE: ICD-10-CM

## 2021-04-12 ENCOUNTER — HOSPITAL ENCOUNTER (OUTPATIENT)
Dept: PULMONOLOGY | Facility: HOSPITAL | Age: 56
Discharge: HOME | End: 2021-04-12
Admitting: NURSE PRACTITIONER

## 2021-04-12 DIAGNOSIS — R06.09 DYSPNEA ON EXERTION: ICD-10-CM

## 2021-04-12 PROCEDURE — 94727 GAS DIL/WSHOT DETER LNG VOL: CPT | Performed by: INTERNAL MEDICINE

## 2021-04-12 PROCEDURE — 94729 DIFFUSING CAPACITY: CPT

## 2021-04-12 PROCEDURE — 94060 EVALUATION OF WHEEZING: CPT

## 2021-04-12 PROCEDURE — 94727 GAS DIL/WSHOT DETER LNG VOL: CPT

## 2021-04-12 PROCEDURE — 94060 EVALUATION OF WHEEZING: CPT | Performed by: INTERNAL MEDICINE

## 2021-04-12 PROCEDURE — 94729 DIFFUSING CAPACITY: CPT | Performed by: INTERNAL MEDICINE

## 2021-04-12 RX ORDER — ALBUTEROL SULFATE 2.5 MG/3ML
2.5 SOLUTION RESPIRATORY (INHALATION) ONCE
Status: COMPLETED | OUTPATIENT
Start: 2021-04-12 | End: 2021-04-12

## 2021-04-12 RX ORDER — LEVETIRACETAM 250 MG/1
TABLET ORAL
Qty: 60 TABLET | Refills: 0 | Status: SHIPPED | OUTPATIENT
Start: 2021-04-12 | End: 2021-05-11

## 2021-04-12 RX ADMIN — ALBUTEROL SULFATE 2.5 MG: 2.5 SOLUTION RESPIRATORY (INHALATION) at 13:35

## 2021-04-13 DIAGNOSIS — G90.59 COMPLEX REGIONAL PAIN SYNDROME TYPE 1 AFFECTING OTHER SITE: ICD-10-CM

## 2021-04-14 RX ORDER — PREGABALIN 150 MG/1
150 CAPSULE ORAL 2 TIMES DAILY
Qty: 60 CAPSULE | Refills: 0 | Status: SHIPPED | OUTPATIENT
Start: 2021-04-14 | End: 2021-06-14

## 2021-04-20 ENCOUNTER — TELEPHONE (OUTPATIENT)
Dept: CARDIOLOGY | Facility: CLINIC | Age: 56
End: 2021-04-20

## 2021-04-20 NOTE — TELEPHONE ENCOUNTER
Patient contacted to review PFT results and recommendations per MJS. Patient verbalizes understanding, all questions answered at this time.

## 2021-04-20 NOTE — TELEPHONE ENCOUNTER
----- Message from CORRIE Russo sent at 4/19/2021  2:33 PM EDT -----  Can you let the patient know that her PFTs showed mild restriction which is something that has been noted previously. She should follow-up with her PCP for further workup of her dyspnea.

## 2021-05-10 PROCEDURE — 93294 REM INTERROG EVL PM/LDLS PM: CPT | Performed by: INTERNAL MEDICINE

## 2021-05-10 PROCEDURE — 93296 REM INTERROG EVL PM/IDS: CPT | Performed by: INTERNAL MEDICINE

## 2021-05-11 DIAGNOSIS — G90.59 COMPLEX REGIONAL PAIN SYNDROME TYPE 1 AFFECTING OTHER SITE: ICD-10-CM

## 2021-05-11 RX ORDER — LEVETIRACETAM 250 MG/1
TABLET ORAL
Qty: 60 TABLET | Refills: 0 | Status: SHIPPED | OUTPATIENT
Start: 2021-05-11 | End: 2021-06-10

## 2021-05-11 RX ORDER — BACLOFEN 10 MG/1
TABLET ORAL
Qty: 60 TABLET | Refills: 1 | Status: SHIPPED | OUTPATIENT
Start: 2021-05-11 | End: 2021-07-12

## 2021-05-20 ENCOUNTER — LAB (OUTPATIENT)
Dept: LAB | Facility: HOSPITAL | Age: 56
End: 2021-05-20

## 2021-05-20 ENCOUNTER — OFFICE VISIT (OUTPATIENT)
Dept: FAMILY MEDICINE CLINIC | Facility: CLINIC | Age: 56
End: 2021-05-20

## 2021-05-20 VITALS
DIASTOLIC BLOOD PRESSURE: 80 MMHG | HEART RATE: 70 BPM | BODY MASS INDEX: 37.66 KG/M2 | HEIGHT: 71 IN | OXYGEN SATURATION: 98 % | WEIGHT: 269 LBS | SYSTOLIC BLOOD PRESSURE: 116 MMHG

## 2021-05-20 DIAGNOSIS — Z20.822 EXPOSURE TO COVID-19 VIRUS: ICD-10-CM

## 2021-05-20 DIAGNOSIS — G43.719 INTRACTABLE CHRONIC MIGRAINE WITHOUT AURA AND WITHOUT STATUS MIGRAINOSUS: ICD-10-CM

## 2021-05-20 DIAGNOSIS — G43.719 INTRACTABLE CHRONIC MIGRAINE WITHOUT AURA AND WITHOUT STATUS MIGRAINOSUS: Primary | ICD-10-CM

## 2021-05-20 PROCEDURE — U0003 INFECTIOUS AGENT DETECTION BY NUCLEIC ACID (DNA OR RNA); SEVERE ACUTE RESPIRATORY SYNDROME CORONAVIRUS 2 (SARS-COV-2) (CORONAVIRUS DISEASE [COVID-19]), AMPLIFIED PROBE TECHNIQUE, MAKING USE OF HIGH THROUGHPUT TECHNOLOGIES AS DESCRIBED BY CMS-2020-01-R: HCPCS

## 2021-05-20 PROCEDURE — 99213 OFFICE O/P EST LOW 20 MIN: CPT | Performed by: NURSE PRACTITIONER

## 2021-05-20 RX ORDER — SUMATRIPTAN 50 MG/1
50 TABLET, FILM COATED ORAL
Qty: 15 TABLET | Refills: 1 | OUTPATIENT
Start: 2021-05-20 | End: 2021-11-17

## 2021-05-22 LAB
LABCORP SARS-COV-2, NAA 2 DAY TAT: NORMAL
SARS-COV-2 RNA RESP QL NAA+PROBE: NOT DETECTED

## 2021-06-05 DIAGNOSIS — G47.9 DYSSOMNIA: ICD-10-CM

## 2021-06-07 RX ORDER — MIRTAZAPINE 15 MG/1
15 TABLET, FILM COATED ORAL NIGHTLY
Qty: 30 TABLET | Refills: 2 | Status: SHIPPED | OUTPATIENT
Start: 2021-06-07 | End: 2021-06-22

## 2021-06-07 NOTE — TELEPHONE ENCOUNTER
Next Office Visit: 6/28/2021  Last Office Visit: 5/20/2021     Last Labs:1/8/2021     Medication: MIRTAZAPINE 15MG TABLETS  Last Refill Date: 3/19/2021  Quantity: 30  Refills: 2    Pharmacy: [id number] [address]  AT [facility] [place] - [phone] [facility] [phone] FX

## 2021-06-10 DIAGNOSIS — G90.59 COMPLEX REGIONAL PAIN SYNDROME TYPE 1 AFFECTING OTHER SITE: ICD-10-CM

## 2021-06-10 RX ORDER — LEVETIRACETAM 250 MG/1
TABLET ORAL
Qty: 60 TABLET | Refills: 0 | Status: SHIPPED | OUTPATIENT
Start: 2021-06-10 | End: 2021-07-12

## 2021-06-12 DIAGNOSIS — G90.59 COMPLEX REGIONAL PAIN SYNDROME TYPE 1 AFFECTING OTHER SITE: ICD-10-CM

## 2021-06-14 RX ORDER — PREGABALIN 150 MG/1
CAPSULE ORAL
Qty: 60 CAPSULE | Refills: 0 | Status: SHIPPED | OUTPATIENT
Start: 2021-06-14 | End: 2021-09-21 | Stop reason: SDUPTHER

## 2021-06-17 PROBLEM — Z96.82 PRESENCE OF NEUROSTIMULATOR: Status: ACTIVE | Noted: 2021-06-17
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
## 2021-06-22 ENCOUNTER — HOSPITAL ENCOUNTER (OUTPATIENT)
Dept: GENERAL RADIOLOGY | Facility: HOSPITAL | Age: 56
Discharge: HOME | End: 2021-06-22
Admitting: ANESTHESIOLOGY

## 2021-06-22 ENCOUNTER — OFFICE VISIT (OUTPATIENT)
Dept: PAIN MEDICINE | Facility: CLINIC | Age: 56
End: 2021-06-22

## 2021-06-22 VITALS
WEIGHT: 270.2 LBS | TEMPERATURE: 95.3 F | BODY MASS INDEX: 37.83 KG/M2 | HEIGHT: 71 IN | OXYGEN SATURATION: 100 % | DIASTOLIC BLOOD PRESSURE: 87 MMHG | SYSTOLIC BLOOD PRESSURE: 147 MMHG | HEART RATE: 64 BPM

## 2021-06-22 DIAGNOSIS — Z96.82 PRESENCE OF NEUROSTIMULATOR: ICD-10-CM

## 2021-06-22 DIAGNOSIS — E11.9 TYPE 2 DIABETES MELLITUS WITHOUT COMPLICATION, WITHOUT LONG-TERM CURRENT USE OF INSULIN (HCC): ICD-10-CM

## 2021-06-22 DIAGNOSIS — E66.9 OBESITY, CLASS II, BMI 35-39.9: ICD-10-CM

## 2021-06-22 DIAGNOSIS — M79.18 MYOFASCIAL PAIN: ICD-10-CM

## 2021-06-22 DIAGNOSIS — R52 PAIN IN PACEMAKER POCKET: ICD-10-CM

## 2021-06-22 DIAGNOSIS — Z86.711 HISTORY OF PULMONARY EMBOLUS (PE): ICD-10-CM

## 2021-06-22 DIAGNOSIS — G90.59 COMPLEX REGIONAL PAIN SYNDROME TYPE 1 AFFECTING OTHER SITE: ICD-10-CM

## 2021-06-22 DIAGNOSIS — M47.814 THORACIC SPONDYLOSIS WITHOUT MYELOPATHY: Primary | ICD-10-CM

## 2021-06-22 DIAGNOSIS — Z46.2 ENCOUNTER FOR FITTING AND ADJUSTMENT OF NEUROPACEMAKER OF SPINAL CORD: ICD-10-CM

## 2021-06-22 DIAGNOSIS — I25.118 CORONARY ARTERY DISEASE OF NATIVE ARTERY OF NATIVE HEART WITH STABLE ANGINA PECTORIS (HCC): ICD-10-CM

## 2021-06-22 DIAGNOSIS — M47.814 THORACIC SPONDYLOSIS WITHOUT MYELOPATHY: ICD-10-CM

## 2021-06-22 DIAGNOSIS — E66.01 MORBID OBESITY (HCC): ICD-10-CM

## 2021-06-22 DIAGNOSIS — G58.9 ENTRAPMENT NEUROPATHY: ICD-10-CM

## 2021-06-22 PROCEDURE — 95972 ALYS CPLX SP/PN NPGT W/PRGRM: CPT | Performed by: ANESTHESIOLOGY

## 2021-06-22 PROCEDURE — 72072 X-RAY EXAM THORAC SPINE 3VWS: CPT

## 2021-06-22 PROCEDURE — 99215 OFFICE O/P EST HI 40 MIN: CPT | Performed by: ANESTHESIOLOGY

## 2021-06-22 RX ORDER — TRAMADOL HYDROCHLORIDE 50 MG/1
50 TABLET ORAL EVERY 6 HOURS PRN
Qty: 30 TABLET | Refills: 0 | OUTPATIENT
Start: 2021-06-22 | End: 2021-11-17

## 2021-06-22 RX ORDER — HYDROXYZINE HYDROCHLORIDE 25 MG/1
TABLET, FILM COATED ORAL
Qty: 60 TABLET | Refills: 1 | Status: SHIPPED | OUTPATIENT
Start: 2021-06-22 | End: 2021-08-10

## 2021-07-10 DIAGNOSIS — E11.9 TYPE 2 DIABETES MELLITUS WITHOUT COMPLICATION, WITHOUT LONG-TERM CURRENT USE OF INSULIN (HCC): ICD-10-CM

## 2021-07-10 DIAGNOSIS — I10 ESSENTIAL HYPERTENSION: ICD-10-CM

## 2021-07-10 DIAGNOSIS — G90.59 COMPLEX REGIONAL PAIN SYNDROME TYPE 1 AFFECTING OTHER SITE: ICD-10-CM

## 2021-07-12 RX ORDER — VALSARTAN 40 MG/1
TABLET ORAL
Qty: 45 TABLET | Refills: 0 | Status: SHIPPED | OUTPATIENT
Start: 2021-07-12 | End: 2021-08-02

## 2021-07-12 RX ORDER — BACLOFEN 10 MG/1
TABLET ORAL
Qty: 60 TABLET | Refills: 1 | Status: SHIPPED | OUTPATIENT
Start: 2021-07-12 | End: 2021-09-08

## 2021-07-12 RX ORDER — LEVETIRACETAM 250 MG/1
TABLET ORAL
Qty: 60 TABLET | Refills: 0 | Status: SHIPPED | OUTPATIENT
Start: 2021-07-12 | End: 2021-08-09

## 2021-07-23 DIAGNOSIS — M47.814 THORACIC SPONDYLOSIS WITHOUT MYELOPATHY: ICD-10-CM

## 2021-07-23 DIAGNOSIS — G90.59 COMPLEX REGIONAL PAIN SYNDROME TYPE 1 AFFECTING OTHER SITE: Primary | ICD-10-CM

## 2021-07-30 ENCOUNTER — TELEPHONE (OUTPATIENT)
Dept: CARDIOLOGY | Facility: CLINIC | Age: 56
End: 2021-07-30

## 2021-07-30 NOTE — TELEPHONE ENCOUNTER
Missed remote cardiac device transmission. Called patient-left voice mail message to send in transmission or call device clinic for assistance.

## 2021-08-02 ENCOUNTER — OFFICE VISIT (OUTPATIENT)
Dept: FAMILY MEDICINE CLINIC | Facility: CLINIC | Age: 56
End: 2021-08-02

## 2021-08-02 ENCOUNTER — LAB (OUTPATIENT)
Dept: LAB | Facility: HOSPITAL | Age: 56
End: 2021-08-02

## 2021-08-02 VITALS
BODY MASS INDEX: 37.02 KG/M2 | HEIGHT: 71 IN | RESPIRATION RATE: 20 BRPM | DIASTOLIC BLOOD PRESSURE: 68 MMHG | WEIGHT: 264.4 LBS | SYSTOLIC BLOOD PRESSURE: 112 MMHG | OXYGEN SATURATION: 95 % | HEART RATE: 69 BPM

## 2021-08-02 DIAGNOSIS — E11.9 TYPE 2 DIABETES MELLITUS WITHOUT COMPLICATION, WITHOUT LONG-TERM CURRENT USE OF INSULIN (HCC): Primary | ICD-10-CM

## 2021-08-02 DIAGNOSIS — E11.9 TYPE 2 DIABETES MELLITUS WITHOUT COMPLICATION, WITHOUT LONG-TERM CURRENT USE OF INSULIN (HCC): ICD-10-CM

## 2021-08-02 DIAGNOSIS — I10 ESSENTIAL HYPERTENSION: ICD-10-CM

## 2021-08-02 LAB
EXPIRATION DATE: NORMAL
HBA1C MFR BLD: 5.7 %
Lab: NORMAL

## 2021-08-02 PROCEDURE — 83036 HEMOGLOBIN GLYCOSYLATED A1C: CPT | Performed by: FAMILY MEDICINE

## 2021-08-02 PROCEDURE — 3044F HG A1C LEVEL LT 7.0%: CPT | Performed by: FAMILY MEDICINE

## 2021-08-02 PROCEDURE — 80048 BASIC METABOLIC PNL TOTAL CA: CPT

## 2021-08-02 PROCEDURE — 99214 OFFICE O/P EST MOD 30 MIN: CPT | Performed by: FAMILY MEDICINE

## 2021-08-02 RX ORDER — MELOXICAM 15 MG/1
15 TABLET ORAL DAILY PRN
COMMUNITY
Start: 2021-07-19 | End: 2021-11-17

## 2021-08-02 RX ORDER — SEMAGLUTIDE 1.34 MG/ML
0.5 INJECTION, SOLUTION SUBCUTANEOUS WEEKLY
Qty: 3 PEN | Refills: 3 | Status: SHIPPED | OUTPATIENT
Start: 2021-08-02 | End: 2021-11-09

## 2021-08-02 RX ORDER — MIRTAZAPINE 15 MG/1
15 TABLET, FILM COATED ORAL NIGHTLY
COMMUNITY
Start: 2021-07-09 | End: 2021-08-02 | Stop reason: SINTOL

## 2021-08-02 NOTE — PATIENT INSTRUCTIONS
1.  Check electrolyte levels and kidney function today    2.  Stop Januvia, increase Ozempic to 0.5mg    3.  When weight goes below 250lbs go down to 1 tablet of HCTZ

## 2021-08-02 NOTE — PROGRESS NOTES
Subjective   Kellen Esparza is a 55 y.o. female.     Chief Complaint   Patient presents with   • Diabetes       History of Present Illness     Kellen Esparza presents today for   Chief Complaint   Patient presents with   • Diabetes     The patient presents today for a follow-up on diabetes. She was last seen on 03/20/2021. The patient reports she has been working hard with her blood sugar. She has been trying to eat healthier. She has lost approximately 10 pounds since her last visit. She states she has had a decrease in her appetite. The patient reports she is unsure if the medication is the cause of her decreased appetite.     She is still taking 0.25 mg Ozempic, along with Januvia. She denies any nausea with the 0.5 mg dose.     The patient reports she is a little more tired in the afternoons.     She is still taking the hydrochlorothiazide 12.5 mg daily.     She reports Dr. Choi has put her on hydroxyzine 2 tablets daily to help her sleep. She reports the hydroxyzine is working well so far. The patient reports she is no longer taking the mirtazapine because this gave her a headache.     This patient is accompanied by their self who contributes to the history of their care.    The following portions of the patient 's history were reviewed and updated as appropriate: allergies, current medications, past family history, past medical history, past social history, past surgical history and problem list.        This patient is accompanied by their self who contributes to the history of their care.    The following portions of the patient's history were reviewed and updated as appropriate: allergies, current medications, past family history, past medical history, past social history, past surgical history and problem list.    Active Ambulatory Problems     Diagnosis Date Noted   • Chest pain 07/21/2017   • Dyspnea on exertion 07/21/2017   • Essential hypertension 07/21/2017   • Pain of right breast 07/21/2017    • Lower extremity edema 07/21/2017   • Diabetes mellitus (CMS/HCC) 07/21/2017   • Abnormal stress test 08/03/2017   • Migraine 08/10/2017   • History of pulmonary embolus (PE) 01/01/1997   • Hyperlipidemia 08/10/2017   • DDD (degenerative disc disease), cervical 09/06/2017   • Coronary artery disease of native artery of native heart with stable angina pectoris (CMS/[facility]) 09/11/2017   • AV block, complete (S/P PPM 7/2019) 04/30/2019   • Restrictive pattern on PFT's without evidence of ILD 08/02/2019   • Obesity, Class II, BMI 35-39.9 08/02/2019   • ALYSIA on CPAP 08/02/2019   • Pain in pacemaker pocket 11/13/2019   • Paroxysmal atrial fibrillation (CMS/[facility]) 11/13/2019   • Complex regional pain syndrome type I 11/14/2019   • Chronic anticoagulation 11/14/2019   • Pacemaker 11/14/2019   • Morbid obesity (CMS/[facility]) 11/14/2019   • Entrapment neuropathy 11/14/2019   • Encounter for fitting and adjustment of neuropacemaker of spinal cord 07/27/2020   • Mechanical low back pain 08/18/2020   • Presence of neurostimulator 06/17/2021   • Thoracic spondylosis without myelopathy 06/22/2021   • Myofascial pain 06/22/2021     Resolved Ambulatory Problems     Diagnosis Date Noted   • No Resolved Ambulatory Problems     Past Medical History:   Diagnosis Date   • Arthritis    • CPAP (continuous positive airway pressure) dependence    • Hypertension    • Musculoligamentous strain    • Pulmonary embolism (CMS/[facility]) 1997   • Sleep apnea    • Wears eyeglasses      Past Surgical History:   Procedure Laterality Date   • BREAST BIOPSY Left 2014   • CARDIAC CATHETERIZATION Left 8/10/2017    Procedure: Cardiac Catheterization/Vascular Study;  Surgeon: Johny Sommer MD;  Location:  [facility] CATH INVASIVE LOCATION;  Service:    • CARDIAC ELECTROPHYSIOLOGY PROCEDURE N/A 7/19/2019    Procedure: Pacemaker DC new;  Surgeon: Sonya Gay MD;  Location:  [facility] CATH INVASIVE LOCATION;  Service: Cardiology   • CHOLECYSTECTOMY     • COLONOSCOPY    "  • ENDOSCOPY     • HYSTERECTOMY  11/2014   • INSERT / REPLACE / REMOVE PACEMAKER     • JOINT REPLACEMENT Right     knee   • KNEE SURGERY Right 12/2017    total replacement   • NASAL SINUS SURGERY     • OOPHORECTOMY  11/2014   • PACEMAKER IMPLANTATION     • PULMONARY EMBOLISM SURGERY      right lung   • SPINAL CORD STIMULATOR IMPLANT N/A 9/10/2020    Procedure: SPINAL CORD STIMULATOR INSERTION PHASE 1, UPPER THORACIC PLACEMENT ;  Surgeon: Victor Manuel Geiger MD;  Location: On license of [facility];  Service: Neurosurgery;  Laterality: N/A;   • TUBAL ABDOMINAL LIGATION       Family History   Problem Relation Age of Onset   • Breast cancer Mother 42   • Heart failure Mother    • No Known Problems Father    • Hypertension Brother    • Hypertension Maternal Grandmother    • Hypertension Maternal Grandfather    • No Known Problems Paternal Grandmother    • No Known Problems Paternal Grandfather    • Heart failure Son    • Hypertension Sister    • Ovarian cancer Neg Hx      Social History     Socioeconomic History   • Marital status: Single     Spouse name: Not on file   • Number of children: Not on file   • Years of education: Not on file   • Highest education level: Not on file   Tobacco Use   • Smoking status: Never Smoker   • Smokeless tobacco: Never Used   Vaping Use   • Vaping Use: Never used   Substance and Sexual Activity   • Alcohol use: No     Comment: rare   • Drug use: No   • Sexual activity: Defer       Review of Systems  See HPI    Objective   Blood pressure 112/68, pulse 69, resp. rate 20, height 180.3 cm (70.98\"), weight 120 kg (264 lb 6.4 oz), last menstrual period 10/31/2014, SpO2 95 %, not currently breastfeeding.  Nursing note reviewed  Physical Exam  Const: NAD, A&Ox4, Pleasant, Cooperative  Eyes: EOMI, no conjunctivitis  ENT: No nasal discharge present, neck supple  Cardiac: Regular rate and rhythm, no cyanosis  Resp: Respiratory rate within normal limits, no increased work of breathing, no audible wheezing or " retractions noted  GI: No distention or ascites  MSK: Motor and sensation grossly intact in bilateral upper extremities  Neurologic: CN II-XII grossly intact  Psych: Appropriate mood and behavior.  Skin: Warm, dry  Procedures  Assessment/Plan   Problem List Items Addressed This Visit        Cardiac and Vasculature    Essential hypertension    Overview     · 24-hour ambulatory blood pressure monitor 4/3/2019: Overall average 135/89, 82 bpm.  Awake average 138/92, 83 bpm.  Sleep average 126/77, 80 bpm.            Endocrine and Metabolic    Diabetes mellitus (CMS/HCC) - Primary    Overview     Did not tolerate metformin.  She was started on Ozempic 0.25 mg weekly and Januvia 50 mg daily on 2/24/2020.  She is tolerating both of these medications well without side effects.  A1c today 5.7%., improved from 6.0% in March  -Increase Ozempic to 0.5mg weekly, stop Januvia         Relevant Medications    Semaglutide,0.25 or 0.5MG/DOS, (Ozempic, 0.25 or 0.5 MG/DOSE,) 2 MG/1.5ML solution pen-injector    Other Relevant Orders    POC Glycosylated Hemoglobin (Hb A1C) (Completed)    Basic Metabolic Panel          1. Diabetes  -Changed dose on her Ozempic from 0.25 mg to 0.5  mg once a week. This can help with her appetite and weight loss and will make up for stopping the Januvia.  -Discontinue Januvia. (duplicate therapy plus good control A1c 5.7%)    2.Hypertension   - I will check her kidney function and electrolytes due to the hydrochlorothiazide she is currently taking.   -Currently taking 2 tablets daily making it 25 mg daily.   -When her weight gets down to 250lbs decrease down to 1 tablet daily.           Patient Instructions   1.  Check electrolyte levels and kidney function today    2.  Stop Januvia, increase Ozempic to 0.5mg    3.  When weight goes below 250lbs go down to 1 tablet of HCTZ      Return in about 3 months (around 11/2/2021) for with A1c;.    Ambulatory progress note signed and attested to by Ryne Puente  SPEEDY.           Scribed for Ryne Puente DO by Óscar Gutiérrez  08/02/21   14:11 EDT    I have personally performed the services described in this document as scribed by the above individual, and it is both accurate and complete.  Ryne Puente DO  8/2/2021  15:50 EDT

## 2021-08-03 LAB
ANION GAP SERPL CALCULATED.3IONS-SCNC: 10.5 MMOL/L (ref 5–15)
BUN SERPL-MCNC: 16 MG/DL (ref 6–20)
BUN/CREAT SERPL: 15.5 (ref 7–25)
CALCIUM SPEC-SCNC: 9.9 MG/DL (ref 8.6–10.5)
CHLORIDE SERPL-SCNC: 99 MMOL/L (ref 98–107)
CO2 SERPL-SCNC: 26.5 MMOL/L (ref 22–29)
CREAT SERPL-MCNC: 1.03 MG/DL (ref 0.57–1)
GFR SERPL CREATININE-BSD FRML MDRD: 67 ML/MIN/1.73
GLUCOSE SERPL-MCNC: 90 MG/DL (ref 65–99)
POTASSIUM SERPL-SCNC: 4.2 MMOL/L (ref 3.5–5.2)
SODIUM SERPL-SCNC: 136 MMOL/L (ref 136–145)

## 2021-08-09 DIAGNOSIS — I10 ESSENTIAL HYPERTENSION: ICD-10-CM

## 2021-08-09 DIAGNOSIS — M47.814 THORACIC SPONDYLOSIS WITHOUT MYELOPATHY: ICD-10-CM

## 2021-08-09 DIAGNOSIS — G90.59 COMPLEX REGIONAL PAIN SYNDROME TYPE 1 AFFECTING OTHER SITE: ICD-10-CM

## 2021-08-09 DIAGNOSIS — E11.9 TYPE 2 DIABETES MELLITUS WITHOUT COMPLICATION, WITHOUT LONG-TERM CURRENT USE OF INSULIN (HCC): ICD-10-CM

## 2021-08-09 PROCEDURE — 93294 REM INTERROG EVL PM/LDLS PM: CPT | Performed by: INTERNAL MEDICINE

## 2021-08-09 PROCEDURE — 93296 REM INTERROG EVL PM/IDS: CPT | Performed by: INTERNAL MEDICINE

## 2021-08-09 RX ORDER — LEVETIRACETAM 250 MG/1
TABLET ORAL
Qty: 60 TABLET | Refills: 0 | Status: SHIPPED | OUTPATIENT
Start: 2021-08-09 | End: 2021-09-08

## 2021-08-09 RX ORDER — HYDROCHLOROTHIAZIDE 12.5 MG/1
CAPSULE, GELATIN COATED ORAL
Qty: 90 CAPSULE | Refills: 0 | Status: SHIPPED | OUTPATIENT
Start: 2021-08-09 | End: 2021-11-02 | Stop reason: SDUPTHER

## 2021-08-09 RX ORDER — SITAGLIPTIN 50 MG/1
TABLET, FILM COATED ORAL
Qty: 30 TABLET | Refills: 5 | Status: SHIPPED | OUTPATIENT
Start: 2021-08-09 | End: 2021-08-02

## 2021-08-10 RX ORDER — HYDROXYZINE HYDROCHLORIDE 25 MG/1
TABLET, FILM COATED ORAL
Qty: 60 TABLET | Refills: 1 | Status: SHIPPED | OUTPATIENT
Start: 2021-08-10 | End: 2021-11-02

## 2021-09-01 RX ORDER — MULTIVITAMIN WITH IRON
TABLET ORAL
Qty: 30 TABLET | Refills: 5 | OUTPATIENT
Start: 2021-09-01

## 2021-09-08 DIAGNOSIS — G90.59 COMPLEX REGIONAL PAIN SYNDROME TYPE 1 AFFECTING OTHER SITE: ICD-10-CM

## 2021-09-08 RX ORDER — LEVETIRACETAM 250 MG/1
TABLET ORAL
Qty: 60 TABLET | Refills: 0 | Status: SHIPPED | OUTPATIENT
Start: 2021-09-08 | End: 2021-10-13 | Stop reason: SDUPTHER

## 2021-09-08 RX ORDER — BACLOFEN 10 MG/1
TABLET ORAL
Qty: 60 TABLET | Refills: 1 | Status: SHIPPED | OUTPATIENT
Start: 2021-09-08 | End: 2022-10-03

## 2021-09-15 DIAGNOSIS — G47.9 DYSSOMNIA: ICD-10-CM

## 2021-09-15 RX ORDER — MIRTAZAPINE 15 MG/1
15 TABLET, FILM COATED ORAL NIGHTLY
Qty: 30 TABLET | Refills: 2 | OUTPATIENT
Start: 2021-09-15 | End: 2021-11-17

## 2021-09-15 NOTE — TELEPHONE ENCOUNTER
Rx Refill Note  Requested Prescriptions     Pending Prescriptions Disp Refills   • mirtazapine (REMERON) 15 MG tablet [Pharmacy Med Name: MIRTAZAPINE 15MG TABLETS] 30 tablet 2     Sig: TAKE 1 TABLET BY MOUTH EVERY NIGHT      Last office visit with prescribing clinician: 8/2/2021      Next office visit with prescribing clinician: 11/2/2021            Gee Caraballo MA  09/15/21, 10:12 EDT

## 2021-09-16 DIAGNOSIS — G90.59 COMPLEX REGIONAL PAIN SYNDROME TYPE 1 AFFECTING OTHER SITE: ICD-10-CM

## 2021-09-17 ENCOUNTER — LAB (OUTPATIENT)
Dept: LAB | Facility: HOSPITAL | Age: 56
End: 2021-09-17

## 2021-09-17 DIAGNOSIS — Z51.81 THERAPEUTIC DRUG MONITORING: ICD-10-CM

## 2021-09-17 DIAGNOSIS — Z51.81 THERAPEUTIC DRUG MONITORING: Primary | ICD-10-CM

## 2021-09-17 PROCEDURE — 80307 DRUG TEST PRSMV CHEM ANLYZR: CPT

## 2021-09-17 PROCEDURE — G0480 DRUG TEST DEF 1-7 CLASSES: HCPCS

## 2021-09-20 RX ORDER — PREGABALIN 150 MG/1
CAPSULE ORAL
Qty: 60 CAPSULE | OUTPATIENT
Start: 2021-09-20

## 2021-09-20 NOTE — TELEPHONE ENCOUNTER
"Patient called asking when her Lyrica would be approved. Advised that it is still in process. Patient asked when it would result. Advised that I was not sure as many things had been delayed due to COVID. Patient then asked for an appointment with Dr. Choi. Inquired as to what the appointment was about and patient refused to answer other than it was to discuss something about her care. Advised that I would be unable to schedule an appointment as she has a procedure on 09/29 and we would not schedule a follow up until that appointment was complete. Patient reports that she does want to keep the appointment, but that she wants an appointment with Dr. Choi to discuss her care and would I \"please pass a message on\" patient also inquired when I would have a response back about this issue since Covid was probably affecting that too. Advised that we would have a response today or tomorrow.   "

## 2021-09-21 ENCOUNTER — OFFICE VISIT (OUTPATIENT)
Dept: FAMILY MEDICINE CLINIC | Facility: CLINIC | Age: 56
End: 2021-09-21

## 2021-09-21 ENCOUNTER — TELEPHONE (OUTPATIENT)
Dept: PAIN MEDICINE | Facility: CLINIC | Age: 56
End: 2021-09-21

## 2021-09-21 VITALS
BODY MASS INDEX: 35.76 KG/M2 | HEIGHT: 71 IN | HEART RATE: 73 BPM | WEIGHT: 255.4 LBS | OXYGEN SATURATION: 95 % | RESPIRATION RATE: 16 BRPM | DIASTOLIC BLOOD PRESSURE: 82 MMHG | SYSTOLIC BLOOD PRESSURE: 130 MMHG | TEMPERATURE: 97.5 F

## 2021-09-21 DIAGNOSIS — G90.59 COMPLEX REGIONAL PAIN SYNDROME TYPE 1 AFFECTING OTHER SITE: ICD-10-CM

## 2021-09-21 DIAGNOSIS — G90.512 COMPLEX REGIONAL PAIN SYNDROME TYPE 1 OF LEFT UPPER EXTREMITY: Primary | ICD-10-CM

## 2021-09-21 PROCEDURE — 99213 OFFICE O/P EST LOW 20 MIN: CPT | Performed by: FAMILY MEDICINE

## 2021-09-21 RX ORDER — PREGABALIN 150 MG/1
150 CAPSULE ORAL 2 TIMES DAILY
Qty: 60 CAPSULE | Refills: 0 | Status: SHIPPED | OUTPATIENT
Start: 2021-09-21 | End: 2021-10-24 | Stop reason: SDUPTHER

## 2021-09-21 NOTE — TELEPHONE ENCOUNTER
Dr. Choi spoke with this patient. She has self- discharged and moved her care to a different pain management provider

## 2021-09-21 NOTE — TELEPHONE ENCOUNTER
I spoke with the patient this morning to clarify these consistencies on taking her Lyrica, as described by Janett.  Also, she canceled several appointments in the past for her procedures.  She has decided to transfer her care to a different pain clinic.  She did not want refills of her medications at this time.  She has an appointment with her primary care physician.  Will be available for the next 30 days.  Patient will be discharged at her own request.  We will cancel her upcoming procedure on 9/29.  Patient verbalized understanding and was very appreciative of the call.  Dr Choi

## 2021-09-21 NOTE — PATIENT INSTRUCTIONS
1.  A new referral has been placed to Pain Management. It would also likely benefit you to see Physical Medicine and Rehabilitation at . A referral has been placed for this as well.

## 2021-09-21 NOTE — PROGRESS NOTES
Subjective   Kellen Esparza is a 55 y.o. female.     Chief Complaint   Patient presents with   • Pain Management referral     see grace from Dr. Choi       History of Present Illness     Kellen Esparza presents today for   Chief Complaint   Patient presents with   • Pain Management referral     see grace from Dr. Choi     Kellen presents today for a referral to a new pain management provider. She was previously under the care of Dr. Choi here at [facility] and self-discharged herself over the last few days due to some issues with obtaining Lyrica. She has been on Lyrica 150 mg twice daily through their office, but over the last few months she went more than 30 days before refilling. This has actually been a trend back to 11/2020 for her. She had filled on 11/02/2020, 12/27/2020, 02/03/2021, 04/14/2021, 06/14/2021, and 07/27/2021 before requesting a refill on 09/16/2021.    The patient reports that she has not been taking her Lyrica twice daily every day, as she was under the impression that she was to taper this medication down after having her spinal stimulator implanted. She has been taking this as needed recently and states that this is the reason why her prescriptions have been lasting anywhere from 30 to 60 days.     This patient is accompanied by their self who contributes to the history of their care.    The following portions of the patient's history were reviewed and updated as appropriate: allergies, current medications, past family history, past medical history, past social history, past surgical history and problem list.    Active Ambulatory Problems     Diagnosis Date Noted   • Chest pain 07/21/2017   • Dyspnea on exertion 07/21/2017   • Essential hypertension 07/21/2017   • Pain of right breast 07/21/2017   • Lower extremity edema 07/21/2017   • Diabetes mellitus (CMS/[facility]) 07/21/2017   • Abnormal stress test 08/03/2017   • Migraine 08/10/2017   • History of pulmonary embolus (PE)  01/01/1997   • Hyperlipidemia 08/10/2017   • DDD (degenerative disc disease), cervical 09/06/2017   • Coronary artery disease of native artery of native heart with stable angina pectoris (CMS/[facility]) 09/11/2017   • AV block, complete (S/P PPM 7/2019) 04/30/2019   • Restrictive pattern on PFT's without evidence of ILD 08/02/2019   • Obesity, Class II, BMI 35-39.9 08/02/2019   • ALYSIA on CPAP 08/02/2019   • Pain in pacemaker pocket 11/13/2019   • Paroxysmal atrial fibrillation (CMS/[facility]) 11/13/2019   • Complex regional pain syndrome type I 11/14/2019   • Chronic anticoagulation 11/14/2019   • Pacemaker 11/14/2019   • Morbid obesity (CMS/[facility]) 11/14/2019   • Entrapment neuropathy 11/14/2019   • Encounter for fitting and adjustment of neuropacemaker of spinal cord 07/27/2020   • Mechanical low back pain 08/18/2020   • Presence of neurostimulator 06/17/2021   • Thoracic spondylosis without myelopathy 06/22/2021   • Myofascial pain 06/22/2021     Resolved Ambulatory Problems     Diagnosis Date Noted   • No Resolved Ambulatory Problems     Past Medical History:   Diagnosis Date   • Arthritis    • CPAP (continuous positive airway pressure) dependence    • Hypertension    • Musculoligamentous strain    • Pulmonary embolism (CMS/[facility]) 1997   • Sleep apnea    • Wears eyeglasses      Past Surgical History:   Procedure Laterality Date   • BREAST BIOPSY Left 2014   • CARDIAC CATHETERIZATION Left 8/10/2017    Procedure: Cardiac Catheterization/Vascular Study;  Surgeon: Johny Sommer MD;  Location:  [facility]ATH INVASIVE LOCATION;  Service:    • CARDIAC ELECTROPHYSIOLOGY PROCEDURE N/A 7/19/2019    Procedure: Pacemaker DC new;  Surgeon: Sonya Gay MD;  Location:  [facility]ATH INVASIVE LOCATION;  Service: Cardiology   • CHOLECYSTECTOMY     • COLONOSCOPY     • ENDOSCOPY     • HYSTERECTOMY  11/2014   • INSERT / REPLACE / REMOVE PACEMAKER     • JOINT REPLACEMENT Right     knee   • KNEE SURGERY Right 12/2017    total replacement   •  "NASAL SINUS SURGERY     • OOPHORECTOMY  11/2014   • PACEMAKER IMPLANTATION     • PULMONARY EMBOLISM SURGERY      right lung   • SPINAL CORD STIMULATOR IMPLANT N/A 9/10/2020    Procedure: SPINAL CORD STIMULATOR INSERTION PHASE 1, UPPER THORACIC PLACEMENT ;  Surgeon: Victor Manuel Geiger MD;  Location: [facility];  Service: Neurosurgery;  Laterality: N/A;   • TUBAL ABDOMINAL LIGATION       Family History   Problem Relation Age of Onset   • Breast cancer Mother 42   • Heart failure Mother    • No Known Problems Father    • Hypertension Brother    • Hypertension Maternal Grandmother    • Hypertension Maternal Grandfather    • No Known Problems Paternal Grandmother    • No Known Problems Paternal Grandfather    • Heart failure Son    • Hypertension Sister    • Ovarian cancer Neg Hx      Social History     Socioeconomic History   • Marital status: Single     Spouse name: Not on file   • Number of children: Not on file   • Years of education: Not on file   • Highest education level: Not on file   Tobacco Use   • Smoking status: Never Smoker   • Smokeless tobacco: Never Used   Vaping Use   • Vaping Use: Never used   Substance and Sexual Activity   • Alcohol use: No     Comment: rare   • Drug use: No   • Sexual activity: Defer       Review of Systems  See HPI    Objective   Blood pressure 130/82, pulse 73, temperature 97.5 °F (36.4 °C), resp. rate 16, height 180.3 cm (70.98\"), weight 116 kg (255 lb 6.4 oz), last menstrual period 10/31/2014, SpO2 95 %, not currently breastfeeding.  Nursing note reviewed  Physical Exam  Const: NAD, A&Ox4, Pleasant, Cooperative  Eyes: EOMI, no conjunctivitis  ENT: No nasal discharge present, neck supple  Cardiac: Regular rate and rhythm, no cyanosis  Resp: Respiratory rate within normal limits, no increased work of breathing, no audible wheezing or retractions noted  GI: No distention or ascites  MSK: Motor and sensation grossly intact in bilateral upper extremities  Neurologic: CN II-XII grossly " intact  Psych: Appropriate mood and behavior.  Skin: Warm, dry  Procedures  Assessment/Plan   Problem List Items Addressed This Visit        Neuro    Complex regional pain syndrome type I - Primary    Relevant Medications    pregabalin (LYRICA) 150 MG capsule    Other Relevant Orders    Ambulatory Referral to Pain Management    Ambulatory Referral to Physical Medicine Rehab (Completed)    Ambulatory Referral to Pain Management    Ambulatory Referral to Physical Medicine Rehab (Completed)          1. Complex regional pain syndrome.  - She has been maintained well on the Lyrica, baclofen, and Keppra from Dr. Choi's office. She evidently has been trying to wean herself off a little bit after having the neurostimulator and so had not been taking it regularly scheduled twice daily which is why her prescriptions were routinely lasting for anywhere between 30 to 60 days between refills. I do not have a significant concern for abuse or diversion, she has a UDS pending from Dr. Choi's office. I will place a referral for a new pain management specialist as well as a referral for PMR. I have sent her a refill for her pregabalin 150 mg twice daily. She was counseled that this is generally a scheduled medication and that she should take it as prescribed.    Patient Instructions   1.  A new referral has been placed to Pain Management. It would also likely benefit you to see Physical Medicine and Rehabilitation at . A referral has been placed for this as well.    No follow-ups on file.    Ambulatory progress note signed and attested to by Ryne Puente D.O.       Transcribed from ambient dictation for Ryne Puente DO by Jerrica Echeverria.  09/21/21   13:22 EDT    I have personally performed the services described in this document as transcribed by the above individual, and it is both accurate and complete.  Ryne Puente DO  9/21/2021  13:24 EDT

## 2021-09-21 NOTE — TELEPHONE ENCOUNTER
Caller: CHUCK FELICIA    Relationship to patient: SELF    Best call back number: [phone]     Chief complaint: MULTIPLE DX    Type of visit: FOLLOW UP    Requested date: BEFORE 9/29/2021    If rescheduling, when is the original appointment: NA    Additional notes: PT HAS A PROCEDURE SCHEDULED FOR 9/29/2021 AND WOULD LIKE AN APPT TO DISCUSS W/ DR ESPINOZA BEFORE THAT TIME. HUB CANNOT SCHEDULE UNTIL THE END OF October. PT HUNG UP BEFORE INFORMATION COULD BE VERIFIED.

## 2021-09-22 LAB
AMPHETAMINES UR QL SCN: NEGATIVE NG/ML
BARBITURATES UR QL SCN: NEGATIVE NG/ML
BENZODIAZ UR QL SCN: NEGATIVE NG/ML
BZE UR QL SCN: NEGATIVE NG/ML
CANNABINOIDS UR QL SCN: NEGATIVE NG/ML
CREAT UR-MCNC: 23 MG/DL (ref 20–300)
FENTANYL UR-MCNC: NEGATIVE PG/ML
LABORATORY COMMENT REPORT: NORMAL
MEPERIDINE UR QL: NEGATIVE NG/ML
METHADONE UR QL SCN: NEGATIVE NG/ML
OPIATES UR QL SCN: NEGATIVE NG/ML
OXYCODONE+OXYMORPHONE UR QL SCN: NEGATIVE NG/ML
PCP UR QL: NEGATIVE NG/ML
PH UR: 6.4 [PH] (ref 4.5–8.9)
PROPOXYPH UR QL SCN: NEGATIVE NG/ML
SP GR UR: 1
TRAMADOL UR QL SCN: NEGATIVE NG/ML

## 2021-09-24 LAB — PREGABALIN UR CFM-MCNC: NEGATIVE UG/ML

## 2021-10-05 ENCOUNTER — TELEPHONE (OUTPATIENT)
Dept: FAMILY MEDICINE CLINIC | Facility: CLINIC | Age: 56
End: 2021-10-05

## 2021-10-05 NOTE — TELEPHONE ENCOUNTER
Faxed labs and last office visit.    Please advise on Keppra  Rx Refill Note  Requested Prescriptions      No prescriptions requested or ordered in this encounter      Last office visit with prescribing clinician: 9/21/2021      Next office visit with prescribing clinician: 11/2/2021            Tanika Diaz MA  10/05/21, 15:14 EDT

## 2021-10-05 NOTE — TELEPHONE ENCOUNTER
Caller: Kellen Esparza    Relationship to patient: Self    Best call back number: [phone]     What is the call regarding:  PATIENT STATES THAT [facility] CALLED HER AND SAID THEY NEEDED MEDICAL RECORDS FROM DR ROMANO IN ORDER TO SCHEDULE HER AN APPOINTMENT.    ALSO, CAN DR ROMANO REFILL HER KEPPRA UNTIL SHE CAN GET IN TO THE PAIN CLINIC.  HER [facility]   Patient was counseled on smoking cessation and she will be provided a nicotine transdermal patch applied while inpatient.  Will provide additional smoking cessation counseling prior to discharge.

## 2021-10-08 DIAGNOSIS — E11.9 TYPE 2 DIABETES MELLITUS WITHOUT COMPLICATION, WITHOUT LONG-TERM CURRENT USE OF INSULIN (HCC): ICD-10-CM

## 2021-10-08 DIAGNOSIS — I10 ESSENTIAL HYPERTENSION: ICD-10-CM

## 2021-10-08 RX ORDER — VALSARTAN 40 MG/1
TABLET ORAL
Qty: 45 TABLET | Refills: 0 | Status: SHIPPED | OUTPATIENT
Start: 2021-10-08 | End: 2021-11-02

## 2021-10-08 RX ORDER — ATORVASTATIN CALCIUM 80 MG/1
80 TABLET, FILM COATED ORAL DAILY
Qty: 90 TABLET | Refills: 3 | Status: SHIPPED | OUTPATIENT
Start: 2021-10-08

## 2021-10-08 NOTE — TELEPHONE ENCOUNTER
Rx Refill Note  Requested Prescriptions     Pending Prescriptions Disp Refills   • valsartan (DIOVAN) 40 MG tablet [Pharmacy Med Name: VALSARTAN 40MG TABLETS] 45 tablet 0     Sig: TAKE 1/2 TABLET BY MOUTH EVERY MORNING      Last office visit with prescribing clinician: 9/21/2021      Next office visit with prescribing clinician: 11/2/2021            Parris Hewitt MA  10/08/21, 10:06 EDT

## 2021-10-13 DIAGNOSIS — G90.59 COMPLEX REGIONAL PAIN SYNDROME TYPE 1 AFFECTING OTHER SITE: ICD-10-CM

## 2021-10-13 RX ORDER — LEVETIRACETAM 250 MG/1
250 TABLET ORAL 2 TIMES DAILY
Qty: 60 TABLET | Refills: 0 | Status: SHIPPED | OUTPATIENT
Start: 2021-10-13 | End: 2021-11-02 | Stop reason: SDUPTHER

## 2021-10-13 RX ORDER — LEVETIRACETAM 250 MG/1
TABLET ORAL
Qty: 60 TABLET | Refills: 0 | OUTPATIENT
Start: 2021-10-13

## 2021-10-13 NOTE — TELEPHONE ENCOUNTER
Caller: Kellen Esparza    Relationship: Self      Medication requested (name and dosage):     levETIRAcetam (KEPPRA) 250 MG tablet    Pharmacy where request should be sent:     [id number] - [facility] [address]  AT [facility] - [phone] [facility] [phone]             Additional details provided by patient:     Best call back number:  Does the patient have less than a 3 day supply:  [x] Yes  [] No    Ivon Coronel Rep   10/13/21 12:27 EDT

## 2021-10-13 NOTE — TELEPHONE ENCOUNTER
Contacted patient to let her know her prescription was sent to the pharmacy on file. Pt verbalized understanding and did not have any additional questions at this time.

## 2021-10-13 NOTE — TELEPHONE ENCOUNTER
Rx Refill Note  Requested Prescriptions     Pending Prescriptions Disp Refills   • levETIRAcetam (KEPPRA) 250 MG tablet 60 tablet 0     Sig: Take 1 tablet by mouth 2 (Two) Times a Day.      Last office visit with prescribing clinician: 9/21/2021      Next office visit with prescribing clinician: 11/2/2021            ANGEL JACKSON MA  10/13/21, 13:17 EDT

## 2021-10-21 ENCOUNTER — PATIENT MESSAGE (OUTPATIENT)
Dept: FAMILY MEDICINE CLINIC | Facility: CLINIC | Age: 56
End: 2021-10-21

## 2021-10-21 DIAGNOSIS — G90.59 COMPLEX REGIONAL PAIN SYNDROME TYPE 1 AFFECTING OTHER SITE: ICD-10-CM

## 2021-10-24 RX ORDER — PREGABALIN 150 MG/1
150 CAPSULE ORAL 2 TIMES DAILY
Qty: 60 CAPSULE | Refills: 1 | Status: SHIPPED | OUTPATIENT
Start: 2021-10-24 | End: 2021-12-22 | Stop reason: SDUPTHER

## 2021-10-28 DIAGNOSIS — M47.814 THORACIC SPONDYLOSIS WITHOUT MYELOPATHY: ICD-10-CM

## 2021-10-28 RX ORDER — HYDROXYZINE HYDROCHLORIDE 25 MG/1
TABLET, FILM COATED ORAL
Qty: 60 TABLET | Refills: 1 | OUTPATIENT
Start: 2021-10-28

## 2021-11-02 ENCOUNTER — OFFICE VISIT (OUTPATIENT)
Dept: FAMILY MEDICINE CLINIC | Facility: CLINIC | Age: 56
End: 2021-11-02

## 2021-11-02 VITALS
HEIGHT: 71 IN | TEMPERATURE: 96.9 F | OXYGEN SATURATION: 96 % | SYSTOLIC BLOOD PRESSURE: 142 MMHG | BODY MASS INDEX: 35.53 KG/M2 | WEIGHT: 253.8 LBS | HEART RATE: 73 BPM | DIASTOLIC BLOOD PRESSURE: 80 MMHG | RESPIRATION RATE: 16 BRPM

## 2021-11-02 DIAGNOSIS — G47.9 DYSSOMNIA: ICD-10-CM

## 2021-11-02 DIAGNOSIS — E11.9 TYPE 2 DIABETES MELLITUS WITHOUT COMPLICATION, WITHOUT LONG-TERM CURRENT USE OF INSULIN (HCC): Primary | ICD-10-CM

## 2021-11-02 DIAGNOSIS — G90.59 COMPLEX REGIONAL PAIN SYNDROME TYPE 1 AFFECTING OTHER SITE: ICD-10-CM

## 2021-11-02 DIAGNOSIS — Z23 NEED FOR INFLUENZA VACCINATION: ICD-10-CM

## 2021-11-02 DIAGNOSIS — I10 ESSENTIAL HYPERTENSION: ICD-10-CM

## 2021-11-02 LAB
EXPIRATION DATE: NORMAL
HBA1C MFR BLD: 5.7 %
Lab: NORMAL

## 2021-11-02 PROCEDURE — 83036 HEMOGLOBIN GLYCOSYLATED A1C: CPT | Performed by: FAMILY MEDICINE

## 2021-11-02 PROCEDURE — 99214 OFFICE O/P EST MOD 30 MIN: CPT | Performed by: FAMILY MEDICINE

## 2021-11-02 PROCEDURE — 90686 IIV4 VACC NO PRSV 0.5 ML IM: CPT | Performed by: FAMILY MEDICINE

## 2021-11-02 PROCEDURE — 90471 IMMUNIZATION ADMIN: CPT | Performed by: FAMILY MEDICINE

## 2021-11-02 RX ORDER — HYDROCHLOROTHIAZIDE 12.5 MG/1
12.5 CAPSULE, GELATIN COATED ORAL DAILY
Qty: 90 CAPSULE | Refills: 0 | Status: SHIPPED | OUTPATIENT
Start: 2021-11-02 | End: 2021-12-09

## 2021-11-02 RX ORDER — LEVETIRACETAM 250 MG/1
250 TABLET ORAL 2 TIMES DAILY
Qty: 180 TABLET | Refills: 0 | Status: SHIPPED | OUTPATIENT
Start: 2021-11-02 | End: 2022-02-11

## 2021-11-02 RX ORDER — ASPIRIN 81 MG/1
81 TABLET ORAL DAILY
Qty: 90 TABLET | Refills: 3 | Status: SHIPPED | OUTPATIENT
Start: 2021-11-02 | End: 2022-10-03

## 2021-11-02 RX ORDER — TRAZODONE HYDROCHLORIDE 50 MG/1
50 TABLET ORAL NIGHTLY
Qty: 30 TABLET | Refills: 2 | Status: SHIPPED | OUTPATIENT
Start: 2021-11-02 | End: 2021-12-09

## 2021-11-02 RX ORDER — VALSARTAN 40 MG/1
20 TABLET ORAL EVERY MORNING
Qty: 45 TABLET | Refills: 0 | Status: SHIPPED | OUTPATIENT
Start: 2021-11-02 | End: 2022-02-07

## 2021-11-02 NOTE — PATIENT INSTRUCTIONS
1.  Take valsartan 20mg daily plus HCTZ 12.5mg daily    2.  Take Ozempic 0.5mg weekly    3.  A1c today 5.7% doing GREAT

## 2021-11-02 NOTE — ASSESSMENT & PLAN NOTE
Somewhat high today, although it has been under better control over the last few months and at home. Evidentially she has been taking 2 of the HCTZ and 0 of the valsartan, I have asked her that I would like her to be taking 0.5 tablet of the valsartan and 1 capsule of the HCTZ 12.5 mg. We discussed that valsartan serves as renal protection and microvascular prevention for her diabetes. We will plan to follow up in a few months.

## 2021-11-02 NOTE — PROGRESS NOTES
"Subjective   Kellen Esparza is a 55 y.o. female.     Chief Complaint   Patient presents with   • Diabetes   • Med Dose Change     keppra   • go back on Trazodone for sleep       History of Present Illness     Kellen Esparza presents today for   Chief Complaint   Patient presents with   • Diabetes   • Med Dose Change     keppra   • go back on Trazodone for sleep      The patient presents today for a follow-up on her diabetes. Her last visit in the office was for more controlled substance monitoring on her Lyrica. See that note for further details. Essentially, she is no longer seeing Dr. Choi's office. Her last A1C was 5.7 percent in 08/2021. She has very good control on Januvia 50 mg and Ozempic 0.5 mg weekly. Given her level of control, we discussed last time discontinuing the Januvia; however, it appears that this was refilled via protocol on 08/09/2021. We also discussed that as she continues to lose weight, once she gets down below 250 pounds, we could likely go down to 1 tablet daily of her hydrochlorothiazide.    She reports that she is feeling well.     The patient reports that she is no longer taking Januvia and Ozempic has since been increased. She reports that she is taking 2 capsules of hydrochlorothiazide. The patient reports that she is still taking Lyrica. She reports that she was under the impression she was to no longer take valsartan and to take \"two of the blue pills\".  The patient states that she was advised by another provider to discuss her remaining on Keppra. She denies problems with the higher dose of Ozempic.     The patient notes that hydroxyzine is not working well for her; therefore, she is requesting to restart trazodone as she feels her sleep quality is better with trazodone. She reports that she is sleeping 2 to 5 hours per night when taking hydroxyzine. She adds that she has tried taking 2 hydroxyzine tablets at 7:00 PM in hopes she would become drowsy by 10:00 PM; however, \"I " "will doze off but I find myself back up\".     Additionally, she reports she has became established at [facility] and will begin receiving injections on 11/4/2021.    This patient is accompanied by their self who contributes to the history of their care.    The following portions of the patient's history were reviewed and updated as appropriate: allergies, current medications, past family history, past medical history, past social history, past surgical history and problem list.    Active Ambulatory Problems     Diagnosis Date Noted   • Chest pain 07/21/2017   • Dyspnea on exertion 07/21/2017   • Essential hypertension 07/21/2017   • Pain of right breast 07/21/2017   • Lower extremity edema 07/21/2017   • Diabetes mellitus (HCC) 07/21/2017   • Abnormal stress test 08/03/2017   • Migraine 08/10/2017   • History of pulmonary embolus (PE) 01/01/1997   • Hyperlipidemia 08/10/2017   • DDD (degenerative disc disease), cervical 09/06/2017   • Coronary artery disease of native artery of native heart with stable angina pectoris ([facility]) 09/11/2017   • AV block, complete (S/P PPM 7/2019) 04/30/2019   • Restrictive pattern on PFT's without evidence of ILD 08/02/2019   • Obesity, Class II, BMI 35-39.9 08/02/2019   • ALYSIA on CPAP 08/02/2019   • Pain in pacemaker pocket 11/13/2019   • Paroxysmal atrial fibrillation ([facility]) 11/13/2019   • Complex regional pain syndrome type I 11/14/2019   • Chronic anticoagulation 11/14/2019   • Pacemaker 11/14/2019   • Morbid obesity ([facility]) 11/14/2019   • Entrapment neuropathy 11/14/2019   • Encounter for fitting and adjustment of neuropacemaker of spinal cord 07/27/2020   • Mechanical low back pain 08/18/2020   • Presence of neurostimulator 06/17/2021   • Thoracic spondylosis without myelopathy 06/22/2021   • Myofascial pain 06/22/2021     Resolved Ambulatory Problems     Diagnosis Date Noted   • No Resolved Ambulatory Problems     Past Medical History:   Diagnosis Date   • Arthritis    • CPAP " (continuous positive airway pressure) dependence    • Hypertension    • Musculoligamentous strain    • Pulmonary embolism (HCC) 1997   • Sleep apnea    • Wears eyeglasses      Past Surgical History:   Procedure Laterality Date   • BREAST BIOPSY Left 2014   • CARDIAC CATHETERIZATION Left 8/10/2017    Procedure: Cardiac Catheterization/Vascular Study;  Surgeon: Johny Sommer MD;  Location:  [facility];  Service:    • CARDIAC ELECTROPHYSIOLOGY PROCEDURE N/A 7/19/2019    Procedure: Pacemaker DC new;  Surgeon: Sonya Gay MD;  Location:  [facility];  Service: Cardiology   • CHOLECYSTECTOMY     • COLONOSCOPY     • ENDOSCOPY     • HYSTERECTOMY  11/2014   • INSERT / REPLACE / REMOVE PACEMAKER     • JOINT REPLACEMENT Right     knee   • KNEE SURGERY Right 12/2017    total replacement   • NASAL SINUS SURGERY     • OOPHORECTOMY  11/2014   • PACEMAKER IMPLANTATION     • PULMONARY EMBOLISM SURGERY      right lung   • SPINAL CORD STIMULATOR IMPLANT N/A 9/10/2020    Procedure: SPINAL CORD STIMULATOR INSERTION PHASE 1, UPPER THORACIC PLACEMENT ;  Surgeon: Victor Manuel Geiger MD;  Location:  [facility];  Service: Neurosurgery;  Laterality: N/A;   • TUBAL ABDOMINAL LIGATION       Family History   Problem Relation Age of Onset   • Breast cancer Mother 42   • Heart failure Mother    • No Known Problems Father    • Hypertension Brother    • Hypertension Maternal Grandmother    • Hypertension Maternal Grandfather    • No Known Problems Paternal Grandmother    • No Known Problems Paternal Grandfather    • Heart failure Son    • Hypertension Sister    • Ovarian cancer Neg Hx      Social History     Socioeconomic History   • Marital status: Single   Tobacco Use   • Smoking status: Never Smoker   • Smokeless tobacco: Never Used   Vaping Use   • Vaping Use: Never used   Substance and Sexual Activity   • Alcohol use: No     Comment: rare   • Drug use: No   • Sexual activity: Defer       Review of  "Systems  See HPI    Objective   Blood pressure 142/80, pulse 73, temperature 96.9 °F (36.1 °C), resp. rate 16, height 180.3 cm (70.98\"), weight 115 kg (253 lb 12.8 oz), last menstrual period 10/31/2014, SpO2 96 %, not currently breastfeeding.  Nursing note reviewed  Physical Exam  Vitals reviewed.       Const: NAD, A&Ox4, Pleasant, Cooperative  Eyes: EOMI, no conjunctivitis  ENT: No nasal discharge present, neck supple  Cardiac: Regular rate and rhythm, no cyanosis  Resp: Respiratory rate within normal limits, no increased work of breathing, no audible wheezing or retractions noted  GI: No distention or ascites  MSK: Motor and sensation grossly intact in bilateral upper extremities  Neurologic: CN II-XII grossly intact  Psych: Appropriate mood and behavior.  Skin: Warm, dry  Procedures  Assessment/Plan   Problem List Items Addressed This Visit        Cardiac and Vasculature    Essential hypertension    Overview     · 24-hour ambulatory blood pressure monitor 4/3/2019: Overall average 135/89, 82 bpm.  Awake average 138/92, 83 bpm.  Sleep average 126/77, 80 bpm.         Current Assessment & Plan     Somewhat high today, although it has been under better control over the last few months and at home. Evidentially she has been taking 2 of the HCTZ and 0 of the valsartan, I have asked her that I would like her to be taking 0.5 tablet of the valsartan and 1 capsule of the HCTZ 12.5 mg. We discussed that valsartan serves as renal protection and microvascular prevention for her diabetes. We will plan to follow up in a few months.          Relevant Medications    valsartan (DIOVAN) 40 MG tablet    hydroCHLOROthiazide (MICROZIDE) 12.5 MG capsule       Endocrine and Metabolic    Diabetes mellitus (HCC) - Primary    Overview     Did not tolerate metformin.  She was started on Ozempic 0.25 mg weekly and Januvia 50 mg daily on 2/24/2020.  She tolerated both of these medications well without side effects.  A1c 11/2/21 5.7%., " improved from 6.0% in March and unchanged from July 2021. In July 2021 we increased her Ozempic to 0.5mg and DC Januvia         Current Assessment & Plan     Very well controlled, she is on Ozempic 0.5 mg weekly as monotherapy. She came off the Januvia in 08/2021 since it was duplicative and since her control was very good. She will continue to work on weight loss, diet, and exercise as much as possible.         Relevant Medications    valsartan (DIOVAN) 40 MG tablet    Other Relevant Orders    POC Glycosylated Hemoglobin (Hb A1C) (Completed)       Neuro    Complex regional pain syndrome type I    Relevant Medications    levETIRAcetam (KEPPRA) 250 MG tablet      Other Visit Diagnoses     Dyssomnia        Relevant Medications    traZODone (DESYREL) 50 MG tablet    Need for influenza vaccination        Relevant Orders    FluLaval/Fluarix/Fluzone >6 Months (7576-1290) (Completed)          1. Type 2 diabetes mellitus.  Very well controlled, she is on Ozempic 0.5 mg weekly as monotherapy. She came off the Januvia in 08/2021 since it was duplicative and since her control was very good. She will continue to work on weight loss, diet, and exercise as much as possible.    2. Hypertension.  Somewhat high today, although it has been under better control over the last few months and at home. Evidentially she has been taking 2 of the HCTZ and 0 of the valsartan, I have asked her that I would like her to be taking 0.5 tablet of the valsartan and 1 capsule of the HCTZ 12.5 mg. We discussed that valsartan serves as renal protection and microvascular prevention for her diabetes. We will plan to follow up in a few months.     3. Elevated creatinine   - Slightly elevated to 1.0 in 08/2021 and we will plan to recheck this with her next round of labs in 3 months.    Patient Instructions   1.  Take valsartan 20mg daily plus HCTZ 12.5mg daily    2.  Take Ozempic 0.5mg weekly    3.  A1c today 5.7% doing GREAT      Return in about 3 months  (around 2/2/2022) for with A1c;.      MDM     Ambulatory progress note signed and attested to by Ryne Puente D.O.         Transcribed from ambient dictation for Ryne Puente DO by Dipika Clayton.  11/02/21   08:56 EDT    I have personally performed the services described in this document as transcribed by the above individual, and it is both accurate and complete.  Ryne Puente DO  11/2/2021  09:15 EDT

## 2021-11-02 NOTE — ASSESSMENT & PLAN NOTE
Very well controlled, she is on Ozempic 0.5 mg weekly as monotherapy. She came off the Januvia in 08/2021 since it was duplicative and since her control was very good. She will continue to work on weight loss, diet, and exercise as much as possible.

## 2021-11-08 PROCEDURE — 93296 REM INTERROG EVL PM/IDS: CPT | Performed by: INTERNAL MEDICINE

## 2021-11-08 PROCEDURE — 93294 REM INTERROG EVL PM/LDLS PM: CPT | Performed by: INTERNAL MEDICINE

## 2021-11-09 DIAGNOSIS — E11.9 TYPE 2 DIABETES MELLITUS WITHOUT COMPLICATION, WITHOUT LONG-TERM CURRENT USE OF INSULIN (HCC): ICD-10-CM

## 2021-11-09 RX ORDER — SEMAGLUTIDE 1.34 MG/ML
INJECTION, SOLUTION SUBCUTANEOUS
Qty: 3 ML | Refills: 3 | Status: ON HOLD | OUTPATIENT
Start: 2021-11-09 | End: 2022-05-03

## 2021-11-09 NOTE — TELEPHONE ENCOUNTER
Rx Refill Note  Requested Prescriptions     Pending Prescriptions Disp Refills   • Ozempic, 0.25 or 0.5 MG/DOSE, 2 MG/1.5ML solution pen-injector [Pharmacy Med Name: OZEMPIC 0.25 OR 0.5MG/DOS 1X2MG PEN] 3 mL      Sig: INJECT 0.25MG UNDER THE SKIN INTO THE APPROPRIATE AREA ONCE WEEKLY ON WEDNESDAYS AS DIRECTED      Last office visit with prescribing clinician: 11/2/2021      Next office visit with prescribing clinician: 2/2/2022            Mandi Allen MA  11/09/21, 13:07 EST

## 2021-11-17 PROCEDURE — U0004 COV-19 TEST NON-CDC HGH THRU: HCPCS | Performed by: PHYSICIAN ASSISTANT

## 2021-12-09 ENCOUNTER — LAB (OUTPATIENT)
Dept: LAB | Facility: HOSPITAL | Age: 56
End: 2021-12-09

## 2021-12-09 ENCOUNTER — OFFICE VISIT (OUTPATIENT)
Dept: FAMILY MEDICINE CLINIC | Facility: CLINIC | Age: 56
End: 2021-12-09

## 2021-12-09 VITALS
BODY MASS INDEX: 35.53 KG/M2 | OXYGEN SATURATION: 96 % | SYSTOLIC BLOOD PRESSURE: 128 MMHG | DIASTOLIC BLOOD PRESSURE: 84 MMHG | RESPIRATION RATE: 16 BRPM | HEIGHT: 71 IN | WEIGHT: 253.8 LBS | HEART RATE: 71 BPM

## 2021-12-09 DIAGNOSIS — E11.9 TYPE 2 DIABETES MELLITUS WITHOUT COMPLICATION, WITHOUT LONG-TERM CURRENT USE OF INSULIN (HCC): ICD-10-CM

## 2021-12-09 DIAGNOSIS — F51.01 PRIMARY INSOMNIA: ICD-10-CM

## 2021-12-09 DIAGNOSIS — R42 DIZZINESS: Primary | ICD-10-CM

## 2021-12-09 DIAGNOSIS — R42 DIZZINESS: ICD-10-CM

## 2021-12-09 PROCEDURE — 80048 BASIC METABOLIC PNL TOTAL CA: CPT

## 2021-12-09 PROCEDURE — 99214 OFFICE O/P EST MOD 30 MIN: CPT | Performed by: FAMILY MEDICINE

## 2021-12-09 RX ORDER — DIAZEPAM 2 MG/1
2-4 TABLET ORAL NIGHTLY PRN
Qty: 6 TABLET | Refills: 0 | Status: SHIPPED | OUTPATIENT
Start: 2021-12-09 | End: 2021-12-15 | Stop reason: SDUPTHER

## 2021-12-10 LAB
ANION GAP SERPL CALCULATED.3IONS-SCNC: 9.4 MMOL/L (ref 5–15)
BUN SERPL-MCNC: 12 MG/DL (ref 6–20)
BUN/CREAT SERPL: 13.6 (ref 7–25)
CALCIUM SPEC-SCNC: 9.5 MG/DL (ref 8.6–10.5)
CHLORIDE SERPL-SCNC: 101 MMOL/L (ref 98–107)
CO2 SERPL-SCNC: 29.6 MMOL/L (ref 22–29)
CREAT SERPL-MCNC: 0.88 MG/DL (ref 0.57–1)
GFR SERPL CREATININE-BSD FRML MDRD: 80 ML/MIN/1.73
GLUCOSE SERPL-MCNC: 86 MG/DL (ref 65–99)
POTASSIUM SERPL-SCNC: 3.8 MMOL/L (ref 3.5–5.2)
SODIUM SERPL-SCNC: 140 MMOL/L (ref 136–145)

## 2021-12-10 NOTE — PROGRESS NOTES
Subjective   Kellen Esparza is a 55 y.o. female.     Chief Complaint   Patient presents with   • Dizziness   • Headache       History of Present Illness     Kellen Esparza presents today for   Chief Complaint   Patient presents with   • Dizziness   • Headache     The patient presents today for an acute same day visit regarding dizziness and headache. She was last seen here in the first week of 11/2021, one month ago, at that time, she was taking 2 of her HCTZ and none of her valsartan. I advised her that I wanted her to take a half tablet of valsartan and 1 capsule of HCTZ 12.5 mg daily. She was doing well at that time. Her blood pressure looks good today. She is on Ozempic 0.5 mg weekly for diabetes, and her last A1c was 5.7 percent. She reports feeling lightheaded today.    She reports that she is compliant with HCTZ,  valsartan and Ozempic. She reports that she drinks more than 64 ounces of water per day. She reports her blood sugar this morning was 95 mg/dL.     She reports a headache for 5 days. She reports that her head pain radiates across her head globally. She reports that it varies in severity from tightening to pounding. Laying down does not make it better or worse. She has tried Excedrin Migraine, Advil, Aleve and Tylenol and nothing helps.     She reports that she stopped taking the mirtazapine when she went back to trazodone, but it is not working at all. She takes trazodone around 9PM and she is still up with the headache until 12:00 AM or 1:00 AM, and then she might dose off for a couple of hours. She states that when she does sleep, she gets 2 to 3 hours of sleep.    She reports that she works from home.    She reports that she has been vaccinated to COVID-19.    This patient is accompanied by their self who contributes to the history of their care.    The following portions of the patient's history were reviewed and updated as appropriate: allergies, current medications, past family history,  past medical history, past social history, past surgical history and problem list.    Active Ambulatory Problems     Diagnosis Date Noted   • Chest pain 07/21/2017   • Dyspnea on exertion 07/21/2017   • Essential hypertension 07/21/2017   • Pain of right breast 07/21/2017   • Lower extremity edema 07/21/2017   • Diabetes mellitus (HCC) 07/21/2017   • Abnormal stress test 08/03/2017   • Migraine 08/10/2017   • History of pulmonary embolus (PE) 01/01/1997   • Hyperlipidemia 08/10/2017   • DDD (degenerative disc disease), cervical 09/06/2017   • Coronary artery disease of native artery of native heart with stable angina pectoris (HCC) 09/11/2017   • AV block, complete (S/P PPM 7/2019) 04/30/2019   • Restrictive pattern on PFT's without evidence of ILD 08/02/2019   • Obesity, Class II, BMI 35-39.9 08/02/2019   • ALYSIA on CPAP 08/02/2019   • Pain in pacemaker pocket 11/13/2019   • Paroxysmal atrial fibrillation (HCC) 11/13/2019   • Complex regional pain syndrome type I 11/14/2019   • Chronic anticoagulation 11/14/2019   • Pacemaker 11/14/2019   • Morbid obesity (HCC) 11/14/2019   • Entrapment neuropathy 11/14/2019   • Encounter for fitting and adjustment of neuropacemaker of spinal cord 07/27/2020   • Mechanical low back pain 08/18/2020   • Presence of neurostimulator 06/17/2021   • Thoracic spondylosis without myelopathy 06/22/2021   • Myofascial pain 06/22/2021     Resolved Ambulatory Problems     Diagnosis Date Noted   • No Resolved Ambulatory Problems     Past Medical History:   Diagnosis Date   • Arthritis    • CPAP (continuous positive airway pressure) dependence    • Hypertension    • Musculoligamentous strain    • Pulmonary embolism (HCC) 1997   • Sleep apnea    • Wears eyeglasses      Past Surgical History:   Procedure Laterality Date   • BREAST BIOPSY Left 2014   • CARDIAC CATHETERIZATION Left 8/10/2017    Procedure: Cardiac Catheterization/Vascular Study;  Surgeon: Johny Sommer MD;  Location: [facility]  "INVASIVE LOCATION;  Service:    • CARDIAC ELECTROPHYSIOLOGY PROCEDURE N/A 7/19/2019    Procedure: Pacemaker DC new;  Surgeon: Sonya Gay MD;  Location:  [facility] IN[facility]AS[facility];  Service: Cardiology   • CHOLECYSTECTOMY     • COLONOSCOPY     • ENDOSCOPY     • HYSTERECTOMY  11/2014   • INSERT / REPLACE / REMOVE PACEMAKER     • JOINT REPLACEMENT Right     knee   • KNEE SURGERY Right 12/2017    total replacement   • NASAL SINUS SURGERY     • OOPHORECTOMY  11/2014   • PACEMAKER IMPLANTATION     • PULMONARY EMBOLISM SURGERY      right lung   • SPINAL CORD STIMULATOR IMPLANT N/A 9/10/2020    Procedure: SPINAL CORD STIMULATOR INSERTION PHASE 1, UPPER THORACIC PLACEMENT ;  Surgeon: Victor Manuel Geiger MD;  Location:  [facility];  Service: Neurosurgery;  Laterality: N/A;   • TUBAL ABDOMINAL LIGATION       Family History   Problem Relation Age of Onset   • Breast cancer Mother 42   • Heart failure Mother    • No Known Problems Father    • Hypertension Brother    • Hypertension Maternal Grandmother    • Hypertension Maternal Grandfather    • No Known Problems Paternal Grandmother    • No Known Problems Paternal Grandfather    • Heart failure Son    • Hypertension Sister    • Ovarian cancer Neg Hx      Social History     Socioeconomic History   • Marital status: Single   Tobacco Use   • Smoking status: Never Smoker   • Smokeless tobacco: Never Used   Vaping Use   • Vaping Use: Never used   Substance and Sexual Activity   • Alcohol use: No     Comment: rare   • Drug use: No   • Sexual activity: Defer       Review of Systems  See HPI    Objective   Blood pressure 128/84, pulse 71, resp. rate 16, height 180.3 cm (70.98\"), weight 115 kg (253 lb 12.8 oz), last menstrual period 10/31/2014, SpO2 96 %, not currently breastfeeding.  Nursing note reviewed  Physical Exam  Const: NAD, A&Ox4, Pleasant, Cooperative  Eyes: EOMI, no conjunctivitis  ENT: No nasal discharge present, neck supple  Cardiac: Regular rate and rhythm, no " cyanosis  Resp: Respiratory rate within normal limits, no increased work of breathing, no audible wheezing or retractions noted  GI: No distention or ascites  MSK: Motor and sensation grossly intact in bilateral upper extremities  Neurologic: CN II-XII grossly intact  Psych: Appropriate mood and behavior.  Skin: Warm, dry  Procedures  Assessment/Plan   Problem List Items Addressed This Visit        Endocrine and Metabolic    Diabetes mellitus (HCC)    Overview     Did not tolerate metformin.  She was started on Ozempic 0.25 mg weekly and Januvia 50 mg daily on 2/24/2020.  She tolerated both of these medications well without side effects.  A1c 11/2/21 5.7%., improved from 6.0% in March and unchanged from July 2021. In July 2021 we increased her Ozempic to 0.5mg and DC Januvia         Relevant Orders    Ambulatory Referral for Diabetic Eye Exam-Optometry      Other Visit Diagnoses     Dizziness    -  Primary    Relevant Orders    Basic Metabolic Panel    Primary insomnia        Relevant Medications    diazePAM (VALIUM) 2 MG tablet        1. Headache and dizziness.  - I think some of it has to do with lack of sleep over the last week. She may also have an imbalance, and I would like her to discontinue HCTZ, check a BMP today.    2. Hypertension.  - Discontinue HCTZ, continue valsartan 20 mg daily. Blood pressure at goal today.    3. Insomnia.  - Multifactorial, which she has not responded to mirtazapine. She stopped the mirtazapine on her own. I would like her to discontinue the trazodone entirely since she is not getting any benefit from it. I will write her for a couple of days of  diazepam to try and get her some sleep to see whether this helps with the headache as well as give her a little bit of acute relief. She will message me on Monday with her status.      Patient Instructions   1.  Stop HCTZ      No follow-ups on file.        [facility]     Ambulatory progress note signed and attested to by Ryne Puente D.O.            Transcribed from ambient dictation for Ryne Puente DO by Camille Taylor.  12/09/21   19:24 EST    Patient verbalized consent to the visit recording.  I have personally performed the services described in this document as transcribed by the above individual, and it is both accurate and complete.  Ryne Puente DO  12/15/2021  06:36 EST

## 2021-12-13 ENCOUNTER — TELEPHONE (OUTPATIENT)
Dept: FAMILY MEDICINE CLINIC | Facility: CLINIC | Age: 56
End: 2021-12-13

## 2021-12-13 DIAGNOSIS — G47.9 DYSSOMNIA: ICD-10-CM

## 2021-12-13 DIAGNOSIS — F51.01 PRIMARY INSOMNIA: ICD-10-CM

## 2021-12-13 RX ORDER — MIRTAZAPINE 15 MG/1
15 TABLET, FILM COATED ORAL NIGHTLY
Qty: 30 TABLET | Refills: 2 | OUTPATIENT
Start: 2021-12-13

## 2021-12-13 RX ORDER — DIAZEPAM 2 MG/1
2-4 TABLET ORAL NIGHTLY PRN
Qty: 6 TABLET | Refills: 0 | Status: CANCELLED | OUTPATIENT
Start: 2021-12-13

## 2021-12-13 NOTE — TELEPHONE ENCOUNTER
Caller: Kellen Esparza    Relationship: Self    Best call back number: [phone]    Requested Prescriptions:   Requested Prescriptions     Pending Prescriptions Disp Refills   • diazePAM (VALIUM) 2 MG tablet 6 tablet 0     Sig: Take 1-2 tablets by mouth At Night As Needed.        Pharmacy where request should be sent: [id number] - [address]  AT [facility] - [phone] [facility] [phone]      Additional details provided by patient: THIS IS HELPING HER SLEEP, SHE IS STILL WAKING UP WITH A SLIGHT HEADACHE BUT SHE IS SLEEPING ALL NIGHT. SHE IS STILL SOB & FATIGUED DURING DAY.  PATIENT WANTS TO GO OVER TEST RESULTS AS WELL AS SHE HAS SEEN THEM IN MY CHART AND WANTS TO DISCUSS.    Does the patient have less than a 3 day supply:  [x] Yes  [] No    Carey Guillory MA   12/13/21 12:35 EST

## 2021-12-15 ENCOUNTER — LAB (OUTPATIENT)
Dept: LAB | Facility: HOSPITAL | Age: 56
End: 2021-12-15

## 2021-12-15 ENCOUNTER — OFFICE VISIT (OUTPATIENT)
Dept: FAMILY MEDICINE CLINIC | Facility: CLINIC | Age: 56
End: 2021-12-15

## 2021-12-15 VITALS
RESPIRATION RATE: 16 BRPM | HEIGHT: 71 IN | WEIGHT: 258 LBS | SYSTOLIC BLOOD PRESSURE: 132 MMHG | BODY MASS INDEX: 36.12 KG/M2 | DIASTOLIC BLOOD PRESSURE: 90 MMHG | HEART RATE: 66 BPM | OXYGEN SATURATION: 98 %

## 2021-12-15 DIAGNOSIS — F51.01 PRIMARY INSOMNIA: ICD-10-CM

## 2021-12-15 DIAGNOSIS — R49.0 HOARSENESS: ICD-10-CM

## 2021-12-15 DIAGNOSIS — R51.9 ACUTE INTRACTABLE HEADACHE, UNSPECIFIED HEADACHE TYPE: ICD-10-CM

## 2021-12-15 DIAGNOSIS — R42 DIZZINESS: ICD-10-CM

## 2021-12-15 DIAGNOSIS — R42 DIZZINESS: Primary | ICD-10-CM

## 2021-12-15 LAB
BASOPHILS # BLD AUTO: 0.03 10*3/MM3 (ref 0–0.2)
BASOPHILS NFR BLD AUTO: 0.5 % (ref 0–1.5)
CRP SERPL-MCNC: 0.53 MG/DL (ref 0–0.5)
DEPRECATED RDW RBC AUTO: 43.4 FL (ref 37–54)
EOSINOPHIL # BLD AUTO: 0.15 10*3/MM3 (ref 0–0.4)
EOSINOPHIL NFR BLD AUTO: 2.3 % (ref 0.3–6.2)
ERYTHROCYTE [DISTWIDTH] IN BLOOD BY AUTOMATED COUNT: 14.5 % (ref 12.3–15.4)
HCT VFR BLD AUTO: 36.1 % (ref 34–46.6)
HGB BLD-MCNC: 11.8 G/DL (ref 12–15.9)
IMM GRANULOCYTES # BLD AUTO: 0.01 10*3/MM3 (ref 0–0.05)
IMM GRANULOCYTES NFR BLD AUTO: 0.2 % (ref 0–0.5)
LYMPHOCYTES # BLD AUTO: 2.42 10*3/MM3 (ref 0.7–3.1)
LYMPHOCYTES NFR BLD AUTO: 37.3 % (ref 19.6–45.3)
MCH RBC QN AUTO: 27.3 PG (ref 26.6–33)
MCHC RBC AUTO-ENTMCNC: 32.7 G/DL (ref 31.5–35.7)
MCV RBC AUTO: 83.4 FL (ref 79–97)
MONOCYTES # BLD AUTO: 0.39 10*3/MM3 (ref 0.1–0.9)
MONOCYTES NFR BLD AUTO: 6 % (ref 5–12)
NEUTROPHILS NFR BLD AUTO: 3.49 10*3/MM3 (ref 1.7–7)
NEUTROPHILS NFR BLD AUTO: 53.7 % (ref 42.7–76)
NRBC BLD AUTO-RTO: 0 /100 WBC (ref 0–0.2)
PLATELET # BLD AUTO: 177 10*3/MM3 (ref 140–450)
PMV BLD AUTO: 12.6 FL (ref 6–12)
RBC # BLD AUTO: 4.33 10*6/MM3 (ref 3.77–5.28)
TSH SERPL DL<=0.05 MIU/L-ACNC: 1.43 UIU/ML (ref 0.27–4.2)
WBC NRBC COR # BLD: 6.49 10*3/MM3 (ref 3.4–10.8)

## 2021-12-15 PROCEDURE — 86140 C-REACTIVE PROTEIN: CPT

## 2021-12-15 PROCEDURE — 80177 DRUG SCRN QUAN LEVETIRACETAM: CPT

## 2021-12-15 PROCEDURE — 99214 OFFICE O/P EST MOD 30 MIN: CPT | Performed by: FAMILY MEDICINE

## 2021-12-15 PROCEDURE — 85025 COMPLETE CBC W/AUTO DIFF WBC: CPT

## 2021-12-15 PROCEDURE — U0004 COV-19 TEST NON-CDC HGH THRU: HCPCS | Performed by: FAMILY MEDICINE

## 2021-12-15 PROCEDURE — 84443 ASSAY THYROID STIM HORMONE: CPT

## 2021-12-15 NOTE — PATIENT INSTRUCTIONS
1.  Use valium for sleep    2.  Skip next dose of Ozempic    3.  Stay off HCTZ, only on valsartan 1/2 tablet    4.  COVID test today

## 2021-12-15 NOTE — PROGRESS NOTES
"Subjective   Kellen Esparza is a 56 y.o. female.     Chief Complaint   Patient presents with   • Dizziness   • Headache   • Shortness of Breath   • Insomnia       History of Present Illness     Kellen Esparza presents today for   Chief Complaint   Patient presents with   • Dizziness   • Headache   • Shortness of Breath   • Insomnia     The patient reports that she is still having a headache and dizziness. She states that she has been staying off of the hydrochlorothiazide and took her last dose on12/13/2021 and was able to sleep some. She reports that she did not sleep at all on 12/14/2021. She adds having to 2 take Valium at night. She denies taking the trazadone nor the \"hydro.\"    The patient states that she is starting to develop a cold. She states that it feels like something is stuck in her throat. She denies having a fever. The patient reports that the dysphonia began to worsen on 12/11/2021.She reports that her headache has been ongoing for approximately 10 days. The patient reports that the headaches began after getting tested for COVID-19 approximately 1 month ago. She reports she feels some swelling in her throat.    She reports that she is still taking the Ozempic 0.5 mg once weekly. She notes that her next dose is due on 12/16/2021.     She states that she has an \"odd\" feeling in her toes within the last 1 week.    This patient is accompanied by their self who contributes to the history of their care.    The following portions of the patient's history were reviewed and updated as appropriate: allergies, current medications, past family history, past medical history, past social history, past surgical history and problem list.    Active Ambulatory Problems     Diagnosis Date Noted   • Chest pain 07/21/2017   • Dyspnea on exertion 07/21/2017   • Essential hypertension 07/21/2017   • Pain of right breast 07/21/2017   • Lower extremity edema 07/21/2017   • Diabetes mellitus (HCC) 07/21/2017   • Abnormal " stress test 08/03/2017   • Migraine 08/10/2017   • History of pulmonary embolus (PE) 01/01/1997   • Hyperlipidemia 08/10/2017   • DDD (degenerative disc disease), cervical 09/06/2017   • Coronary artery disease of native artery of native heart with stable angina pectoris (HCC) 09/11/2017   • AV block, complete (S/P PPM 7/2019) 04/30/2019   • Restrictive pattern on PFT's without evidence of ILD 08/02/2019   • Obesity, Class II, BMI 35-39.9 08/02/2019   • ALYSIA on CPAP 08/02/2019   • Pain in pacemaker pocket 11/13/2019   • Paroxysmal atrial fibrillation (HCC) 11/13/2019   • Complex regional pain syndrome type I 11/14/2019   • Chronic anticoagulation 11/14/2019   • Pacemaker 11/14/2019   • Morbid obesity (HCC) 11/14/2019   • Entrapment neuropathy 11/14/2019   • Encounter for fitting and adjustment of neuropacemaker of spinal cord 07/27/2020   • Mechanical low back pain 08/18/2020   • Presence of neurostimulator 06/17/2021   • Thoracic spondylosis without myelopathy 06/22/2021   • Myofascial pain 06/22/2021     Resolved Ambulatory Problems     Diagnosis Date Noted   • No Resolved Ambulatory Problems     Past Medical History:   Diagnosis Date   • Arthritis    • CPAP (continuous positive airway pressure) dependence    • Hypertension    • Musculoligamentous strain    • Pulmonary embolism (HCC) 1997   • Sleep apnea    • Wears eyeglasses      Past Surgical History:   Procedure Laterality Date   • BREAST BIOPSY Left 2014   • CARDIAC CATHETERIZATION Left 8/10/2017    Procedure: Cardiac Catheterization/Vascular Study;  Surgeon: Johny Sommer MD;  Location:  [facility] CATH INVASIVE LOCATION;  Service:    • CARDIAC ELECTROPHYSIOLOGY PROCEDURE N/A 7/19/2019    Procedure: Pacemaker DC new;  Surgeon: Sonya Gay MD;  Location:  [facility] CATH INVASIVE LOCATION;  Service: Cardiology   • CHOLECYSTECTOMY     • COLONOSCOPY     • ENDOSCOPY     • HYSTERECTOMY  11/2014   • INSERT / REPLACE / REMOVE PACEMAKER     • JOINT REPLACEMENT  "Right     knee   • KNEE SURGERY Right 12/2017    total replacement   • NASAL SINUS SURGERY     • OOPHORECTOMY  11/2014   • PACEMAKER IMPLANTATION     • PULMONARY EMBOLISM SURGERY      right lung   • SPINAL CORD STIMULATOR IMPLANT N/A 9/10/2020    Procedure: SPINAL CORD STIMULATOR INSERTION PHASE 1, UPPER THORACIC PLACEMENT ;  Surgeon: Victor Manuel Geiger MD;  Location: [facility];  Service: Neurosurgery;  Laterality: N/A;   • TUBAL ABDOMINAL LIGATION       Family History   Problem Relation Age of Onset   • Breast cancer Mother 42   • Heart failure Mother    • No Known Problems Father    • Hypertension Brother    • Hypertension Maternal Grandmother    • Hypertension Maternal Grandfather    • No Known Problems Paternal Grandmother    • No Known Problems Paternal Grandfather    • Heart failure Son    • Hypertension Sister    • Ovarian cancer Neg Hx      Social History     Socioeconomic History   • Marital status: Single   Tobacco Use   • Smoking status: Never Smoker   • Smokeless tobacco: Never Used   Vaping Use   • Vaping Use: Never used   Substance and Sexual Activity   • Alcohol use: No     Comment: rare   • Drug use: No   • Sexual activity: Defer       Review of Systems  See HPI    Objective   Blood pressure 132/90, pulse 66, resp. rate 16, height 180.3 cm (70.98\"), weight 117 kg (258 lb), last menstrual period 10/31/2014, SpO2 98 %, not currently breastfeeding.  Nursing note reviewed  Physical Exam  Neck:      Comments: Thyroid fullness      Const: NAD, A&Ox4, Pleasant, Cooperative  Eyes: EOMI, no conjunctivitis  ENT: No nasal discharge present, neck supple  Cardiac: Regular rate and rhythm, no cyanosis  Resp: Respiratory rate within normal limits, no increased work of breathing, no audible wheezing or retractions noted  GI: No distention or ascites  MSK: Motor and sensation grossly intact in bilateral upper extremities  Neurologic: CN II-XII grossly intact  Psych: Appropriate mood and behavior.  Skin: Warm, " dry  Procedures  Assessment/Plan   Problem List Items Addressed This Visit     None      Visit Diagnoses     Dizziness    -  Primary    Relevant Orders    COVID-19 PCR, [facility], NP SWAB IN [facility]SPORT MEDIA/ORAL SWISH 24-30 HR TAT - Swab, Nasopharynx    CBC & Differential (Completed)    C-reactive Protein (Completed)    Levetiracetam Level (Keppra)    TSH Rfx On Abnormal To Free T4 (Completed)    Acute intractable headache, unspecified headache type        Relevant Orders    COVID-19 PCR, [facility], NP SWAB IN [facility] TRANSPORT MEDIA/ORAL SWISH 24-30 HR TAT - Swab, Nasopharynx    CBC & Differential (Completed)    C-reactive Protein (Completed)    Levetiracetam Level (Keppra)    TSH Rfx On Abnormal To Free T4 (Completed)    Hoarseness        Relevant Orders    CT Soft Tissue Neck Without Contrast    Primary insomnia        Relevant Medications    diazePAM (VALIUM) 2 MG tablet          1. Headache with dizziness and hoarseness  - This has been ongoing for approximately 1 month. Her COVID-19 was negative in mid 09/2021. We will repeat a COVID-19 swab and check labs today. She does have some thyroid fullness on exam, so I have ordered a CT scan of the soft tissues to check this.    Patient Instructions   1.  Use valium for sleep    2.  Skip next dose of Ozempic    3.  Stay off HCTZ, only on valsartan 1/2 tablet    4.  COVID test today      No follow-ups on file.      [facility]     Ambulatory progress note signed and attested to by Ryne Puente D.O.         Transcribed from ambient dictation for Ryne Puente DO by Calderon Ogden.  12/15/21   12:02 EST    Patient verbalized consent to the visit recording.  I have personally performed the services described in this document as transcribed by the above individual, and it is both accurate and complete.  Ryne Puente DO  12/16/2021  09:21 EST

## 2021-12-16 ENCOUNTER — TELEMEDICINE (OUTPATIENT)
Dept: FAMILY MEDICINE CLINIC | Facility: CLINIC | Age: 56
End: 2021-12-16

## 2021-12-16 DIAGNOSIS — R49.0 HOARSENESS: Primary | ICD-10-CM

## 2021-12-16 DIAGNOSIS — R51.9 ACUTE INTRACTABLE HEADACHE, UNSPECIFIED HEADACHE TYPE: ICD-10-CM

## 2021-12-16 LAB — SARS-COV-2 RNA NOSE QL NAA+PROBE: NOT DETECTED

## 2021-12-16 PROCEDURE — 99213 OFFICE O/P EST LOW 20 MIN: CPT | Performed by: FAMILY MEDICINE

## 2021-12-16 RX ORDER — DIAZEPAM 2 MG/1
2-4 TABLET ORAL NIGHTLY PRN
Qty: 10 TABLET | Refills: 0 | Status: SHIPPED | OUTPATIENT
Start: 2021-12-16 | End: 2021-12-28 | Stop reason: SDUPTHER

## 2021-12-16 RX ORDER — BROMPHENIRAMINE MALEATE, PSEUDOEPHEDRINE HYDROCHLORIDE, AND DEXTROMETHORPHAN HYDROBROMIDE 2; 30; 10 MG/5ML; MG/5ML; MG/5ML
5 SYRUP ORAL 4 TIMES DAILY PRN
Qty: 118 ML | Refills: 1 | Status: SHIPPED | OUTPATIENT
Start: 2021-12-16 | End: 2021-12-23

## 2021-12-16 RX ORDER — AMOXICILLIN AND CLAVULANATE POTASSIUM 875; 125 MG/1; MG/1
1 TABLET, FILM COATED ORAL 2 TIMES DAILY
Qty: 14 TABLET | Refills: 0 | Status: SHIPPED | OUTPATIENT
Start: 2021-12-16 | End: 2021-12-23

## 2021-12-16 NOTE — PATIENT INSTRUCTIONS
1.  Bromfed DM is for the daytime and will help with congestion and cough    2.  Tussionex is for nighttime (do not take with Valium)    3.  Antibiotic is to start tonight

## 2021-12-16 NOTE — PROGRESS NOTES
Subjective   Kellen Esparza is a 56 y.o. female.     Chief Complaint   Patient presents with   • Follow-up     dizziness, insomnia       History of Present Illness     Kellen Esparza presents today for   Chief Complaint   Patient presents with   • Follow-up     dizziness, insomnia     Still feeling bad, a lot of hoarseness and fatigue and headache. Dizziness is sticking around. Still not sleeping. Labs were ok, COVID was negative.    You have chosen to receive care through a telehealth visit.  Do you consent to use a video/audio connection for your medical care today? Yes    The following portions of the patient's history were reviewed and updated as appropriate: allergies, current medications, past family history, past medical history, past social history, past surgical history and problem list.    Active Ambulatory Problems     Diagnosis Date Noted   • Chest pain 07/21/2017   • Dyspnea on exertion 07/21/2017   • Essential hypertension 07/21/2017   • Pain of right breast 07/21/2017   • Lower extremity edema 07/21/2017   • Diabetes mellitus (HCC) 07/21/2017   • Abnormal stress test 08/03/2017   • Migraine 08/10/2017   • History of pulmonary embolus (PE) 01/01/1997   • Hyperlipidemia 08/10/2017   • DDD (degenerative disc disease), cervical 09/06/2017   • Coronary artery disease of native artery of native heart with stable angina pectoris (HCC) 09/11/2017   • AV block, complete (S/P PPM 7/2019) 04/30/2019   • Restrictive pattern on PFT's without evidence of ILD 08/02/2019   • Obesity, Class II, BMI 35-39.9 08/02/2019   • ALYSIA on CPAP 08/02/2019   • Pain in pacemaker pocket 11/13/2019   • Paroxysmal atrial fibrillation (HCC) 11/13/2019   • Complex regional pain syndrome type I 11/14/2019   • Chronic anticoagulation 11/14/2019   • Pacemaker 11/14/2019   • Morbid obesity (HCC) 11/14/2019   • Entrapment neuropathy 11/14/2019   • Encounter for fitting and adjustment of neuropacemaker of spinal cord 07/27/2020   •  Mechanical low back pain 08/18/2020   • Presence of neurostimulator 06/17/2021   • Thoracic spondylosis without myelopathy 06/22/2021   • Myofascial pain 06/22/2021     Resolved Ambulatory Problems     Diagnosis Date Noted   • No Resolved Ambulatory Problems     Past Medical History:   Diagnosis Date   • Arthritis    • CPAP (continuous positive airway pressure) dependence    • Hypertension    • Musculoligamentous strain    • Pulmonary embolism (HCC) 1997   • Sleep apnea    • Wears eyeglasses      Past Surgical History:   Procedure Laterality Date   • BREAST BIOPSY Left 2014   • CARDIAC CATHETERIZATION Left 8/10/2017    Procedure: Cardiac Catheterization/Vascular Study;  Surgeon: Johny Sommer MD;  Location:  [facility] CATH INVASIVE LOCATION;  Service:    • CARDIAC ELECTROPHYSIOLOGY PROCEDURE N/A 7/19/2019    Procedure: Pacemaker DC new;  Surgeon: Sonya Gay MD;  Location:  [facility] CATH INVASIVE LOCATION;  Service: Cardiology   • CHOLECYSTECTOMY     • COLONOSCOPY     • ENDOSCOPY     • HYSTERECTOMY  11/2014   • INSERT / REPLACE / REMOVE PACEMAKER     • JOINT REPLACEMENT Right     knee   • KNEE SURGERY Right 12/2017    total replacement   • NASAL SINUS SURGERY     • OOPHORECTOMY  11/2014   • PACEMAKER IMPLANTATION     • PULMONARY EMBOLISM SURGERY      right lung   • SPINAL CORD STIMULATOR IMPLANT N/A 9/10/2020    Procedure: SPINAL CORD STIMULATOR INSERTION PHASE 1, UPPER THORACIC PLACEMENT ;  Surgeon: Victor Manuel Geiger MD;  Location:  [facility];  Service: Neurosurgery;  Laterality: N/A;   • TUBAL ABDOMINAL LIGATION       Family History   Problem Relation Age of Onset   • Breast cancer Mother 42   • Heart failure Mother    • No Known Problems Father    • Hypertension Brother    • Hypertension Maternal Grandmother    • Hypertension Maternal Grandfather    • No Known Problems Paternal Grandmother    • No Known Problems Paternal Grandfather    • Heart failure Son    • Hypertension Sister    • Ovarian cancer Neg Hx       Social History     Socioeconomic History   • Marital status: Single   Tobacco Use   • Smoking status: Never Smoker   • Smokeless tobacco: Never Used   Vaping Use   • Vaping Use: Never used   Substance and Sexual Activity   • Alcohol use: No     Comment: rare   • Drug use: No   • Sexual activity: Defer       Review of Systems  Review of Systems -  General ROS: negative for - chills, fever or night sweats  Cardiovascular ROS: no chest pain or dyspnea on exertion  Gastrointestinal ROS: no abdominal pain, change in bowel habits, or black or bloody stools  Genito-Urinary ROS: no dysuria, trouble voiding, or hematuria    Objective   Last menstrual period 10/31/2014, not currently breastfeeding.  Vitals obtained from patient if available  Physical Exam  Const: Non-toxic appearing, NAD, A&Ox4, Pleasant, Cooperative  Eyes: EOMI, no conjunctivitis  ENT: No copious nasal drainage noted  Cardiac: Regular rate by pulse  Resp: Respiratory rate observed to be within normal limits, no increased work of breathing observed, no audible wheezing or cough noted  Psych: Appropriate mood and behavior.  Procedures  Assessment/Plan   Problem List Items Addressed This Visit     None      Visit Diagnoses     Hoarseness    -  Primary    Relevant Medications    brompheniramine-pseudoephedrine-DM 30-2-10 MG/5ML syrup    amoxicillin-clavulanate (Augmentin) 875-125 MG per tablet    HYDROcod Polst-CPM Polst ER (Tussionex Pennkinetic ER) 10-8 MG/5ML ER suspension    Acute intractable headache, unspecified headache type        Relevant Medications    brompheniramine-pseudoephedrine-DM 30-2-10 MG/5ML syrup    amoxicillin-clavulanate (Augmentin) 875-125 MG per tablet    HYDROcod Polst-CPM Polst ER (Tussionex Pennkinetic ER) 10-8 MG/5ML ER suspension          See patient diagnoses and orders along with patient instructions for assessment, plan, and changes to care for patient.    This visit was conducted via telemedicine with live video and audio  provided through Video Options: [facility] at the point of care.    Patient Instructions   1.  Bromfed DM is for the daytime and will help with congestion and cough    2.  Tussionex is for nighttime (do not take with Valium)    3.  Antibiotic is to start tonight          No follow-ups on file.    Ambulatory progress note signed and attested to by Ryne Puente D.O.

## 2021-12-20 LAB — LEVETIRACETAM SERPL-MCNC: 4.7 UG/ML (ref 10–40)

## 2021-12-22 DIAGNOSIS — G90.59 COMPLEX REGIONAL PAIN SYNDROME TYPE 1 AFFECTING OTHER SITE: ICD-10-CM

## 2021-12-22 RX ORDER — PREGABALIN 150 MG/1
150 CAPSULE ORAL 2 TIMES DAILY
Qty: 60 CAPSULE | Refills: 1 | Status: SHIPPED | OUTPATIENT
Start: 2021-12-22

## 2021-12-22 NOTE — TELEPHONE ENCOUNTER
Rx Refill Note  Requested Prescriptions     Pending Prescriptions Disp Refills   • pregabalin (LYRICA) 150 MG capsule 60 capsule 1     Sig: Take 1 capsule by mouth 2 (Two) Times a Day.      Last office visit with prescribing clinician: 12/15/2021      Next office visit with prescribing clinician: 12/28/2021     Flor Cacrees MA  12/22/21, 12:11 EST     Last fill: 10/24/2021

## 2021-12-28 DIAGNOSIS — F51.01 PRIMARY INSOMNIA: ICD-10-CM

## 2021-12-28 RX ORDER — DIAZEPAM 2 MG/1
2-4 TABLET ORAL NIGHTLY PRN
Qty: 10 TABLET | Refills: 0 | Status: SHIPPED | OUTPATIENT
Start: 2021-12-28 | End: 2022-01-06 | Stop reason: SDUPTHER

## 2021-12-28 NOTE — TELEPHONE ENCOUNTER
Rx Refill Note  Requested Prescriptions     Pending Prescriptions Disp Refills   • diazePAM (VALIUM) 2 MG tablet 10 tablet 0     Sig: Take 1-2 tablets by mouth At Night As Needed for Sedation.      Last office visit with prescribing clinician: 12/15/2021      Next office visit with prescribing clinician: 2/2/2022     UDS: Not on file  CSA: Not on file        Nedra Arias MA  12/28/21, 13:27 EST

## 2022-01-06 DIAGNOSIS — F51.01 PRIMARY INSOMNIA: ICD-10-CM

## 2022-01-07 RX ORDER — DIAZEPAM 2 MG/1
2-4 TABLET ORAL NIGHTLY PRN
Qty: 10 TABLET | Refills: 0 | OUTPATIENT
Start: 2022-01-07

## 2022-01-07 RX ORDER — DIAZEPAM 2 MG/1
2-4 TABLET ORAL NIGHTLY PRN
Qty: 10 TABLET | Refills: 0 | Status: SHIPPED | OUTPATIENT
Start: 2022-01-07 | End: 2022-01-25 | Stop reason: SDUPTHER

## 2022-01-07 NOTE — TELEPHONE ENCOUNTER
Rx Refill Note  Requested Prescriptions     Pending Prescriptions Disp Refills   • diazePAM (VALIUM) 2 MG tablet 10 tablet 0     Sig: Take 1-2 tablets by mouth At Night As Needed for Sedation.      Last office visit with prescribing clinician: 12/15/2021      Next office visit with prescribing clinician: 2/2/2022     CSA: Not on file  UDS: Not on file       Nedra Arias MA  01/07/22, 10:17 EST

## 2022-01-24 PROCEDURE — U0004 COV-19 TEST NON-CDC HGH THRU: HCPCS | Performed by: PHYSICIAN ASSISTANT

## 2022-01-25 ENCOUNTER — TELEMEDICINE (OUTPATIENT)
Dept: FAMILY MEDICINE CLINIC | Facility: CLINIC | Age: 57
End: 2022-01-25

## 2022-01-25 DIAGNOSIS — U07.1 COVID-19 VIRUS INFECTION: Primary | ICD-10-CM

## 2022-01-25 PROCEDURE — 99214 OFFICE O/P EST MOD 30 MIN: CPT | Performed by: FAMILY MEDICINE

## 2022-01-25 NOTE — PROGRESS NOTES
Subjective   Kellen Esparza is a 56 y.o. female.     Chief Complaint   Patient presents with   • Covid-19 Home Monitoring Video Visit       History of Present Illness     Kellen Esparza presents today for   Chief Complaint   Patient presents with   • Covid-19 Home Monitoring Video Visit     Diagnosed with COVID at [facility] yesterday. Candidate for molnupiravir given BMI, CAD.    You have chosen to receive care through a telehealth visit.  Do you consent to use a video/audio connection for your medical care today? Yes    The following portions of the patient's history were reviewed and updated as appropriate: allergies, current medications, past family history, past medical history, past social history, past surgical history and problem list.    Active Ambulatory Problems     Diagnosis Date Noted   • Chest pain 07/21/2017   • Dyspnea on exertion 07/21/2017   • Essential hypertension 07/21/2017   • Pain of right breast 07/21/2017   • Lower extremity edema 07/21/2017   • Diabetes mellitus (HCC) 07/21/2017   • Abnormal stress test 08/03/2017   • Migraine 08/10/2017   • History of pulmonary embolus (PE) 01/01/1997   • Hyperlipidemia 08/10/2017   • DDD (degenerative disc disease), cervical 09/06/2017   • Coronary artery disease of native artery of native heart with stable angina pectoris (HCC) 09/11/2017   • AV block, complete (S/P PPM 7/2019) 04/30/2019   • Restrictive pattern on PFT's without evidence of ILD 08/02/2019   • Obesity, Class II, BMI 35-39.9 08/02/2019   • ALYSIA on CPAP 08/02/2019   • Pain in pacemaker pocket 11/13/2019   • Paroxysmal atrial fibrillation (HCC) 11/13/2019   • Complex regional pain syndrome type I 11/14/2019   • Chronic anticoagulation 11/14/2019   • Pacemaker 11/14/2019   • Morbid obesity (HCC) 11/14/2019   • Entrapment neuropathy 11/14/2019   • Encounter for fitting and adjustment of neuropacemaker of spinal cord 07/27/2020   • Mechanical low back pain 08/18/2020   • Presence of neurostimulator  06/17/2021   • Thoracic spondylosis without myelopathy 06/22/2021   • Myofascial pain 06/22/2021     Resolved Ambulatory Problems     Diagnosis Date Noted   • No Resolved Ambulatory Problems     Past Medical History:   Diagnosis Date   • Arthritis    • CPAP (continuous positive airway pressure) dependence    • Hypertension    • Musculoligamentous strain    • Pulmonary embolism (HCC) 1997   • Sleep apnea    • Wears eyeglasses      Past Surgical History:   Procedure Laterality Date   • BREAST BIOPSY Left 2014   • CARDIAC CATHETERIZATION Left 8/10/2017    Procedure: Cardiac Catheterization/Vascular Study;  Surgeon: Johny Sommer MD;  Location:  [facility] CATH INVASIVE LOCATION;  Service:    • CARDIAC ELECTROPHYSIOLOGY PROCEDURE N/A 7/19/2019    Procedure: Pacemaker DC new;  Surgeon: Sonya Gay MD;  Location:  [facility] CATH INVASIVE LOCATION;  Service: Cardiology   • CHOLECYSTECTOMY     • COLONOSCOPY     • ENDOSCOPY     • HYSTERECTOMY  11/2014   • INSERT / REPLACE / REMOVE PACEMAKER     • JOINT REPLACEMENT Right     knee   • KNEE SURGERY Right 12/2017    total replacement   • NASAL SINUS SURGERY     • OOPHORECTOMY  11/2014   • PACEMAKER IMPLANTATION     • PULMONARY EMBOLISM SURGERY      right lung   • SPINAL CORD STIMULATOR IMPLANT N/A 9/10/2020    Procedure: SPINAL CORD STIMULATOR INSERTION PHASE 1, UPPER THORACIC PLACEMENT ;  Surgeon: Victor Manuel Geiger MD;  Location:  [facility];  Service: Neurosurgery;  Laterality: N/A;   • TUBAL ABDOMINAL LIGATION       Family History   Problem Relation Age of Onset   • Breast cancer Mother 42   • Heart failure Mother    • No Known Problems Father    • Hypertension Brother    • Hypertension Maternal Grandmother    • Hypertension Maternal Grandfather    • No Known Problems Paternal Grandmother    • No Known Problems Paternal Grandfather    • Heart failure Son    • Hypertension Sister    • Ovarian cancer Neg Hx      Social History     Socioeconomic History   • Marital status:  Single   Tobacco Use   • Smoking status: Never Smoker   • Smokeless tobacco: Never Used   Vaping Use   • Vaping Use: Never used   Substance and Sexual Activity   • Alcohol use: No     Comment: rare   • Drug use: No   • Sexual activity: Defer       Review of Systems  Review of Systems -  General ROS: negative for - chills, fever or night sweats  Cardiovascular ROS: no chest pain or dyspnea on exertion  Gastrointestinal ROS: no abdominal pain, change in bowel habits, or black or bloody stools  Genito-Urinary ROS: no dysuria, trouble voiding, or hematuria    Objective   Last menstrual period 10/31/2014, not currently breastfeeding.  Vitals obtained from patient if available  Physical Exam  Const: Non-toxic appearing, NAD, A&Ox4, Pleasant, Cooperative  Eyes: EOMI, no conjunctivitis  ENT: No copious nasal drainage noted  Cardiac: Regular rate by pulse  Resp: Respiratory rate observed to be within normal limits, no increased work of breathing observed, no audible wheezing or cough noted  Psych: Appropriate mood and behavior.  Procedures  Assessment/Plan   Problem List Items Addressed This Visit     None      Visit Diagnoses     COVID-19 virus infection    -  Primary    Relevant Medications    Molnupiravir (LAGEVRIO) 200 MG capsule          See patient diagnoses and orders along with patient instructions for assessment, plan, and changes to care for patient.    This visit was conducted via telemedicine with live video and audio provided through Video Options: MyChart/Zoom at the point of care.    There are no Patient Instructions on file for this visit.    No follow-ups on file.    Ambulatory progress note signed and attested to by Ryne Puente D.O.

## 2022-02-03 DIAGNOSIS — G47.9 DYSSOMNIA: ICD-10-CM

## 2022-02-04 RX ORDER — TRAZODONE HYDROCHLORIDE 50 MG/1
50 TABLET ORAL NIGHTLY
Qty: 30 TABLET | Refills: 2 | Status: ON HOLD | OUTPATIENT
Start: 2022-02-04 | End: 2022-05-03

## 2022-02-04 NOTE — TELEPHONE ENCOUNTER
Rx Refill Note  Requested Prescriptions     Pending Prescriptions Disp Refills   • traZODone (DESYREL) 50 MG tablet [Pharmacy Med Name: TRAZODONE 50MG TABLETS] 30 tablet 2     Sig: TAKE 1 TABLET BY MOUTH EVERY NIGHT      Last office visit with prescribing clinician: 12/15/2021      Next office visit with prescribing clinician: 2/7/2022            Nedra Arias MA  02/04/22, 09:46 EST     Do you want to refill this medicine? It states The original prescription was discontinued on 12/9/2021 by Ryne uPente DO for the following reason: Not Efficacious. Renewing this prescription may not be appropriate.

## 2022-02-05 DIAGNOSIS — I10 ESSENTIAL HYPERTENSION: ICD-10-CM

## 2022-02-05 DIAGNOSIS — E11.9 TYPE 2 DIABETES MELLITUS WITHOUT COMPLICATION, WITHOUT LONG-TERM CURRENT USE OF INSULIN: ICD-10-CM

## 2022-02-07 ENCOUNTER — OFFICE VISIT (OUTPATIENT)
Dept: FAMILY MEDICINE CLINIC | Facility: CLINIC | Age: 57
End: 2022-02-07

## 2022-02-07 VITALS
BODY MASS INDEX: 35.81 KG/M2 | OXYGEN SATURATION: 97 % | RESPIRATION RATE: 16 BRPM | TEMPERATURE: 97.6 F | DIASTOLIC BLOOD PRESSURE: 80 MMHG | SYSTOLIC BLOOD PRESSURE: 140 MMHG | HEIGHT: 71 IN | HEART RATE: 85 BPM | WEIGHT: 255.8 LBS

## 2022-02-07 DIAGNOSIS — R51.9 ACUTE INTRACTABLE HEADACHE, UNSPECIFIED HEADACHE TYPE: ICD-10-CM

## 2022-02-07 DIAGNOSIS — N95.9 POSTMENOPAUSAL SYMPTOMS: ICD-10-CM

## 2022-02-07 DIAGNOSIS — Z51.81 ENCOUNTER FOR THERAPEUTIC DRUG MONITORING: ICD-10-CM

## 2022-02-07 DIAGNOSIS — E11.9 TYPE 2 DIABETES MELLITUS WITHOUT COMPLICATION, WITHOUT LONG-TERM CURRENT USE OF INSULIN: Primary | ICD-10-CM

## 2022-02-07 DIAGNOSIS — R49.0 HOARSENESS: ICD-10-CM

## 2022-02-07 DIAGNOSIS — U07.1 COVID-19 VIRUS INFECTION: ICD-10-CM

## 2022-02-07 LAB
EXPIRATION DATE: NORMAL
HBA1C MFR BLD: 5.7 %
Lab: NORMAL

## 2022-02-07 PROCEDURE — U0004 COV-19 TEST NON-CDC HGH THRU: HCPCS | Performed by: FAMILY MEDICINE

## 2022-02-07 PROCEDURE — 99214 OFFICE O/P EST MOD 30 MIN: CPT | Performed by: FAMILY MEDICINE

## 2022-02-07 PROCEDURE — 83036 HEMOGLOBIN GLYCOSYLATED A1C: CPT | Performed by: FAMILY MEDICINE

## 2022-02-07 RX ORDER — BROMPHENIRAMINE MALEATE, PSEUDOEPHEDRINE HYDROCHLORIDE, AND DEXTROMETHORPHAN HYDROBROMIDE 2; 30; 10 MG/5ML; MG/5ML; MG/5ML
5 SYRUP ORAL 4 TIMES DAILY PRN
Qty: 118 ML | Refills: 1 | Status: SHIPPED | OUTPATIENT
Start: 2022-02-07 | End: 2022-02-14

## 2022-02-07 RX ORDER — ESTRADIOL 1 MG/1
1 TABLET ORAL DAILY
Qty: 30 TABLET | Refills: 2 | Status: ON HOLD | OUTPATIENT
Start: 2022-02-07 | End: 2022-05-03

## 2022-02-07 RX ORDER — VALSARTAN 40 MG/1
TABLET ORAL
Qty: 45 TABLET | Refills: 0 | Status: SHIPPED | OUTPATIENT
Start: 2022-02-07 | End: 2022-05-11

## 2022-02-07 RX ORDER — HYDROCHLOROTHIAZIDE 12.5 MG/1
12.5 CAPSULE, GELATIN COATED ORAL DAILY
Qty: 90 CAPSULE | Refills: 0 | OUTPATIENT
Start: 2022-02-07

## 2022-02-07 NOTE — TELEPHONE ENCOUNTER
Rx Refill Note  Requested Prescriptions     Pending Prescriptions Disp Refills   • valsartan (DIOVAN) 40 MG tablet [Pharmacy Med Name: VALSARTAN 40MG TABLETS] 45 tablet 0     Sig: TAKE 1/2 TABLET BY MOUTH EVERY MORNING   • hydroCHLOROthiazide (MICROZIDE) 12.5 MG capsule [Pharmacy Med Name: HYDROCHLOROTHIAZIDE 12.5MG CAPSULES] 90 capsule 0     Sig: TAKE 1 CAPSULE BY MOUTH DAILY      Last office visit with prescribing clinician: 12/15/2021      Next office visit with prescribing clinician: 2/7/2022            Parris Hewitt MA  02/07/22, 09:40 EST

## 2022-02-08 LAB — SARS-COV-2 RNA NOSE QL NAA+PROBE: NOT DETECTED

## 2022-02-08 NOTE — PROGRESS NOTES
Established Patient Office Visit      Patient Name: Kellen Esparza  : 1965   MRN: [id number]   Care Team: Patient Care Team:  Ryne Puente DO as PCP - General (Family Medicine)  Johny Sommer MD as Consulting Physician (Cardiology)  Sonya Gay MD as Consulting Physician (Cardiology)  Deb Guajardo APRN as Nurse Practitioner (Pulmonary Disease)  Sal Sheldon MD (Pain Medicine)    Chief Complaint:    Chief Complaint   Patient presents with   • Diabetes   • Hypertension   • Positive for Covid 3 weeks ago     2022   • Shortness of Breath   • Cough       History of Present Illness: Kellen Esparza is a 56 y.o. female who is here today for chief complaint.    HPI    Kellen presents today for a follow-up on diabetes. Incidentally, she was diagnosed with COVID-19 3 weeks ago. Her last A1c was in 2021 at 5.7 percent.    The patient reports she is still experiencing shortness of breath and hoarseness. She states she took the antiviral medication. She reports her headaches are not as bad. She reports she takes Valium to sleep. She reports she occasionally takes tramadol to sleep. She also reports her pain doctor prescribed her nortriptyline to help her sleep. She notes she is short of breath with talking and walking, but has been experiencing this before she tested positive for COVID-19. She reports she does not see her cardiologist again until 2022.     She notes she never received a phone call to schedule the CT scan for her neck.     She reports Dr. Kathleen Rodriguez was her gynecologist, but she is now retired. She reports her estrogen has  and needs a refill. She reports she needs the estrogen at night because she had a total hysterectomy and experiences hot flashes. She states she has been taking estrogen for approximately 2 years. She notes Dr. Rodriguez ordered a mammogram for her before she retired in .    This patient is accompanied by their self who contributes  to the history of their care.    The following portions of the patient's history were reviewed and updated as appropriate: allergies, current medications, past family history, past medical history, past social history, past surgical history and problem list.    Subjective      Review of Systems:   Review of Systems - See HPI    Past Medical History:   Past Medical History:   Diagnosis Date   • Arthritis    • CPAP (continuous positive airway pressure) dependence    • Diabetes mellitus (HCC)     doesnt check sugar    • Hypertension    • Migraine    • Musculoligamentous strain    • Pacemaker    • Pulmonary embolism (HCC) 1997   • Sleep apnea    • Wears eyeglasses        Past Surgical History:   Past Surgical History:   Procedure Laterality Date   • BREAST BIOPSY Left 2014   • CARDIAC CATHETERIZATION Left 8/10/2017    Procedure: Cardiac Catheterization/Vascular Study;  Surgeon: Johny Sommer MD;  Location:  [facility];  Service:    • CARDIAC ELECTROPHYSIOLOGY PROCEDURE N/A 7/19/2019    Procedure: Pacemaker DC new;  Surgeon: Sonya Gay MD;  Location:  [facility];  Service: Cardiology   • CHOLECYSTECTOMY     • COLONOSCOPY     • ENDOSCOPY     • HYSTERECTOMY  11/2014   • INSERT / REPLACE / REMOVE PACEMAKER     • JOINT REPLACEMENT Right     knee   • KNEE SURGERY Right 12/2017    total replacement   • NASAL SINUS SURGERY     • OOPHORECTOMY  11/2014   • PACEMAKER IMPLANTATION     • PULMONARY EMBOLISM SURGERY      right lung   • SPINAL CORD STIMULATOR IMPLANT N/A 9/10/2020    Procedure: SPINAL CORD STIMULATOR INSERTION PHASE 1, UPPER THORACIC PLACEMENT ;  Surgeon: Victor Manuel Geiger MD;  Location:  [facility];  Service: Neurosurgery;  Laterality: N/A;   • TUBAL ABDOMINAL LIGATION         Family History:   Family History   Problem Relation Age of Onset   • Breast cancer Mother 42   • Heart failure Mother    • No Known Problems Father    • Hypertension Brother    • Hypertension  Maternal Grandmother    • Hypertension Maternal Grandfather    • No Known Problems Paternal Grandmother    • No Known Problems Paternal Grandfather    • Heart failure Son    • Hypertension Sister    • Ovarian cancer Neg Hx        Social History:   Social History     Socioeconomic History   • Marital status: Single   Tobacco Use   • Smoking status: Never Smoker   • Smokeless tobacco: Never Used   Vaping Use   • Vaping Use: Never used   Substance and Sexual Activity   • Alcohol use: No     Comment: rare   • Drug use: No   • Sexual activity: Yes     Partners: Male     Birth control/protection: Surgical       Tobacco History:   Social History     Tobacco Use   Smoking Status Never Smoker   Smokeless Tobacco Never Used       Medications:     Current Outpatient Medications:   •  aspirin (aspirin) 81 MG EC tablet, Take 1 tablet by mouth Daily., Disp: 90 tablet, Rfl: 3  •  atorvastatin (LIPITOR) 80 MG tablet, TAKE 1 TABLET BY MOUTH DAILY, Disp: 90 tablet, Rfl: 3  •  baclofen (LIORESAL) 10 MG tablet, TAKE 1 TABLET BY MOUTH TWICE DAILY AS NEEDED FOR MUSCLE SPASMS, Disp: 60 tablet, Rfl: 1  •  diazePAM (VALIUM) 2 MG tablet, Take 1-2 tablets by mouth At Night As Needed for Sedation., Disp: 30 tablet, Rfl: 0  •  estradiol (ESTRACE) 1 MG tablet, Take 1 tablet by mouth Daily., Disp: 30 tablet, Rfl: 2  •  Gel Base gel, CAPSAICIN 0.001% IMIPRAMINE 3% LIDOCAINE 10% MANNITOL 20% MELOXICAM 0.1% PRILOCAINE 2%. APPLY 1-2 G TO AREA 3-4 TIMES DAILY, Disp: 30 g, Rfl: PRN  •  HYDROcod Polst-CPM Polst ER (Tussionex Pennkinetic ER) 10-8 MG/5ML ER suspension, Take 5 mL by mouth Every 12 (Twelve) Hours As Needed for Cough., Disp: 60 mL, Rfl: 0  •  levETIRAcetam (KEPPRA) 250 MG tablet, Take 1 tablet by mouth 2 (Two) Times a Day., Disp: 180 tablet, Rfl: 0  •  lidocaine (LIDODERM) 5 %, Place 1 patch on the skin as directed by provider Daily. Remove & Discard patch within 12 hours or as directed by MD, Disp: 30 patch, Rfl: 0  •  Ozempic, 0.25 or 0.5  "MG/DOSE, 2 MG/1.5ML solution pen-injector, INJECT 0.25MG UNDER THE SKIN INTO THE APPROPRIATE AREA ONCE WEEKLY ON WEDNESDAYS AS DIRECTED, Disp: 3 mL, Rfl: 3  •  pregabalin (LYRICA) 150 MG capsule, Take 1 capsule by mouth 2 (Two) Times a Day., Disp: 60 capsule, Rfl: 1  •  traZODone (DESYREL) 50 MG tablet, TAKE 1 TABLET BY MOUTH EVERY NIGHT, Disp: 30 tablet, Rfl: 2  •  valsartan (DIOVAN) 40 MG tablet, TAKE 1/2 TABLET BY MOUTH EVERY MORNING, Disp: 45 tablet, Rfl: 0  •  brompheniramine-pseudoephedrine-DM 30-2-10 MG/5ML syrup, Take 5 mL by mouth 4 (Four) Times a Day As Needed for Allergies (mucous) for up to 7 days., Disp: 118 mL, Rfl: 1    Allergies:   Allergies   Allergen Reactions   • Metformin GI Intolerance   • Lisinopril Cough       Objective   Objective     Physical Exam:  Vital Signs:   Vitals:    02/07/22 1544   BP: 140/80   Pulse: 85   Resp: 16   Temp: 97.6 °F (36.4 °C)   SpO2: 97%   Weight: 116 kg (255 lb 12.8 oz)   Height: 180.3 cm (70.98\")     Body mass index is 35.69 kg/m².     Physical Exam  Nursing note reviewed  Const: NAD, A&Ox4, Pleasant, Cooperative  Eyes: EOMI, no conjunctivitis  ENT: No nasal discharge present, neck supple  Cardiac: Regular rate and rhythm, no cyanosis  Resp: Respiratory rate within normal limits, no increased work of breathing, no audible wheezing or retractions noted  GI: No distention or ascites  MSK: Motor and sensation grossly intact in bilateral upper extremities  Neurologic: CN II-XII grossly intact  Psych: Appropriate mood and behavior.  Skin: Warm, dry  Procedures/Radiology     Procedures  No radiology results for the last 7 days     Assessment/Plan   Assessment / Plan      Assessment/Plan:   Problems Addressed This Visit  Diagnoses and all orders for this visit:    1. Type 2 diabetes mellitus without complication, without long-term current use of insulin (HCC) (Primary)  -     POC Glycosylated Hemoglobin (Hb A1C)    2. Hoarseness  -     HYDROcod Polst-CPM Polst ER " (Tussionex Pennkinetic ER) 10-8 MG/5ML ER suspension; Take 5 mL by mouth Every 12 (Twelve) Hours As Needed for Cough.  Dispense: 60 mL; Refill: 0  -     Ambulatory Referral to ENT (Otolaryngology)    3. Acute intractable headache, unspecified headache type  -     HYDROcod Polst-CPM Polst ER (Tussionex Pennkinetic ER) 10-8 MG/5ML ER suspension; Take 5 mL by mouth Every 12 (Twelve) Hours As Needed for Cough.  Dispense: 60 mL; Refill: 0    4. COVID-19 virus infection  -     COVID-19 PCR, [facility], NP SWAB IN [facility] VIRAL TRANSPORT MEDIA/ORAL SWISH 24-30 HR TAT - Swab, Nasopharynx; Future  -     COVID-19 PCR, [facility], NP SWAB IN [facility] VIRAL TRANSPORT MEDIA/ORAL SWISH 24-30 HR TAT - Swab, Nasopharynx    5. Postmenopausal symptoms  Comments:  History of PE, high risk for estrogen therapy--ref to GYN  Orders:  -     Ambulatory Referral to Gynecology  -     estradiol (ESTRACE) 1 MG tablet; Take 1 tablet by mouth Daily.  Dispense: 30 tablet; Refill: 2    6. Encounter for therapeutic drug monitoring  -     Compliance Drug Analysis, Ur - Urine, Clean Catch; Future  -     Compliance Drug Analysis, Ur - Urine, Clean Catch    Other orders  -     brompheniramine-pseudoephedrine-DM 30-2-10 MG/5ML syrup; Take 5 mL by mouth 4 (Four) Times a Day As Needed for Allergies (mucous) for up to 7 days.  Dispense: 118 mL; Refill: 1      Problem List Items Addressed This Visit        Endocrine and Metabolic    Diabetes mellitus (HCC) - Primary    Overview     Did not tolerate metformin.  She was started on Ozempic 0.25 mg weekly and Januvia 50 mg daily on 2/24/2020.  She tolerated both of these medications well without side effects.  A1c 11/2/21 5.7%., improved from 6.0% in March and unchanged from July 2021. In July 2021 we increased her Ozempic to 0.5mg and DC Januvia         Relevant Orders    POC Glycosylated Hemoglobin (Hb A1C) (Completed)      Other Visit Diagnoses     Hoarseness        Relevant Medications    HYDROcod Polst-CPM  Polst ER (Tussionex Pennkinetic ER) 10-8 MG/5ML ER suspension    Other Relevant Orders    Ambulatory Referral to ENT (Otolaryngology) (Completed)    Acute intractable headache, unspecified headache type        Relevant Medications    HYDROcod Polst-CPM Polst ER (Tussionex Pennkinetic ER) 10-8 MG/5ML ER suspension    COVID-19 virus infection        Relevant Orders    COVID-19 PCR, [facility], NP SWAB IN [facility] VIRAL TRANSPORT MEDIA/ORAL SWISH 24-30 HR TAT - Swab, Nasopharynx    Postmenopausal symptoms        History of PE, high risk for estrogen therapy--ref to GYN    Relevant Medications    estradiol (ESTRACE) 1 MG tablet    Other Relevant Orders    Ambulatory Referral to Gynecology    Encounter for therapeutic drug monitoring        Relevant Orders    Compliance Drug Analysis, Ur - Urine, Clean Catch          1. Type 2 diabetes mellitus.  - Good control of her current medications, A1c 5.7%, no changes.    2. Post COVID-19 syndrome and shortness of breath.  - Continued cough medication as ordered.    3. Chronic hoarseness.  - This has been going on almost a year, referral to ENT placed today.    4. Postmenopausal symptom symptoms.  - Her previous gynecologist, Kathleen Rodriguez, had been writing her for estrogen 1 mg nightly for hot flashes and postmenopausal symptoms. New referral to gynecology placed today. Given her history of blood clots, she is high risk for estrogen therapy.    There are no Patient Instructions on file for this visit.    Follow Up:   Return in about 4 months (around 6/7/2022) for with A1c;.      CRISTOBAL Puente DO   [facility] [address]  [facility]  [address]  [facility] [address]  Fax [phone]  Phone [phone]        Transcribed from ambient dictation for Ryne Puente DO by BUSTER RILEY.  02/08/22   10:42 EST    Patient verbalized consent to the visit recording.  I have personally performed the services described in this  document as transcribed by the above individual, and it is both accurate and complete.  Ryne Puente, DO  2/16/2022  12:55 EST

## 2022-02-10 DIAGNOSIS — G90.59 COMPLEX REGIONAL PAIN SYNDROME TYPE 1 AFFECTING OTHER SITE: ICD-10-CM

## 2022-02-11 LAB — DRUGS UR: NORMAL

## 2022-02-11 RX ORDER — LEVETIRACETAM 250 MG/1
TABLET ORAL
Qty: 180 TABLET | Refills: 0 | Status: SHIPPED | OUTPATIENT
Start: 2022-02-11 | End: 2022-05-12

## 2022-02-13 PROCEDURE — 93296 REM INTERROG EVL PM/IDS: CPT | Performed by: INTERNAL MEDICINE

## 2022-02-13 PROCEDURE — 93294 REM INTERROG EVL PM/LDLS PM: CPT | Performed by: INTERNAL MEDICINE

## 2022-02-22 DIAGNOSIS — F51.01 PRIMARY INSOMNIA: ICD-10-CM

## 2022-02-22 RX ORDER — DIAZEPAM 2 MG/1
2-4 TABLET ORAL NIGHTLY PRN
Qty: 30 TABLET | Refills: 0 | Status: SHIPPED | OUTPATIENT
Start: 2022-02-22 | End: 2022-03-24 | Stop reason: SDUPTHER

## 2022-02-22 NOTE — TELEPHONE ENCOUNTER
Rx Refill Note  Requested Prescriptions     Pending Prescriptions Disp Refills   • diazePAM (VALIUM) 2 MG tablet [Pharmacy Med Name: DIAZEPAM 2MG TABLETS] 30 tablet      Sig: TAKE 1-2 TABLETS BY MOUTH AT NIGHT AS NEEDED FOR SEDATION      Last office visit with prescribing clinician: 2/7/2022      Next office visit with prescribing clinician: Visit date not found   3}  Flor Caceres MA  02/22/22, 10:57 EST     Last fill: 1/25/2022  UDS:UTD  CSA:Not in chart

## 2022-03-14 ENCOUNTER — OFFICE VISIT (OUTPATIENT)
Dept: OBSTETRICS AND GYNECOLOGY | Facility: CLINIC | Age: 57
End: 2022-03-14

## 2022-03-14 VITALS — WEIGHT: 265 LBS | SYSTOLIC BLOOD PRESSURE: 128 MMHG | BODY MASS INDEX: 36.98 KG/M2 | DIASTOLIC BLOOD PRESSURE: 80 MMHG

## 2022-03-14 DIAGNOSIS — Z01.419 PAP TEST, AS PART OF ROUTINE GYNECOLOGICAL EXAMINATION: Primary | ICD-10-CM

## 2022-03-14 PROCEDURE — 99386 PREV VISIT NEW AGE 40-64: CPT | Performed by: OBSTETRICS & GYNECOLOGY

## 2022-03-14 NOTE — PROGRESS NOTES
GYN Annual Exam     CC - Here for annual exam.     Subjective   HPI  Kellen Esparza is a 56 y.o. female, , who presents for annual well woman exam.  She is postmenopausal.  Patient reports problems with: patient has had pulmonary embolism 20 years ago and has been on HRT since hysterectomy. Currently taking 1mg daily of estradiol.  Partner Status: Marital Status: single.  New Partners since last visit: no.    Patient taking estrodiol and denies menopausal symptoms.     Discussed risks of HRT including blood clots, stroke, HTN, breast cancer. Recommend that we stop HRT and she agrees.     Patient  Will be having a appt with Endoscopy   Additional OB/GYN History   Current contraception: contraceptive methods: None  Desires to: do not start contraception  Last Pap :   Last Completed Pap Smear     This patient has no relevant Health Maintenance data.        History of abnormal Pap smear: no  Family history of uterine, colon, breast, or ovarian cancer: yes - mother breast  Performs monthly Self-Breast Exam: yes  Last mammogram: 2020 neg   Last Completed Mammogram          Ordered - MAMMOGRAM (Every 2 Years) Ordered on 3/14/2022    2020  Mammo Screening Digital Tomosynthesis Bilateral With CAD    10/26/2016  Mammo Diagnostic Digital Tomosynthesis Right Without CAD    10/13/2016  SCANNED - MAMMO    10/12/2016  Mammo Screening Digital Tomosynthesis Bilateral With CAD              Last colonoscopy: its been along time per pt   Last Completed Colonoscopy          Ordered - COLORECTAL CANCER SCREENING (COLONOSCOPY - Every 10 Years) Ordered on 3/14/2022    2018  SCANNED - COLONOSCOPY              Last DEXA: never had  Exercises Regularly: yes  Feelings of Anxiety or Depression: yes - medication controlled valium and trazadone    Tobacco Usage?: No   OB History        5    Para   5    Term   5            AB        Living           SAB        IAB        Ectopic        Molar         Multiple        Live Births                    Health Maintenance   Topic Date Due   • Pneumococcal Vaccine 0-64 (1 of 2 - PPSV23) Never done   • Hepatitis B (1 of 3 - Risk 3-dose series) Never done   • TDAP/TD VACCINES (1 - Tdap) Never done   • ZOSTER VACCINE (1 of 2) Never done   • HEPATITIS C SCREENING  Never done   • DIABETIC FOOT EXAM  Never done   • PAP SMEAR  Never done   • DIABETIC EYE EXAM  Never done   • URINE MICROALBUMIN  07/26/2020   • COVID-19 Vaccine (3 - Booster for Pfizer series) 05/18/2022 (Originally 9/30/2021)   • ANNUAL PHYSICAL  03/20/2022   • LIPID PANEL  04/05/2022   • HEMOGLOBIN A1C  08/07/2022   • MAMMOGRAM  12/07/2022   • Annual Gynecologic Pelvic and Breast Exam  03/15/2023   • COLORECTAL CANCER SCREENING  06/13/2028   • INFLUENZA VACCINE  Completed       The additional following portions of the patient's history were reviewed and updated as appropriate: allergies, current medications, past family history, past medical history, past social history, past surgical history and problem list.    Review of Systems   Constitutional: Negative.    Respiratory: Negative.    Cardiovascular: Negative.    Gastrointestinal: Negative.    Genitourinary: Negative.    Musculoskeletal: Negative.    Neurological: Negative.    Psychiatric/Behavioral: Negative.        I have reviewed and agree with the HPI, ROS, and historical information as entered above. Sriram Barros MD    Objective   /80   Wt 120 kg (265 lb)   LMP 10/31/2014 (LMP Unknown) Comment: Mammogram 2014 info given   BMI 36.98 kg/m²     Physical Exam  Vitals reviewed. Exam conducted with a chaperone present.   Constitutional:       Appearance: Normal appearance.   HENT:      Head: Normocephalic and atraumatic.   Cardiovascular:      Rate and Rhythm: Normal rate and regular rhythm.   Pulmonary:      Effort: Pulmonary effort is normal.      Breath sounds: Normal breath sounds.   Chest:   Breasts:      Right: Normal.      Left:  Normal.       Abdominal:      General: Abdomen is flat. Bowel sounds are normal.      Palpations: Abdomen is soft.   Genitourinary:     General: Normal vulva.      Vagina: Normal.      Cervix: Normal.      Uterus: Normal.       Adnexa: Right adnexa normal and left adnexa normal.   Musculoskeletal:      Cervical back: Neck supple.   Skin:     General: Skin is warm and dry.   Neurological:      Mental Status: She is alert and oriented to person, place, and time.   Psychiatric:         Mood and Affect: Mood normal.         Behavior: Behavior normal.         Assessment/Plan     Assessment     Problem List Items Addressed This Visit    None     Visit Diagnoses     Pap test, as part of routine gynecological examination    -  Primary    Relevant Orders    Pap IG, HPV-hr    DEXA Bone Density Axial    Mammo Screening Digital Tomosynthesis Bilateral With CAD    Ambulatory Referral For Screening Colonoscopy          1. GYN annual well woman exam.   2. Menopause    Plan     1. Reviewed monthly self breast exams.  Instructed to call with lumps, pain, or breast discharge.  Yearly mammograms ordered.  2. Ordered mammogram today.  3. Recommended use of Vitamin D and getting adequate calcium in her diet. (1500mg)  4. Osteoporosis screening ordered today.  5. Reviewed exercise as a preventative health measures.   6. Colonoscopy recommended.  7. Reviewed risks of ERT including increased risk of breast cancer, increased blood clots, increased heart disease.  Patient strongly desires to stay on or start ERT.  She understands we will use the lowest amount that adequately controls her symptoms.  8. Symptoms of menopausal transition reviewed with patient.   9. Reccommended Flu Vaccine in Fall of each year.  10. Instructed on [place] exercises and gave patient educational information.  11. Other: Will f/u in 1 month for DEXA scan and to discuss response to stopping HRT.     Sriram Barros MD  03/14/2022

## 2022-03-17 ENCOUNTER — PREP FOR SURGERY (OUTPATIENT)
Dept: OTHER | Facility: HOSPITAL | Age: 57
End: 2022-03-17

## 2022-03-17 DIAGNOSIS — Z12.11 ENCOUNTER FOR SCREENING FOR MALIGNANT NEOPLASM OF COLON: Primary | ICD-10-CM

## 2022-03-18 ENCOUNTER — PATIENT ROUNDING (BHMG ONLY) (OUTPATIENT)
Dept: OBSTETRICS AND GYNECOLOGY | Facility: CLINIC | Age: 57
End: 2022-03-18

## 2022-03-18 NOTE — PROGRESS NOTES
March 18, 2022    Hello, may I speak with Kellen Esparza?    My name is Zina     I am  with [facility]  [address]  [place] [facility] [address] [phone].    Before we get started may I verify your date of birth? 1965    I am calling to officially welcome you to our practice and ask about your recent visit. Is this a good time to talk? Yes     Tell me about your visit with us. What things went well?  Everything went wonderful. Loved everyone here.      We're always looking for ways to make our patients' experiences even better. Do you have recommendations on ways we may improve?  No     Overall were you satisfied with your first visit to our practice? Yes - very      I appreciate you taking the time to speak with me today. Is there anything else I can do for you? No     Thank you, and have a great day.

## 2022-03-22 DIAGNOSIS — Z01.419 PAP TEST, AS PART OF ROUTINE GYNECOLOGICAL EXAMINATION: ICD-10-CM

## 2022-03-24 DIAGNOSIS — F51.01 PRIMARY INSOMNIA: ICD-10-CM

## 2022-03-24 RX ORDER — DIAZEPAM 2 MG/1
2-4 TABLET ORAL NIGHTLY PRN
Qty: 30 TABLET | Refills: 0 | Status: ON HOLD | OUTPATIENT
Start: 2022-03-24 | End: 2022-05-03

## 2022-03-24 NOTE — TELEPHONE ENCOUNTER
Rx Refill Note  Requested Prescriptions     Pending Prescriptions Disp Refills   • diazePAM (VALIUM) 2 MG tablet 30 tablet 0     Sig: Take 1-2 tablets by mouth At Night As Needed for Sedation.      Last office visit with prescribing clinician: 2/7/2022      Next office visit with prescribing clinician: Visit date not found   }  Flor Caceres MA  03/24/22, 07:50 EDT     Last fill: 02/22/2022  CSA: FLAVIO  UDS: UTD

## 2022-03-25 DIAGNOSIS — R49.0 HOARSENESS: ICD-10-CM

## 2022-03-25 DIAGNOSIS — R51.9 ACUTE INTRACTABLE HEADACHE, UNSPECIFIED HEADACHE TYPE: ICD-10-CM

## 2022-03-25 NOTE — TELEPHONE ENCOUNTER
Rx Refill Note  Requested Prescriptions     Pending Prescriptions Disp Refills   • HYDROcod Polst-CPM Polst ER (Tussionex Pennkinetic ER) 10-8 MG/5ML ER suspension 60 mL 0     Sig: Take 5 mL by mouth Every 12 (Twelve) Hours As Needed for Cough.      Last office visit with prescribing clinician: 2/7/2022      Next office visit with prescribing clinician: Visit date not found   }  Flor Caceres MA  03/25/22, 13:07 EDT   Last fill: 02/07/2022

## 2022-04-01 ENCOUNTER — OFFICE VISIT (OUTPATIENT)
Dept: FAMILY MEDICINE CLINIC | Facility: CLINIC | Age: 57
End: 2022-04-01

## 2022-04-01 VITALS
OXYGEN SATURATION: 97 % | HEIGHT: 71 IN | SYSTOLIC BLOOD PRESSURE: 138 MMHG | HEART RATE: 66 BPM | WEIGHT: 266.4 LBS | RESPIRATION RATE: 16 BRPM | DIASTOLIC BLOOD PRESSURE: 90 MMHG | BODY MASS INDEX: 37.3 KG/M2

## 2022-04-01 DIAGNOSIS — R49.0 HOARSENESS: Primary | ICD-10-CM

## 2022-04-01 PROCEDURE — 99213 OFFICE O/P EST LOW 20 MIN: CPT | Performed by: FAMILY MEDICINE

## 2022-04-01 RX ORDER — FAMOTIDINE 40 MG/1
1 TABLET, FILM COATED ORAL NIGHTLY
COMMUNITY
Start: 2022-03-18 | End: 2022-10-03

## 2022-04-01 RX ORDER — OMEPRAZOLE 40 MG/1
1 CAPSULE, DELAYED RELEASE ORAL
COMMUNITY
Start: 2022-03-18 | End: 2023-02-13

## 2022-04-01 RX ORDER — BROMPHENIRAMINE MALEATE, PSEUDOEPHEDRINE HYDROCHLORIDE, AND DEXTROMETHORPHAN HYDROBROMIDE 2; 30; 10 MG/5ML; MG/5ML; MG/5ML
5 SYRUP ORAL 4 TIMES DAILY
COMMUNITY
Start: 2022-03-28 | End: 2022-06-13 | Stop reason: SDUPTHER

## 2022-04-01 RX ORDER — METHYLPREDNISOLONE 4 MG/1
TABLET ORAL
COMMUNITY
Start: 2022-03-29 | End: 2022-04-21

## 2022-04-01 NOTE — PROGRESS NOTES
"Established Patient Office Visit      Patient Name: Kellen Esparza  : 1965   MRN: [id number]   Care Team: Patient Care Team:  Ryne Puente DO as PCP - General (Family Medicine)  Johny Sommer MD as Consulting Physician (Cardiology)  Sonya Gay MD as Consulting Physician (Cardiology)  Deb Guajardo APRN as Nurse Practitioner (Pulmonary Disease)  Sal Sheldon MD (Pain Medicine)    Chief Complaint:    Chief Complaint   Patient presents with   • throat issue     EGD done Tuesday     Was called about a throat scan then was told the order was incorrect       History of Present Illness: Kellen Esparza is a 56 y.o. female who is here today for chief complaint.    HPI    Patient verbalized consent to the visit recording.    Savi presents today for a \" throat issue. \" She had an EGD done on Tuesday, 2022, at [facility]. Gastric antrum biopsy showed active chronic gastritis with focal erosion/ulceration, but no intestinal metaplasia or dysplasia, and negative H. pylori.    She was called yesterday and was told she had orders sent to have a CT of her throat but the order was sent in wrong. She is waiting to go back to the ENT doctor until she gets the results from the pathology report.     This patient is accompanied by their self who contributes to the history of their care.    The following portions of the patient's history were reviewed and updated as appropriate: allergies, current medications, past family history, past medical history, past social history, past surgical history and problem list.    Subjective      Review of Systems:   Review of Systems - See HPI    Past Medical History:   Past Medical History:   Diagnosis Date   • Arthritis    • CPAP (continuous positive airway pressure) dependence    • Diabetes mellitus (HCC)     doesnt check sugar    • Hypertension    • Migraine    • Musculoligamentous strain    • Pacemaker    • Pulmonary embolism (HCC)    • " Sleep apnea    • Wears eyeglasses        Past Surgical History:   Past Surgical History:   Procedure Laterality Date   • BREAST BIOPSY Left 2014   • CARDIAC CATHETERIZATION Left 8/10/2017    Procedure: Cardiac Catheterization/Vascular Study;  Surgeon: Johny Sommer MD;  Location:  [facility];  Service:    • CARDIAC ELECTROPHYSIOLOGY PROCEDURE N/A 7/19/2019    Procedure: Pacemaker DC new;  Surgeon: Sonya Gay MD;  Location:  [facility];  Service: Cardiology   • CHOLECYSTECTOMY     • COLONOSCOPY     • ENDOSCOPY     • HYSTERECTOMY  11/2014   • INSERT / REPLACE / REMOVE PACEMAKER     • JOINT REPLACEMENT Right     knee   • KNEE SURGERY Right 12/2017    total replacement   • NASAL SINUS SURGERY     • OOPHORECTOMY  11/2014   • PACEMAKER IMPLANTATION     • PULMONARY EMBOLISM SURGERY      right lung   • SPINAL CORD STIMULATOR IMPLANT N/A 9/10/2020    Procedure: SPINAL CORD STIMULATOR INSERTION PHASE 1, UPPER THORACIC PLACEMENT ;  Surgeon: Victor Manuel Geiger MD;  Location:  [facility];  Service: Neurosurgery;  Laterality: N/A;   • TUBAL ABDOMINAL LIGATION         Family History:   Family History   Problem Relation Age of Onset   • Breast cancer Mother 42   • Heart failure Mother    • No Known Problems Father    • Hypertension Sister    • Hypertension Brother    • Heart failure Son    • Hypertension Maternal Grandmother    • No Known Problems Paternal Grandmother    • Hypertension Maternal Grandfather    • No Known Problems Paternal Grandfather    • Ovarian cancer Neg Hx        Social History:   Social History     Socioeconomic History   • Marital status: Single   Tobacco Use   • Smoking status: Never Smoker   • Smokeless tobacco: Never Used   Vaping Use   • Vaping Use: Never used   Substance and Sexual Activity   • Alcohol use: No     Comment: rare   • Drug use: No   • Sexual activity: Yes     Partners: Male     Birth control/protection: Surgical       Tobacco History:   Social  History     Tobacco Use   Smoking Status Never Smoker   Smokeless Tobacco Never Used       Medications:     Current Outpatient Medications:   •  aspirin (aspirin) 81 MG EC tablet, Take 1 tablet by mouth Daily., Disp: 90 tablet, Rfl: 3  •  atorvastatin (LIPITOR) 80 MG tablet, TAKE 1 TABLET BY MOUTH DAILY, Disp: 90 tablet, Rfl: 3  •  baclofen (LIORESAL) 10 MG tablet, TAKE 1 TABLET BY MOUTH TWICE DAILY AS NEEDED FOR MUSCLE SPASMS, Disp: 60 tablet, Rfl: 1  •  brompheniramine-pseudoephedrine-DM 30-2-10 MG/5ML syrup, Take 5 mL by mouth 4 (Four) Times a Day., Disp: , Rfl:   •  diazePAM (VALIUM) 2 MG tablet, Take 1-2 tablets by mouth At Night As Needed for Sedation., Disp: 30 tablet, Rfl: 0  •  estradiol (ESTRACE) 1 MG tablet, Take 1 tablet by mouth Daily., Disp: 30 tablet, Rfl: 2  •  famotidine (PEPCID) 40 MG tablet, Take 1 tablet by mouth Every Night., Disp: , Rfl:   •  Gel Base gel, CAPSAICIN 0.001% IMIPRAMINE 3% LIDOCAINE 10% MANNITOL 20% MELOXICAM 0.1% PRILOCAINE 2%. APPLY 1-2 G TO AREA 3-4 TIMES DAILY, Disp: 30 g, Rfl: PRN  •  HYDROcod Polst-CPM Polst ER (Tussionex Pennkinetic ER) 10-8 MG/5ML ER suspension, Take 5 mL by mouth Every 12 (Twelve) Hours As Needed for Cough., Disp: 60 mL, Rfl: 0  •  levETIRAcetam (KEPPRA) 250 MG tablet, TAKE 1 TABLET BY MOUTH TWICE DAILY, Disp: 180 tablet, Rfl: 0  •  lidocaine (LIDODERM) 5 %, Place 1 patch on the skin as directed by provider Daily. Remove & Discard patch within 12 hours or as directed by MD, Disp: 30 patch, Rfl: 0  •  methylPREDNISolone (MEDROL) 4 MG dose pack, follow package directions, Disp: , Rfl:   •  omeprazole (priLOSEC) 40 MG capsule, Take 1 capsule by mouth 2 (Two) Times a Day Before Meals., Disp: , Rfl:   •  Ozempic, 0.25 or 0.5 MG/DOSE, 2 MG/1.5ML solution pen-injector, INJECT 0.25MG UNDER THE SKIN INTO THE APPROPRIATE AREA ONCE WEEKLY ON WEDNESDAYS AS DIRECTED, Disp: 3 mL, Rfl: 3  •  pregabalin (LYRICA) 150 MG capsule, Take 1 capsule by mouth 2 (Two) Times a  "Day., Disp: 60 capsule, Rfl: 1  •  traZODone (DESYREL) 50 MG tablet, TAKE 1 TABLET BY MOUTH EVERY NIGHT, Disp: 30 tablet, Rfl: 2  •  valsartan (DIOVAN) 40 MG tablet, TAKE 1/2 TABLET BY MOUTH EVERY MORNING, Disp: 45 tablet, Rfl: 0    Allergies:   Allergies   Allergen Reactions   • Metformin GI Intolerance   • Lisinopril Cough       Objective   Objective     Physical Exam:  Vital Signs:   Vitals:    04/01/22 1343   BP: 138/90   Pulse: 66   Resp: 16   SpO2: 97%   Weight: 121 kg (266 lb 6.4 oz)   Height: 180.3 cm (70.98\")     Body mass index is 37.17 kg/m².     Physical Exam  Nursing note reviewed  Const: NAD, A&Ox4, Pleasant, Cooperative  Eyes: EOMI, no conjunctivitis  ENT: No nasal discharge present, neck supple  Cardiac: Regular rate and rhythm, no cyanosis  Resp: Respiratory rate within normal limits, no increased work of breathing, no audible wheezing or retractions noted  GI: No distention or ascites  MSK: Motor and sensation grossly intact in bilateral upper extremities  Neurologic: CN II-XII grossly intact  Psych: Appropriate mood and behavior.  Skin: Warm, dry  Procedures/Radiology     Procedures  Mammo Screening Digital Tomosynthesis Bilateral With CAD    Result Date: 4/3/2022  No findings suspicious for malignancy.  BI-RADS CATEGORY:  1, NEGATIVE  RECOMMENDATION: Yearly mammogram, yearly clinical breast exam, and encourage self breast awareness.  CAD was used.  The standard false negative rate of mammography is between 10% and 25%. Complex patterns or increased breast density will markedly elevate the false negative rate of mammography.  A letter, in lay terminology, with the results of this exam will be mailed to the patient.  This report was finalized on 4/3/2022 12:41 PM by Dr. Kathi Farris MD.         Assessment/Plan   Assessment / Plan      Assessment/Plan:   Problems Addressed This Visit  Diagnoses and all orders for this visit:    1. Hoarseness (Primary)  -     CT Soft Tissue Neck Without Contrast; " Future        -     She will continue to see ENT. She has a follow-up with the gastroenterologist on her EGD at [facility]. In regards to the CT of the neck, a new order was placed for this today. It was initially ordered in 12/2021.    Problem List Items Addressed This Visit    None     Visit Diagnoses     Hoarseness    -  Primary    Relevant Orders    CT Soft Tissue Neck Without Contrast              There are no Patient Instructions on file for this visit.    Follow Up:   No follow-ups on file.        MDM     [facility] [address]  [facility]  [address]  [facility] [address]  Fax [phone]  Phone [phone]      Transcribed from ambient dictation for Ryne Puente, DO by Diann Smiley.  04/01/22   15:41 EDT

## 2022-04-02 ENCOUNTER — HOSPITAL ENCOUNTER (OUTPATIENT)
Dept: MAMMOGRAPHY | Facility: HOSPITAL | Age: 57
Discharge: HOME | End: 2022-04-02
Admitting: OBSTETRICS & GYNECOLOGY

## 2022-04-02 DIAGNOSIS — Z01.419 PAP TEST, AS PART OF ROUTINE GYNECOLOGICAL EXAMINATION: ICD-10-CM

## 2022-04-02 PROCEDURE — 77067 SCR MAMMO BI INCL CAD: CPT | Performed by: RADIOLOGY

## 2022-04-02 PROCEDURE — 77067 SCR MAMMO BI INCL CAD: CPT

## 2022-04-02 PROCEDURE — 77063 BREAST TOMOSYNTHESIS BI: CPT

## 2022-04-02 PROCEDURE — 77063 BREAST TOMOSYNTHESIS BI: CPT | Performed by: RADIOLOGY

## 2022-04-11 ENCOUNTER — OFFICE VISIT (OUTPATIENT)
Dept: OBSTETRICS AND GYNECOLOGY | Facility: CLINIC | Age: 57
End: 2022-04-11

## 2022-04-11 VITALS — SYSTOLIC BLOOD PRESSURE: 120 MMHG | BODY MASS INDEX: 37.12 KG/M2 | DIASTOLIC BLOOD PRESSURE: 80 MMHG | WEIGHT: 266 LBS

## 2022-04-11 DIAGNOSIS — N95.1 MENOPAUSAL SYNDROME (HOT FLASHES): Primary | ICD-10-CM

## 2022-04-11 PROCEDURE — 99213 OFFICE O/P EST LOW 20 MIN: CPT | Performed by: OBSTETRICS & GYNECOLOGY

## 2022-04-11 RX ORDER — NORTRIPTYLINE HYDROCHLORIDE 25 MG/1
CAPSULE ORAL
Qty: 60 CAPSULE | Refills: 5 | OUTPATIENT
Start: 2022-04-11

## 2022-04-11 NOTE — PROGRESS NOTES
Chief Complaint   Patient presents with   • Hormone replacement   • Dexa scan   • Hot Flashes       Subjective   HPI  Kellen Esparza is a 56 y.o. female, , who presents for Dexa scan and follow up after coming off of HRT.  She was stopped due to history of PE.     She states she has experienced this problem for 1 month.  She describes the severity as mild.  She states that the problem is discuss HRT.  The patient reports additional symptoms as none Patient reports problems with: none.  Partner Status: Marital Status: single.  New Partners since last visit: no.  Desires STD Screening: no.    Discussed herbal option for HRT and all questions answered.   DEXA normal today.     Additional OB/GYN History   Current contraception: contraceptive methods: None  Desires to: do not start contraception  Last Pap : 2022 negative  Last Completed Pap Smear          PAP SMEAR (Every 3 Years) Next due on 3/18/2025    2022  Pap IG, HPV-hr              History of abnormal Pap smear: no  Last mammogram: 2022 neg   Last Completed Mammogram          MAMMOGRAM (Every 2 Years) Next due on 2022  Mammo Screening Digital Tomosynthesis Bilateral With CAD    2020  Mammo Screening Digital Tomosynthesis Bilateral With CAD    10/26/2016  Mammo Diagnostic Digital Tomosynthesis Right Without CAD    10/13/2016  SCANNED - MAMMO    10/12/2016  Mammo Screening Digital Tomosynthesis Bilateral With CAD              Tobacco Usage?: No   OB History        5    Para   5    Term   5            AB        Living           SAB        IAB        Ectopic        Molar        Multiple        Live Births                    Health Maintenance   Topic Date Due   • Pneumococcal Vaccine 0-64 (1 - PCV) Never done   • Hepatitis B (1 of 3 - Risk 3-dose series) Never done   • TDAP/TD VACCINES (1 - Tdap) Never done   • ZOSTER VACCINE (1 of 2) Never done   • HEPATITIS C SCREENING  Never done   • DIABETIC FOOT  EXAM  Never done   • DIABETIC EYE EXAM  Never done   • URINE MICROALBUMIN  07/26/2020   • ANNUAL PHYSICAL  03/20/2022   • LIPID PANEL  04/05/2022   • COVID-19 Vaccine (3 - Booster for Pfizer series) 05/18/2022 (Originally 9/30/2021)   • INFLUENZA VACCINE  08/01/2022   • HEMOGLOBIN A1C  08/07/2022   • Annual Gynecologic Pelvic and Breast Exam  03/15/2023   • MAMMOGRAM  04/02/2024   • PAP SMEAR  03/18/2025   • COLORECTAL CANCER SCREENING  06/13/2028       The additional following portions of the patient's history were reviewed and updated as appropriate: allergies and current medications.    Review of Systems   Constitutional: Negative.    Eyes: Negative.    Respiratory: Negative.    Cardiovascular: Negative.    Gastrointestinal: Negative.    Genitourinary: Negative.    Musculoskeletal: Negative.    Neurological: Negative.    Psychiatric/Behavioral: Negative.        I have reviewed and agree with the HPI, ROS, and historical information as entered above. Sriram Barros MD    Objective   /80   Wt 121 kg (266 lb)   LMP 10/31/2014 (LMP Unknown) Comment: Mammogram 2014 info given   BMI 37.12 kg/m²     Physical Exam  Vitals reviewed.   Constitutional:       Appearance: Normal appearance.   Abdominal:      General: Abdomen is flat. Bowel sounds are normal.      Palpations: Abdomen is soft.   Skin:     General: Skin is warm and dry.   Neurological:      General: No focal deficit present.      Mental Status: She is alert and oriented to person, place, and time.   Psychiatric:         Mood and Affect: Mood normal.         Behavior: Behavior normal.         Assessment/Plan     Assessment     Problem List Items Addressed This Visit        Genitourinary and Reproductive     Menopausal syndrome (hot flashes) - Primary    Relevant Medications    lidocaine (LIDODERM) 5 %          1. Monopausal hot flashes    Plan     Return in about 6 months (around 10/11/2022) for follow up on hormones after stopping HRT.  1. Doing  as expected off of HRT. Still recommend not starting back due to history of hot flashes. Discussed possible herbal remedies.   2. Normal DEXA scan today. Recommend calcium and vitamin D supplementation.       Sriram Barors MD  04/11/2022

## 2022-04-14 ENCOUNTER — TELEPHONE (OUTPATIENT)
Dept: GASTROENTEROLOGY | Facility: CLINIC | Age: 57
End: 2022-04-14

## 2022-04-14 NOTE — TELEPHONE ENCOUNTER
Pt needing CC for upcoming colonoscopy. Upon chart review it appears at LOV w [facility], pt was to continue Eliquis and ASA 81 mg. Her Eliquis was then discontinued by pain management doctor a couple months later during office visit, however visit note doesn't explain why. I called patient to obtain further information, however she did not have much more information to give. However she did state she has not been taking her Eliquis for several months. Does patient need to be taking the Eliquis?       Also, please advise as to if patient cleared for colonoscopy and how long to hold ASA 81 mg and Eliquis (if it is to be restarted). Thanks!

## 2022-04-14 NOTE — TELEPHONE ENCOUNTER
Kellen Esparza  [address] [address]  1965        Patient will be having a a colonoscopy by Dr. Bryant Mejia on May 3, 2022. The patient is needing cardiac clearance due to being on blood thinners. Please complete the following and fax back to the office.     Patient's was last seen in the office on:_____________________    Procedure/Test Performed:    _____ Stress Test       _____ Echocardiogram    _____ EKG     _____ Heart Cath    Based on the above test results and/or clinical evaluation it is my recommendation:    ____ Patient may proceed with the scheduled surgical procedure with an acceptable cardiac risk.    ____ Patient CAN NOT stop Plavix, Effient, Ticlid, Aspirin, Coumadin, Pradaxa, Xarelto, Eliquis, Savaysa, or Brilinta prior to procedure.     ____ Patient CAN stop Plavix, Effient, Ticlid, Aspirin, Coumadin, Pradaxa, Xarelto, Eliquis, Savaysa, or Brilinta  ______ days prior to procedure.    Please sign and date below.    Signature________________________________________   Date:_________________     Thank You    Mark I. Brunner, M.D.  DOLORES Curran M.D. Mark A. Spurlin, M.D. Gregory M. Woolfolk, M.D.  Dana Colindres, CORRIE Martel, JACQUELYN Henson

## 2022-04-14 NOTE — TELEPHONE ENCOUNTER
PATIENT IS SCHEDULED FOR COLONOSCOPY ON MAY 3RD WITH DR BAILEY. PATIENT SEE DR METCALF HERE AT [facility]. PLEASE OBTAIN CARDIAC CLEARANCE.

## 2022-04-15 NOTE — TELEPHONE ENCOUNTER
"Per [facility]: \"I recommend restarting Eliquis unless she had a bleeding issue.  If she did have a bleeding issue, we should figure out the details. But with that said, okay to hold for 48 hours prior to colonoscopy.  Continue aspirin periprocedurally.\"    Attempted to reach pt to further discuss, no answer. LVM w call back number.   "

## 2022-04-15 NOTE — TELEPHONE ENCOUNTER
Pt returned call. Denies any bleeding issues while taking Eliquis. Encouraged pt to restart medication and informed her to hold medication for 2 days prior to GI procedure. Pt cleared for procedure. Rx for Eliquis sent to preferred pharmacy.

## 2022-04-18 DIAGNOSIS — Z12.11 ENCOUNTER FOR SCREENING COLONOSCOPY: Primary | ICD-10-CM

## 2022-04-21 ENCOUNTER — TELEPHONE (OUTPATIENT)
Dept: FAMILY MEDICINE CLINIC | Facility: CLINIC | Age: 57
End: 2022-04-21

## 2022-04-21 ENCOUNTER — OFFICE VISIT (OUTPATIENT)
Dept: FAMILY MEDICINE CLINIC | Facility: CLINIC | Age: 57
End: 2022-04-21

## 2022-04-21 VITALS
HEIGHT: 71 IN | BODY MASS INDEX: 37.72 KG/M2 | HEART RATE: 75 BPM | RESPIRATION RATE: 16 BRPM | DIASTOLIC BLOOD PRESSURE: 70 MMHG | OXYGEN SATURATION: 99 % | SYSTOLIC BLOOD PRESSURE: 132 MMHG | WEIGHT: 269.4 LBS

## 2022-04-21 DIAGNOSIS — R51.9 ACUTE INTRACTABLE HEADACHE, UNSPECIFIED HEADACHE TYPE: ICD-10-CM

## 2022-04-21 DIAGNOSIS — R49.0 HOARSENESS: ICD-10-CM

## 2022-04-21 DIAGNOSIS — R35.0 URINARY FREQUENCY: Primary | ICD-10-CM

## 2022-04-21 DIAGNOSIS — F41.9 ANXIETY: ICD-10-CM

## 2022-04-21 DIAGNOSIS — F51.01 PRIMARY INSOMNIA: ICD-10-CM

## 2022-04-21 LAB
BILIRUB BLD-MCNC: NEGATIVE MG/DL
CLARITY, POC: CLEAR
COLOR UR: YELLOW
EXPIRATION DATE: ABNORMAL
GLUCOSE UR STRIP-MCNC: NEGATIVE MG/DL
KETONES UR QL: NEGATIVE
LEUKOCYTE EST, POC: ABNORMAL
Lab: ABNORMAL
NITRITE UR-MCNC: NEGATIVE MG/ML
PH UR: 6 [PH] (ref 5–8)
PROT UR STRIP-MCNC: NEGATIVE MG/DL
RBC # UR STRIP: NEGATIVE /UL
SP GR UR: 1.01 (ref 1–1.03)
UROBILINOGEN UR QL: NORMAL

## 2022-04-21 PROCEDURE — 81003 URINALYSIS AUTO W/O SCOPE: CPT | Performed by: FAMILY MEDICINE

## 2022-04-21 PROCEDURE — 99214 OFFICE O/P EST MOD 30 MIN: CPT | Performed by: FAMILY MEDICINE

## 2022-04-21 RX ORDER — MIRTAZAPINE 15 MG/1
15 TABLET, FILM COATED ORAL NIGHTLY
Qty: 30 TABLET | Refills: 2 | Status: SHIPPED | OUTPATIENT
Start: 2022-04-21 | End: 2023-02-13

## 2022-04-21 NOTE — PATIENT INSTRUCTIONS
Send us [facility] paperwork--to be off intermittently for doctor visits until 7/1/22 (after appt with [facility]) while working up and treating hoarseness, GI, etc  Use mirtazapine for sleep nightly

## 2022-04-21 NOTE — PROGRESS NOTES
"Established Patient Office Visit      Patient Name: Kellen Esparza  : 1965   MRN: [id number]   Care Team: Patient Care Team:  Ryne Puente DO as PCP - General (Family Medicine)  Johny Sommer MD as Consulting Physician (Cardiology)  Sonya Gay MD as Consulting Physician (Cardiology)  Deb Guajardo APRN as Nurse Practitioner (Pulmonary Disease)  Sal Sheldon MD (Pain Medicine)    Chief Complaint:    Chief Complaint   Patient presents with   • FMLA concern   • Urinary Frequency     Urine odor   • Anxiety   • trouble sleeping       History of Present Illness: Kellen Esparza is a 56 y.o. female who is here today for chief complaint.    HPI    Throat issues  The patient reports that the doctors could not do the speech exercises with her because her vocal cords were severely swollen. She was referred to see Dr. James, otolaryngologist, at the [facility], and she has an appointment on 2022 for her throat issues. The patient also has an appointment with the gastroenterologist on 2022.    Insomnia  She states that she has been having trouble sleeping even though she takes trazodone and Valium through the day. The patient reports that she does not take her morphine, but she knows she should. She states that it keeps her calm because she is dealing with an alcoholic son. She reports that she tosses and turns all night.    Hypertension  The patient reports that Dr. Sommer put her back on the Eliquis.    Urinary tract infection  She feels that she has to urinate often, but when she gets to the bathroom, she can only urinate a very small amount. She denies any itching or burning.     Depression  The patient reports that she is having trouble focusing. She states that any little thing will \"set her off\". She states that she has been on mirtazapine in the past.     This patient is accompanied by their self who contributes to the history of their care.    The following " portions of the patient's history were reviewed and updated as appropriate: allergies, current medications, past family history, past medical history, past social history, past surgical history and problem list.    Subjective      Review of Systems:   Review of Systems - See HPI    Past Medical History:   Past Medical History:   Diagnosis Date   • Arthritis    • CPAP (continuous positive airway pressure) dependence    • Diabetes mellitus (HCC)     doesnt check sugar    • Hypertension    • Migraine    • Musculoligamentous strain    • Pacemaker    • Pulmonary embolism (HCC) 1997   • Sleep apnea    • Wears eyeglasses        Past Surgical History:   Past Surgical History:   Procedure Laterality Date   • BREAST BIOPSY Left 2014   • CARDIAC CATHETERIZATION Left 8/10/2017    Procedure: Cardiac Catheterization/Vascular Study;  Surgeon: Johny Sommer MD;  Location:  [facility];  Service:    • CARDIAC ELECTROPHYSIOLOGY PROCEDURE N/A 7/19/2019    Procedure: Pacemaker DC new;  Surgeon: Sonya Gay MD;  Location:  [facility] CATH INVASIVE LOCATION;  Service: Cardiology   • CHOLECYSTECTOMY     • COLONOSCOPY     • ENDOSCOPY     • HYSTERECTOMY  11/2014   • INSERT / REPLACE / REMOVE PACEMAKER     • JOINT REPLACEMENT Right     knee   • KNEE SURGERY Right 12/2017    total replacement   • NASAL SINUS SURGERY     • OOPHORECTOMY  11/2014   • PACEMAKER IMPLANTATION     • PULMONARY EMBOLISM SURGERY      right lung   • SPINAL CORD STIMULATOR IMPLANT N/A 9/10/2020    Procedure: SPINAL CORD STIMULATOR INSERTION PHASE 1, UPPER THORACIC PLACEMENT ;  Surgeon: Victor Manuel Geiger MD;  Location:  [facility] OR;  Service: Neurosurgery;  Laterality: N/A;   • TUBAL ABDOMINAL LIGATION         Family History:   Family History   Problem Relation Age of Onset   • Breast cancer Mother 42   • Heart failure Mother    • No Known Problems Father    • Hypertension Sister    • Hypertension Brother    • Heart failure Son    • Hypertension  Maternal Grandmother    • No Known Problems Paternal Grandmother    • Hypertension Maternal Grandfather    • No Known Problems Paternal Grandfather    • Ovarian cancer Neg Hx        Social History:   Social History     Socioeconomic History   • Marital status: Single   Tobacco Use   • Smoking status: Never Smoker   • Smokeless tobacco: Never Used   Vaping Use   • Vaping Use: Never used   Substance and Sexual Activity   • Alcohol use: No     Comment: rare   • Drug use: No   • Sexual activity: Yes     Partners: Male     Birth control/protection: Surgical       Tobacco History:   Social History     Tobacco Use   Smoking Status Never Smoker   Smokeless Tobacco Never Used       Medications:     Current Outpatient Medications:   •  apixaban (Eliquis) 5 MG tablet tablet, Take 1 tablet by mouth 2 (Two) Times a Day., Disp: 180 tablet, Rfl: 3  •  aspirin (aspirin) 81 MG EC tablet, Take 1 tablet by mouth Daily., Disp: 90 tablet, Rfl: 3  •  atorvastatin (LIPITOR) 80 MG tablet, TAKE 1 TABLET BY MOUTH DAILY, Disp: 90 tablet, Rfl: 3  •  baclofen (LIORESAL) 10 MG tablet, TAKE 1 TABLET BY MOUTH TWICE DAILY AS NEEDED FOR MUSCLE SPASMS, Disp: 60 tablet, Rfl: 1  •  brompheniramine-pseudoephedrine-DM 30-2-10 MG/5ML syrup, Take 5 mL by mouth 4 (Four) Times a Day., Disp: , Rfl:   •  diazePAM (VALIUM) 2 MG tablet, Take 1-2 tablets by mouth At Night As Needed for Sedation., Disp: 30 tablet, Rfl: 0  •  estradiol (ESTRACE) 1 MG tablet, Take 1 tablet by mouth Daily., Disp: 30 tablet, Rfl: 2  •  famotidine (PEPCID) 40 MG tablet, Take 1 tablet by mouth Every Night., Disp: , Rfl:   •  Gel Base gel, CAPSAICIN 0.001% IMIPRAMINE 3% LIDOCAINE 10% MANNITOL 20% MELOXICAM 0.1% PRILOCAINE 2%. APPLY 1-2 G TO AREA 3-4 TIMES DAILY, Disp: 30 g, Rfl: PRN  •  levETIRAcetam (KEPPRA) 250 MG tablet, TAKE 1 TABLET BY MOUTH TWICE DAILY, Disp: 180 tablet, Rfl: 0  •  lidocaine (LIDODERM) 5 %, Place 1 patch on the skin as directed by provider Daily. Remove & Discard  "patch within 12 hours or as directed by MD, Disp: 30 patch, Rfl: 0  •  omeprazole (priLOSEC) 40 MG capsule, Take 1 capsule by mouth 2 (Two) Times a Day Before Meals., Disp: , Rfl:   •  Ozempic, 0.25 or 0.5 MG/DOSE, 2 MG/1.5ML solution pen-injector, INJECT 0.25MG UNDER THE SKIN INTO THE APPROPRIATE AREA ONCE WEEKLY ON WEDNESDAYS AS DIRECTED, Disp: 3 mL, Rfl: 3  •  pregabalin (LYRICA) 150 MG capsule, Take 1 capsule by mouth 2 (Two) Times a Day., Disp: 60 capsule, Rfl: 1  •  Sod Picosulfate-Mag Ox-Cit Acd 10-3.5-12 MG-GM -GM/160ML solution, Take 160 mL by mouth Take As Directed for 2 doses., Disp: 320 mL, Rfl: 0  •  traZODone (DESYREL) 50 MG tablet, TAKE 1 TABLET BY MOUTH EVERY NIGHT, Disp: 30 tablet, Rfl: 2  •  valsartan (DIOVAN) 40 MG tablet, TAKE 1/2 TABLET BY MOUTH EVERY MORNING, Disp: 45 tablet, Rfl: 0  •  HYDROcod Polst-CPM Polst ER (Tussionex Pennkinetic ER) 10-8 MG/5ML ER suspension, Take 5 mL by mouth Every 12 (Twelve) Hours As Needed for Cough., Disp: 60 mL, Rfl: 0  •  mirtazapine (Remeron) 15 MG tablet, Take 1 tablet by mouth Every Night., Disp: 30 tablet, Rfl: 2  •  nitrofurantoin, macrocrystal-monohydrate, (MACROBID) 100 MG capsule, Take 1 capsule by mouth 2 (Two) Times a Day for 5 days., Disp: 10 capsule, Rfl: 0    Allergies:   Allergies   Allergen Reactions   • Metformin GI Intolerance   • Lisinopril Cough       Objective   Objective     Physical Exam:  Vital Signs:   Vitals:    04/21/22 1318   BP: 132/70   Pulse: 75   Resp: 16   SpO2: 99%   Weight: 122 kg (269 lb 6.4 oz)   Height: 180.3 cm (70.98\")     Body mass index is 37.59 kg/m².     Physical Exam  Nursing note reviewed  Const: NAD, A&Ox4, Pleasant, Cooperative  Eyes: EOMI, no conjunctivitis  ENT: No nasal discharge present, neck supple  Cardiac: Regular rate and rhythm, no cyanosis  Resp: Respiratory rate within normal limits, no increased work of breathing, no audible wheezing or retractions noted  GI: No distention or ascites  MSK: Motor and " sensation grossly intact in bilateral upper extremities  Neurologic: CN II-XII grossly intact  Psych: Appropriate mood and behavior.  Skin: Warm, dry  Procedures/Radiology     Procedures  No radiology results for the last 7 days     Assessment/Plan   Assessment / Plan      Assessment/Plan:   Problems Addressed This Visit  Diagnoses and all orders for this visit:    1. Urinary frequency (Primary)  Comments:  I will send in a prescription for an antibiotic.  Orders:  -     POC Glycosylated Hemoglobin (Hb A1C)  -     POCT urinalysis dipstick, automated  -     nitrofurantoin, macrocrystal-monohydrate, (MACROBID) 100 MG capsule; Take 1 capsule by mouth 2 (Two) Times a Day for 5 days.  Dispense: 10 capsule; Refill: 0    2. Anxiety  -     mirtazapine (Remeron) 15 MG tablet; Take 1 tablet by mouth Every Night.  Dispense: 30 tablet; Refill: 2    3. Primary insomnia  Comments:  I will send in a prescription for mirtazapine at night for sleep plus anxiety.  Orders:  -     mirtazapine (Remeron) 15 MG tablet; Take 1 tablet by mouth Every Night.  Dispense: 30 tablet; Refill: 2      Problem List Items Addressed This Visit    None     Visit Diagnoses     Urinary frequency    -  Primary    I will send in a prescription for an antibiotic.    Relevant Medications    nitrofurantoin, macrocrystal-monohydrate, (MACROBID) 100 MG capsule    Other Relevant Orders    POC Glycosylated Hemoglobin (Hb A1C)    POCT urinalysis dipstick, automated (Completed)    Anxiety        Relevant Medications    mirtazapine (Remeron) 15 MG tablet    Primary insomnia        I will send in a prescription for mirtazapine at night for sleep plus anxiety.    Relevant Medications    mirtazapine (Remeron) 15 MG tablet              Patient Instructions   1. Send us FMLA paperwork--to be off intermittently for doctor visits until 7/1/22 (after appt with ) while working up and treating hoarseness, GI, etc  2. Use mirtazapine for sleep nightly      Follow Up:   No  follow-ups on file.        MDM     [facility] [address]  [facility]  [address]  [facility] [address]  Fax [phone]  Phone [phone]    Transcribed from ambient dictation for Ryne Puente DO by TRINA KASPER.  04/21/22   15:06 EDT    Patient verbalized consent to the visit recording.

## 2022-04-21 NOTE — TELEPHONE ENCOUNTER
Rx Refill Note  Requested Prescriptions     Pending Prescriptions Disp Refills   • HYDROcod Polst-CPM Polst ER (Tussionex Pennkinetic ER) 10-8 MG/5ML ER suspension 60 mL 0     Sig: Take 5 mL by mouth Every 12 (Twelve) Hours As Needed for Cough.      Last office visit with prescribing clinician: 4/21/2022      Next office visit with prescribing clinician: Visit date not found 23}  Flor Caceres MA  04/21/22, 14:36 EDT     Last fill: 03/25/2022

## 2022-04-22 RX ORDER — NITROFURANTOIN 25; 75 MG/1; MG/1
100 CAPSULE ORAL 2 TIMES DAILY
Qty: 10 CAPSULE | Refills: 0 | Status: SHIPPED | OUTPATIENT
Start: 2022-04-22 | End: 2022-04-27

## 2022-04-29 ENCOUNTER — PRE-ADMISSION TESTING (OUTPATIENT)
Dept: PREADMISSION TESTING | Facility: HOSPITAL | Age: 57
End: 2022-04-29

## 2022-04-29 LAB
DEPRECATED RDW RBC AUTO: 42.1 FL (ref 37–54)
ERYTHROCYTE [DISTWIDTH] IN BLOOD BY AUTOMATED COUNT: 14.3 % (ref 12.3–15.4)
HCT VFR BLD AUTO: 36.5 % (ref 34–46.6)
HGB BLD-MCNC: 12.1 G/DL (ref 12–15.9)
INR PPP: 0.95 (ref 0.84–1.13)
MCH RBC QN AUTO: 27.1 PG (ref 26.6–33)
MCHC RBC AUTO-ENTMCNC: 33.2 G/DL (ref 31.5–35.7)
MCV RBC AUTO: 81.7 FL (ref 79–97)
PLATELET # BLD AUTO: 191 10*3/MM3 (ref 140–450)
PMV BLD AUTO: 11.1 FL (ref 6–12)
POTASSIUM SERPL-SCNC: 3.8 MMOL/L (ref 3.5–5.2)
PROTHROMBIN TIME: 12.6 SECONDS (ref 11.4–14.4)
QT INTERVAL: 376 MS
QTC INTERVAL: 408 MS
RBC # BLD AUTO: 4.47 10*6/MM3 (ref 3.77–5.28)
WBC NRBC COR # BLD: 5.63 10*3/MM3 (ref 3.4–10.8)

## 2022-04-29 PROCEDURE — 93010 ELECTROCARDIOGRAM REPORT: CPT | Performed by: INTERNAL MEDICINE

## 2022-04-29 PROCEDURE — 85027 COMPLETE CBC AUTOMATED: CPT

## 2022-04-29 PROCEDURE — 36415 COLL VENOUS BLD VENIPUNCTURE: CPT

## 2022-04-29 PROCEDURE — 84132 ASSAY OF SERUM POTASSIUM: CPT

## 2022-04-29 PROCEDURE — 85610 PROTHROMBIN TIME: CPT

## 2022-04-29 PROCEDURE — 93005 ELECTROCARDIOGRAM TRACING: CPT

## 2022-05-02 ENCOUNTER — ANESTHESIA EVENT (OUTPATIENT)
Dept: GASTROENTEROLOGY | Facility: HOSPITAL | Age: 57
End: 2022-05-02

## 2022-05-02 RX ORDER — FAMOTIDINE 20 MG/1
20 TABLET, FILM COATED ORAL ONCE
Status: CANCELLED | OUTPATIENT
Start: 2022-05-02 | End: 2022-05-02

## 2022-05-02 RX ORDER — LIDOCAINE HYDROCHLORIDE 10 MG/ML
0.5 INJECTION, SOLUTION EPIDURAL; INFILTRATION; INTRACAUDAL; PERINEURAL ONCE AS NEEDED
Status: CANCELLED | OUTPATIENT
Start: 2022-05-02

## 2022-05-02 RX ORDER — SODIUM CHLORIDE 0.9 % (FLUSH) 0.9 %
10 SYRINGE (ML) INJECTION EVERY 12 HOURS SCHEDULED
Status: CANCELLED | OUTPATIENT
Start: 2022-05-02

## 2022-05-03 ENCOUNTER — HOSPITAL ENCOUNTER (OUTPATIENT)
Facility: HOSPITAL | Age: 57
Setting detail: HOSPITAL OUTPATIENT SURGERY
Discharge: HOME | End: 2022-05-03
Attending: INTERNAL MEDICINE | Admitting: INTERNAL MEDICINE

## 2022-05-03 ENCOUNTER — ANESTHESIA (OUTPATIENT)
Dept: GASTROENTEROLOGY | Facility: HOSPITAL | Age: 57
End: 2022-05-03

## 2022-05-03 VITALS
RESPIRATION RATE: 16 BRPM | HEART RATE: 77 BPM | SYSTOLIC BLOOD PRESSURE: 138 MMHG | DIASTOLIC BLOOD PRESSURE: 89 MMHG | TEMPERATURE: 97.6 F | OXYGEN SATURATION: 96 %

## 2022-05-03 DIAGNOSIS — Z12.11 ENCOUNTER FOR SCREENING FOR MALIGNANT NEOPLASM OF COLON: ICD-10-CM

## 2022-05-03 LAB — GLUCOSE BLDC GLUCOMTR-MCNC: 132 MG/DL (ref 70–130)

## 2022-05-03 PROCEDURE — 45385 COLONOSCOPY W/LESION REMOVAL: CPT | Performed by: INTERNAL MEDICINE

## 2022-05-03 PROCEDURE — 25010000002 PROPOFOL 10 MG/ML EMULSION

## 2022-05-03 PROCEDURE — S0260 H&P FOR SURGERY: HCPCS | Performed by: INTERNAL MEDICINE

## 2022-05-03 PROCEDURE — 82962 GLUCOSE BLOOD TEST: CPT | Performed by: FAMILY MEDICINE

## 2022-05-03 PROCEDURE — 88305 TISSUE EXAM BY PATHOLOGIST: CPT | Performed by: INTERNAL MEDICINE

## 2022-05-03 RX ORDER — ONDANSETRON 2 MG/ML
4 INJECTION INTRAMUSCULAR; INTRAVENOUS ONCE AS NEEDED
Status: DISCONTINUED | OUTPATIENT
Start: 2022-05-03 | End: 2022-05-03 | Stop reason: HOSPADM

## 2022-05-03 RX ORDER — LIDOCAINE HYDROCHLORIDE 10 MG/ML
INJECTION, SOLUTION EPIDURAL; INFILTRATION; INTRACAUDAL; PERINEURAL AS NEEDED
Status: DISCONTINUED | OUTPATIENT
Start: 2022-05-03 | End: 2022-05-03 | Stop reason: SURG

## 2022-05-03 RX ORDER — MIDAZOLAM HYDROCHLORIDE 1 MG/ML
1 INJECTION INTRAMUSCULAR; INTRAVENOUS
Status: DISCONTINUED | OUTPATIENT
Start: 2022-05-03 | End: 2022-05-03 | Stop reason: HOSPADM

## 2022-05-03 RX ORDER — FAMOTIDINE 10 MG/ML
20 INJECTION, SOLUTION INTRAVENOUS ONCE
Status: COMPLETED | OUTPATIENT
Start: 2022-05-03 | End: 2022-05-03

## 2022-05-03 RX ORDER — IPRATROPIUM BROMIDE AND ALBUTEROL SULFATE 2.5; .5 MG/3ML; MG/3ML
3 SOLUTION RESPIRATORY (INHALATION) ONCE AS NEEDED
Status: DISCONTINUED | OUTPATIENT
Start: 2022-05-03 | End: 2022-05-03 | Stop reason: HOSPADM

## 2022-05-03 RX ORDER — SODIUM CHLORIDE 0.9 % (FLUSH) 0.9 %
10 SYRINGE (ML) INJECTION AS NEEDED
Status: DISCONTINUED | OUTPATIENT
Start: 2022-05-03 | End: 2022-05-03 | Stop reason: HOSPADM

## 2022-05-03 RX ORDER — SODIUM CHLORIDE, SODIUM LACTATE, POTASSIUM CHLORIDE, CALCIUM CHLORIDE 600; 310; 30; 20 MG/100ML; MG/100ML; MG/100ML; MG/100ML
9 INJECTION, SOLUTION INTRAVENOUS CONTINUOUS
Status: DISCONTINUED | OUTPATIENT
Start: 2022-05-03 | End: 2022-05-03 | Stop reason: HOSPADM

## 2022-05-03 RX ADMIN — FAMOTIDINE 20 MG: 10 INJECTION INTRAVENOUS at 09:00

## 2022-05-03 RX ADMIN — SODIUM CHLORIDE, POTASSIUM CHLORIDE, SODIUM LACTATE AND CALCIUM CHLORIDE 9 ML/HR: 600; 310; 30; 20 INJECTION, SOLUTION INTRAVENOUS at 08:57

## 2022-05-03 RX ADMIN — PROPOFOL 80 MCG/KG/MIN: 10 INJECTION, EMULSION INTRAVENOUS at 09:51

## 2022-05-03 RX ADMIN — Medication 10 ML: at 08:58

## 2022-05-03 RX ADMIN — LIDOCAINE HYDROCHLORIDE 50 MG: 10 INJECTION, SOLUTION EPIDURAL; INFILTRATION; INTRACAUDAL; PERINEURAL at 09:51

## 2022-05-03 NOTE — H&P
[facility]    Referring Provider: Bryant Mejia MD    Reason for Consultation: screening colonoscopy    Chief complaint here for screening colonoscopy    History of present illness:  Kellen Esparza is a 56 y.o. female who presents to inpatient endoscopy for screening colonoscopy. H/o cad, s/p pacemaker, chronic anticoagulation use-eliquis, h/o covid with vocal cord dysfunction. No gib loss or fever.    Allergies:  Metformin, Codeine, and Lisinopril    Scheduled Meds:        Infusions:  lactated ringers, 9 mL/hr, Last Rate: 9 mL/hr (05/03/22 0857)        PRN Meds:  midazolam  •  sodium chloride    Home Meds:  Medications Prior to Admission   Medication Sig Dispense Refill Last Dose   • aspirin (aspirin) 81 MG EC tablet Take 1 tablet by mouth Daily. 90 tablet 3 5/2/2022 at 1000   • atorvastatin (LIPITOR) 80 MG tablet TAKE 1 TABLET BY MOUTH DAILY 90 tablet 3 5/2/2022 at Unknown time   • baclofen (LIORESAL) 10 MG tablet TAKE 1 TABLET BY MOUTH TWICE DAILY AS NEEDED FOR MUSCLE SPASMS 60 tablet 1 5/2/2022 at Unknown time   • brompheniramine-pseudoephedrine-DM 30-2-10 MG/5ML syrup Take 5 mL by mouth 4 (Four) Times a Day.   Past Month at Unknown time   • famotidine (PEPCID) 40 MG tablet Take 1 tablet by mouth Every Night.   5/2/2022 at Unknown time   • HYDROcod Polst-CPM Polst ER (Tussionex Pennkinetic ER) 10-8 MG/5ML ER suspension Take 5 mL by mouth Every 12 (Twelve) Hours As Needed for Cough. 60 mL 0 Past Month at Unknown time   • levETIRAcetam (KEPPRA) 250 MG tablet TAKE 1 TABLET BY MOUTH TWICE DAILY 180 tablet 0 5/2/2022 at Unknown time   • mirtazapine (Remeron) 15 MG tablet Take 1 tablet by mouth Every Night. 30 tablet 2 5/2/2022 at Unknown time   • omeprazole (priLOSEC) 40 MG capsule Take 1 capsule by mouth 2 (Two) Times a Day Before Meals.   5/2/2022 at Unknown time   • pregabalin (LYRICA) 150 MG capsule Take 1 capsule by mouth 2 (Two) Times a Day. 60 capsule 1 5/2/2022 at Unknown time   • Sod  Picosulfate-Mag Ox-Cit Acd 10-3.5-12 MG-GM -GM/160ML solution Take 160 mL by mouth Take As Directed for 2 doses. 320 mL 0 5/2/2022 at Unknown time   • valsartan (DIOVAN) 40 MG tablet TAKE 1/2 TABLET BY MOUTH EVERY MORNING 45 tablet 0 5/2/2022 at Unknown time   • apixaban (Eliquis) 5 MG tablet tablet Take 1 tablet by mouth 2 (Two) Times a Day. (Patient taking differently: Take 5 mg by mouth 2 (Two) Times a Day. Can hold 48 hours prior to colonoscopy per Dr. Sommer.) 180 tablet 3 4/30/2022       ROS: Review of Systems  All other systems reviewed and are negative.    PAST MED HX: Pt  has a past medical history of Arthritis, CPAP (continuous positive airway pressure) dependence, Depression, Diabetes mellitus ([facility]), GERD (gastroesophageal reflux disease), History of COVID-19 (01/2022), Hypertension, Migraine, Musculoligamentous strain, Pacemaker, Pulmonary embolism ([facility]) (1997), Sleep apnea, and Wears eyeglasses.  PAST SURG HX: Pt  has a past surgical history that includes Oophorectomy (11/2014); Cholecystectomy; Tubal ligation; Breast biopsy (Left, 2014); Hysterectomy (11/2014); Colonoscopy; Esophagogastroduodenoscopy; Nasal sinus surgery; Pulmonary embolism surgery; Knee surgery (Right, 12/2017); Cardiac catheterization (Left, 8/10/2017); Joint replacement (Right); Cardiac electrophysiology procedure (N/A, 7/19/2019); Insert / replace / remove pacemaker; Pacemaker Implantation; and Spinal cord stimulator implant (N/A, 9/10/2020).  FAM HX: family history includes Breast cancer (age of onset: 42) in her mother; Heart failure in her mother and son; Hypertension in her brother, maternal grandfather, maternal grandmother, and sister; No Known Problems in her father, paternal grandfather, and paternal grandmother.  SOC HX: Pt  reports that she has never smoked. She has never used smokeless tobacco. She reports that she does not drink alcohol and does not use drugs.    /86 (BP Location: Left arm, Patient Position:  Sitting)   Pulse 81   Temp 97.5 °F (36.4 °C) (Tympanic)   Resp 18   LMP 10/31/2014 (LMP Unknown) Comment: Mammogram 2014 info given   SpO2 96%     Physical Exam  Wt Readings from Last 3 Encounters:   04/21/22 122 kg (269 lb 6.4 oz)   04/11/22 121 kg (266 lb)   04/01/22 121 kg (266 lb 6.4 oz)   ,body mass index is unknown because there is no height or weight on file.    General Appearance:  Vitals as above. no acute distress  Head/face:  Normocephalic, atraumatic  Eyes:   EOMI, no conjunctivitis or icterus   Nose/Sinuses:  Nares patent bilaterally without discharge or lesions  Mouth/Throat:  Normal oral movements without dyskinesia  Neck:  trachea is midline, no thyromegaly  Lungs:  Normal work of breathing effort, no overt rales  Abdomen: obese, no rebound    Neurologic:  Alert; no focal deficits; age appropriate behavior and speech  Psychiatric: mood and affect are congruent  Vascular: extremities without edema  Skin: no rash or cyanosis.          Results Review:   I reviewed the patient's new clinical results.    Lab Results   Component Value Date    WBC 5.63 04/29/2022    HGB 12.1 04/29/2022    HCT 36.5 04/29/2022    MCV 81.7 04/29/2022     04/29/2022       Lab Results   Component Value Date    GLUCOSE 86 12/09/2021    BUN 12 12/09/2021    CREATININE 0.88 12/09/2021    EGFRIFAFRI 80 12/09/2021    BCR 13.6 12/09/2021    CO2 29.6 (H) 12/09/2021    CALCIUM 9.5 12/09/2021    ALBUMIN 4.10 01/08/2021    AST 21 01/08/2021    ALT 24 01/08/2021       ASSESSMENTS/PLANS  1.) HCM  2.) s/p pacemaker  3.) Anticoagulation use  4.) CAD  To screening colonoscopy  The risk of endoscopy was discussed including the risk of anesthesia, bowel perforation, bleeding, missed lesion, infection, and death should a severe complication occur.  The patient determined that the diagnostic benefit outweighed the risk.             I discussed the patients findings and my recommendations with the patient    Bryant Mejia,  MD  05/03/22  09:36 EDT

## 2022-05-03 NOTE — ANESTHESIA POSTPROCEDURE EVALUATION
Patient: Kellen Esparza    Procedure Summary     Date: 05/03/22 Room / Location:  [facility] /  [facility]    Anesthesia Start: 0945 Anesthesia Stop:     Procedure: COLONOSCOPY (N/A ) Diagnosis:       Encounter for screening for malignant neoplasm of colon      (Encounter for screening for malignant neoplasm of colon [Z12.11])    Surgeons: Bryant Mejia MD Provider: Al Lux MD    Anesthesia Type: general, MAC ASA Status: 3          Anesthesia Type: general, MAC    Vitals  Vitals Value Taken Time   /85 05/03/22 1010   Temp 97.6 °F (36.4 °C) 05/03/22 1010   Pulse 78 05/03/22 1012   Resp 16 05/03/22 1010   SpO2 98 % 05/03/22 1012   Vitals shown include unvalidated device data.        Post Anesthesia Care and Evaluation    Patient location during evaluation: PACU  Patient participation: complete - patient participated  Level of consciousness: awake and alert  Pain management: adequate  Airway patency: patent  Anesthetic complications: No anesthetic complications  PONV Status: none  Cardiovascular status: hemodynamically stable and acceptable  Respiratory status: nonlabored ventilation, acceptable and nasal cannula  Hydration status: acceptable

## 2022-05-03 NOTE — ANESTHESIA PREPROCEDURE EVALUATION
Anesthesia Evaluation     Patient summary reviewed and Nursing notes reviewed   NPO Solid Status: > 8 hours  NPO Liquid Status: > 8 hours           Airway   Mallampati: II  TM distance: >3 FB  Neck ROM: limited  Possible difficult intubation  Dental      Pulmonary    (+) pulmonary embolism (remote 1992 ), shortness of breath (stable), sleep apnea (Recalled CPAP),   (-) COPD, asthma, recent URI, not a smoker, no home oxygen  Cardiovascular     ECG reviewed    (+) hypertension, dysrhythmias Paroxysmal Atrial Fib, DVT, hyperlipidemia,   (-) past MI, CAD (denies -see Cath 2017 ), angina (denies), cardiac stents    ROS comment: ECG no PM- SR 1st degree AV block NS TW abn    ECHO 2018 · EF = 65%.   mild concentric LVH   LV DD (grade I a) consistent with impaired relaxation.    GXT 2017 EF = 70% MPS .   medium-sized infarct -anterior wall with no significant ischemia noted- possible breast attenuation artifact -uncertain risk.    CATH 2017 small vessel disease in the distal portion of a small RPL S.   No obstructive CAD         Neuro/Psych  (+) headaches, numbness, psychiatric history Depression,    (-) seizures, CVA    ROS Comment: On Keppra for pain not seizures   GI/Hepatic/Renal/Endo    (+) obesity, morbid obesity, GERD,  diabetes mellitus type 2,   (-) liver disease, no renal disease, no thyroid disorder    Musculoskeletal     Abdominal    Substance History      OB/GYN          Other   arthritis,      ROS/Med Hx Other: Pacemaker 3 years ago with complex pain syndrome ? After now with Spinal cord stimulator 2 years                   Anesthesia Plan    ASA 3     general and MAC   (PFL CPAP (via mask circuit))  intravenous induction     Anesthetic plan, all risks, benefits, and alternatives have been provided, discussed and informed consent has been obtained with: patient.    Plan discussed with CRNA.        CODE STATUS:

## 2022-05-04 LAB
CYTO UR: NORMAL
LAB AP CASE REPORT: NORMAL
LAB AP CLINICAL INFORMATION: NORMAL
PATH REPORT.FINAL DX SPEC: NORMAL
PATH REPORT.GROSS SPEC: NORMAL

## 2022-05-05 ENCOUNTER — TELEPHONE (OUTPATIENT)
Dept: GASTROENTEROLOGY | Facility: CLINIC | Age: 57
End: 2022-05-05

## 2022-05-05 NOTE — TELEPHONE ENCOUNTER
----- Message from Bryant Mejia MD sent at 5/5/2022 10:25 AM EDT -----  Your tissue is benign without precancerous tissue.  I recommend a repeat colonoscopy in 1 year with a 2 DAY BOWEL PREP as suggested by your colonoscopy report .  It was nice to see you.

## 2022-05-11 DIAGNOSIS — E11.9 TYPE 2 DIABETES MELLITUS WITHOUT COMPLICATION, WITHOUT LONG-TERM CURRENT USE OF INSULIN: ICD-10-CM

## 2022-05-11 DIAGNOSIS — G90.59 COMPLEX REGIONAL PAIN SYNDROME TYPE 1 AFFECTING OTHER SITE: ICD-10-CM

## 2022-05-11 DIAGNOSIS — I10 ESSENTIAL HYPERTENSION: ICD-10-CM

## 2022-05-11 RX ORDER — VALSARTAN 40 MG/1
TABLET ORAL
Qty: 45 TABLET | Refills: 0 | Status: SHIPPED | OUTPATIENT
Start: 2022-05-11 | End: 2022-10-03 | Stop reason: SDUPTHER

## 2022-05-11 NOTE — TELEPHONE ENCOUNTER
Rx Refill Note  Requested Prescriptions     Pending Prescriptions Disp Refills   • levETIRAcetam (KEPPRA) 250 MG tablet [Pharmacy Med Name: LEVETIRACETAM 250MG TABLETS] 180 tablet 0     Sig: TAKE 1 TABLET BY MOUTH TWICE DAILY      Last office visit with prescribing clinician: 4/21/2022      Next office visit with prescribing clinician: Visit date not found            Ivon Barreto Rep  05/11/22, 14:31 EDT     Unable to lvm. Pt was to return in June for a follow up.    HUB OKAY TO RELAY MESSAGE AND SCHEDULE

## 2022-05-11 NOTE — TELEPHONE ENCOUNTER
Rx Refill Note  Requested Prescriptions     Pending Prescriptions Disp Refills   • valsartan (DIOVAN) 40 MG tablet [Pharmacy Med Name: VALSARTAN 40MG TABLETS] 45 tablet 0     Sig: TAKE 1/2 TABLET BY MOUTH EVERY MORNING      Last office visit with prescribing clinician: 4/21/2022      Next office visit with prescribing clinician: Visit date not found            Ivon Barreto Rep  05/11/22, 08:19 EDT

## 2022-05-12 RX ORDER — LEVETIRACETAM 250 MG/1
TABLET ORAL
Qty: 180 TABLET | Refills: 0 | Status: SHIPPED | OUTPATIENT
Start: 2022-05-12 | End: 2022-12-08

## 2022-05-12 NOTE — TELEPHONE ENCOUNTER
Rx Refill Note  Requested Prescriptions     Pending Prescriptions Disp Refills   • levETIRAcetam (KEPPRA) 250 MG tablet [Pharmacy Med Name: LEVETIRACETAM 250MG TABLETS] 180 tablet 0     Sig: TAKE 1 TABLET BY MOUTH TWICE DAILY      Last office visit with prescribing clinician: 4/21/2022      Next office visit with prescribing clinician: Visit date not found            Nedra Arias MA  05/12/22, 08:42 EDT

## 2022-05-15 PROCEDURE — 93294 REM INTERROG EVL PM/LDLS PM: CPT | Performed by: INTERNAL MEDICINE

## 2022-05-15 PROCEDURE — 93296 REM INTERROG EVL PM/IDS: CPT | Performed by: INTERNAL MEDICINE

## 2022-06-06 ENCOUNTER — OFFICE VISIT (OUTPATIENT)
Dept: CARDIOLOGY | Facility: CLINIC | Age: 57
End: 2022-06-06

## 2022-06-06 ENCOUNTER — LAB (OUTPATIENT)
Dept: LAB | Facility: HOSPITAL | Age: 57
End: 2022-06-06

## 2022-06-06 VITALS
BODY MASS INDEX: 38.22 KG/M2 | SYSTOLIC BLOOD PRESSURE: 142 MMHG | DIASTOLIC BLOOD PRESSURE: 98 MMHG | OXYGEN SATURATION: 98 % | HEIGHT: 71 IN | HEART RATE: 76 BPM | WEIGHT: 273 LBS

## 2022-06-06 DIAGNOSIS — I48.0 PAROXYSMAL ATRIAL FIBRILLATION: ICD-10-CM

## 2022-06-06 DIAGNOSIS — R06.09 DYSPNEA ON EXERTION: ICD-10-CM

## 2022-06-06 DIAGNOSIS — I25.118 CORONARY ARTERY DISEASE OF NATIVE ARTERY OF NATIVE HEART WITH STABLE ANGINA PECTORIS: ICD-10-CM

## 2022-06-06 DIAGNOSIS — Z99.89 OSA ON CPAP: ICD-10-CM

## 2022-06-06 DIAGNOSIS — I44.2 AV BLOCK, COMPLETE: ICD-10-CM

## 2022-06-06 DIAGNOSIS — G47.33 OSA ON CPAP: ICD-10-CM

## 2022-06-06 DIAGNOSIS — I25.118 CORONARY ARTERY DISEASE OF NATIVE ARTERY OF NATIVE HEART WITH STABLE ANGINA PECTORIS: Primary | ICD-10-CM

## 2022-06-06 LAB
ALBUMIN SERPL-MCNC: 4.5 G/DL (ref 3.5–5.2)
ALBUMIN/GLOB SERPL: 1.6 G/DL
ALP SERPL-CCNC: 75 U/L (ref 39–117)
ALT SERPL W P-5'-P-CCNC: 26 U/L (ref 1–33)
ANION GAP SERPL CALCULATED.3IONS-SCNC: 15.1 MMOL/L (ref 5–15)
ARTICHOKE IGE QN: 95 MG/DL (ref 0–100)
AST SERPL-CCNC: 24 U/L (ref 1–32)
BASOPHILS # BLD AUTO: 0.05 10*3/MM3 (ref 0–0.2)
BASOPHILS NFR BLD AUTO: 0.8 % (ref 0–1.5)
BILIRUB SERPL-MCNC: 0.4 MG/DL (ref 0–1.2)
BUN SERPL-MCNC: 13 MG/DL (ref 6–20)
BUN/CREAT SERPL: 18.1 (ref 7–25)
CALCIUM SPEC-SCNC: 9.4 MG/DL (ref 8.6–10.5)
CHLORIDE SERPL-SCNC: 103 MMOL/L (ref 98–107)
CO2 SERPL-SCNC: 24.9 MMOL/L (ref 22–29)
CREAT SERPL-MCNC: 0.72 MG/DL (ref 0.57–1)
D DIMER PPP FEU-MCNC: 0.35 MCGFEU/ML (ref 0.01–0.5)
DEPRECATED RDW RBC AUTO: 43.5 FL (ref 37–54)
EGFRCR SERPLBLD CKD-EPI 2021: 97.1 ML/MIN/1.73
EOSINOPHIL # BLD AUTO: 0.12 10*3/MM3 (ref 0–0.4)
EOSINOPHIL NFR BLD AUTO: 1.9 % (ref 0.3–6.2)
ERYTHROCYTE [DISTWIDTH] IN BLOOD BY AUTOMATED COUNT: 14.6 % (ref 12.3–15.4)
GLOBULIN UR ELPH-MCNC: 2.8 GM/DL
GLUCOSE SERPL-MCNC: 79 MG/DL (ref 65–99)
HCT VFR BLD AUTO: 37.3 % (ref 34–46.6)
HGB BLD-MCNC: 12.2 G/DL (ref 12–15.9)
IMM GRANULOCYTES # BLD AUTO: 0.01 10*3/MM3 (ref 0–0.05)
IMM GRANULOCYTES NFR BLD AUTO: 0.2 % (ref 0–0.5)
LYMPHOCYTES # BLD AUTO: 2.29 10*3/MM3 (ref 0.7–3.1)
LYMPHOCYTES NFR BLD AUTO: 36.8 % (ref 19.6–45.3)
MCH RBC QN AUTO: 27.2 PG (ref 26.6–33)
MCHC RBC AUTO-ENTMCNC: 32.7 G/DL (ref 31.5–35.7)
MCV RBC AUTO: 83.3 FL (ref 79–97)
MONOCYTES # BLD AUTO: 0.39 10*3/MM3 (ref 0.1–0.9)
MONOCYTES NFR BLD AUTO: 6.3 % (ref 5–12)
NEUTROPHILS NFR BLD AUTO: 3.36 10*3/MM3 (ref 1.7–7)
NEUTROPHILS NFR BLD AUTO: 54 % (ref 42.7–76)
NRBC BLD AUTO-RTO: 0 /100 WBC (ref 0–0.2)
PLATELET # BLD AUTO: 178 10*3/MM3 (ref 140–450)
PMV BLD AUTO: 11.9 FL (ref 6–12)
POTASSIUM SERPL-SCNC: 4.4 MMOL/L (ref 3.5–5.2)
PROT SERPL-MCNC: 7.3 G/DL (ref 6–8.5)
RBC # BLD AUTO: 4.48 10*6/MM3 (ref 3.77–5.28)
SODIUM SERPL-SCNC: 143 MMOL/L (ref 136–145)
TSH SERPL DL<=0.05 MIU/L-ACNC: 0.85 UIU/ML (ref 0.27–4.2)
WBC NRBC COR # BLD: 6.22 10*3/MM3 (ref 3.4–10.8)

## 2022-06-06 PROCEDURE — 85379 FIBRIN DEGRADATION QUANT: CPT

## 2022-06-06 PROCEDURE — 36415 COLL VENOUS BLD VENIPUNCTURE: CPT

## 2022-06-06 PROCEDURE — 83721 ASSAY OF BLOOD LIPOPROTEIN: CPT

## 2022-06-06 PROCEDURE — 83880 ASSAY OF NATRIURETIC PEPTIDE: CPT

## 2022-06-06 PROCEDURE — 99214 OFFICE O/P EST MOD 30 MIN: CPT | Performed by: INTERNAL MEDICINE

## 2022-06-06 PROCEDURE — 93000 ELECTROCARDIOGRAM COMPLETE: CPT | Performed by: INTERNAL MEDICINE

## 2022-06-06 PROCEDURE — 80050 GENERAL HEALTH PANEL: CPT

## 2022-06-06 RX ORDER — HYDROCHLOROTHIAZIDE 12.5 MG/1
12.5 CAPSULE, GELATIN COATED ORAL DAILY
Qty: 30 CAPSULE | Refills: 11 | Status: SHIPPED | OUTPATIENT
Start: 2022-06-06 | End: 2022-10-03

## 2022-06-06 NOTE — PROGRESS NOTES
[facility] AT [address], [address]  [place], [place], [address]    [phone]  WWW.[facility]FREE[facility]it[facility]           OUTPATIENT CLINIC FOLLOW UP NOTE    Patient Care Team:  Patient Care Team:  Ryne Puente DO as PCP - General (Family Medicine)  Johny Sommer MD as Consulting Physician (Cardiology)  Sonya Gay MD as Consulting Physician (Cardiology)  Deb Guajardo APRN as Nurse Practitioner (Pulmonary Disease)  Sal Sheldon MD (Pain Medicine)    Subjective:     Chief complaint: Dyspnea, AV block, pacemaker site pain    HPI:    Kellen Esparza is a 56 y.o. female.  Cardiac Problem List:  1. Small-vessel coronary artery disease:  a. MPS, 07/26/2017: EF 70%. Medium-sized infarct pattern located in the anterior wall with no significant ischemia noted. This finding is worse on resting images which is suggestive of possible breast attenuation artifact instead of an infarction.  b. LHC, 08/10/2017: Mild-to-moderate diffuse tortuosity of the vessels. Evidence of small vessel disease in the distal portion of a small RPL S. No obstructive CAD.  2.  High degree AV Block:  srinivasan Shine, 04/29/2019: Only 1.5 days of monitoring. High grade AV block for 4.4 seconds at 7:23 am. 2nd degree AV block for 10 seconds at 8:53 pm. Average HR 86 bpm.  dotty Shine, 06/18/2019: Only one day of monitoring. 2.9% PACs. 1st degree AV block, brief 2nd degree type I AVB.   c. MEGANOT, 06/24/2019: High degree AV block.  d. Pacemaker implant, 07/19/2019, Dr. Gay: [place] [employer] DR model L311 serial #[id number]. DDDR .  e. Significant neuropathic pain at the pacemaker site.  3. Mild diastolic dysfunction:  a. Echo, 11/28/2018: EF 65% with mild concentric LVH. Grade I a diastolic dysfunction. Left atrium borderline dilated.  4. Paroxysmal atrial fibrillation:  a. Noted on device interrogation, 11/13/2019.   b. CHADS-VASc = 5 (HTN, DM, Female, Vasc disease, history of PE).     c. Multiple episodes of atrial fibrillation all less than 1 minute.  5. Diabetes  6. Hypertension:  a. 24h BP monitor, 04/09/2019: 45% > 140 mmHg systolic, max 164, min 103. Average HR 82 max 111, min 56.  b. Hair loss with valsartan  7. Hyperlipidemia  8. Migraines  9. History of PE, in the 1990s  10. COVID pneumonia January 2022  11. ALYSIA, on CPAP.  a. Followed by Dr. Quispe.  b. CPAP recalled, 2021    Today she presents for follow-up.      The patient has had progressive exertional dyspnea and fatigue over the last several months.  Preceded her COVID diagnosis in January, but has worsened since then.  Has noted mild lower extremity swelling that comes and goes.  No skin discoloration.  Legs are not painful.  Has chest tightness/squeezing when she becomes dyspneic.  Does not clearly have exertional chest pain apart from her dyspnea.  Uncertain of what her blood pressure has been.  Does not own a blood pressure cuff    Off CPAP since her last 1 was recalled.    Review of Systems:  As noted in the HPI    PFSH:  Patient Active Problem List   Diagnosis   • Chest pain   • Dyspnea on exertion   • Essential hypertension   • Pain of right breast   • Lower extremity edema   • Diabetes mellitus (HCC)   • Abnormal stress test   • Migraine   • History of pulmonary embolus (PE)   • Hyperlipidemia   • DDD (degenerative disc disease), cervical   • Coronary artery disease of native artery of native heart with stable angina pectoris (HCC)   • AV block, complete (S/P PPM 7/2019)   • Restrictive pattern on PFT's without evidence of ILD   • Obesity, Class II, BMI 35-39.9   • ALYSIA on CPAP   • Pain in pacemaker pocket   • Paroxysmal atrial fibrillation (HCC)   • Complex regional pain syndrome type I   • Chronic anticoagulation   • Pacemaker   • Morbid obesity (HCC)   • Entrapment neuropathy   • Encounter for fitting and adjustment of neuropacemaker of spinal cord   • Mechanical low back pain   • Presence of neurostimulator   •  Thoracic spondylosis without myelopathy   • Myofascial pain   • Menopausal syndrome (hot flashes)         Current Outpatient Medications:   •  apixaban (Eliquis) 5 MG tablet tablet, Take 1 tablet by mouth 2 (Two) Times a Day. (Patient taking differently: Take 5 mg by mouth 2 (Two) Times a Day. Can hold 48 hours prior to colonoscopy per Dr. Sommer.), Disp: 180 tablet, Rfl: 3  •  aspirin (aspirin) 81 MG EC tablet, Take 1 tablet by mouth Daily., Disp: 90 tablet, Rfl: 3  •  atorvastatin (LIPITOR) 80 MG tablet, TAKE 1 TABLET BY MOUTH DAILY, Disp: 90 tablet, Rfl: 3  •  baclofen (LIORESAL) 10 MG tablet, TAKE 1 TABLET BY MOUTH TWICE DAILY AS NEEDED FOR MUSCLE SPASMS, Disp: 60 tablet, Rfl: 1  •  brompheniramine-pseudoephedrine-DM 30-2-10 MG/5ML syrup, Take 5 mL by mouth 4 (Four) Times a Day., Disp: , Rfl:   •  famotidine (PEPCID) 40 MG tablet, Take 1 tablet by mouth Every Night., Disp: , Rfl:   •  levETIRAcetam (KEPPRA) 250 MG tablet, TAKE 1 TABLET BY MOUTH TWICE DAILY, Disp: 180 tablet, Rfl: 0  •  mirtazapine (Remeron) 15 MG tablet, Take 1 tablet by mouth Every Night., Disp: 30 tablet, Rfl: 2  •  omeprazole (priLOSEC) 40 MG capsule, Take 1 capsule by mouth 2 (Two) Times a Day Before Meals., Disp: , Rfl:   •  pregabalin (LYRICA) 150 MG capsule, Take 1 capsule by mouth 2 (Two) Times a Day., Disp: 60 capsule, Rfl: 1  •  valsartan (DIOVAN) 40 MG tablet, TAKE 1/2 TABLET BY MOUTH EVERY MORNING, Disp: 45 tablet, Rfl: 0  •  hydroCHLOROthiazide (MICROZIDE) 12.5 MG capsule, Take 1 capsule by mouth Daily., Disp: 30 capsule, Rfl: 11  •  HYDROcod Polst-CPM Polst ER (Tussionex Pennkinetic ER) 10-8 MG/5ML ER suspension, Take 5 mL by mouth Every 12 (Twelve) Hours As Needed for Cough., Disp: 60 mL, Rfl: 0  •  Sod Picosulfate-Mag Ox-Cit Acd 10-3.5-12 MG-GM -GM/160ML solution, Take 160 mL by mouth Take As Directed for 2 doses., Disp: 320 mL, Rfl: 0    Allergies   Allergen Reactions   • Metformin GI Intolerance   • Codeine Itching   • Lisinopril  "Cough        reports that she has never smoked. She has never used smokeless tobacco.    Family History   Problem Relation Age of Onset   • Breast cancer Mother 42   • Heart failure Mother    • No Known Problems Father    • Hypertension Sister    • Hypertension Brother    • Heart failure Son    • Hypertension Maternal Grandmother    • No Known Problems Paternal Grandmother    • Hypertension Maternal Grandfather    • No Known Problems Paternal Grandfather    • Ovarian cancer Neg Hx          Objective:   /98 (BP Location: Right arm, Patient Position: Sitting)   Pulse 76   Ht 180.3 cm (70.98\")   Wt 124 kg (273 lb)   LMP 10/31/2014 (LMP Unknown) Comment: Mammogram 2014 info given   SpO2 98%   BMI 38.09 kg/m²   CONSTITUTIONAL: No acute distress  RESPIRATORY: Normal effort. Clear to auscultation bilaterally without wheezing or rales  CARDIOVASCULAR: Regular rate and rhythm with normal S1 and S2. Without murmur, gallop or rub.  PERIPHERAL VASCULAR: Normal radial pulse.  Mild lower extremity swelling, right greater than left      Labs:  Lab Results   Component Value Date    ALT 24 01/08/2021    AST 21 01/08/2021     Lab Results   Component Value Date    CHOL 114 07/26/2019    TRIG 100 07/26/2019    HDL 55 07/26/2019    CREATININE 0.88 12/09/2021       Diagnostic Data:      ECG 12 Lead    Date/Time: 6/6/2022 12:46 PM  Performed by: Johny Sommer MD  Authorized by: Johny Sommer MD   Comparison: compared with previous ECG from 4/29/2022  Similar to previous ECG  Rhythm: sinus rhythm  Conduction: 1st degree AV block  Comments: Nonspecific T wave abnormality            [employer] heart monitor 6/2019  -High degree symptomatic AV Block    Zio Patch 4/2019  -Only 1.5 days of monitoring.  -High-grade AV block for 4.4 seconds at 7:23 AM.  -Second-degree AV block for 10 seconds at 8:53 PM.  -Average heart rate 86.    TTE 11/2018  · Left ventricular systolic function is normal. Estimated EF = 65%.  · Left ventricular wall " thickness is consistent with mild concentric hypertrophy.  · Left ventricular diastolic dysfunction (grade I a) consistent with impaired relaxation.  · Left atrial cavity size is borderline dilated.  · Trace-to-mild aortic valve regurgitation is present.    TTE 7/2017  · Estimated EF appears to be in the range of 66 - 70%.  · Left ventricular wall thickness is consistent with moderate asymmetric hypertrophy.  · Left ventricular diastolic dysfunction (grade I) consistent with impaired relaxation.    [facility] 7/2017  · There is mild to moderate diffuse tortuosity of the vessels.  There is evidence of small vessel disease in the distal portion of a small RPL S.  There is no obstructive coronary artery disease.  · Normal left ventricular ejection fraction with no regional wall motion abnormalities    CT PE Protocol 7/2017 - No evidence of pulmonary embolus or other acute chest disease.    PFTs 11/2018 - moderate restrictive lung disease     PFTs 4/2021  No obstruction, no change with bronchodilator, mild restriction, mild reduction in DLCO overcorrects for alveolar volume.    DEVICE INTERROGATION: Receept, Interrogation date 6/6/22- RA pacing 2%, RV pacing less than 1%.  Thresholds acceptable.  Battery voltage is 6.5 years.  Less than 1% mode switching.  Longest 17 minutes.  18 RVR episodes with the longest being 20 seconds.    Assessment and Plan:     Dyspnea  ALYSIA   History of PE  Obesity  Mild restrictive lung disease  -Etiology of symptoms is uncertain  -Acceptable PFTs last year; unremarkable device interrogation and twelve-lead ECG today  -Ordered repeat lab work, echo, lower extremity venous duplex ultrasound  -Referral back to sleep medicine for CPAP    Complete AV block  Paroxysmal atrial fibrillation  Chest wall pain  -s/p [place] [facility] DC PPM 7/2019, Dr Gay  -Continue Eliquis    Small vessel, coronary artery disease of native artery of native heart with stable angina pectoris  -Continue aspirin,  statin  -Not on a beta-blocker in the past due to bradycardia/fatigue  -Repeat direct LDL today  -Considering discontinuation of aspirin in the future if with stable CAD and on Eliquis    Essential hypertension  -Tolerating half dose valsartan without significant hair loss  -Hypertensive, adding HCTZ 12.5 mg once daily for now, patient advised to purchase a BP cuff and keep a diary over the next couple weeks    - Return in about 3 months (around 9/6/2022).      Johny Sommer MD, MSc, FACC, [facility]  Interventional Cardiology  [facility]

## 2022-06-07 DIAGNOSIS — R06.09 DYSPNEA ON EXERTION: Primary | ICD-10-CM

## 2022-06-07 LAB — NT-PROBNP SERPL-MCNC: 58.1 PG/ML (ref 0–900)

## 2022-06-13 ENCOUNTER — OFFICE VISIT (OUTPATIENT)
Dept: FAMILY MEDICINE CLINIC | Facility: CLINIC | Age: 57
End: 2022-06-13

## 2022-06-13 VITALS
DIASTOLIC BLOOD PRESSURE: 90 MMHG | OXYGEN SATURATION: 95 % | SYSTOLIC BLOOD PRESSURE: 132 MMHG | WEIGHT: 273 LBS | HEIGHT: 71 IN | HEART RATE: 78 BPM | BODY MASS INDEX: 38.22 KG/M2

## 2022-06-13 DIAGNOSIS — J02.9 SORE THROAT: Primary | ICD-10-CM

## 2022-06-13 DIAGNOSIS — J00 ACUTE NASOPHARYNGITIS: ICD-10-CM

## 2022-06-13 LAB
EXPIRATION DATE: NORMAL
EXPIRATION DATE: NORMAL
FLUAV AG UPPER RESP QL IA.RAPID: NOT DETECTED
FLUBV AG UPPER RESP QL IA.RAPID: NOT DETECTED
INTERNAL CONTROL: NORMAL
INTERNAL CONTROL: NORMAL
Lab: NORMAL
Lab: NORMAL
S PYO AG THROAT QL: NEGATIVE
SARS-COV-2 AG UPPER RESP QL IA.RAPID: NOT DETECTED

## 2022-06-13 PROCEDURE — 87880 STREP A ASSAY W/OPTIC: CPT | Performed by: FAMILY MEDICINE

## 2022-06-13 PROCEDURE — 99213 OFFICE O/P EST LOW 20 MIN: CPT | Performed by: FAMILY MEDICINE

## 2022-06-13 PROCEDURE — 87428 SARSCOV & INF VIR A&B AG IA: CPT | Performed by: FAMILY MEDICINE

## 2022-06-13 RX ORDER — ALBUTEROL SULFATE 90 UG/1
2 AEROSOL, METERED RESPIRATORY (INHALATION) EVERY 4 HOURS PRN
Qty: 8 G | Refills: 1 | Status: SHIPPED | OUTPATIENT
Start: 2022-06-13 | End: 2022-10-03

## 2022-06-13 RX ORDER — AZITHROMYCIN 250 MG/1
TABLET, FILM COATED ORAL
Qty: 6 TABLET | Refills: 0 | Status: SHIPPED | OUTPATIENT
Start: 2022-06-13 | End: 2022-07-11

## 2022-06-13 RX ORDER — BROMPHENIRAMINE MALEATE, PSEUDOEPHEDRINE HYDROCHLORIDE, AND DEXTROMETHORPHAN HYDROBROMIDE 2; 30; 10 MG/5ML; MG/5ML; MG/5ML
5 SYRUP ORAL 4 TIMES DAILY
Qty: 118 ML | Refills: 2 | Status: SHIPPED | OUTPATIENT
Start: 2022-06-13 | End: 2022-10-03

## 2022-06-13 NOTE — PATIENT INSTRUCTIONS
"Attain adequate rest and increase clear fluid intake.   Use warm salt water gargles, lozenges, honey as a natural antitussive, and/or Delsym syrup as needed for cough.   Use Mucinex 600 mg twice daily and nasal saline irrigation with \"ocean\" or \"simply saline\" brand sterile nasal spray twice daily.   Use warm compresses over sinuses for comfort.   Zinc lozenges may prevent the adhesion of viruses in the respiratory tract to reduce your risk of getting sick.  Alternate use of Tylenol 500 mg and Ibuprofen 400 mg every 4 hours as needed for headache, body aches, and/or fever reduction  Use Loratadine (Claritin), Cetrizine (Zyrtec), or Fexofenadine (Allegra) once daily over the counter, Flonase 1 spray each nostril daily, and auto-inflate ears using modified valsalva maneuver approximately 20 times daily for ear congestion.     Call or Return to office if symptoms worsen or persist.    "

## 2022-06-14 NOTE — PROGRESS NOTES
Established Patient Office Visit      Patient Name: Kellen Esparza  : 1965   MRN: [id number]   Care Team: Patient Care Team:  Ryne Puente DO as PCP - General (Family Medicine)  Johny Sommer MD as Consulting Physician (Cardiology)  Sonya Gay MD as Consulting Physician (Cardiology)  Deb Guajardo APRN as Nurse Practitioner (Pulmonary Disease)  Sal Sheldon MD (Pain Medicine)    Chief Complaint:    Chief Complaint   Patient presents with   • Sore Throat   • Difficulty Swallowing     6 days took 2 Covid test and was negative    • Earache   • Nasal Congestion       History of Present Illness: Kellen Esparza is a 56 y.o. female who is here today for chief complaint.    HPI    The patient presents today for an acute same day visit for recurrent sore throat, difficulty swallowing, earache, and nasal congestion.    The patient reports that she went to the ENT at the [facility] and they went down and put a balloon in her esophagus. She states that the one at [facility] told her that he was sending her to . She reports that she went to see him and he told her that everything would be okay with speech therapy. She states that she has went twice for speech therapy. She reports that it is the same thing as she has had before, and its the 6th day. She states that she has a really bad cough, and has been taking some daytime and nighttime cough syrup that was prescribed. The patient reports that when she coughs at night, she has a real bad wheeze. She states that she does not have any inhalers. She reports the problem is with the congestion at night, it gets her coughing and it wakes her up and she is choking. She states that she did a COVID-19 test at home twice and it was negative.    She reports that she is scheduled for a vascular doctor. She reports that he wants to take a picture of the ultrasound of her heart to make sure she is not going to congestive heart  failure. She reports that they called her today and they are scheduled for next Monday.    This patient is accompanied by their self who contributes to the history of their care.    The following portions of the patient's history were reviewed and updated as appropriate: allergies, current medications, past family history, past medical history, past social history, past surgical history and problem list.    Subjective      Review of Systems:   Review of Systems - See HPI    A review of systems was performed, and the pertinent positives are noted in the HPI.     Past Medical History:   Past Medical History:   Diagnosis Date   • Arthritis    • CPAP (continuous positive airway pressure) dependence    • Depression    • Diabetes mellitus (HCC)     doesnt check sugar    • GERD (gastroesophageal reflux disease)    • History of COVID-19 01/2022   • Hypertension    • Migraine    • Musculoligamentous strain    • Pacemaker    • Pulmonary embolism ([facility]) 1997   • Sleep apnea     CPAP machine was recalled, waiting for new one.   • Wears eyeglasses        Past Surgical History:   Past Surgical History:   Procedure Laterality Date   • BREAST BIOPSY Left 2014   • CARDIAC CATHETERIZATION Left 8/10/2017    Procedure: Cardiac Catheterization/Vascular Study;  Surgeon: Johny Sommer MD;  Location:  [facility] CATH INVASIVE LOCATION;  Service:    • CARDIAC ELECTROPHYSIOLOGY PROCEDURE N/A 7/19/2019    Procedure: Pacemaker DC new;  Surgeon: Sonya Gay MD;  Location:  [facility] CATH INVASIVE LOCATION;  Service: Cardiology   • CHOLECYSTECTOMY     • COLONOSCOPY     • COLONOSCOPY N/A 5/3/2022    Procedure: COLONOSCOPY;  Surgeon: Bryant Mejia MD;  Location:  [facility] ENDOSCOPY;  Service: Gastroenterology;  Laterality: N/A;   • ENDOSCOPY     • HYSTERECTOMY  11/2014   • INSERT / REPLACE / REMOVE PACEMAKER     • JOINT REPLACEMENT Right     knee   • KNEE SURGERY Right 12/2017    total replacement   • NASAL SINUS SURGERY     • OOPHORECTOMY   11/2014   • PACEMAKER IMPLANTATION     • PULMONARY EMBOLISM SURGERY      right lung   • SPINAL CORD STIMULATOR IMPLANT N/A 9/10/2020    Procedure: SPINAL CORD STIMULATOR INSERTION PHASE 1, UPPER THORACIC PLACEMENT ;  Surgeon: Victor Manuel Geiger MD;  Location: [facility];  Service: Neurosurgery;  Laterality: N/A;   • TUBAL ABDOMINAL LIGATION         Family History:   Family History   Problem Relation Age of Onset   • Breast cancer Mother 42   • Heart failure Mother    • No Known Problems Father    • Hypertension Sister    • Hypertension Brother    • Heart failure Son    • Hypertension Maternal Grandmother    • No Known Problems Paternal Grandmother    • Hypertension Maternal Grandfather    • No Known Problems Paternal Grandfather    • Ovarian cancer Neg Hx        Social History:   Social History     Socioeconomic History   • Marital status: Single   Tobacco Use   • Smoking status: Never Smoker   • Smokeless tobacco: Never Used   Vaping Use   • Vaping Use: Never used   Substance and Sexual Activity   • Alcohol use: No     Comment: rare   • Drug use: No   • Sexual activity: Yes     Partners: Male     Birth control/protection: Surgical       Tobacco History:   Social History     Tobacco Use   Smoking Status Never Smoker   Smokeless Tobacco Never Used       Medications:     Current Outpatient Medications:   •  apixaban (Eliquis) 5 MG tablet tablet, Take 1 tablet by mouth 2 (Two) Times a Day. (Patient taking differently: Take 5 mg by mouth 2 (Two) Times a Day. Can hold 48 hours prior to colonoscopy per Dr. Sommer.), Disp: 180 tablet, Rfl: 3  •  aspirin (aspirin) 81 MG EC tablet, Take 1 tablet by mouth Daily., Disp: 90 tablet, Rfl: 3  •  atorvastatin (LIPITOR) 80 MG tablet, TAKE 1 TABLET BY MOUTH DAILY, Disp: 90 tablet, Rfl: 3  •  baclofen (LIORESAL) 10 MG tablet, TAKE 1 TABLET BY MOUTH TWICE DAILY AS NEEDED FOR MUSCLE SPASMS, Disp: 60 tablet, Rfl: 1  •  brompheniramine-pseudoephedrine-DM 30-2-10 MG/5ML syrup, Take 5 mL by  "mouth 4 (Four) Times a Day., Disp: 118 mL, Rfl: 2  •  famotidine (PEPCID) 40 MG tablet, Take 1 tablet by mouth Every Night., Disp: , Rfl:   •  hydroCHLOROthiazide (MICROZIDE) 12.5 MG capsule, Take 1 capsule by mouth Daily., Disp: 30 capsule, Rfl: 11  •  levETIRAcetam (KEPPRA) 250 MG tablet, TAKE 1 TABLET BY MOUTH TWICE DAILY, Disp: 180 tablet, Rfl: 0  •  mirtazapine (Remeron) 15 MG tablet, Take 1 tablet by mouth Every Night., Disp: 30 tablet, Rfl: 2  •  omeprazole (priLOSEC) 40 MG capsule, Take 1 capsule by mouth 2 (Two) Times a Day Before Meals., Disp: , Rfl:   •  pregabalin (LYRICA) 150 MG capsule, Take 1 capsule by mouth 2 (Two) Times a Day., Disp: 60 capsule, Rfl: 1  •  valsartan (DIOVAN) 40 MG tablet, TAKE 1/2 TABLET BY MOUTH EVERY MORNING, Disp: 45 tablet, Rfl: 0  •  albuterol sulfate  (90 Base) MCG/ACT inhaler, Inhale 2 puffs Every 4 (Four) Hours As Needed for Wheezing or Shortness of Air., Disp: 8 g, Rfl: 1  •  azithromycin (Zithromax Z-Jose C) 250 MG tablet, Take 2 tablets the first day, then 1 tablet daily for 4 days., Disp: 6 tablet, Rfl: 0  •  HYDROcod Polst-CPM Polst ER (Tussionex Pennkinetic ER) 10-8 MG/5ML ER suspension, Take 5 mL by mouth Every 12 (Twelve) Hours As Needed for Cough., Disp: 100 mL, Rfl: 0    Allergies:   Allergies   Allergen Reactions   • Metformin GI Intolerance   • Codeine Itching   • Lisinopril Cough       Objective   Objective     Physical Exam:  Vital Signs:   Vitals:    06/13/22 1601   BP: 132/90   Pulse: 78   SpO2: 95%   Weight: 124 kg (273 lb)   Height: 180.3 cm (70.98\")     Body mass index is 38.09 kg/m².     Physical Exam  Nursing note reviewed  Const: NAD, A&Ox4, Pleasant, Cooperative  Eyes: EOMI, no conjunctivitis  ENT: No nasal discharge present, neck supple  Cardiac: Regular rate and rhythm, no cyanosis  Resp: Respiratory rate within normal limits, no increased work of breathing, no audible wheezing or retractions noted  GI: No distention or ascites  MSK: Motor and " sensation grossly intact in bilateral upper extremities  Neurologic: CN II-XII grossly intact  Psych: Appropriate mood and behavior.  Skin: Warm, dry  Procedures/Radiology     Procedures  No radiology results for the last 7 days     Assessment & Plan   Assessment / Plan      Assessment/Plan:   Problems Addressed This Visit  Diagnoses and all orders for this visit:    1. Sore throat (Primary)  Comments:  I will send in an antibiotic cough syrup and inhaler. She can still use the inhaler at night.  Orders:  -     POCT rapid strep A  -     POCT SARS-CoV-2 Antigen TARAN + Flu    2. Acute nasopharyngitis  Comments:  I will send in an antibiotic cough syrup and inhaler. She can still use the inhaler at night.  Orders:  -     brompheniramine-pseudoephedrine-DM 30-2-10 MG/5ML syrup; Take 5 mL by mouth 4 (Four) Times a Day.  Dispense: 118 mL; Refill: 2  -     azithromycin (Zithromax Z-Jose C) 250 MG tablet; Take 2 tablets the first day, then 1 tablet daily for 4 days.  Dispense: 6 tablet; Refill: 0  -     albuterol sulfate  (90 Base) MCG/ACT inhaler; Inhale 2 puffs Every 4 (Four) Hours As Needed for Wheezing or Shortness of Air.  Dispense: 8 g; Refill: 1  -     HYDROcod Polst-CPM Polst ER (Tussionex Pennkinetic ER) 10-8 MG/5ML ER suspension; Take 5 mL by mouth Every 12 (Twelve) Hours As Needed for Cough.  Dispense: 100 mL; Refill: 0      Problem List Items Addressed This Visit    None     Visit Diagnoses     Sore throat    -  Primary    I will send in an antibiotic cough syrup and inhaler. She can still use the inhaler at night.    Relevant Orders    POCT rapid strep A (Completed)    POCT SARS-CoV-2 Antigen TARAN + Flu (Completed)    Acute nasopharyngitis        I will send in an antibiotic cough syrup and inhaler. She can still use the inhaler at night.    Relevant Medications    brompheniramine-pseudoephedrine-DM 30-2-10 MG/5ML syrup    azithromycin (Zithromax Z-Jose C) 250 MG tablet    albuterol sulfate  (90 Base)  "MCG/ACT inhaler    HYDROcod Polst-CPM Polst ER (Tussionex Pennkinetic ER) 10-8 MG/5ML ER suspension            Patient Instructions   · Attain adequate rest and increase clear fluid intake.   · Use warm salt water gargles, lozenges, honey as a natural antitussive, and/or Delsym syrup as needed for cough.   · Use Mucinex 600 mg twice daily and nasal saline irrigation with \"ocean\" or \"simply saline\" brand sterile nasal spray twice daily.   · Use warm compresses over sinuses for comfort.   · Zinc lozenges may prevent the adhesion of viruses in the respiratory tract to reduce your risk of getting sick.  · Alternate use of Tylenol 500 mg and Ibuprofen 400 mg every 4 hours as needed for headache, body aches, and/or fever reduction  · Use Loratadine (Claritin), Cetrizine (Zyrtec), or Fexofenadine (Allegra) once daily over the counter, Flonase 1 spray each nostril daily, and auto-inflate ears using modified valsalva maneuver approximately 20 times daily for ear congestion.     Call or Return to office if symptoms worsen or persist.        Follow Up:   Return if symptoms worsen or fail to improve.      CRISTOBAL Puente DO   [address]  [facility]  [address]  [facility] [id number]  Fax [phone]  Phone [phone]       Transcribed from ambient dictation for Ryne Puente DO by KAYDEN LEE.  06/13/22   20:20 EDT    Patient verbalized consent to the visit recording.  I have personally performed the services described in this document as transcribed by the above individual, and it is both accurate and complete.  Ryne Puente DO  6/23/2022  07:49 EDT  "

## 2022-06-15 ENCOUNTER — HOSPITAL ENCOUNTER (OUTPATIENT)
Dept: BONE DENSITY | Facility: HOSPITAL | Age: 57
End: 2022-06-15

## 2022-06-27 ENCOUNTER — HOSPITAL ENCOUNTER (OUTPATIENT)
Dept: CARDIOLOGY | Facility: HOSPITAL | Age: 57
Discharge: HOME | End: 2022-06-27

## 2022-06-27 VITALS
SYSTOLIC BLOOD PRESSURE: 140 MMHG | HEIGHT: 71 IN | BODY MASS INDEX: 38.27 KG/M2 | WEIGHT: 273.37 LBS | DIASTOLIC BLOOD PRESSURE: 90 MMHG

## 2022-06-27 DIAGNOSIS — I44.2 AV BLOCK, COMPLETE: ICD-10-CM

## 2022-06-27 DIAGNOSIS — I25.118 CORONARY ARTERY DISEASE OF NATIVE ARTERY OF NATIVE HEART WITH STABLE ANGINA PECTORIS: ICD-10-CM

## 2022-06-27 DIAGNOSIS — I48.0 PAROXYSMAL ATRIAL FIBRILLATION: ICD-10-CM

## 2022-06-27 LAB
BH CV ECHO MEAS - AI P1/2T: 671.9 MSEC
BH CV ECHO MEAS - AO MAX PG: 9 MMHG
BH CV ECHO MEAS - AO MEAN PG: 5.5 MMHG
BH CV ECHO MEAS - AO ROOT DIAM: 3.4 CM
BH CV ECHO MEAS - AO V2 MAX: 149.9 CM/SEC
BH CV ECHO MEAS - AO V2 VTI: 32.7 CM
BH CV ECHO MEAS - AVA(I,D): 2.41 CM2
BH CV ECHO MEAS - EDV(CUBED): 49.6 ML
BH CV ECHO MEAS - EDV(MOD-SP2): 134 ML
BH CV ECHO MEAS - EDV(MOD-SP4): 157 ML
BH CV ECHO MEAS - EF(MOD-BP): 63.3 %
BH CV ECHO MEAS - EF(MOD-SP2): 63.7 %
BH CV ECHO MEAS - EF(MOD-SP4): 65 %
BH CV ECHO MEAS - ESV(CUBED): 9.7 ML
BH CV ECHO MEAS - ESV(MOD-SP2): 48.6 ML
BH CV ECHO MEAS - ESV(MOD-SP4): 54.9 ML
BH CV ECHO MEAS - FS: 41.9 %
BH CV ECHO MEAS - IVS/LVPW: 1.2 CM
BH CV ECHO MEAS - IVSD: 1.59 CM
BH CV ECHO MEAS - LA DIMENSION: 3.8 CM
BH CV ECHO MEAS - LAT PEAK E' VEL: 11.9 CM/SEC
BH CV ECHO MEAS - LV DIASTOLIC VOL/BSA (35-75): 65.9 CM2
BH CV ECHO MEAS - LV MASS(C)D: 196.4 GRAMS
BH CV ECHO MEAS - LV MAX PG: 4.5 MMHG
BH CV ECHO MEAS - LV MEAN PG: 2.7 MMHG
BH CV ECHO MEAS - LV SYSTOLIC VOL/BSA (12-30): 23 CM2
BH CV ECHO MEAS - LV V1 MAX: 105.5 CM/SEC
BH CV ECHO MEAS - LV V1 VTI: 24.3 CM
BH CV ECHO MEAS - LVIDD: 3.7 CM
BH CV ECHO MEAS - LVIDS: 2.13 CM
BH CV ECHO MEAS - LVOT AREA: 3.2 CM2
BH CV ECHO MEAS - LVOT DIAM: 2.03 CM
BH CV ECHO MEAS - LVPWD: 1.32 CM
BH CV ECHO MEAS - MED PEAK E' VEL: 8.1 CM/SEC
BH CV ECHO MEAS - MV A MAX VEL: 66.6 CM/SEC
BH CV ECHO MEAS - MV DEC TIME: 0.27 MSEC
BH CV ECHO MEAS - MV E MAX VEL: 50.9 CM/SEC
BH CV ECHO MEAS - MV E/A: 0.76
BH CV ECHO MEAS - MV MAX PG: 2.9 MMHG
BH CV ECHO MEAS - MV MEAN PG: 1.58 MMHG
BH CV ECHO MEAS - MV V2 VTI: 23.2 CM
BH CV ECHO MEAS - MVA(VTI): 3.4 CM2
BH CV ECHO MEAS - PA ACC TIME: 0.15 SEC
BH CV ECHO MEAS - PA PR(ACCEL): 9.3 MMHG
BH CV ECHO MEAS - PA V2 MAX: 93 CM/SEC
BH CV ECHO MEAS - RAP SYSTOLE: 3 MMHG
BH CV ECHO MEAS - RVSP: 32 MMHG
BH CV ECHO MEAS - SI(MOD-SP2): 35.8 ML/M2
BH CV ECHO MEAS - SI(MOD-SP4): 42.9 ML/M2
BH CV ECHO MEAS - SV(LVOT): 78.8 ML
BH CV ECHO MEAS - SV(MOD-SP2): 85.4 ML
BH CV ECHO MEAS - SV(MOD-SP4): 102.1 ML
BH CV ECHO MEAS - TAPSE (>1.6): 2.43 CM
BH CV ECHO MEAS - TR MAX PG: 29 MMHG
BH CV ECHO MEAS - TR MAX VEL: 264.8 CM/SEC
BH CV ECHO MEASUREMENTS AVERAGE E/E' RATIO: 5.09
BH CV LOWER VASCULAR LEFT COMMON FEMORAL AUGMENT: NORMAL
BH CV LOWER VASCULAR LEFT COMMON FEMORAL COMPRESS: NORMAL
BH CV LOWER VASCULAR LEFT COMMON FEMORAL PHASIC: NORMAL
BH CV LOWER VASCULAR LEFT COMMON FEMORAL SPONT: NORMAL
BH CV LOWER VASCULAR LEFT DISTAL FEMORAL COMPRESS: NORMAL
BH CV LOWER VASCULAR LEFT GASTRONEMIUS COMPRESS: NORMAL
BH CV LOWER VASCULAR LEFT GREATER SAPH AK COMPRESS: NORMAL
BH CV LOWER VASCULAR LEFT GREATER SAPH BK COMPRESS: NORMAL
BH CV LOWER VASCULAR LEFT LESSER SAPH COMPRESS: NORMAL
BH CV LOWER VASCULAR LEFT MID FEMORAL AUGMENT: NORMAL
BH CV LOWER VASCULAR LEFT MID FEMORAL COMPRESS: NORMAL
BH CV LOWER VASCULAR LEFT MID FEMORAL PHASIC: NORMAL
BH CV LOWER VASCULAR LEFT MID FEMORAL SPONT: NORMAL
BH CV LOWER VASCULAR LEFT PERONEAL COMPRESS: NORMAL
BH CV LOWER VASCULAR LEFT POPLITEAL AUGMENT: NORMAL
BH CV LOWER VASCULAR LEFT POPLITEAL COMPETENT: NORMAL
BH CV LOWER VASCULAR LEFT POPLITEAL COMPRESS: NORMAL
BH CV LOWER VASCULAR LEFT POPLITEAL PHASIC: NORMAL
BH CV LOWER VASCULAR LEFT POPLITEAL SPONT: NORMAL
BH CV LOWER VASCULAR LEFT POSTERIOR TIBIAL COMPRESS: NORMAL
BH CV LOWER VASCULAR LEFT PROFUNDA FEMORAL COMPRESS: NORMAL
BH CV LOWER VASCULAR LEFT PROXIMAL FEMORAL COMPRESS: NORMAL
BH CV LOWER VASCULAR LEFT SAPHENOFEMORAL JUNCTION COMPRESS: NORMAL
BH CV LOWER VASCULAR RIGHT COMMON FEMORAL AUGMENT: NORMAL
BH CV LOWER VASCULAR RIGHT COMMON FEMORAL COMPRESS: NORMAL
BH CV LOWER VASCULAR RIGHT COMMON FEMORAL PHASIC: NORMAL
BH CV LOWER VASCULAR RIGHT COMMON FEMORAL SPONT: NORMAL
BH CV LOWER VASCULAR RIGHT DISTAL FEMORAL COMPRESS: NORMAL
BH CV LOWER VASCULAR RIGHT GASTRONEMIUS COMPRESS: NORMAL
BH CV LOWER VASCULAR RIGHT GREATER SAPH AK COMPRESS: NORMAL
BH CV LOWER VASCULAR RIGHT GREATER SAPH BK COMPRESS: NORMAL
BH CV LOWER VASCULAR RIGHT LESSER SAPH COMPRESS: NORMAL
BH CV LOWER VASCULAR RIGHT MID FEMORAL AUGMENT: NORMAL
BH CV LOWER VASCULAR RIGHT MID FEMORAL COMPRESS: NORMAL
BH CV LOWER VASCULAR RIGHT MID FEMORAL PHASIC: NORMAL
BH CV LOWER VASCULAR RIGHT MID FEMORAL SPONT: NORMAL
BH CV LOWER VASCULAR RIGHT PERONEAL COMPRESS: NORMAL
BH CV LOWER VASCULAR RIGHT POPLITEAL AUGMENT: NORMAL
BH CV LOWER VASCULAR RIGHT POPLITEAL COMPETENT: NORMAL
BH CV LOWER VASCULAR RIGHT POPLITEAL COMPRESS: NORMAL
BH CV LOWER VASCULAR RIGHT POPLITEAL PHASIC: NORMAL
BH CV LOWER VASCULAR RIGHT POPLITEAL SPONT: NORMAL
BH CV LOWER VASCULAR RIGHT POSTERIOR TIBIAL COMPRESS: NORMAL
BH CV LOWER VASCULAR RIGHT PROFUNDA FEMORAL COMPRESS: NORMAL
BH CV LOWER VASCULAR RIGHT PROXIMAL FEMORAL COMPRESS: NORMAL
BH CV LOWER VASCULAR RIGHT SAPHENOFEMORAL JUNCTION COMPRESS: NORMAL
BH CV XLRA - RV BASE: 3.8 CM
BH CV XLRA - RV LENGTH: 7.1 CM
BH CV XLRA - RV MID: 2.8 CM
BH CV XLRA - TDI S': 16.8 CM/SEC
LEFT ATRIUM VOLUME INDEX: 25.5 ML/M2
MAXIMAL PREDICTED HEART RATE: 162 BPM
MAXIMAL PREDICTED HEART RATE: 162 BPM
STRESS TARGET HR: 138 BPM
STRESS TARGET HR: 138 BPM

## 2022-06-27 PROCEDURE — 93306 TTE W/DOPPLER COMPLETE: CPT | Performed by: INTERNAL MEDICINE

## 2022-06-27 PROCEDURE — 93970 EXTREMITY STUDY: CPT | Performed by: INTERNAL MEDICINE

## 2022-06-27 PROCEDURE — 93970 EXTREMITY STUDY: CPT

## 2022-06-27 PROCEDURE — 93306 TTE W/DOPPLER COMPLETE: CPT

## 2022-06-29 ENCOUNTER — TELEPHONE (OUTPATIENT)
Dept: CARDIOLOGY | Facility: CLINIC | Age: 57
End: 2022-06-29

## 2022-06-29 NOTE — TELEPHONE ENCOUNTER
Patient contacted to review results. Patient states that her BP is still elevated but she was unsure if she should be taking Valsartan with the HCTZ. Patient has not been. Patient advised to restart Valsartan. If BP remains elevated after one week, patient to call office. All questions answered at this time. Patient is agreeable to plan.

## 2022-06-29 NOTE — TELEPHONE ENCOUNTER
----- Message from Johny Sommer MD sent at 6/28/2022  9:29 PM EDT -----  Labs, echo, venous duplex all looked okay. Please ask how BP is doing with added HCTZ. Increase to 25mg daily if average above 130/90    ----- Message -----  From: Johny Sommer MD  Sent: 6/27/2022   7:01 PM EDT  To: Johny Sommer MD

## 2022-07-01 DIAGNOSIS — Z01.812 BLOOD TESTS PRIOR TO TREATMENT OR PROCEDURE: Primary | ICD-10-CM

## 2022-07-08 ENCOUNTER — CLINICAL SUPPORT NO REQUIREMENTS (OUTPATIENT)
Dept: PULMONOLOGY | Facility: CLINIC | Age: 57
End: 2022-07-08

## 2022-07-08 DIAGNOSIS — Z01.812 BLOOD TESTS PRIOR TO TREATMENT OR PROCEDURE: ICD-10-CM

## 2022-07-08 PROCEDURE — 99211 OFF/OP EST MAY X REQ PHY/QHP: CPT | Performed by: NURSE PRACTITIONER

## 2022-07-08 PROCEDURE — U0004 COV-19 TEST NON-CDC HGH THRU: HCPCS | Performed by: NURSE PRACTITIONER

## 2022-07-09 LAB — SARS-COV-2 RNA NOSE QL NAA+PROBE: NOT DETECTED

## 2022-07-11 ENCOUNTER — OFFICE VISIT (OUTPATIENT)
Dept: PULMONOLOGY | Facility: CLINIC | Age: 57
End: 2022-07-11

## 2022-07-11 VITALS
HEIGHT: 70 IN | WEIGHT: 276.4 LBS | SYSTOLIC BLOOD PRESSURE: 140 MMHG | TEMPERATURE: 98 F | DIASTOLIC BLOOD PRESSURE: 78 MMHG | OXYGEN SATURATION: 98 % | HEART RATE: 82 BPM | BODY MASS INDEX: 39.57 KG/M2

## 2022-07-11 DIAGNOSIS — R06.09 DYSPNEA ON EXERTION: Primary | ICD-10-CM

## 2022-07-11 DIAGNOSIS — I48.0 PAROXYSMAL ATRIAL FIBRILLATION: ICD-10-CM

## 2022-07-11 DIAGNOSIS — G47.33 OSA (OBSTRUCTIVE SLEEP APNEA): ICD-10-CM

## 2022-07-11 PROCEDURE — 94375 RESPIRATORY FLOW VOLUME LOOP: CPT | Performed by: NURSE PRACTITIONER

## 2022-07-11 PROCEDURE — 94729 DIFFUSING CAPACITY: CPT | Performed by: NURSE PRACTITIONER

## 2022-07-11 PROCEDURE — 94726 PLETHYSMOGRAPHY LUNG VOLUMES: CPT | Performed by: NURSE PRACTITIONER

## 2022-07-11 PROCEDURE — 99214 OFFICE O/P EST MOD 30 MIN: CPT | Performed by: NURSE PRACTITIONER

## 2022-07-12 PROBLEM — Z99.89 OSA ON CPAP: Status: RESOLVED | Noted: 2019-08-02 | Resolved: 2022-07-12

## 2022-07-12 PROBLEM — G47.33 OSA ON CPAP: Status: RESOLVED | Noted: 2019-08-02 | Resolved: 2022-07-12

## 2022-07-12 PROBLEM — R94.2 RESTRICTIVE PATTERN PRESENT ON PULMONARY FUNCTION TESTING: Status: RESOLVED | Noted: 2019-08-02 | Resolved: 2022-07-12

## 2022-07-12 NOTE — PROGRESS NOTES
[facility]    CHIEF COMPLAINT    Dyspnea/fatoigue    HISTORY OF PRESENT ILLNESS    Kellen Esparza is a 56 y.o.female here today for follow-up.  She was last seen in the office by me in February 2020.  She denies any respiratory illnesses since her last appointment.    At her last appointment she had been not wearing her CPAP more than 4 hours at a time and she was considered noncompliant.  She states she still has the equipment currently but has to turn the equipment back in.  Her [employer] advised her to start the process over.    She has noticed more daytime somnolence, fatigue and nonrestorative sleep.  She states that she does not sleep very well at nighttime.  She states that she has difficulty staying asleep at nighttime.  She does toss and turn at night.  She tries to avoid caffeine in the afternoons.    She states that she is unable to do any physical activity due to her dyspnea.  She is rather inactive at home.    She has a rescue inhaler that she has used occasionally but does not notice a significant improvement when using it.    She denies fever, chills, sputum production, hemoptysis, night sweats, weight loss, chest pain or palpitations.  She denies any lower extremity edema or calf tenderness.    She continues to follow closely with cardiology for her atrial fibrillation.    She is up-to-date on her current vaccinations.    Patient Active Problem List   Diagnosis   • Chest pain   • Dyspnea on exertion   • Essential hypertension   • Pain of right breast   • Lower extremity edema   • Diabetes mellitus (HCC)   • Abnormal stress test   • Migraine   • History of pulmonary embolus (PE)   • Hyperlipidemia   • DDD (degenerative disc disease), cervical   • Coronary artery disease of native artery of native heart with stable angina pectoris (HCC)   • AV block, complete (S/P PPM 7/2019)   • Obesity, Class II, BMI 35-39.9   • ALYSIA (obstructive sleep apnea)   • Pain in pacemaker pocket   •  Paroxysmal atrial fibrillation (HCC)   • Complex regional pain syndrome type I   • Chronic anticoagulation   • Pacemaker   • Morbid obesity (HCC)   • Entrapment neuropathy   • Encounter for fitting and adjustment of neuropacemaker of spinal cord   • Mechanical low back pain   • Presence of neurostimulator   • Thoracic spondylosis without myelopathy   • Myofascial pain   • Menopausal syndrome (hot flashes)       Allergies   Allergen Reactions   • Metformin GI Intolerance   • Codeine Itching   • Lisinopril Cough       Current Outpatient Medications:   •  albuterol sulfate  (90 Base) MCG/ACT inhaler, Inhale 2 puffs Every 4 (Four) Hours As Needed for Wheezing or Shortness of Air., Disp: 8 g, Rfl: 1  •  apixaban (Eliquis) 5 MG tablet tablet, Take 1 tablet by mouth 2 (Two) Times a Day. (Patient taking differently: Take 5 mg by mouth 2 (Two) Times a Day. Can hold 48 hours prior to colonoscopy per Dr. Sommer.), Disp: 180 tablet, Rfl: 3  •  aspirin (aspirin) 81 MG EC tablet, Take 1 tablet by mouth Daily., Disp: 90 tablet, Rfl: 3  •  atorvastatin (LIPITOR) 80 MG tablet, TAKE 1 TABLET BY MOUTH DAILY, Disp: 90 tablet, Rfl: 3  •  baclofen (LIORESAL) 10 MG tablet, TAKE 1 TABLET BY MOUTH TWICE DAILY AS NEEDED FOR MUSCLE SPASMS, Disp: 60 tablet, Rfl: 1  •  brompheniramine-pseudoephedrine-DM 30-2-10 MG/5ML syrup, Take 5 mL by mouth 4 (Four) Times a Day., Disp: 118 mL, Rfl: 2  •  famotidine (PEPCID) 40 MG tablet, Take 1 tablet by mouth Every Night., Disp: , Rfl:   •  hydroCHLOROthiazide (MICROZIDE) 12.5 MG capsule, Take 1 capsule by mouth Daily., Disp: 30 capsule, Rfl: 11  •  HYDROcod Polst-CPM Polst ER (Tussionex Pennkinetic ER) 10-8 MG/5ML ER suspension, Take 5 mL by mouth Every 12 (Twelve) Hours As Needed for Cough., Disp: 100 mL, Rfl: 0  •  levETIRAcetam (KEPPRA) 250 MG tablet, TAKE 1 TABLET BY MOUTH TWICE DAILY, Disp: 180 tablet, Rfl: 0  •  mirtazapine (Remeron) 15 MG tablet, Take 1 tablet by mouth Every Night., Disp: 30  "tablet, Rfl: 2  •  omeprazole (priLOSEC) 40 MG capsule, Take 1 capsule by mouth 2 (Two) Times a Day Before Meals., Disp: , Rfl:   •  pregabalin (LYRICA) 150 MG capsule, Take 1 capsule by mouth 2 (Two) Times a Day., Disp: 60 capsule, Rfl: 1  •  valsartan (DIOVAN) 40 MG tablet, TAKE 1/2 TABLET BY MOUTH EVERY MORNING, Disp: 45 tablet, Rfl: 0  MEDICATION LIST AND ALLERGIES REVIEWED.    Social History     Tobacco Use   • Smoking status: Never Smoker   • Smokeless tobacco: Never Used   Vaping Use   • Vaping Use: Never used   Substance Use Topics   • Alcohol use: No     Comment: rare   • Drug use: No       FAMILY AND SOCIAL HISTORY REVIEWED.    Review of Systems   Constitutional: Positive for fatigue. Negative for activity change, appetite change, fever and unexpected weight change.   HENT: Negative for congestion, postnasal drip, rhinorrhea, sinus pressure, sore throat and voice change.    Eyes: Negative for visual disturbance.   Respiratory: Positive for shortness of breath. Negative for cough, chest tightness and wheezing.    Cardiovascular: Negative for chest pain, palpitations and leg swelling.   Gastrointestinal: Negative for abdominal distention, abdominal pain, nausea and vomiting.   Endocrine: Negative for cold intolerance and heat intolerance.   Genitourinary: Negative for difficulty urinating and urgency.   Musculoskeletal: Negative for arthralgias, back pain and neck pain.   Skin: Negative for color change and pallor.   Allergic/Immunologic: Negative for environmental allergies and food allergies.   Neurological: Negative for dizziness, syncope, weakness and light-headedness.   Hematological: Negative for adenopathy. Does not bruise/bleed easily.   Psychiatric/Behavioral: Negative for agitation and behavioral problems.   .    /78 (BP Location: Left arm, Patient Position: Sitting, Cuff Size: Large Adult)   Pulse 82   Temp 98 °F (36.7 °C) (Infrared)   Ht 176.5 cm (69.5\")   Wt 125 kg (276 lb 6.4 oz)   " LMP 10/31/2014 (LMP Unknown) Comment: Mammogram 2014 info given   SpO2 98% Comment: room air, at rest  BMI 40.23 kg/m²     Immunization History   Administered Date(s) Administered   • COVID-19 (PFIZER) PURPLE CAP 04/02/2021, 04/30/2021   • FluLaval/Fluarix/Fluzone >6 11/02/2021       Physical Exam  Vitals and nursing note reviewed.   Constitutional:       Appearance: She is well-developed. She is not diaphoretic.   HENT:      Head: Normocephalic and atraumatic.   Eyes:      Pupils: Pupils are equal, round, and reactive to light.   Neck:      Thyroid: No thyromegaly.   Cardiovascular:      Rate and Rhythm: Normal rate and regular rhythm.      Heart sounds: Normal heart sounds. No murmur heard.    No friction rub. No gallop.   Pulmonary:      Effort: Pulmonary effort is normal. No respiratory distress.      Breath sounds: Normal breath sounds. No wheezing or rales.   Chest:      Chest wall: No tenderness.   Abdominal:      General: Bowel sounds are normal.      Palpations: Abdomen is soft.      Tenderness: There is no abdominal tenderness.   Musculoskeletal:         General: No swelling. Normal range of motion.      Cervical back: Normal range of motion and neck supple.   Lymphadenopathy:      Cervical: No cervical adenopathy.   Skin:     General: Skin is warm and dry.      Capillary Refill: Capillary refill takes less than 2 seconds.   Neurological:      Mental Status: She is alert and oriented to person, place, and time.   Psychiatric:         Mood and Affect: Mood normal.         Behavior: Behavior normal.           RESULTS    PFTS in the office today, read by me, FVC 2.81 84% predicted, FEV1 2.04 77% predicted, FEV1/FVC 73% predicted, TLC 6.07 101% predicted, DLCO 78% predicted, no obstruction, no restriction and reduced DLCO.    PROBLEM LIST    Problem List Items Addressed This Visit        Cardiac and Vasculature    Dyspnea on exertion - Primary    Overview     · PFT, 12/5/18: No air way obstruction, moderate  restrictive lung disease           Relevant Orders    Pulmonary Function Test (Completed)    Paroxysmal atrial fibrillation (HCC)       Sleep    ALYSIA (obstructive sleep apnea)    Relevant Orders    Home Sleep Study            DISCUSSION    Ms. Esparza was here for follow-up of her dyspnea.  She continues to have dyspnea with exertion.  We reviewed her full PFTs in the office today and she has no obstruction or restriction.  She does not meet any qualifications for inhalers at this time.  I did advise her she could use the albuterol as needed if it helped.  She did not seem to think it helped much.    Based on her daytime somnolence, fatigue and nonrestorative sleep she is agreeable to start CPAP therapy again.  She does need to have a home sleep study performed to start her CPAP.  She does have her equipment at home currently but has to turn this in and I did advise her to use it until she had to turn it in.    She will continue close follow-up with cardiology for her atrial fibrillation.    Did encourage her to continue doing daily physical activity and encouraged weight loss.    She will follow-up in 2 to 3 months with Dr. Quispe or sooner if her symptoms worsen.  I did advise her to call with any additional concerns or questions.    Level of service justified based on 32 minutes spent in patient care on this date of service including, but not limited to: preparing to see the patient, obtaining and/or reviewing history, performing medically appropriate examination, ordering tests/medicine/procedures, independently interpreting results, documenting clinical information in EHR, and counseling/education of patient/family/caregiver. (Level 4 30-39 minutes; Level 5 40-54 minutes)      Deb Guajardo, CORRIE  07/11/202208:34 EDT  Electronically signed     Please note that portions of this note were completed with a voice recognition program.        CC: Ryne Puente DO

## 2022-08-04 ENCOUNTER — TELEPHONE (OUTPATIENT)
Dept: CARDIOLOGY | Facility: CLINIC | Age: 57
End: 2022-08-04

## 2022-08-04 NOTE — TELEPHONE ENCOUNTER
Called Ms. Esparza because her home monitor didn't send in its scheduled reading. We tried sending in a reading together but were unsuccessful. I gave her the number to [employer] to call for assistance.

## 2022-08-14 PROCEDURE — 93296 REM INTERROG EVL PM/IDS: CPT | Performed by: INTERNAL MEDICINE

## 2022-08-14 PROCEDURE — 93294 REM INTERROG EVL PM/LDLS PM: CPT | Performed by: INTERNAL MEDICINE

## 2022-08-18 ENCOUNTER — TELEPHONE (OUTPATIENT)
Dept: FAMILY MEDICINE CLINIC | Facility: CLINIC | Age: 57
End: 2022-08-18

## 2022-08-31 ENCOUNTER — APPOINTMENT (OUTPATIENT)
Dept: SLEEP MEDICINE | Facility: HOSPITAL | Age: 57
End: 2022-08-31

## 2022-09-15 ENCOUNTER — HOSPITAL ENCOUNTER (OUTPATIENT)
Dept: GENERAL RADIOLOGY | Facility: HOSPITAL | Age: 57
Discharge: HOME | End: 2022-09-15
Admitting: PHYSICAL MEDICINE & REHABILITATION

## 2022-09-15 ENCOUNTER — TRANSCRIBE ORDERS (OUTPATIENT)
Dept: ADMINISTRATIVE | Facility: HOSPITAL | Age: 57
End: 2022-09-15

## 2022-09-15 DIAGNOSIS — M79.18 MYALGIA, OTHER SITE: Primary | ICD-10-CM

## 2022-09-15 PROCEDURE — 72110 X-RAY EXAM L-2 SPINE 4/>VWS: CPT

## 2022-10-03 ENCOUNTER — OFFICE VISIT (OUTPATIENT)
Dept: CARDIOLOGY | Facility: CLINIC | Age: 57
End: 2022-10-03

## 2022-10-03 VITALS
WEIGHT: 271.6 LBS | BODY MASS INDEX: 38.88 KG/M2 | HEIGHT: 70 IN | OXYGEN SATURATION: 96 % | DIASTOLIC BLOOD PRESSURE: 96 MMHG | HEART RATE: 99 BPM | SYSTOLIC BLOOD PRESSURE: 160 MMHG

## 2022-10-03 DIAGNOSIS — I25.118 CORONARY ARTERY DISEASE OF NATIVE ARTERY OF NATIVE HEART WITH STABLE ANGINA PECTORIS: ICD-10-CM

## 2022-10-03 DIAGNOSIS — I48.0 PAROXYSMAL ATRIAL FIBRILLATION: Primary | ICD-10-CM

## 2022-10-03 DIAGNOSIS — I44.2 AV BLOCK, COMPLETE: ICD-10-CM

## 2022-10-03 DIAGNOSIS — I10 ESSENTIAL HYPERTENSION: ICD-10-CM

## 2022-10-03 DIAGNOSIS — E11.9 TYPE 2 DIABETES MELLITUS WITHOUT COMPLICATION, WITHOUT LONG-TERM CURRENT USE OF INSULIN: ICD-10-CM

## 2022-10-03 DIAGNOSIS — G90.59 COMPLEX REGIONAL PAIN SYNDROME TYPE 1 AFFECTING OTHER SITE: ICD-10-CM

## 2022-10-03 PROCEDURE — 99214 OFFICE O/P EST MOD 30 MIN: CPT | Performed by: INTERNAL MEDICINE

## 2022-10-03 PROCEDURE — 93280 PM DEVICE PROGR EVAL DUAL: CPT | Performed by: INTERNAL MEDICINE

## 2022-10-03 RX ORDER — HYDROCODONE BITARTRATE AND ACETAMINOPHEN 7.5; 325 MG/1; MG/1
1 TABLET ORAL 3 TIMES DAILY
COMMUNITY
Start: 2022-09-16

## 2022-10-03 RX ORDER — CYCLOBENZAPRINE HCL 10 MG
10 TABLET ORAL 3 TIMES DAILY
COMMUNITY
Start: 2022-09-16

## 2022-10-03 RX ORDER — VALSARTAN 40 MG/1
40 TABLET ORAL EVERY MORNING
Qty: 90 TABLET | Refills: 0 | Status: SHIPPED | OUTPATIENT
Start: 2022-10-03 | End: 2023-01-09

## 2022-10-03 RX ORDER — CARVEDILOL 6.25 MG/1
6.25 TABLET ORAL 2 TIMES DAILY
Qty: 180 TABLET | Refills: 3 | Status: SHIPPED | OUTPATIENT
Start: 2022-10-03

## 2022-10-03 NOTE — PROGRESS NOTES
[facility] AT [address], [address]  [place], [place], [address]    [phone]  [email or web address]           OUTPATIENT CLINIC FOLLOW UP NOTE    Patient Care Team:  Patient Care Team:  Ryne Puente DO as PCP - General (Family Medicine)  Johny Sommer MD as Consulting Physician (Cardiology)  Sonya Gay MD as Consulting Physician (Cardiology)  Deb Guajardo APRN as Nurse Practitioner (Pulmonary Disease)  Sal Sheldon MD (Pain Medicine)  Luis aSeed MD as Consulting Physician (Otolaryngology)    Subjective:     Chief complaint: Dyspnea, AV block, pacemaker site pain    HPI:    Kellen Esparza is a 56 y.o. female.  Cardiac Problem List:  1. Small-vessel coronary artery disease:  a. MPS, 07/26/2017: EF 70%. Medium-sized infarct pattern located in the anterior wall with no significant ischemia noted. This finding is worse on resting images which is suggestive of possible breast attenuation artifact instead of an infarction.  b. LHC, 08/10/2017: Mild-to-moderate diffuse tortuosity of the vessels. Evidence of small vessel disease in the distal portion of a small RPL S. No obstructive CAD.  2.  High degree AV Block:  srinivasan Shine, 04/29/2019: Only 1.5 days of monitoring. High grade AV block for 4.4 seconds at 7:23 am. 2nd degree AV block for 10 seconds at 8:53 pm. Average HR 86 bpm.  dotty Shine, 06/18/2019: Only one day of monitoring. 2.9% PACs. 1st degree AV block, brief 2nd degree type I AVB.   c. OT, 06/24/2019: High degree AV block.  d. Pacemaker implant, 07/19/2019, Dr. Gay: Baisden Accolade MRI DR model L311 serial #[id number]. DDDR .  e. Significant neuropathic pain at the pacemaker site.  3. Mild diastolic dysfunction:  a. Echo, 11/28/2018: EF 65% with mild concentric LVH. Grade I a diastolic dysfunction. Left atrium borderline dilated.  4. Paroxysmal atrial fibrillation:  a. Noted on device interrogation, 11/13/2019.   b. CHADS-VASc =  5 (HTN, DM, Female, Vasc disease, history of PE).    c. Multiple episodes of atrial fibrillation all less than 1 minute.  5. Diabetes  6. Hypertension:  a. 24h BP monitor, 04/09/2019: 45% > 140 mmHg systolic, max 164, min 103. Average HR 82 max 111, min 56.  b. Hair loss with valsartan  7. Hyperlipidemia  8. Migraines  9. History of PE, in the 1990s  10. COVID pneumonia January 2022  11. Carpal tunnel  12. ALYSIA, on CPAP.  a. Followed by Dr. Quispe.  b. CPAP recalled, 2021    Today she presents for follow-up.      Patient is having recurrent palpitations, associated diaphoresis.  Ongoing chronic pain, stable, unchanged.    Recommended new CPAP by sleep team recently.    Review of Systems:  As noted in the HPI    PFSH:  Patient Active Problem List   Diagnosis   • Chest pain   • Dyspnea on exertion   • Essential hypertension   • Pain of right breast   • Lower extremity edema   • Diabetes mellitus (HCC)   • Abnormal stress test   • Migraine   • History of pulmonary embolus (PE)   • Hyperlipidemia   • DDD (degenerative disc disease), cervical   • Coronary artery disease of native artery of native heart with stable angina pectoris (HCC)   • AV block, complete (S/P PPM 7/2019)   • Obesity, Class II, BMI 35-39.9   • ALYSIA (obstructive sleep apnea)   • Pain in pacemaker pocket   • Paroxysmal atrial fibrillation (HCC)   • Complex regional pain syndrome type I   • Chronic anticoagulation   • Pacemaker   • Morbid obesity (HCC)   • Entrapment neuropathy   • Encounter for fitting and adjustment of neuropacemaker of spinal cord   • Mechanical low back pain   • Presence of neurostimulator   • Thoracic spondylosis without myelopathy   • Myofascial pain   • Menopausal syndrome (hot flashes)         Current Outpatient Medications:   •  apixaban (Eliquis) 5 MG tablet tablet, Take 1 tablet by mouth 2 (Two) Times a Day. (Patient taking differently: Take 5 mg by mouth 2 (Two) Times a Day. Can hold 48 hours prior to colonoscopy per   "Ros.), Disp: 180 tablet, Rfl: 3  •  aspirin (aspirin) 81 MG EC tablet, Take 1 tablet by mouth Daily., Disp: 90 tablet, Rfl: 3  •  atorvastatin (LIPITOR) 80 MG tablet, TAKE 1 TABLET BY MOUTH DAILY, Disp: 90 tablet, Rfl: 3  •  cyclobenzaprine (FLEXERIL) 10 MG tablet, Take 10 mg by mouth 3 (Three) Times a Day., Disp: , Rfl:   •  hydroCHLOROthiazide (MICROZIDE) 12.5 MG capsule, Take 1 capsule by mouth Daily., Disp: 30 capsule, Rfl: 11  •  HYDROcodone-acetaminophen (NORCO) 7.5-325 MG per tablet, Take 1 tablet by mouth 3 (Three) Times a Day., Disp: , Rfl:   •  levETIRAcetam (KEPPRA) 250 MG tablet, TAKE 1 TABLET BY MOUTH TWICE DAILY, Disp: 180 tablet, Rfl: 0  •  mirtazapine (Remeron) 15 MG tablet, Take 1 tablet by mouth Every Night., Disp: 30 tablet, Rfl: 2  •  omeprazole (priLOSEC) 40 MG capsule, Take 1 capsule by mouth 2 (Two) Times a Day Before Meals., Disp: , Rfl:   •  pregabalin (LYRICA) 150 MG capsule, Take 1 capsule by mouth 2 (Two) Times a Day., Disp: 60 capsule, Rfl: 1  •  valsartan (DIOVAN) 40 MG tablet, TAKE 1/2 TABLET BY MOUTH EVERY MORNING, Disp: 45 tablet, Rfl: 0    Allergies   Allergen Reactions   • Metformin GI Intolerance   • Codeine Itching   • Lisinopril Cough        reports that she has never smoked. She has never used smokeless tobacco.    Family History   Problem Relation Age of Onset   • Breast cancer Mother 42   • Heart failure Mother    • No Known Problems Father    • Hypertension Sister    • Hypertension Brother    • Heart failure Son    • Hypertension Maternal Grandmother    • No Known Problems Paternal Grandmother    • Hypertension Maternal Grandfather    • No Known Problems Paternal Grandfather    • Ovarian cancer Neg Hx          Objective:   /96 (BP Location: Left arm, Patient Position: Sitting)   Pulse 99   Ht 176.5 cm (69.5\")   Wt 123 kg (271 lb 9.6 oz)   LMP 10/31/2014 (LMP Unknown) Comment: Mammogram 2014 info given   SpO2 96%   BMI 39.53 kg/m²   CONSTITUTIONAL: No acute " distress  RESPIRATORY: Normal effort. Clear to auscultation bilaterally without wheezing or rales  CARDIOVASCULAR: Regular rate and rhythm with normal S1 and S2.  AZALIA at RUSB without radiation.  No carotid bruit bilaterally  PERIPHERAL VASCULAR: Normal radial pulse.      Labs:  Lab Results   Component Value Date    ALT 26 06/06/2022    AST 24 06/06/2022     Lab Results   Component Value Date    CHOL 114 07/26/2019    TRIG 100 07/26/2019    HDL 55 07/26/2019    CREATININE 0.72 06/06/2022       Diagnostic Data:    Procedures    Biotel heart monitor 6/2019  -High degree symptomatic AV Block    Zio Patch 4/2019  -Only 1.5 days of monitoring.  -High-grade AV block for 4.4 seconds at 7:23 AM.  -Second-degree AV block for 10 seconds at 8:53 PM.  -Average heart rate 86.    TTE 11/2018  · Left ventricular systolic function is normal. Estimated EF = 65%.  · Left ventricular wall thickness is consistent with mild concentric hypertrophy.  · Left ventricular diastolic dysfunction (grade I a) consistent with impaired relaxation.  · Left atrial cavity size is borderline dilated.  · Trace-to-mild aortic valve regurgitation is present.    Results for orders placed during the hospital encounter of 06/27/22    Adult Transthoracic Echo Complete W/ Cont if Necessary Per Protocol    Interpretation Summary  · Left ventricular ejection fraction appears to be 61 - 65%. Left ventricular systolic function is normal.  · Left ventricular wall thickness is consistent with mild to moderate concentric hypertrophy.  · Mild aortic valve regurgitation is present.    [facility] 7/2017  · There is mild to moderate diffuse tortuosity of the vessels.  There is evidence of small vessel disease in the distal portion of a small RPL S.  There is no obstructive coronary artery disease.  · Normal left ventricular ejection fraction with no regional wall motion abnormalities    CT PE Protocol 7/2017 - No evidence of pulmonary embolus or other acute chest  disease.    PFTs 11/2018 - moderate restrictive lung disease     PFTs 4/2021  No obstruction, no change with bronchodilator, mild restriction, mild reduction in DLCO overcorrects for alveolar volume.    DEVICE INTERROGATION: [employer], Interrogation date 6/6/22- RA pacing 2%, RV pacing less than 1%.  Thresholds acceptable.  Battery voltage is 6.5 years.  Less than 1% mode switching.  Longest 17 minutes.  18 RVR episodes with the longest being 20 seconds.    DEVICE INTERROGATION: [facility], Interrogation date 10/3/22- RA pacing <1%, RV pacing <1%. Threshold and impedances are acceptable. Battery voltage is 6+ years.  Multiple episodes of short runs of SVT, longest 15 seconds.      Assessment and Plan:     Dyspnea  ALYSIA   History of PE  Obesity  Mild restrictive lung disease  -Continue CPAP use  -Followed by pulmonary    Complete AV block  Paroxysmal atrial fibrillation  Chest wall pain  -s/p [facility] [employer] [facility] PPM 7/2019, Dr Gay  -Continue Eliquis  -Adding carvedilol as noted below    Small vessel, coronary artery disease of native artery of native heart with stable angina pectoris  -Continue statin  -Not on a beta-blocker in the past due to bradycardia/fatigue  -Continue Eliquis, discontinue aspirin    Essential hypertension  -BP has been high.  Stopping HCTZ.  Increasing valsartan to 40 mg daily.  Starting carvedilol 6.25 mg twice daily for both palpitations/A. fib/SVT and hypertension.  -Patient to buy blood pressure cuff and keep a diary  -Patient to call in 7 to 10 days to update us on blood pressure.  If averaging above 140/90 mmHg, increase carvedilol to 12.5 mg twice daily    - Return in about 6 months (around 4/3/2023) for Next scheduled follow-up with an ECG.      Johny Sommer MD, MSc, FACC, [facility]  Interventional Cardiology  [facility]

## 2022-10-24 ENCOUNTER — TELEPHONE (OUTPATIENT)
Dept: CARDIOLOGY | Facility: CLINIC | Age: 57
End: 2022-10-24

## 2022-10-24 NOTE — TELEPHONE ENCOUNTER
Patients BP averaging 150/88 HR 76. She does report that since discontinuing HCTZ, she has had an increased in edema as well as shortness of breath. She does report her palpitations are improved.     Per your last office note, you wanted to increase Carvedilol to 12.5 mg BID. Do you have any additional recommendations?

## 2022-10-24 NOTE — TELEPHONE ENCOUNTER
Patient contacted to review recommendations. Patient to increase Coreg to 12.5 mg BID and call back in one week to report BP/swelling. Patient agreeable to plan, all questions answered at this time.

## 2022-10-26 ENCOUNTER — TRANSCRIBE ORDERS (OUTPATIENT)
Dept: ADMINISTRATIVE | Facility: HOSPITAL | Age: 57
End: 2022-10-26

## 2022-10-26 DIAGNOSIS — M54.50 LOW BACK PAIN, UNSPECIFIED BACK PAIN LATERALITY, UNSPECIFIED CHRONICITY, UNSPECIFIED WHETHER SCIATICA PRESENT: Primary | ICD-10-CM

## 2022-11-09 ENCOUNTER — HOSPITAL ENCOUNTER (OUTPATIENT)
Dept: CT IMAGING | Facility: HOSPITAL | Age: 57
Discharge: HOME | End: 2022-11-09
Admitting: PHYSICAL MEDICINE & REHABILITATION

## 2022-11-09 DIAGNOSIS — M54.50 LOW BACK PAIN, UNSPECIFIED BACK PAIN LATERALITY, UNSPECIFIED CHRONICITY, UNSPECIFIED WHETHER SCIATICA PRESENT: ICD-10-CM

## 2022-11-09 PROCEDURE — 72131 CT LUMBAR SPINE W/O DYE: CPT

## 2022-11-10 RX ORDER — ASPIRIN 81 MG/1
TABLET, COATED ORAL
Qty: 90 TABLET | Refills: 3 | OUTPATIENT
Start: 2022-11-10

## 2022-11-13 PROCEDURE — 93296 REM INTERROG EVL PM/IDS: CPT | Performed by: INTERNAL MEDICINE

## 2022-11-13 PROCEDURE — 93294 REM INTERROG EVL PM/LDLS PM: CPT | Performed by: INTERNAL MEDICINE

## 2022-12-08 DIAGNOSIS — G90.59 COMPLEX REGIONAL PAIN SYNDROME TYPE 1 AFFECTING OTHER SITE: ICD-10-CM

## 2022-12-08 RX ORDER — LEVETIRACETAM 250 MG/1
TABLET ORAL
Qty: 180 TABLET | Refills: 0 | Status: SHIPPED | OUTPATIENT
Start: 2022-12-08 | End: 2023-03-11

## 2023-01-09 DIAGNOSIS — I10 ESSENTIAL HYPERTENSION: ICD-10-CM

## 2023-01-09 DIAGNOSIS — E11.9 TYPE 2 DIABETES MELLITUS WITHOUT COMPLICATION, WITHOUT LONG-TERM CURRENT USE OF INSULIN: ICD-10-CM

## 2023-01-09 RX ORDER — VALSARTAN 40 MG/1
40 TABLET ORAL EVERY MORNING
Qty: 90 TABLET | Refills: 3 | Status: SHIPPED | OUTPATIENT
Start: 2023-01-09

## 2023-02-01 ENCOUNTER — OFFICE VISIT (OUTPATIENT)
Dept: OBSTETRICS AND GYNECOLOGY | Facility: CLINIC | Age: 58
End: 2023-02-01
Payer: COMMERCIAL

## 2023-02-01 VITALS
WEIGHT: 280 LBS | BODY MASS INDEX: 41.47 KG/M2 | HEIGHT: 69 IN | DIASTOLIC BLOOD PRESSURE: 88 MMHG | RESPIRATION RATE: 18 BRPM | SYSTOLIC BLOOD PRESSURE: 132 MMHG

## 2023-02-01 DIAGNOSIS — N89.8 VAGINAL DISCHARGE: ICD-10-CM

## 2023-02-01 DIAGNOSIS — N89.8 VAGINAL IRRITATION: Primary | ICD-10-CM

## 2023-02-01 DIAGNOSIS — N30.00 ACUTE CYSTITIS WITHOUT HEMATURIA: ICD-10-CM

## 2023-02-01 LAB
BILIRUB BLD-MCNC: NEGATIVE MG/DL
CLARITY, POC: CLEAR
COLOR UR: YELLOW
GLUCOSE UR STRIP-MCNC: NEGATIVE MG/DL
KETONES UR QL: NEGATIVE
LEUKOCYTE EST, POC: ABNORMAL
NITRITE UR-MCNC: NEGATIVE MG/ML
PH UR: 6 [PH] (ref 5–8)
PROT UR STRIP-MCNC: ABNORMAL MG/DL
RBC # UR STRIP: NEGATIVE /UL
SP GR UR: 1.02 (ref 1–1.03)
UROBILINOGEN UR QL: NORMAL
WET PREP GENITAL: NORMAL

## 2023-02-01 PROCEDURE — 87210 SMEAR WET MOUNT SALINE/INK: CPT | Performed by: NURSE PRACTITIONER

## 2023-02-01 PROCEDURE — 81002 URINALYSIS NONAUTO W/O SCOPE: CPT | Performed by: NURSE PRACTITIONER

## 2023-02-01 PROCEDURE — 99213 OFFICE O/P EST LOW 20 MIN: CPT | Performed by: NURSE PRACTITIONER

## 2023-02-01 RX ORDER — DOXYCYCLINE 100 MG/1
100 CAPSULE ORAL 2 TIMES DAILY
Qty: 14 CAPSULE | Refills: 0 | Status: SHIPPED | OUTPATIENT
Start: 2023-02-01 | End: 2023-02-08

## 2023-02-01 RX ORDER — NITROFURANTOIN 25; 75 MG/1; MG/1
100 CAPSULE ORAL 2 TIMES DAILY
Qty: 14 CAPSULE | Refills: 0 | Status: SHIPPED | OUTPATIENT
Start: 2023-02-01 | End: 2023-02-08

## 2023-02-01 RX ORDER — FLUCONAZOLE 150 MG/1
TABLET ORAL
Qty: 2 TABLET | Refills: 0 | Status: SHIPPED | OUTPATIENT
Start: 2023-02-01 | End: 2023-02-03 | Stop reason: SDUPTHER

## 2023-02-01 NOTE — PROGRESS NOTES
Chief Complaint   Patient presents with   • Vaginal Discharge     VAGINAL IRRITATION WITH DISCHARGE/ITCHING/ODOR         Subjective   HPI  Kellen Esparza is a 57 y.o. female, , who presents for evaluation of discharge, local irritation, odor and vulvar itching. The discharge is watery and clear.  Her symptoms have been present for 1 month(s).  Additional she has noticed None.    Prior to the onset of symptoms she was not on antibiotics.  She has recently changed soaps/detergents/toilet tissue.  Prior to this visit, she has used monistat in an attempt to improve her symptoms one month ago with no improvement.    Sexual History    She is currently sexually active. In the past year there has been NO new sexual partners. Condoms are never used.  She would like to be screened for STD's at today's exam.    Current birth control method: hysterectomy.    Menstrual History:    Patient's last menstrual period was 10/31/2014 (lmp unknown).    In the past 6 months her cycles have been absent.   Her menstrual flow has been absent. Intermenstrual bleeding is absent.  Post-coital bleeding is absent.      Additional OB/GYN History   Last Pap :2022 Negative ASCUS , Negative HPV  Last Completed Pap Smear          PAP SMEAR (Every 3 Years) Next due on 3/18/2025    2022  Pap IG, HPV-hr                OB History        5    Para   5    Term   5            AB        Living           SAB        IAB        Ectopic        Molar        Multiple        Live Births                    The additional following portions of the patient's history were reviewed and updated as appropriate: allergies, current medications, past family history, past medical history, past social history, past surgical history and problem list.    Review of Systems  All other systems reviewed and are negative.     I have reviewed and agree with the HPI, ROS, and historical information as entered above. Shreya Horton,  "APRN    Objective   /88   Resp 18   Ht 176.5 cm (69.49\")   Wt 127 kg (280 lb)   LMP 10/31/2014 (LMP Unknown) Comment: Mammogram 2014 info given   BMI 40.77 kg/m²     Physical Exam  Vitals and nursing note reviewed. Exam conducted with a chaperone present.   Constitutional:       General: She is not in acute distress.     Appearance: Normal appearance. She is obese. She is not ill-appearing.   Pulmonary:      Effort: Pulmonary effort is normal. No respiratory distress.   Abdominal:      General: There is no distension.      Palpations: Abdomen is soft. There is no mass.      Tenderness: There is no abdominal tenderness. There is no guarding or rebound.      Hernia: No hernia is present.   Genitourinary:     General: Normal vulva.      Vagina: Vaginal discharge present.      Cervix: Normal.      Uterus: Normal.       Adnexa: Right adnexa normal and left adnexa normal.      Comments: Thin, yellow dc  Skin:     General: Skin is warm and dry.   Neurological:      Mental Status: She is alert and oriented to person, place, and time.   Psychiatric:         Mood and Affect: Mood normal.         Behavior: Behavior normal.         Wet mount performed? Yes. Pertinent positives include: wbcs    Assessment & Plan     Assessment and Plan    Problem List Items Addressed This Visit    None  Visit Diagnoses     Vaginal irritation    -  Primary    Relevant Orders    Urine Culture - Urine, Urine, Clean Catch    POC Urinalysis Dipstick (Completed)    POC Wet Prep (Completed)    Vaginal discharge        Relevant Medications    doxycycline (MONODOX) 100 MG capsule    fluconazole (Diflucan) 150 MG tablet    Other Relevant Orders    Chlamydia trachomatis, Neisseria gonorrhoeae, Trichomonas vaginalis, PCR - Swab, Cervix    Acute cystitis without hematuria        Relevant Medications    nitrofurantoin, macrocrystal-monohydrate, (Macrobid) 100 MG capsule          Findings c/w atopic vaginitis and a UTI.  Complete meds as directed.  " Call prn no improvement or worsening/new symptoms.  Will notify of STD testing.        Shreya Horton, APRN  02/01/2023

## 2023-02-03 DIAGNOSIS — N89.8 VAGINAL DISCHARGE: ICD-10-CM

## 2023-02-03 LAB
BACTERIA UR CULT: NO GROWTH
BACTERIA UR CULT: NORMAL
C TRACH RRNA SPEC QL NAA+PROBE: NEGATIVE
N GONORRHOEA RRNA SPEC QL NAA+PROBE: NEGATIVE
T VAGINALIS RRNA SPEC QL NAA+PROBE: POSITIVE

## 2023-02-03 RX ORDER — METRONIDAZOLE 500 MG/1
500 TABLET ORAL 2 TIMES DAILY
Qty: 14 TABLET | Refills: 0 | Status: SHIPPED | OUTPATIENT
Start: 2023-02-03 | End: 2023-02-10

## 2023-02-03 RX ORDER — FLUCONAZOLE 150 MG/1
TABLET ORAL
Qty: 2 TABLET | Refills: 0 | Status: SHIPPED | OUTPATIENT
Start: 2023-02-03 | End: 2023-02-13

## 2023-02-12 PROCEDURE — 93296 REM INTERROG EVL PM/IDS: CPT | Performed by: INTERNAL MEDICINE

## 2023-02-12 PROCEDURE — 93294 REM INTERROG EVL PM/LDLS PM: CPT | Performed by: INTERNAL MEDICINE

## 2023-02-13 ENCOUNTER — OFFICE VISIT (OUTPATIENT)
Dept: ORTHOPEDIC SURGERY | Facility: CLINIC | Age: 58
End: 2023-02-13
Payer: COMMERCIAL

## 2023-02-13 VITALS — HEIGHT: 69 IN | BODY MASS INDEX: 41.47 KG/M2 | WEIGHT: 280 LBS

## 2023-02-13 DIAGNOSIS — M17.12 PRIMARY OSTEOARTHRITIS OF LEFT KNEE: ICD-10-CM

## 2023-02-13 DIAGNOSIS — M25.462 EFFUSION OF LEFT KNEE: ICD-10-CM

## 2023-02-13 DIAGNOSIS — E66.01 OBESITY, MORBID, BMI 40.0-49.9: ICD-10-CM

## 2023-02-13 DIAGNOSIS — M25.562 LEFT KNEE PAIN, UNSPECIFIED CHRONICITY: Primary | ICD-10-CM

## 2023-02-13 PROCEDURE — 20610 DRAIN/INJ JOINT/BURSA W/O US: CPT | Performed by: STUDENT IN AN ORGANIZED HEALTH CARE EDUCATION/TRAINING PROGRAM

## 2023-02-13 PROCEDURE — 99204 OFFICE O/P NEW MOD 45 MIN: CPT | Performed by: STUDENT IN AN ORGANIZED HEALTH CARE EDUCATION/TRAINING PROGRAM

## 2023-02-13 RX ORDER — ROPIVACAINE HYDROCHLORIDE 5 MG/ML
4 INJECTION, SOLUTION EPIDURAL; INFILTRATION; PERINEURAL
Status: COMPLETED | OUTPATIENT
Start: 2023-02-13 | End: 2023-02-13

## 2023-02-13 RX ORDER — TRIAMCINOLONE ACETONIDE 40 MG/ML
80 INJECTION, SUSPENSION INTRA-ARTICULAR; INTRAMUSCULAR
Status: COMPLETED | OUTPATIENT
Start: 2023-02-13 | End: 2023-02-13

## 2023-02-13 RX ORDER — TRAMADOL HYDROCHLORIDE 50 MG/1
50 TABLET ORAL EVERY 8 HOURS PRN
Qty: 30 TABLET | Refills: 0 | Status: SHIPPED | OUTPATIENT
Start: 2023-02-13

## 2023-02-13 RX ORDER — LIDOCAINE HYDROCHLORIDE 10 MG/ML
4 INJECTION, SOLUTION EPIDURAL; INFILTRATION; INTRACAUDAL; PERINEURAL
Status: COMPLETED | OUTPATIENT
Start: 2023-02-13 | End: 2023-02-13

## 2023-02-13 RX ADMIN — ROPIVACAINE HYDROCHLORIDE 4 ML: 5 INJECTION, SOLUTION EPIDURAL; INFILTRATION; PERINEURAL at 16:31

## 2023-02-13 RX ADMIN — LIDOCAINE HYDROCHLORIDE 4 ML: 10 INJECTION, SOLUTION EPIDURAL; INFILTRATION; INTRACAUDAL; PERINEURAL at 16:31

## 2023-02-13 RX ADMIN — TRIAMCINOLONE ACETONIDE 80 MG: 40 INJECTION, SUSPENSION INTRA-ARTICULAR; INTRAMUSCULAR at 16:31

## 2023-02-13 NOTE — PROGRESS NOTES
[facility] [place] [facility] Office Visit     Office Visit       Date: 02/13/2023   Patient Name: Kellen Esparza  MRN: [id number]  YOB: 1965    Referring Physician: Referring, Self     Chief Complaint:   Chief Complaint   Patient presents with   • Left Knee - Pain       History of Present Illness:   Kellen Esparza is a 57 y.o. female who presents with left knee pain for 1 month(s). Onset atraumatic and gradual in nature. Pain is localized to the medial joint line and is a 9/10 on the pain scale. Pain is described as aching and throbbing. Associated symptoms include pain, popping, grinding and giving way/buckling. The pain is worse with walking, sitting, climbing stairs, sleeping, rising from seated position and any movement of the joint; nothing makes it better. Previous treatments have included: nothing since symptom onset. Although some transient relief was reported with these interventions, these conservative measures have failed and symptoms have persisted. The patient is limited in daily activities and has had a significant decrease in quality of life as a result. She denies fevers, chills, or constitutional symptoms.    Subjective   Review of Systems: Review of Systems   Constitutional: Negative for chills, fever, unexpected weight gain and unexpected weight loss.   HENT: Negative for congestion, postnasal drip and rhinorrhea.    Eyes: Negative for blurred vision.   Respiratory: Negative for shortness of breath.    Cardiovascular: Negative for leg swelling.   Gastrointestinal: Negative for abdominal pain, nausea and vomiting.   Genitourinary: Negative for difficulty urinating.   Musculoskeletal: Positive for arthralgias. Negative for gait problem, joint swelling and myalgias.   Skin: Negative for skin lesions and wound.   Neurological: Negative for dizziness, weakness, light-headedness and numbness.   Hematological: Does not bruise/bleed easily.    Psychiatric/Behavioral: Negative for depressed mood.        I have reviewed the following portions of the patient's history:History of Present Illness and review of systems.    Past Medical History:   Past Medical History:   Diagnosis Date   • Arthritis    • Arthritis of back    • Carpal tunnel syndrome    • CPAP (continuous positive airway pressure) dependence    • Depression    • Diabetes mellitus (HCC)     doesnt check sugar    • GERD (gastroesophageal reflux disease)    • History of COVID-19 01/2022   • Hypertension    • Migraine    • Musculoligamentous strain    • Pacemaker    • Pulmonary embolism (HCC) 1997   • Sleep apnea     CPAP machine was recalled, waiting for new one.   • Wears eyeglasses        Past Surgical History:   Past Surgical History:   Procedure Laterality Date   • BREAST BIOPSY Left 2014   • CARDIAC CATHETERIZATION Left 08/10/2017    Procedure: Cardiac Catheterization/Vascular Study;  Surgeon: Johny Sommer MD;  Location:  [facility];  Service:    • CARDIAC ELECTROPHYSIOLOGY PROCEDURE N/A 07/19/2019    Procedure: Pacemaker DC new;  Surgeon: Sonya Gay MD;  Location:  [facility];  Service: Cardiology   • CHOLECYSTECTOMY     • COLONOSCOPY     • COLONOSCOPY N/A 05/03/2022    Procedure: COLONOSCOPY;  Surgeon: Bryant Mejia MD;  Location:  [facility];  Service: Gastroenterology;  Laterality: N/A;   • ENDOSCOPY     • HYSTERECTOMY  11/2014   • INSERT / REPLACE / REMOVE PACEMAKER     • JOINT REPLACEMENT Right     knee   • KNEE SURGERY Right 12/2017    total replacement   • NASAL SINUS SURGERY     • OOPHORECTOMY  11/2014   • PACEMAKER IMPLANTATION     • PULMONARY EMBOLISM SURGERY      right lung   • SPINAL CORD STIMULATOR IMPLANT N/A 09/10/2020    Procedure: SPINAL CORD STIMULATOR INSERTION PHASE 1, UPPER THORACIC PLACEMENT ;  Surgeon: Victor Manuel Geiger MD;  Location:  [facility];  Service: Neurosurgery;  Laterality: N/A;   • TUBAL ABDOMINAL  LIGATION         Family History:   Family History   Problem Relation Age of Onset   • Breast cancer Mother 42   • Heart failure Mother    • Diabetes Mother    • No Known Problems Father    • Hypertension Sister    • Hypertension Brother    • Heart failure Son    • Hypertension Maternal Grandmother    • No Known Problems Paternal Grandmother    • Hypertension Maternal Grandfather    • No Known Problems Paternal Grandfather    • Ovarian cancer Neg Hx        Social History:   Social History     Socioeconomic History   • Marital status: Single   Tobacco Use   • Smoking status: Never   • Smokeless tobacco: Never   Vaping Use   • Vaping Use: Never used   Substance and Sexual Activity   • Alcohol use: No     Comment: rare   • Drug use: No   • Sexual activity: Yes     Partners: Male     Birth control/protection: Surgical, Hysterectomy       Medications:   Current Outpatient Medications:   •  apixaban (Eliquis) 5 MG tablet tablet, Take 1 tablet by mouth 2 (Two) Times a Day. (Patient taking differently: Take 5 mg by mouth 2 (Two) Times a Day. Can hold 48 hours prior to colonoscopy per Dr. Sommer.), Disp: 180 tablet, Rfl: 3  •  atorvastatin (LIPITOR) 80 MG tablet, TAKE 1 TABLET BY MOUTH DAILY, Disp: 90 tablet, Rfl: 3  •  carvedilol (COREG) 6.25 MG tablet, Take 1 tablet by mouth 2 (Two) Times a Day., Disp: 180 tablet, Rfl: 3  •  cyclobenzaprine (FLEXERIL) 10 MG tablet, Take 10 mg by mouth 3 (Three) Times a Day., Disp: , Rfl:   •  HYDROcodone-acetaminophen (NORCO) 7.5-325 MG per tablet, Take 1 tablet by mouth 3 (Three) Times a Day., Disp: , Rfl:   •  levETIRAcetam (KEPPRA) 250 MG tablet, TAKE 1 TABLET BY MOUTH TWICE DAILY, Disp: 180 tablet, Rfl: 0  •  pregabalin (LYRICA) 150 MG capsule, Take 1 capsule by mouth 2 (Two) Times a Day., Disp: 60 capsule, Rfl: 1  •  valsartan (DIOVAN) 40 MG tablet, TAKE 1 TABLET BY MOUTH EVERY MORNING, Disp: 90 tablet, Rfl: 3    Allergies:   Allergies   Allergen Reactions   • Metformin GI Intolerance  "  • Codeine Itching   • Lisinopril Cough       I reviewed the patient's chief complaint, history of present illness, review of systems, past medical history, surgical history, family history, social history, medications and allergy list.     Objective    Vital Signs:   Vitals:    02/13/23 1506   Weight: 127 kg (280 lb)   Height: 176.5 cm (69.49\")     Body mass index is 40.77 kg/m².   Class 3 Severe Obesity (BMI >=40). Obesity-related health conditions include the following: hypertension, coronary heart disease, diabetes mellitus and dyslipidemias. Obesity is newly identified. BMI is is above average; BMI management plan is completed. We discussed portion control and increasing exercise.     Patient reports that she is a non-smoker and has not ever been a smoker.  This behavior was applauded and she was encouraged to continue in smoking cessation.  We will continue to monitor at subsequent visits.    Ortho Exam:  Constitutional: General Appearance: healthy-appearing, NAD, and normal body habitus.   Gait and Station: Appearance: limp and antalgic gait and ambulates with no assistive devices.   Skin: Right Lower Extremity: normal. Left Lower Extremity: normal.   Psychiatric: Orientation: oriented to time, place, and person. Mood and Affect: normal mood and affect and active and alert.   Left knee exam:   There is swelling and effusion but no warmth or erythema of the knee.    The skin is clear.    Overall the alignment of the knee is varus   The patient has 5/5 strength with ankle plantar flexion, dorsiflexion, inversion, eversion, and great toe extension.    Sensation is grossly present to light touch in the superficial peroneal, deep peroneal, and tibial nerve distributions.    The dorsalis pedis pulse is 2+ and the foot is well perfused with brisk capillary refill.   Active ROM is 10° to 100°.   Passive ROM is 0° to 110°.   The knee shows no gross instability to varus/valgus stress testing, anterior/posterior drawer " sign, and Lachman testing.    There is tenderness to palpation over the medial joint line. Patellar grind test is positive.   Hip ROM is full and painless.  right knee exam:   Normal knee contour.   No evidence of swelling or effusion. Full range of motion.    Results Review:   Imaging Results (Last 24 Hours)     Procedure Component Value Units Date/Time    XR Knee 4+ View Left [[id number]] Resulted: 02/13/23 1608     Updated: 02/13/23 1609    Narrative:      Indication: Left knee pain    Views: Weightbearing views of the knee are submitted.     Impression: There is no fracture subluxation or dislocation. The patella   sits normally in the trochlea. There are no acute findings. There are   significant degenerative changes including medial joint space narrowing   with osteophyte formation as well as in the patellofemoral joint space.    Skiers view shows total knee arthroplasty in the contralateral knee..    Comparison: No additional images available for comparison review.         Procedures    Assessment / Plan    Assessment/Plan:   Diagnoses and all orders for this visit:    1. Left knee pain, unspecified chronicity (Primary)  -     XR Knee 4+ View Left    2. Primary osteoarthritis of left knee    3. Effusion of left knee    4. Obesity, morbid, BMI 40.0-49.9 ([facility])    Other orders  -     - Large Joint Arthrocentesis: L knee    1 month of worsening left anterior and medial knee pain.  She has a previous history of right total knee arthroplasty.  Her symptoms are present over the medial joint line and behind the knee.  No previous treatment for the left knee.  We discussed her radiographic findings and how they correlate to her current symptoms.  I recommend anti-inflammatory medication to control the pain and swelling.  I encouraged her to ice and work on her range of motion multiple times daily.  Encouraged her to become more physically active to combat her underlying obesity.  We discussed the risks and benefits of  corticosteroid injection into the left knee given her deficits and limitations at this moment.  After discussion, patient elected proceed and tolerated procedure well.  See procedure note.  I plan to see her back in 6 weeks to monitor her response and decide on additional treatment options.    Past imaging studies reviewed: 3/14/2022-DEXA.    Past laboratory results reviewed: 6/6/2022-creatinine 0.72, EGFR 97.1, TSH 0.852.    Follow Up:   Return in about 6 weeks (around 3/27/2023) for Recheck.      Caesar Najera MD  [facility] [place] [facility]

## 2023-02-13 NOTE — PROGRESS NOTES
Procedure   - Large Joint Arthrocentesis: L knee on 2/13/2023 4:31 PM  Indications: pain  Details: 22 G needle, anterolateral approach  Medications: 4 mL lidocaine PF 1% 1 %; 80 mg triamcinolone acetonide 40 MG/ML; 4 mL ropivacaine 0.5 %  Outcome: tolerated well, no immediate complications  Procedure, treatment alternatives, risks and benefits explained, specific risks discussed. Consent was given by the patient. Immediately prior to procedure a time out was called to verify the correct patient, procedure, equipment, support staff and site/side marked as required. Patient was prepped and draped in the usual sterile fashion.

## 2023-02-22 ENCOUNTER — OFFICE VISIT (OUTPATIENT)
Dept: OBSTETRICS AND GYNECOLOGY | Facility: CLINIC | Age: 58
End: 2023-02-22
Payer: COMMERCIAL

## 2023-02-22 VITALS — DIASTOLIC BLOOD PRESSURE: 86 MMHG | SYSTOLIC BLOOD PRESSURE: 126 MMHG | WEIGHT: 285.6 LBS | BODY MASS INDEX: 41.59 KG/M2

## 2023-02-22 DIAGNOSIS — Z87.440 HISTORY OF UTI: ICD-10-CM

## 2023-02-22 DIAGNOSIS — N89.8 VAGINAL DISCHARGE: Primary | ICD-10-CM

## 2023-02-22 DIAGNOSIS — Z20.2 TRICHOMONAS CONTACT, TREATED: ICD-10-CM

## 2023-02-22 LAB
BILIRUB BLD-MCNC: NEGATIVE MG/DL
CLARITY, POC: CLEAR
COLOR UR: YELLOW
GLUCOSE UR STRIP-MCNC: NEGATIVE MG/DL
KETONES UR QL: NEGATIVE
LEUKOCYTE EST, POC: NEGATIVE
NITRITE UR-MCNC: NEGATIVE MG/ML
PH UR: 6 [PH] (ref 5–8)
PROT UR STRIP-MCNC: ABNORMAL MG/DL
RBC # UR STRIP: NEGATIVE /UL
SP GR UR: 1.01 (ref 1–1.03)
UROBILINOGEN UR QL: ABNORMAL

## 2023-02-22 PROCEDURE — 99213 OFFICE O/P EST LOW 20 MIN: CPT | Performed by: NURSE PRACTITIONER

## 2023-02-22 PROCEDURE — 81002 URINALYSIS NONAUTO W/O SCOPE: CPT | Performed by: NURSE PRACTITIONER

## 2023-02-22 RX ORDER — HYDROCHLOROTHIAZIDE 12.5 MG/1
1 CAPSULE, GELATIN COATED ORAL DAILY
COMMUNITY
Start: 2023-02-12

## 2023-02-22 NOTE — PROGRESS NOTES
Chief Complaint   Patient presents with   • test of cure       Subjective   HPI  Kellen Esparza is a 57 y.o. female, .   On 23, she was treated for cervicitis and UTI with doxycycline and Macrobid.  STD testing resulted positive for trichomonas.  She did complete Rx for Flagyl as directed.  Her symptoms have resolved and she has no complaints today.    Additional OB/GYN History     Last Pap :   Last Completed Pap Smear          PAP SMEAR (Every 3 Years) Next due on 3/18/2025    2022  Pap IG, HPV-hr                Last mammogram:   Last Completed Mammogram          MAMMOGRAM (Every 2 Years) Next due on 2022  Mammo Screening Digital Tomosynthesis Bilateral With CAD    2020  Mammo Screening Digital Tomosynthesis Bilateral With CAD    10/26/2016  Mammo Diagnostic Digital Tomosynthesis Right Without CAD    10/13/2016  SCANNED - MAMMO    10/12/2016  Mammo Screening Digital Tomosynthesis Bilateral With CAD                Tobacco Usage?: No   OB History        5    Para   5    Term   5            AB        Living           SAB        IAB        Ectopic        Molar        Multiple        Live Births                      Current Outpatient Medications:   •  apixaban (Eliquis) 5 MG tablet tablet, Take 1 tablet by mouth 2 (Two) Times a Day. (Patient taking differently: Take 5 mg by mouth 2 (Two) Times a Day. Can hold 48 hours prior to colonoscopy per Dr. Sommer.), Disp: 180 tablet, Rfl: 3  •  atorvastatin (LIPITOR) 80 MG tablet, TAKE 1 TABLET BY MOUTH DAILY, Disp: 90 tablet, Rfl: 3  •  carvedilol (COREG) 6.25 MG tablet, Take 1 tablet by mouth 2 (Two) Times a Day., Disp: 180 tablet, Rfl: 3  •  cyclobenzaprine (FLEXERIL) 10 MG tablet, Take 10 mg by mouth 3 (Three) Times a Day., Disp: , Rfl:   •  hydroCHLOROthiazide (MICROZIDE) 12.5 MG capsule, Take 1 capsule by mouth Daily., Disp: , Rfl:   •  HYDROcodone-acetaminophen (NORCO) 7.5-325 MG per tablet, Take 1  tablet by mouth 3 (Three) Times a Day., Disp: , Rfl:   •  levETIRAcetam (KEPPRA) 250 MG tablet, TAKE 1 TABLET BY MOUTH TWICE DAILY, Disp: 180 tablet, Rfl: 0  •  pregabalin (LYRICA) 150 MG capsule, Take 1 capsule by mouth 2 (Two) Times a Day., Disp: 60 capsule, Rfl: 1  •  traMADol (ULTRAM) 50 MG tablet, Take 1 tablet by mouth Every 8 (Eight) Hours As Needed for Moderate Pain., Disp: 30 tablet, Rfl: 0  •  valsartan (DIOVAN) 40 MG tablet, TAKE 1 TABLET BY MOUTH EVERY MORNING, Disp: 90 tablet, Rfl: 3     Past Medical History:   Diagnosis Date   • Arthritis    • Arthritis of back    • Carpal tunnel syndrome    • Chlamydia    • CPAP (continuous positive airway pressure) dependence    • Depression    • Diabetes mellitus (HCC)     doesnt check sugar    • GERD (gastroesophageal reflux disease)    • History of COVID-19 01/2022   • Hypertension    • Migraine    • Musculoligamentous strain    • Pacemaker    • Pulmonary embolism (HCC) 1997   • Sleep apnea     CPAP machine was recalled, waiting for new one.   • Wears eyeglasses         Past Surgical History:   Procedure Laterality Date   • BREAST BIOPSY Left 2014   • CARDIAC CATHETERIZATION Left 08/10/2017    Procedure: Cardiac Catheterization/Vascular Study;  Surgeon: Johny Sommer MD;  Location:  [facility];  Service:    • CARDIAC ELECTROPHYSIOLOGY PROCEDURE N/A 07/19/2019    Procedure: Pacemaker DC new;  Surgeon: Sonya Gay MD;  Location:  [facility];  Service: Cardiology   • CHOLECYSTECTOMY     • COLONOSCOPY     • COLONOSCOPY N/A 05/03/2022    Procedure: COLONOSCOPY;  Surgeon: Bryant Mejia MD;  Location:  [facility];  Service: Gastroenterology;  Laterality: N/A;   • ENDOSCOPY     • HYSTERECTOMY  11/2014   • INSERT / REPLACE / REMOVE PACEMAKER     • JOINT REPLACEMENT Right     knee   • KNEE SURGERY Right 12/2017    total replacement   • NASAL SINUS SURGERY     • OOPHORECTOMY  11/2014   • PACEMAKER IMPLANTATION      • PULMONARY EMBOLISM SURGERY      right lung   • SPINAL CORD STIMULATOR IMPLANT N/A 09/10/2020    Procedure: SPINAL CORD STIMULATOR INSERTION PHASE 1, UPPER THORACIC PLACEMENT ;  Surgeon: Victor Manuel Geiger MD;  Location: [facility];  Service: Neurosurgery;  Laterality: N/A;   • TUBAL ABDOMINAL LIGATION         The additional following portions of the patient's history were reviewed and updated as appropriate: allergies, current medications, past family history, past medical history, past social history, past surgical history and problem list.    Review of Systems   Genitourinary: Positive for frequency and vaginal discharge.   All other systems reviewed and are negative.      I have reviewed and agree with the HPI, ROS, and historical information as entered above. Shreya Aminata Seguraf, APRN    Objective   /86   Wt 130 kg (285 lb 9.6 oz)   LMP 10/31/2014 (LMP Unknown) Comment: Mammogram 2014 info given   BMI 41.59 kg/m²     Physical Exam  Vitals and nursing note reviewed. Exam conducted with a chaperone present.   Constitutional:       General: She is not in acute distress.     Appearance: Normal appearance. She is obese. She is not ill-appearing.   Pulmonary:      Effort: Pulmonary effort is normal. No respiratory distress.   Abdominal:      General: There is no distension.      Palpations: Abdomen is soft. There is no mass.      Tenderness: There is no abdominal tenderness. There is no guarding or rebound.      Hernia: No hernia is present.   Genitourinary:     General: Normal vulva.   Skin:     General: Skin is warm and dry.   Neurological:      Mental Status: She is alert and oriented to person, place, and time.   Psychiatric:         Mood and Affect: Mood normal.         Behavior: Behavior normal.         Assessment & Plan     Assessment     Problem List Items Addressed This Visit    None  Visit Diagnoses     Vaginal discharge    -  Primary    Relevant Orders    Chlamydia trachomatis, Neisseria gonorrhoeae,  Trichomonas vaginalis, PCR - Swab, Vagina    History of UTI        Relevant Orders    POC Urinalysis Dipstick (Completed)    Urine Culture - Urine, Urine, Clean Catch    Trichomonas contact, treated        Relevant Orders    Chlamydia trachomatis, Neisseria gonorrhoeae, Trichomonas vaginalis, PCR - Swab, Vagina          Plan     D/w pt will notify of UTI and trichomonas CORNELL.  Enc condoms prn.  RTO annually and prn.    Shreya Horton, APRN  02/22/2023

## 2023-02-24 LAB
BACTERIA UR CULT: NORMAL
BACTERIA UR CULT: NORMAL

## 2023-02-25 LAB
C TRACH RRNA SPEC QL NAA+PROBE: NEGATIVE
N GONORRHOEA RRNA SPEC QL NAA+PROBE: NEGATIVE

## 2023-03-03 LAB — SPECIMEN STATUS: NORMAL

## 2023-03-10 DIAGNOSIS — G90.59 COMPLEX REGIONAL PAIN SYNDROME TYPE 1 AFFECTING OTHER SITE: ICD-10-CM

## 2023-03-11 RX ORDER — LEVETIRACETAM 250 MG/1
TABLET ORAL
Qty: 180 TABLET | Refills: 0 | Status: SHIPPED | OUTPATIENT
Start: 2023-03-11

## 2023-03-11 NOTE — TELEPHONE ENCOUNTER
Rx Refill Note  Requested Prescriptions     Pending Prescriptions Disp Refills   • levETIRAcetam (KEPPRA) 250 MG tablet [Pharmacy Med Name: LEVETIRACETAM 250MG TABLETS] 180 tablet 0     Sig: TAKE 1 TABLET BY MOUTH TWICE DAILY      Last office visit with prescribing clinician: 6/13/2022   Last telemedicine visit with prescribing clinician: 3/21/2023   Next office visit with prescribing clinician: 3/21/2023                         Would you like a call back once the refill request has been completed: [] Yes [] No    If the office needs to give you a call back, can they leave a voicemail: [] Yes [] No    Elida Recinos MA  03/11/23, 08:45 EST

## 2023-03-22 LAB
T VAGINALIS RRNA SPEC QL NAA+PROBE: NEGATIVE
WRITTEN AUTHORIZATION: NORMAL

## 2023-03-30 NOTE — DISCHARGE INSTR - LAB
If you are not called in regards to the stress test by noon tomorrow (7/23) please call [phone] to schedule.    Wound Care: Petrolatum

## 2023-05-04 ENCOUNTER — OFFICE VISIT (OUTPATIENT)
Dept: FAMILY MEDICINE CLINIC | Facility: CLINIC | Age: 58
End: 2023-05-04
Payer: COMMERCIAL

## 2023-05-04 ENCOUNTER — LAB (OUTPATIENT)
Dept: LAB | Facility: HOSPITAL | Age: 58
End: 2023-05-04
Payer: COMMERCIAL

## 2023-05-04 VITALS
DIASTOLIC BLOOD PRESSURE: 78 MMHG | SYSTOLIC BLOOD PRESSURE: 110 MMHG | OXYGEN SATURATION: 98 % | TEMPERATURE: 96.9 F | BODY MASS INDEX: 43.4 KG/M2 | HEIGHT: 69 IN | WEIGHT: 293 LBS | HEART RATE: 80 BPM

## 2023-05-04 DIAGNOSIS — Z00.00 WELL ADULT EXAM: Primary | ICD-10-CM

## 2023-05-04 DIAGNOSIS — Z00.00 WELL ADULT EXAM: ICD-10-CM

## 2023-05-04 DIAGNOSIS — E11.649 TYPE 2 DIABETES MELLITUS WITH HYPOGLYCEMIA WITHOUT COMA, WITHOUT LONG-TERM CURRENT USE OF INSULIN: ICD-10-CM

## 2023-05-04 LAB
ALBUMIN UR-MCNC: 1.5 MG/DL
BACTERIA UR QL AUTO: ABNORMAL /HPF
BILIRUB UR QL STRIP: NEGATIVE
CLARITY UR: CLEAR
COLOR UR: YELLOW
CREAT UR-MCNC: 60.7 MG/DL
DEPRECATED RDW RBC AUTO: 42 FL (ref 37–54)
ERYTHROCYTE [DISTWIDTH] IN BLOOD BY AUTOMATED COUNT: 14.2 % (ref 12.3–15.4)
EXPIRATION DATE: NORMAL
GLUCOSE UR STRIP-MCNC: NEGATIVE MG/DL
HBA1C MFR BLD: 7.1 %
HCT VFR BLD AUTO: 37.3 % (ref 34–46.6)
HGB BLD-MCNC: 12.1 G/DL (ref 12–15.9)
HGB UR QL STRIP.AUTO: NEGATIVE
HYALINE CASTS UR QL AUTO: ABNORMAL /LPF
KETONES UR QL STRIP: NEGATIVE
LEUKOCYTE ESTERASE UR QL STRIP.AUTO: ABNORMAL
Lab: NORMAL
MCH RBC QN AUTO: 26.7 PG (ref 26.6–33)
MCHC RBC AUTO-ENTMCNC: 32.4 G/DL (ref 31.5–35.7)
MCV RBC AUTO: 82.2 FL (ref 79–97)
MICROALBUMIN/CREAT UR: 24.7 MG/G
NITRITE UR QL STRIP: NEGATIVE
PH UR STRIP.AUTO: 6.5 [PH] (ref 5–8)
PLATELET # BLD AUTO: 171 10*3/MM3 (ref 140–450)
PMV BLD AUTO: 12.6 FL (ref 6–12)
PROT UR QL STRIP: NEGATIVE
RBC # BLD AUTO: 4.54 10*6/MM3 (ref 3.77–5.28)
RBC # UR STRIP: ABNORMAL /HPF
REF LAB TEST METHOD: ABNORMAL
SP GR UR STRIP: 1.01 (ref 1–1.03)
SQUAMOUS #/AREA URNS HPF: ABNORMAL /HPF
UROBILINOGEN UR QL STRIP: ABNORMAL
WBC # UR STRIP: ABNORMAL /HPF
WBC NRBC COR # BLD: 8.21 10*3/MM3 (ref 3.4–10.8)

## 2023-05-04 PROCEDURE — 80061 LIPID PANEL: CPT

## 2023-05-04 PROCEDURE — 82043 UR ALBUMIN QUANTITATIVE: CPT

## 2023-05-04 PROCEDURE — 82570 ASSAY OF URINE CREATININE: CPT

## 2023-05-04 PROCEDURE — 84681 ASSAY OF C-PEPTIDE: CPT

## 2023-05-04 PROCEDURE — 83525 ASSAY OF INSULIN: CPT

## 2023-05-04 PROCEDURE — 81001 URINALYSIS AUTO W/SCOPE: CPT

## 2023-05-04 PROCEDURE — 80050 GENERAL HEALTH PANEL: CPT

## 2023-05-04 RX ORDER — SEMAGLUTIDE 1.34 MG/ML
INJECTION, SOLUTION SUBCUTANEOUS
Qty: 3 ML | Refills: 0 | Status: SHIPPED | OUTPATIENT
Start: 2023-05-04 | End: 2023-07-13

## 2023-05-04 NOTE — PROGRESS NOTES
Annual Well Adult Visit     Patient Name: Kellen Esparza  : 1965   MRN: [id number]   Care Team: Patient Care Team:  Ryne Puente DO as PCP - General (Family Medicine)  Johny Sommer MD as Consulting Physician (Cardiology)  Sonya Gay MD as Consulting Physician (Cardiology)  Deb Guajardo APRN as Nurse Practitioner (Pulmonary Disease)  Sal Sheldon MD (Pain Medicine)  Luis Saeed MD as Consulting Physician (Otolaryngology)    Chief Complaint:    Chief Complaint   Patient presents with   • Annual Exam       History of Present Illness: Kellen Esparza is a 57 y.o. female who presents today for annual physical exam and preventative care.    HPI    Sugar has been getting very low, down around 45. Has been going on last 3 weeks. Feels very tired. Was previously on Ozempic >1 year ago. Happens in morning and evening after prolonged fasting, but also occurs within a couple hours of a meal sometimes. Feels like she is peeing a lot and drinking a lot of water. Has not had A1c since 2022 5.7%.    Still in pain management for pain in low back and arthritis. Would like to discuss FMLA. I recommended that she discuss with her pain management physician.    Colonoscopy scheduled for July 3 (yearly)    This patient is accompanied by their self who contributes to the history of their care.    The following portions of the patient's history were reviewed and updated as appropriate: allergies, current medications, past family history, past medical history, past social history, past surgical history and problem list.       Allied Screenings    N/A         Date      Eye Exam       []              []   Up to date    Location:   []   Recommended       Counseled every 2 years in those without known issues, yearly if wearing glasses or contacts      Dental Exam       []           []   Up to date   Location:   []   Recommended       Counseled, recommended cleanings every 6 months, daily  brushing and flossing        Skin Cancer Screening        []            []   Up to date   Location:   []   Recommended Counseled on regular sunscreen wear, self-skin checks      Obesity Counseling        []        []   Complete   []   Nutritionist referral   []   Declined Counseled on moderate portions, low meat diet focusing on whole foods and plant-based protein           Diabetes  [] Yes  [] No    N/A         Date      Eye Exam       []             []   Complete         Date:  Where:         Foot Exam       []           []   Complete              Obesity Counseling       []        []   Complete          Additional Testing         Date      Colorectal Screening        []   N/A   []   Complete        Date:     Where:         Pap        []   N/A   []   Complete    Date:     Where:         Mammogram           []   N/A   []   Complete Date:     Where:      PSA  (Over age 50)     []   N/A   []   Complete    Date:     Where:      US Aorta  (For male with >20 cigarette history, age 65)     []   N/A   []   Complete    Date:     Where:      CT for Smoker  (Age 55-75, 30 pk yr)     []   N/A   []   Complete    Date:     Where:      Bone Density/DEXA        []   N/A   []   Complete    Date:     Follow-up:      Hep. C     []   N/A   []   Complete         Subjective      Review of Systems:   Review of Systems - See HPI    Past Medical History:   Past Medical History:   Diagnosis Date   • Arthritis    • Arthritis of back    • Carpal tunnel syndrome    • Chlamydia    • CPAP (continuous positive airway pressure) dependence    • Depression    • Diabetes mellitus     doesnt check sugar    • GERD (gastroesophageal reflux disease)    • History of COVID-19 01/2022   • Hypertension    • Migraine    • Musculoligamentous strain    • Pacemaker    • Pulmonary embolism 1997   • Sleep apnea     CPAP machine was recalled, waiting for new one.   • Wears eyeglasses        Past Surgical History:   Past Surgical History:   Procedure Laterality  Date   • BREAST BIOPSY Left 2014   • CARDIAC CATHETERIZATION Left 08/10/2017    Procedure: Cardiac Catheterization/Vascular Study;  Surgeon: Johny Sommer MD;  Location:  [facility];  Service:    • CARDIAC ELECTROPHYSIOLOGY PROCEDURE N/A 07/19/2019    Procedure: Pacemaker DC new;  Surgeon: Sonya Gay MD;  Location:  [facility];  Service: Cardiology   • CHOLECYSTECTOMY     • COLONOSCOPY     • COLONOSCOPY N/A 05/03/2022    Procedure: COLONOSCOPY;  Surgeon: Bryant Mejia MD;  Location:  [facility];  Service: Gastroenterology;  Laterality: N/A;   • ENDOSCOPY     • HYSTERECTOMY  11/2014   • INSERT / REPLACE / REMOVE PACEMAKER     • JOINT REPLACEMENT Right     knee   • KNEE SURGERY Right 12/2017    total replacement   • NASAL SINUS SURGERY     • OOPHORECTOMY  11/2014   • PACEMAKER IMPLANTATION     • PULMONARY EMBOLISM SURGERY      right lung   • SPINAL CORD STIMULATOR IMPLANT N/A 09/10/2020    Procedure: SPINAL CORD STIMULATOR INSERTION PHASE 1, UPPER THORACIC PLACEMENT ;  Surgeon: Victor Manuel Geiger MD;  Location:  [facility];  Service: Neurosurgery;  Laterality: N/A;   • TUBAL ABDOMINAL LIGATION         Family History:   Family History   Problem Relation Age of Onset   • Breast cancer Mother 42   • Heart failure Mother    • Diabetes Mother    • No Known Problems Father    • Hypertension Sister    • Hypertension Brother    • Heart failure Son    • Hypertension Maternal Grandmother    • No Known Problems Paternal Grandmother    • Hypertension Maternal Grandfather    • No Known Problems Paternal Grandfather    • Ovarian cancer Neg Hx        Social History:   Social History     Socioeconomic History   • Marital status: Single   Tobacco Use   • Smoking status: Never   • Smokeless tobacco: Never   Vaping Use   • Vaping Use: Never used   Substance and Sexual Activity   • Alcohol use: No     Comment: rare   • Drug use: No   • Sexual activity: Yes     Partners: Male      Birth control/protection: Surgical, Hysterectomy       Tobacco History:   Social History     Tobacco Use   Smoking Status Never   Smokeless Tobacco Never       Medications:     Current Outpatient Medications:   •  apixaban (Eliquis) 5 MG tablet tablet, Take 1 tablet by mouth 2 (Two) Times a Day. (Patient taking differently: Take 1 tablet by mouth 2 (Two) Times a Day. Can hold 48 hours prior to colonoscopy per Dr. Sommer.), Disp: 180 tablet, Rfl: 3  •  atorvastatin (LIPITOR) 80 MG tablet, TAKE 1 TABLET BY MOUTH DAILY, Disp: 90 tablet, Rfl: 3  •  carvedilol (COREG) 6.25 MG tablet, Take 1 tablet by mouth 2 (Two) Times a Day., Disp: 180 tablet, Rfl: 3  •  cyclobenzaprine (FLEXERIL) 10 MG tablet, Take 1 tablet by mouth 3 (Three) Times a Day., Disp: , Rfl:   •  HYDROcodone-acetaminophen (NORCO) 7.5-325 MG per tablet, Take 1 tablet by mouth 3 (Three) Times a Day., Disp: , Rfl:   •  levETIRAcetam (KEPPRA) 250 MG tablet, TAKE 1 TABLET BY MOUTH TWICE DAILY, Disp: 180 tablet, Rfl: 0  •  pregabalin (LYRICA) 150 MG capsule, Take 1 capsule by mouth 2 (Two) Times a Day., Disp: 60 capsule, Rfl: 1  •  valsartan (DIOVAN) 40 MG tablet, TAKE 1 TABLET BY MOUTH EVERY MORNING, Disp: 90 tablet, Rfl: 3  •  Ozempic, 0.25 or 0.5 MG/DOSE, 2 MG/1.5ML solution pen-injector, Inject 0.25 mg under the skin into the appropriate area as directed 1 (One) Time Per Week for 28 days, THEN 0.5 mg 1 (One) Time Per Week for 42 days., Disp: 3 mL, Rfl: 0    Immunizations:   Immunization History   Administered Date(s) Administered   • COVID-19 (PFIZER) Purple Cap Monovalent 04/02/2021, 04/30/2021   • FluLaval/Fluzone >6mos 11/02/2021        Allergies:   Allergies   Allergen Reactions   • Metformin GI Intolerance   • Codeine Itching   • Lisinopril Cough       Objective   Objective     Physical Exam:  Vital Signs:   Vitals:    05/04/23 1335   BP: 110/78   BP Location: Left arm   Patient Position: Sitting   Cuff Size: Adult   Pulse: 80   Temp: 96.9 °F (36.1 °C)  "  TempSrc: Infrared   SpO2: 98%   Weight: 133 kg (293 lb 12.8 oz)   Height: 176.5 cm (69.49\")     Body mass index is 42.78 kg/m².     Physical Exam  Vitals and nursing note reviewed.   Constitutional:       Appearance: She is well-developed.   HENT:      Head: Normocephalic and atraumatic.      Right Ear: External ear normal.      Left Ear: External ear normal.   Eyes:      Conjunctiva/sclera: Conjunctivae normal.      Pupils: Pupils are equal, round, and reactive to light.   Neck:      Thyroid: No thyromegaly.      Vascular: No JVD.      Trachea: No tracheal deviation.   Cardiovascular:      Rate and Rhythm: Normal rate and regular rhythm.      Heart sounds: Normal heart sounds. No murmur heard.  Pulmonary:      Effort: Pulmonary effort is normal. No respiratory distress.      Breath sounds: Normal breath sounds.   Abdominal:      General: Bowel sounds are normal. There is no distension.      Palpations: Abdomen is soft.      Tenderness: There is no abdominal tenderness.   Musculoskeletal:      Cervical back: Normal range of motion and neck supple.   Lymphadenopathy:      Cervical: No cervical adenopathy.   Skin:     General: Skin is warm and dry.      Capillary Refill: Capillary refill takes less than 2 seconds.   Neurological:      Mental Status: She is alert and oriented to person, place, and time.      Cranial Nerves: No cranial nerve deficit.   Psychiatric:         Behavior: Behavior normal.       Nursing note reviewed  PHQ-2 Depression Screening  Little interest or pleasure in doing things? 0-->not at all   Feeling down, depressed, or hopeless? 0-->not at all   PHQ-2 Total Score 0     Procedures/Radiology     Procedures  No radiology results for the last 7 days     Assessment & Plan   Assessment / Plan      Assessment/Plan:   Problems Addressed This Visit  Diagnoses and all orders for this visit:    1. Well adult exam (Primary)  -     CBC (No Diff); Future  -     Lipid Panel; Future  -     Comprehensive " Metabolic Panel; Future  -     Urinalysis With Microscopic If Indicated (No Culture) - Urine, Clean Catch; Future  -     TSH; Future  -     Insulin, Random; Future  -     C-Peptide; Future  -     Microalbumin / Creatinine Urine Ratio - Urine, Clean Catch; Future    2. Type 2 diabetes mellitus with hypoglycemia without coma, without long-term current use of insulin  -     POC Glycosylated Hemoglobin (Hb A1C)  -     Ozempic, 0.25 or 0.5 MG/DOSE, 2 MG/1.5ML solution pen-injector; Inject 0.25 mg under the skin into the appropriate area as directed 1 (One) Time Per Week for 28 days, THEN 0.5 mg 1 (One) Time Per Week for 42 days.  Dispense: 3 mL; Refill: 0  -     CBC (No Diff); Future  -     Lipid Panel; Future  -     Comprehensive Metabolic Panel; Future  -     Urinalysis With Microscopic If Indicated (No Culture) - Urine, Clean Catch; Future  -     TSH; Future  -     Insulin, Random; Future  -     C-Peptide; Future  -     Microalbumin / Creatinine Urine Ratio - Urine, Clean Catch; Future  -     Ambulatory Referral to Diabetic Education  -     Ambulatory Referral for Diabetic Eye Exam-Optometry      Problem List Items Addressed This Visit        Endocrine and Metabolic    Diabetes mellitus (HCC)    Overview     Did not tolerate metformin.  She was started on Ozempic 0.25 mg weekly and Januvia 50 mg daily on 2/24/2020.  She tolerated both of these medications well without side effects.  A1c 11/2/21 5.7%., improved from 6.0% in March and unchanged from July 2021. In July 2021 we increased her Ozempic to 0.5mg and DC Januvia  -Appears she DC Ozempic over last year, unclear why. A1c today 7.1% uncontrolled. Recommend restarting Ozempic.         Relevant Medications    Ozempic, 0.25 or 0.5 MG/DOSE, 2 MG/1.5ML solution pen-injector    Other Relevant Orders    POC Glycosylated Hemoglobin (Hb A1C) (Completed)    CBC (No Diff) (Completed)    Lipid Panel (Completed)    Comprehensive Metabolic Panel (Completed)    Urinalysis With  Microscopic If Indicated (No Culture) - Urine, Clean Catch (Completed)    TSH (Completed)    Insulin, Random (Completed)    C-Peptide (Completed)    Microalbumin / Creatinine Urine Ratio - Urine, Clean Catch (Completed)    Ambulatory Referral to Diabetic Education    Ambulatory Referral for Diabetic Eye Exam-Optometry   Other Visit Diagnoses     Well adult exam    -  Primary    Relevant Orders    CBC (No Diff) (Completed)    Lipid Panel (Completed)    Comprehensive Metabolic Panel (Completed)    Urinalysis With Microscopic If Indicated (No Culture) - Urine, Clean Catch (Completed)    TSH (Completed)    Insulin, Random (Completed)    C-Peptide (Completed)    Microalbumin / Creatinine Urine Ratio - Urine, Clean Catch (Completed)          See patient diagnoses and orders along with patient instructions for assessment, plan, and changes to care for patient.    The preventative exam has been reviewed in detail.  The patient has been fully counseled on preventative guidelines for vaccines, cancer screenings, and other health maintenance needs. The patient was counseled on maintaining a lifestyle to promote good health and to minimize chronic diseases.  The patient has been assisted with scheduling healthcare procedures for the coming year and given a written document outlining these recommendations. Age-appropriate screening measures have been ordered for the patient today as indicated above.    Patient Instructions   1. A1c today 7.1%      Follow Up:   Return in about 3 months (around 8/4/2023) for with A1c;.    DO BRENNAN Torres RD  [facility]  [address]  [facility] [address]  Fax [phone]  Phone [phone]

## 2023-05-05 ENCOUNTER — HOSPITAL ENCOUNTER (EMERGENCY)
Facility: HOSPITAL | Age: 58
Discharge: HOME | End: 2023-05-05
Attending: EMERGENCY MEDICINE
Payer: COMMERCIAL

## 2023-05-05 ENCOUNTER — APPOINTMENT (OUTPATIENT)
Dept: CT IMAGING | Facility: HOSPITAL | Age: 58
End: 2023-05-05
Payer: COMMERCIAL

## 2023-05-05 ENCOUNTER — PRIOR AUTHORIZATION (OUTPATIENT)
Dept: FAMILY MEDICINE CLINIC | Facility: CLINIC | Age: 58
End: 2023-05-05
Payer: COMMERCIAL

## 2023-05-05 ENCOUNTER — TELEPHONE (OUTPATIENT)
Dept: FAMILY MEDICINE CLINIC | Facility: CLINIC | Age: 58
End: 2023-05-05
Payer: COMMERCIAL

## 2023-05-05 VITALS
RESPIRATION RATE: 18 BRPM | HEIGHT: 71 IN | OXYGEN SATURATION: 95 % | WEIGHT: 290 LBS | DIASTOLIC BLOOD PRESSURE: 88 MMHG | BODY MASS INDEX: 40.6 KG/M2 | HEART RATE: 76 BPM | SYSTOLIC BLOOD PRESSURE: 146 MMHG | TEMPERATURE: 97.6 F

## 2023-05-05 DIAGNOSIS — Z86.59 HISTORY OF DEPRESSION: ICD-10-CM

## 2023-05-05 DIAGNOSIS — Z87.19 HISTORY OF GASTROESOPHAGEAL REFLUX (GERD): ICD-10-CM

## 2023-05-05 DIAGNOSIS — Z86.79 HISTORY OF HYPERTENSION: ICD-10-CM

## 2023-05-05 DIAGNOSIS — Z79.01 ANTICOAGULATED BY ANTICOAGULATION TREATMENT: ICD-10-CM

## 2023-05-05 DIAGNOSIS — Z86.69 HISTORY OF MIGRAINE HEADACHES: ICD-10-CM

## 2023-05-05 DIAGNOSIS — E16.2 HYPOGLYCEMIA: Primary | ICD-10-CM

## 2023-05-05 DIAGNOSIS — R51.9 GENERALIZED HEADACHE: ICD-10-CM

## 2023-05-05 DIAGNOSIS — I10 ELEVATED BLOOD PRESSURE READING WITH DIAGNOSIS OF HYPERTENSION: ICD-10-CM

## 2023-05-05 DIAGNOSIS — Z86.39 HISTORY OF DIABETES MELLITUS: ICD-10-CM

## 2023-05-05 LAB
ALBUMIN SERPL-MCNC: 4 G/DL (ref 3.5–5.2)
ALBUMIN SERPL-MCNC: 4.4 G/DL (ref 3.5–5.2)
ALBUMIN/GLOB SERPL: 1.1 G/DL
ALBUMIN/GLOB SERPL: 1.4 G/DL
ALP SERPL-CCNC: 84 U/L (ref 39–117)
ALP SERPL-CCNC: 96 U/L (ref 39–117)
ALT SERPL W P-5'-P-CCNC: 24 U/L (ref 1–33)
ALT SERPL W P-5'-P-CCNC: 27 U/L (ref 1–33)
ANION GAP SERPL CALCULATED.3IONS-SCNC: 10.4 MMOL/L (ref 5–15)
ANION GAP SERPL CALCULATED.3IONS-SCNC: 11 MMOL/L (ref 5–15)
AST SERPL-CCNC: 16 U/L (ref 1–32)
AST SERPL-CCNC: 17 U/L (ref 1–32)
BASOPHILS # BLD AUTO: 0.04 10*3/MM3 (ref 0–0.2)
BASOPHILS NFR BLD AUTO: 0.5 % (ref 0–1.5)
BILIRUB SERPL-MCNC: 0.4 MG/DL (ref 0–1.2)
BILIRUB SERPL-MCNC: 0.4 MG/DL (ref 0–1.2)
BUN SERPL-MCNC: 10 MG/DL (ref 6–20)
BUN SERPL-MCNC: 8 MG/DL (ref 6–20)
BUN/CREAT SERPL: 11.1 (ref 7–25)
BUN/CREAT SERPL: 17.2 (ref 7–25)
CALCIUM SPEC-SCNC: 10.3 MG/DL (ref 8.6–10.5)
CALCIUM SPEC-SCNC: 9.7 MG/DL (ref 8.6–10.5)
CHLORIDE SERPL-SCNC: 100 MMOL/L (ref 98–107)
CHLORIDE SERPL-SCNC: 100 MMOL/L (ref 98–107)
CHOLEST SERPL-MCNC: 167 MG/DL (ref 0–200)
CO2 SERPL-SCNC: 28 MMOL/L (ref 22–29)
CO2 SERPL-SCNC: 29.6 MMOL/L (ref 22–29)
CREAT SERPL-MCNC: 0.58 MG/DL (ref 0.57–1)
CREAT SERPL-MCNC: 0.72 MG/DL (ref 0.57–1)
DEPRECATED RDW RBC AUTO: 42.5 FL (ref 37–54)
EGFRCR SERPLBLD CKD-EPI 2021: 104.4 ML/MIN/1.73
EGFRCR SERPLBLD CKD-EPI 2021: 96.5 ML/MIN/1.73
EOSINOPHIL # BLD AUTO: 0.17 10*3/MM3 (ref 0–0.4)
EOSINOPHIL NFR BLD AUTO: 2.1 % (ref 0.3–6.2)
ERYTHROCYTE [DISTWIDTH] IN BLOOD BY AUTOMATED COUNT: 13.8 % (ref 12.3–15.4)
GLOBULIN UR ELPH-MCNC: 3.2 GM/DL
GLOBULIN UR ELPH-MCNC: 3.8 GM/DL
GLUCOSE BLDC GLUCOMTR-MCNC: 114 MG/DL (ref 70–130)
GLUCOSE SERPL-MCNC: 110 MG/DL (ref 65–99)
GLUCOSE SERPL-MCNC: 97 MG/DL (ref 65–99)
HCT VFR BLD AUTO: 38.5 % (ref 34–46.6)
HDLC SERPL-MCNC: 56 MG/DL (ref 40–60)
HGB BLD-MCNC: 12.4 G/DL (ref 12–15.9)
IMM GRANULOCYTES # BLD AUTO: 0.03 10*3/MM3 (ref 0–0.05)
IMM GRANULOCYTES NFR BLD AUTO: 0.4 % (ref 0–0.5)
LDLC SERPL CALC-MCNC: 98 MG/DL (ref 0–100)
LDLC/HDLC SERPL: 1.74 {RATIO}
LYMPHOCYTES # BLD AUTO: 2.28 10*3/MM3 (ref 0.7–3.1)
LYMPHOCYTES NFR BLD AUTO: 28.7 % (ref 19.6–45.3)
MCH RBC QN AUTO: 27.1 PG (ref 26.6–33)
MCHC RBC AUTO-ENTMCNC: 32.2 G/DL (ref 31.5–35.7)
MCV RBC AUTO: 84.2 FL (ref 79–97)
MONOCYTES # BLD AUTO: 0.5 10*3/MM3 (ref 0.1–0.9)
MONOCYTES NFR BLD AUTO: 6.3 % (ref 5–12)
NEUTROPHILS NFR BLD AUTO: 4.92 10*3/MM3 (ref 1.7–7)
NEUTROPHILS NFR BLD AUTO: 62 % (ref 42.7–76)
NRBC BLD AUTO-RTO: 0 /100 WBC (ref 0–0.2)
PLATELET # BLD AUTO: 167 10*3/MM3 (ref 140–450)
PMV BLD AUTO: 12.1 FL (ref 6–12)
POTASSIUM SERPL-SCNC: 4 MMOL/L (ref 3.5–5.2)
POTASSIUM SERPL-SCNC: 4.4 MMOL/L (ref 3.5–5.2)
PROT SERPL-MCNC: 7.6 G/DL (ref 6–8.5)
PROT SERPL-MCNC: 7.8 G/DL (ref 6–8.5)
RBC # BLD AUTO: 4.57 10*6/MM3 (ref 3.77–5.28)
SODIUM SERPL-SCNC: 139 MMOL/L (ref 136–145)
SODIUM SERPL-SCNC: 140 MMOL/L (ref 136–145)
TRIGL SERPL-MCNC: 69 MG/DL (ref 0–150)
TSH SERPL DL<=0.05 MIU/L-ACNC: 1.12 UIU/ML (ref 0.27–4.2)
VLDLC SERPL-MCNC: 13 MG/DL (ref 5–40)
WBC NRBC COR # BLD: 7.94 10*3/MM3 (ref 3.4–10.8)

## 2023-05-05 PROCEDURE — 96374 THER/PROPH/DIAG INJ IV PUSH: CPT

## 2023-05-05 PROCEDURE — 96361 HYDRATE IV INFUSION ADD-ON: CPT

## 2023-05-05 PROCEDURE — 82948 REAGENT STRIP/BLOOD GLUCOSE: CPT

## 2023-05-05 PROCEDURE — 96375 TX/PRO/DX INJ NEW DRUG ADDON: CPT

## 2023-05-05 PROCEDURE — 70450 CT HEAD/BRAIN W/O DYE: CPT

## 2023-05-05 PROCEDURE — 80053 COMPREHEN METABOLIC PANEL: CPT | Performed by: PHYSICIAN ASSISTANT

## 2023-05-05 PROCEDURE — 85025 COMPLETE CBC W/AUTO DIFF WBC: CPT | Performed by: PHYSICIAN ASSISTANT

## 2023-05-05 PROCEDURE — 25010000002 DIPHENHYDRAMINE PER 50 MG: Performed by: NURSE PRACTITIONER

## 2023-05-05 PROCEDURE — 99283 EMERGENCY DEPT VISIT LOW MDM: CPT

## 2023-05-05 PROCEDURE — 25010000002 PROCHLORPERAZINE 10 MG/2ML SOLUTION: Performed by: NURSE PRACTITIONER

## 2023-05-05 RX ORDER — DIPHENHYDRAMINE HYDROCHLORIDE 50 MG/ML
25 INJECTION INTRAMUSCULAR; INTRAVENOUS ONCE
Status: COMPLETED | OUTPATIENT
Start: 2023-05-05 | End: 2023-05-05

## 2023-05-05 RX ORDER — PROCHLORPERAZINE EDISYLATE 5 MG/ML
10 INJECTION INTRAMUSCULAR; INTRAVENOUS ONCE
Status: COMPLETED | OUTPATIENT
Start: 2023-05-05 | End: 2023-05-05

## 2023-05-05 RX ADMIN — PROCHLORPERAZINE EDISYLATE 10 MG: 5 INJECTION INTRAMUSCULAR; INTRAVENOUS at 18:37

## 2023-05-05 RX ADMIN — DIPHENHYDRAMINE HYDROCHLORIDE 25 MG: 50 INJECTION INTRAMUSCULAR; INTRAVENOUS at 18:37

## 2023-05-05 RX ADMIN — SODIUM CHLORIDE 1000 ML: 9 INJECTION, SOLUTION INTRAVENOUS at 18:36

## 2023-05-05 NOTE — TELEPHONE ENCOUNTER
Checked on patient states she is better. Did not go to ER as suggested. Still jittery but has ate lunch, and her fiance is there with her watching her. Has already use ozempic today 1 hr ago. Just checked her glucose and the machine was not able to read gave a low reading error and states to call doctor immediately and take a spoon full of honey. Pt followed the instructions of the machine and I also informed again that she needs to go to the ER to be evaluated. Pt states she will go to the ER right now.

## 2023-05-05 NOTE — TELEPHONE ENCOUNTER
Pt called and states that he glucose was 31, states she didn't feel good and she was dizzy but doesn't really want to go to ER. Instructed to patient to go to the kitchen and get some orange juice and peanut butter, pt did and after a few minutes glucose is up to 41. I informed the per PCP partner (latesha) she needs to go to the ER for evaluation due to her glucose. Of course the patient states she has to find a ride ( EMS suggested to patient) but states she will see how she feels then make a decision. Her daughter comes to see her every morning around 11 a so she will let her know what's going on as well. Also pt's ozempic is $1200 and needs a PA informed I will start that today and keep her updated.

## 2023-05-05 NOTE — SIGNIFICANT NOTE
Kellen Esparza is a 57-year-old, -American female with a known diagnosis of A-fib on Eliquis, T2DM, HTN, HLD, migraines, AV block s/p PPM, CAD, chronic back pain, s/p spinal stimulator, obesity, and ALYSIA who presents with hypoglycemia and headache.  Patient was advised by her PCP office to present to the ER for evaluation secondary to multiple low blood sugar readings this morning.  Blood sugar was 35 this morning, she went back to sleep and her blood sugar was 41.  She ate some honey and peanut butter.  Glucose is 110 at [facility].  Patient complaining of headache for which a CT head was obtained.  CT head revealed vague hypodensity in the left cerebellum which may represent infarct, MRI was recommended.  Patient is unable to have an MRI secondary to PPM and neurostimulator.  We are asked to see the patient in reference to CT findings.    I evaluated the patient at bedside.  She is complaining of a bifrontal 8/10 headache; she states this has been waxing and waning for about 2 weeks.  She denies photophobia, denies nausea.  States she has never had a similar headache though she has been treated for migraines in the past.  She reports blurred vision but this has been ongoing for.  Lateral weakness, she has bilateral lower extremity numbness and tingling secondary to diabetic neuropathy.  No speech deficits.      NIH Stroke Scale  Time: 17:30 EDT  Person Administering Scale: CORRIE Portillo    1a  Level of consciousness: 0=alert; keenly responsive   1b. LOC questions:  0=Answers both questions correctly   1c. LOC commands: 0=Performs both tasks correctly   2.  Best Gaze: 0=normal   3.  Visual: 0=No visual loss   4. Facial Palsy: 0=Normal symmetric movement   5a.  Motor left arm: 0=No drift, limb holds 90 (or 45) degrees for full 10 seconds   5b.  Motor right arm: 0=No drift, limb holds 90 (or 45) degrees for full 10 seconds   6a. motor left le=No drift, limb holds 90 (or 45) degrees for full 10 seconds   6b   Motor right le=No drift, limb holds 90 (or 45) degrees for full 10 seconds   7. Limb Ataxia: 0=Absent   8.  Sensory: 0=Normal; no sensory loss   9. Best Language:  0=No aphasia, normal   10. Dysarthria: 0=Normal   11. Extinction and Inattention: 0=No abnormality    Total:   0       Very pleasant 57-year-old lady presenting with hypoglycemia and headache.  I suspect her headaches may be secondary to her fluctuating blood sugars.  Her exam is nonfocal, no concerns for stroke.  The hypodensity on her CT head in the left cerebellum may represent a remote infarct; however this is unchanged from her CT scan in 2021.  No further stroke work-up is needed.  Discussed with Dr. Fink.    -Normal saline fluid bolus 1 L  -Compazine and Benadryl  -Disposition per ER provider

## 2023-05-05 NOTE — ED PROVIDER NOTES
EMERGENCY DEPARTMENT ENCOUNTER    Pt Name: Kellen Esparza  MRN: [id number]  Pt :   1965  Room Number:    Date of encounter:  2023  PCP: Ryne Puente DO  ED Provider: JACQUELYN Pérez    Historian: Patient    HPI:  Chief Complaint: Low Blood Sugar, Headache    Context: Kellen Esparza is a 57 y.o. female who presents to the ED c/o low blood sugar.  Patient states that she has been feeling more tired and thirsty lately.  She states that she saw her primary care physician yesterday and had a complete physical and labs obtained.  Patient was previously on Ozempic for her blood sugar but was taken off this by her primary care physician for reason unknown.  After being back into see her primary care yesterday she was reinitiated back on Ozempic.  She had not started the medication back as she was waiting on approval from her insurance.  It was approved and now available.  She reports that when she woke up this morning around 9 AM her blood sugar was 35.  She states that she laid there and went back to sleep and then woke up again at approximately 1130 with her blood sugar reading of 41.  She called her primary care office and they recommended eating some honey and peanut butter which she did.  They also suggested that her first symptoms were that bad that she could present to the ER for further evaluation.  In addition to her low blood sugar she states that she has had a headache this morning.  She does not have history of headaches.  She reports that she is currently in pain management for her arthritis.  Anticoagulated with Eliquis for history of paroxysmal atrial fibrillation.  HPI     REVIEW OF SYSTEMS  A chief complaint appropriate review of systems was completed and is negative except as noted in the HPI.     PAST MEDICAL HISTORY  Past Medical History:   Diagnosis Date   • Arthritis    • Arthritis of back    • Carpal tunnel syndrome    • Chlamydia    • CPAP (continuous positive airway  pressure) dependence    • Depression    • Diabetes mellitus     doesnt check sugar    • GERD (gastroesophageal reflux disease)    • History of COVID-19 01/2022   • Hypertension    • Migraine    • Musculoligamentous strain    • Pacemaker    • Pulmonary embolism 1997   • Sleep apnea     CPAP machine was recalled, waiting for new one.   • Wears eyeglasses        PAST SURGICAL HISTORY  Past Surgical History:   Procedure Laterality Date   • BREAST BIOPSY Left 2014   • CARDIAC CATHETERIZATION Left 08/10/2017    Procedure: Cardiac Catheterization/Vascular Study;  Surgeon: Johny Sommer MD;  Location:  [facility];  Service:    • CARDIAC ELECTROPHYSIOLOGY PROCEDURE N/A 07/19/2019    Procedure: Pacemaker DC new;  Surgeon: Sonya Gay MD;  Location:  [facility];  Service: Cardiology   • CHOLECYSTECTOMY     • COLONOSCOPY     • COLONOSCOPY N/A 05/03/2022    Procedure: COLONOSCOPY;  Surgeon: Bryant Mejia MD;  Location:  [facility];  Service: Gastroenterology;  Laterality: N/A;   • ENDOSCOPY     • HYSTERECTOMY  11/2014   • INSERT / REPLACE / REMOVE PACEMAKER     • JOINT REPLACEMENT Right     knee   • KNEE SURGERY Right 12/2017    total replacement   • NASAL SINUS SURGERY     • OOPHORECTOMY  11/2014   • PACEMAKER IMPLANTATION     • PULMONARY EMBOLISM SURGERY      right lung   • SPINAL CORD STIMULATOR IMPLANT N/A 09/10/2020    Procedure: SPINAL CORD STIMULATOR INSERTION PHASE 1, UPPER THORACIC PLACEMENT ;  Surgeon: Victor Manuel Geiger MD;  Location:  [facility];  Service: Neurosurgery;  Laterality: N/A;   • TUBAL ABDOMINAL LIGATION         FAMILY HISTORY  Family History   Problem Relation Age of Onset   • Breast cancer Mother 42   • Heart failure Mother    • Diabetes Mother    • No Known Problems Father    • Hypertension Sister    • Hypertension Brother    • Heart failure Son    • Hypertension Maternal Grandmother    • No Known Problems Paternal Grandmother    • Hypertension  Maternal Grandfather    • No Known Problems Paternal Grandfather    • Ovarian cancer Neg Hx        SOCIAL HISTORY  Social History     Socioeconomic History   • Marital status: Single   Tobacco Use   • Smoking status: Never   • Smokeless tobacco: Never   Vaping Use   • Vaping Use: Never used   Substance and Sexual Activity   • Alcohol use: No     Comment: rare   • Drug use: No   • Sexual activity: Yes     Partners: Male     Birth control/protection: Surgical, Hysterectomy       ALLERGIES  Metformin, Codeine, and Lisinopril    PHYSICAL EXAM  Physical Exam  GENERAL:   Appears in no acute distress.  HENT: Nares patent.  EYES: No scleral icterus.  CV: Regular rhythm, regular rate.  RESPIRATORY: Normal effort.  No audible wheezes, rales or rhonchi.  ABDOMEN: Soft, nontender  MUSCULOSKELETAL: No deformities.   NEURO: Alert, moves all extremities, follows commands.  No focal deficits appreciated  SKIN: Warm, dry, no rash visualized.    I have reviewed the triage vital signs and nursing notes.    Physical Exam     LAB RESULTS  Results for orders placed or performed during the hospital encounter of 05/05/23   Comprehensive Metabolic Panel    Specimen: Blood   Result Value Ref Range    Glucose 97 65 - 99 mg/dL    BUN 8 6 - 20 mg/dL    Creatinine 0.72 0.57 - 1.00 mg/dL    Sodium 139 136 - 145 mmol/L    Potassium 4.0 3.5 - 5.2 mmol/L    Chloride 100 98 - 107 mmol/L    CO2 28.0 22.0 - 29.0 mmol/L    Calcium 9.7 8.6 - 10.5 mg/dL    Total Protein 7.8 6.0 - 8.5 g/dL    Albumin 4.0 3.5 - 5.2 g/dL    ALT (SGPT) 24 1 - 33 U/L    AST (SGOT) 17 1 - 32 U/L    Alkaline Phosphatase 96 39 - 117 U/L    Total Bilirubin 0.4 0.0 - 1.2 mg/dL    Globulin 3.8 gm/dL    A/G Ratio 1.1 g/dL    BUN/Creatinine Ratio 11.1 7.0 - 25.0    Anion Gap 11.0 5.0 - 15.0 mmol/L    eGFR 96.5 >60.0 mL/min/1.73   CBC Auto Differential    Specimen: Blood   Result Value Ref Range    WBC 7.94 3.40 - 10.80 10*3/mm3    RBC 4.57 3.77 - 5.28 10*6/mm3    Hemoglobin 12.4 12.0  - 15.9 g/dL    Hematocrit 38.5 34.0 - 46.6 %    MCV 84.2 79.0 - 97.0 fL    MCH 27.1 26.6 - 33.0 pg    MCHC 32.2 31.5 - 35.7 g/dL    RDW 13.8 12.3 - 15.4 %    RDW-SD 42.5 37.0 - 54.0 fl    MPV 12.1 (H) 6.0 - 12.0 fL    Platelets 167 140 - 450 10*3/mm3    Neutrophil % 62.0 42.7 - 76.0 %    Lymphocyte % 28.7 19.6 - 45.3 %    Monocyte % 6.3 5.0 - 12.0 %    Eosinophil % 2.1 0.3 - 6.2 %    Basophil % 0.5 0.0 - 1.5 %    Immature Grans % 0.4 0.0 - 0.5 %    Neutrophils, Absolute 4.92 1.70 - 7.00 10*3/mm3    Lymphocytes, Absolute 2.28 0.70 - 3.10 10*3/mm3    Monocytes, Absolute 0.50 0.10 - 0.90 10*3/mm3    Eosinophils, Absolute 0.17 0.00 - 0.40 10*3/mm3    Basophils, Absolute 0.04 0.00 - 0.20 10*3/mm3    Immature Grans, Absolute 0.03 0.00 - 0.05 10*3/mm3    nRBC 0.0 0.0 - 0.2 /100 WBC   POC Glucose Once    Specimen: Blood   Result Value Ref Range    Glucose 114 70 - 130 mg/dL       If labs were ordered, I independently reviewed the results and considered them in treating the patient.    RADIOLOGY  CT Head Without Contrast   Final Result   Impression:   1.Vague asymmetric hypodensity left cerebellar hemisphere as compared to the right that may be artifactual. MRI may be of benefit to reassess.   Report called to Mir VALLADARES in the emergency room.         Electronically Signed: Ulisses Blackwell     5/5/2023 4:00 PM EDT     Workstation ID: [id number]        [x] Radiologist's Report Reviewed:  I ordered and independently reviewed the above noted radiographic studies.  See radiologist's dictation for official interpretation.      PROCEDURES    Procedures    No orders to display       MEDICATIONS GIVEN IN ER    Medications   prochlorperazine (COMPAZINE) injection 10 mg (10 mg Intravenous Given 5/5/23 1837)   diphenhydrAMINE (BENADRYL) injection 25 mg (25 mg Intravenous Given 5/5/23 1837)   sodium chloride 0.9 % bolus 1,000 mL (0 mL Intravenous Stopped 5/5/23 2108)       MEDICAL DECISION MAKING, PROGRESS, and CONSULTS   Medical Decision  Making  Anticoagulated by anticoagulation treatment: chronic illness or injury  Elevated blood pressure reading with diagnosis of hypertension: acute illness or injury  Generalized headache: acute illness or injury  History of depression: chronic illness or injury  History of diabetes mellitus: chronic illness or injury  History of gastroesophageal reflux (GERD): chronic illness or injury  History of hypertension: chronic illness or injury  History of migraine headaches: chronic illness or injury  Hypoglycemia: acute illness or injury  Amount and/or Complexity of Data Reviewed  Labs: ordered.  Radiology: ordered.          All labs have been independently reviewed by me.  All radiology studies have been reviewed by me and the radiologist dictating the report.  All EKG's have been independently viewed by me.    [] Discussed with radiology regarding test interpretation:    Discussion below represents my analysis of pertinent findings related to patient's condition, differential diagnosis, treatment plan and final disposition.    Differential diagnosis:  The differential diagnosis associated with the patient's presentation includes: Migraine, tension headache, drug adverse effect, sinusitis, viral syndrome, febrile illness, spinal headache, temporal arteritis, subarachnoid hemorrhage, CVA, TIA, acute severe hypertension, subdural hematoma, neuralgias, intracranial hypertension, cluster headache, cerebral venous sinus thrombosis or brain tumor.    Additional sources  Discussed/ obtained information from independent historians:   [] Spouse  [] Parent  [] Family member  [] Friend  [] EMS   [] Other:  External (non-ED) record review:   [] Inpatient record:   [x] Office record: Outpatient records confirmed patient in pain management   [] Outpatient record:   [] Prior Outpatient labs:   [] Prior Outpatient radiology:   [x] Primary Care record: Primary care records reviewed confirming history of diabetes   [] Outside ED  record:   [] Other:   Patient's care impacted by:   [x] Diabetes  [x] Hypertension  [x] Hyperlipidemia  [] Hypothyroidism   [x] Coronary Artery Disease   [] COPD   [] Cancer   [x] Obesity  [x] GERD   [] Tobacco Abuse   [] Substance Abuse    [] Anxiety   [] Depression   [x] Other: Paroxysmal atrial fibrillation, chronic pain  Care significantly affected by Social Determinants of Health (housing and economic circumstances, unemployment)    [] Yes     [x] No   If yes, Patient's care significantly limited by  Social Determinants of Health including:   [] Inadequate housing   [] Low income   [] Alcoholism and drug addiction in family   [] Problems related to primary support group   [] Unemployment   [] Problems related to employment   [] Other Social Determinants of Health:     Shared decision making:  I had a discussion with the patient/family regarding diagnosis, diagnostic results, treatment plan, and medications.  The patient/family indicated understanding of these instructions.  I spent adequate time at the bedside preceding discharge necessary to personally discuss the aftercare instructions, giving patient education, providing explanations of the results of our evaluations/findings, and my decision making to assure that the patient/family understand the plan of care.  Time was allotted to answer questions at that time and throughout the ED course.  Emphasis was placed on timely follow-up after discharge.  I also discussed the potential for the development of an acute emergent condition requiring further evaluation, admission, or even surgical intervention. I discussed that we found nothing during the visit today indicating the need for further workup, admission, or the presence of an unstable medical condition.  I encouraged the patient to return to the emergency department immediately for ANY concerns, worsening, new complaints, or if symptoms persist and unable to seek follow-up in a timely fashion.  The  patient/family expressed understanding and agreement with this plan.  The patient will follow-up with her primary care for reevaluation.     Orders placed during this visit:  Orders Placed This Encounter   Procedures   • CT Head Without Contrast   • Comprehensive Metabolic Panel   • CBC Auto Differential   • POC Glucose Once   • CBC & Differential     Additional orders considered but not ordered:  The following testing was considered but ultimately not selected after discussion with patient/family: MRI considered in this patient however she has a pacemaker and spinal stimulator without knowledge of stimulator is MRI compatible and no documentation.    ED Course:    ED Course as of 05/05/23 2127   Fri May 05, 2023   1600 Notified by radiology of abnormal finding on CT of head. [JG]   1605 Stroke service contacted who recommended MRI of the brain and if it was positive they would be happy to see the patient and complete the work-up.  They are going to alert themselves of results of the MRI. [JG]   1720 Case reviewed with Nurse Practitioner with stroke service who will see and evaluate patient and assist with disposition plan.  [JG]   1835 Case reviewed with nurse practitioner with stroke service who suggested patient informed her that her headache had been there for 2 weeks intermittently.  She has been given medication for her symptoms.  She also examined the patient and could not appreciate any deficits.  Did not see benefits from additional work-up or hospitalization. [JG]   1933 On reassessment patient reports improvement in her headache symptoms.  I discussed the plan of care with patient at bedside she will finish fluids and we will reassess. [JG]   2122 In summary this is a 57 year old female with history of diabetes who presents to ER for evaluation of an episode of hypoglycemia this morning and a headache. No acute or emergent findings demonstrated on physical exam.  Labs obtained and reviewed demonstrate no  acute or emergent abnormalities.  POC Glucose on arrival of 114, glucose with lab draw 97. CT head was obtained that result with vague asymmetric hypodensity left cerebellar hemisphere as compared to the right that may be artifactual. An MRI was recommended but patient was not able to complete as she has spine stimulator and pacemaker. Patient was seen and evaluated by Nurse Practitioner with stroke service who observed no deficits and suggested finding on CT to be artifact. Headache treated in ED with symptomatic improvement. At time of discharge disposition patient is afebrile, nontoxic appearing, vital signs stable and able to maintain O2 sats of 95% on room air. Patient will be discharged home with symptomatic care and outpatient follow up.  [JG]      ED Course User Index  [JG] Mir Leal PA            DIAGNOSIS  Final diagnoses:   Hypoglycemia   Generalized headache   Elevated blood pressure reading with diagnosis of hypertension   History of diabetes mellitus   History of gastroesophageal reflux (GERD)   History of hypertension   History of depression   History of migraine headaches   Anticoagulated by anticoagulation treatment       DISPOSITION    ED Disposition     ED Disposition   Discharge    Condition   Stable    Comment   --             Please note that portions of this document were completed with voice recognition software.      iMr Leal PA  05/05/23 [id number]

## 2023-05-05 NOTE — TELEPHONE ENCOUNTER
Message from Plan  PA Case: [id number], Status: Approved, Coverage Starts on: 5/5/2023 12:00:00 AM, Coverage Ends on: 5/4/2024 12:00:00 AM.    Informed pt. She also found a coupon online and used it today and received mediation with $0 copay

## 2023-05-05 NOTE — TELEPHONE ENCOUNTER
Kay: [id number]) - [id number]  Ozempic (0.25 or 0.5 MG/DOSE) 2MG/3ML pen-injectors  Status: below  Created: May 4th, 2023 [phone]  Sent: May 5th, 2023    Wait for Determination  [facility] return a determination.

## 2023-05-06 LAB — C PEPTIDE SERPL-MCNC: 4.7 NG/ML (ref 1.1–4.4)

## 2023-05-11 LAB — INSULIN SERPL-ACNC: 12 UIU/ML

## 2023-05-12 DIAGNOSIS — G90.59 COMPLEX REGIONAL PAIN SYNDROME TYPE 1 AFFECTING OTHER SITE: ICD-10-CM

## 2023-05-12 RX ORDER — LEVETIRACETAM 250 MG/1
TABLET ORAL
Qty: 180 TABLET | Refills: 0 | Status: SHIPPED | OUTPATIENT
Start: 2023-05-12

## 2023-05-12 NOTE — TELEPHONE ENCOUNTER
Rx Refill Note  Requested Prescriptions     Pending Prescriptions Disp Refills   • levETIRAcetam (KEPPRA) 250 MG tablet [Pharmacy Med Name: LEVETIRACETAM 250MG TABLETS] 180 tablet 0     Sig: TAKE 1 TABLET BY MOUTH TWICE DAILY      Last office visit with prescribing clinician: 5/4/2023   Last telemedicine visit with prescribing clinician: 5/5/2023   Next office visit with prescribing clinician: 8/4/2023                         Would you like a call back once the refill request has been completed: [] Yes [] No    If the office needs to give you a call back, can they leave a voicemail: [] Yes [] No    Mandi Allen MA  05/12/23, 14:29 EDT

## 2023-05-14 PROCEDURE — 93294 REM INTERROG EVL PM/LDLS PM: CPT | Performed by: INTERNAL MEDICINE

## 2023-05-14 PROCEDURE — 93296 REM INTERROG EVL PM/IDS: CPT | Performed by: INTERNAL MEDICINE

## 2023-06-05 ENCOUNTER — OFFICE VISIT (OUTPATIENT)
Dept: CARDIOLOGY | Facility: CLINIC | Age: 58
End: 2023-06-05
Payer: COMMERCIAL

## 2023-06-05 VITALS
DIASTOLIC BLOOD PRESSURE: 68 MMHG | OXYGEN SATURATION: 98 % | WEIGHT: 281.2 LBS | BODY MASS INDEX: 39.37 KG/M2 | HEIGHT: 71 IN | HEART RATE: 81 BPM | SYSTOLIC BLOOD PRESSURE: 120 MMHG

## 2023-06-05 DIAGNOSIS — I48.0 PAROXYSMAL ATRIAL FIBRILLATION: Primary | ICD-10-CM

## 2023-06-05 DIAGNOSIS — I44.2 AV BLOCK, COMPLETE: ICD-10-CM

## 2023-06-05 DIAGNOSIS — I10 ESSENTIAL HYPERTENSION: ICD-10-CM

## 2023-06-05 DIAGNOSIS — I25.118 CORONARY ARTERY DISEASE OF NATIVE ARTERY OF NATIVE HEART WITH STABLE ANGINA PECTORIS: ICD-10-CM

## 2023-06-05 RX ORDER — OXYCODONE HYDROCHLORIDE AND ACETAMINOPHEN 5; 325 MG/1; MG/1
1 TABLET ORAL 3 TIMES DAILY
COMMUNITY
Start: 2023-05-13

## 2023-06-05 NOTE — PROGRESS NOTES
[facility] AT [address], [address]  [place], [place], [address]    [phone]  [email or web address]           OUTPATIENT CLINIC FOLLOW UP NOTE    Patient Care Team:  Patient Care Team:  Ryne Puente DO as PCP - General (Family Medicine)  Johny Sommer MD as Consulting Physician (Cardiology)  Sonya Gay MD as Consulting Physician (Cardiology)  Deb Guajardo APRN as Nurse Practitioner (Pulmonary Disease)  Sal Sheldon MD (Pain Medicine)  Luis Saeed MD as Consulting Physician (Otolaryngology)    Subjective:     Chief complaint: Dyspnea, AV block, pacemaker site pain    HPI:    Kellen Esparza is a 57 y.o. female.  Cardiac Problem List:  Small-vessel coronary artery disease:  MPS, 07/26/2017: EF 70%. Medium-sized infarct pattern located in the anterior wall with no significant ischemia noted. This finding is worse on resting images which is suggestive of possible breast attenuation artifact instead of an infarction.  C, 08/10/2017: Mild-to-moderate diffuse tortuosity of the vessels. Evidence of small vessel disease in the distal portion of a small RPL S. No obstructive CAD.   High degree AV Block:  [facility], 04/29/2019: Only 1.5 days of monitoring. High grade AV block for 4.4 seconds at 7:23 am. 2nd degree AV block for 10 seconds at 8:53 pm. Average HR 86 bpm.  o, 06/18/2019: Only one day of monitoring. 2.9% PACs. 1st degree AV block, brief 2nd degree type I AVB.   [facility], 06/24/2019: High degree AV block.  Pacemaker implant, 07/19/2019, Dr. Gay: [place] [employer] MRI DR model L311 serial #[id number]. DDDR .  Significant neuropathic pain at the pacemaker site.  Mild diastolic dysfunction:  Echo, 11/28/2018: EF 65% with mild concentric LVH. Grade I a diastolic dysfunction. Left atrium borderline dilated.  Paroxysmal atrial fibrillation:  Noted on device interrogation, 11/13/2019.   CHADS-VASc = 5 (HTN, DM, Female, Vasc disease, history  of PE).    Multiple episodes of atrial fibrillation all less than 1 minute.  Diabetes  Hypertension:  24h BP monitor, 04/09/2019: 45% > 140 mmHg systolic, max 164, min 103. Average HR 82 max 111, min 56.  Hair loss with valsartan  Hyperlipidemia  Migraines  History of PE, in the 1990s  COVID pneumonia January 2022  Carpal tunnel  ALYSIA, on CPAP.  Followed by Dr. Quispe.  CPAP recalled, 2021    Today she presents for follow-up.      Patient is mild palpitations, no significant runs.  Denies chest pain.  Stable exertional dyspnea.  Back on Ozempic.  Recently lost about 10 pounds.  Using CPAP     Review of Systems:  As noted in the HPI    PFSH:  Patient Active Problem List   Diagnosis    Chest pain    Dyspnea on exertion    Essential hypertension    Pain of right breast    Lower extremity edema    Diabetes mellitus (HCC)    Abnormal stress test    Migraine    History of pulmonary embolus (PE)    Hyperlipidemia    DDD (degenerative disc disease), cervical    Coronary artery disease of native artery of native heart with stable angina pectoris    AV block, complete (S/P PPM 7/2019)    Obesity, Class II, BMI 35-39.9    ALYSIA (obstructive sleep apnea)    Pain in pacemaker pocket    Paroxysmal atrial fibrillation    Complex regional pain syndrome type I    Chronic anticoagulation    Pacemaker    Morbid obesity    Entrapment neuropathy    Encounter for fitting and adjustment of neuropacemaker of spinal cord    Mechanical low back pain    Presence of neurostimulator    Thoracic spondylosis without myelopathy    Myofascial pain    Menopausal syndrome (hot flashes)         Current Outpatient Medications:     apixaban (Eliquis) 5 MG tablet tablet, Take 1 tablet by mouth 2 (Two) Times a Day., Disp: 180 tablet, Rfl: 3    atorvastatin (LIPITOR) 80 MG tablet, TAKE 1 TABLET BY MOUTH DAILY, Disp: 90 tablet, Rfl: 3    carvedilol (COREG) 6.25 MG tablet, Take 1 tablet by mouth 2 (Two) Times a Day., Disp: 180 tablet, Rfl: 3     "cyclobenzaprine (FLEXERIL) 10 MG tablet, Take 1 tablet by mouth 3 (Three) Times a Day., Disp: , Rfl:     levETIRAcetam (KEPPRA) 250 MG tablet, TAKE 1 TABLET BY MOUTH TWICE DAILY, Disp: 180 tablet, Rfl: 0    oxyCODONE-acetaminophen (PERCOCET) 5-325 MG per tablet, Take 1 tablet by mouth 3 (Three) Times a Day., Disp: , Rfl:     Ozempic, 0.25 or 0.5 MG/DOSE, 2 MG/1.5ML solution pen-injector, Inject 0.25 mg under the skin into the appropriate area as directed 1 (One) Time Per Week for 28 days, THEN 0.5 mg 1 (One) Time Per Week for 42 days., Disp: 3 mL, Rfl: 0    pregabalin (LYRICA) 150 MG capsule, Take 1 capsule by mouth 2 (Two) Times a Day., Disp: 60 capsule, Rfl: 1    valsartan (DIOVAN) 40 MG tablet, TAKE 1 TABLET BY MOUTH EVERY MORNING, Disp: 90 tablet, Rfl: 3    Allergies   Allergen Reactions    Metformin GI Intolerance    Codeine Itching    Lisinopril Cough        reports that she has never smoked. She has never used smokeless tobacco.      Objective:   /68 (BP Location: Left arm, Patient Position: Sitting)   Pulse 81   Ht 180.3 cm (71\")   Wt 128 kg (281 lb 3.2 oz)   LMP 10/31/2014 (LMP Unknown) Comment: Mammogram 2014 info given   SpO2 98%   BMI 39.22 kg/m²   CONSTITUTIONAL: No acute distress  RESPIRATORY: Normal effort. Clear to auscultation bilaterally without wheezing or rales  CARDIOVASCULAR: Regular rate and rhythm with normal S1 and S2.  AZALIA at RUSB without radiation not audible today.  No carotid bruit bilaterally  PERIPHERAL VASCULAR: Normal radial pulse.      Labs:  Lab Results   Component Value Date    ALT 24 05/05/2023    AST 17 05/05/2023     Lab Results   Component Value Date    CHOL 167 05/04/2023    TRIG 69 05/04/2023    HDL 56 05/04/2023    CREATININE 0.72 05/05/2023       Diagnostic Data:      ECG 12 Lead    Date/Time: 6/5/2023 4:32 PM  Performed by: Johny Sommer MD  Authorized by: Johny Sommer MD   Comparison: compared with previous ECG from 6/6/2022  Similar to previous " ECG  Comparison to previous ECG: PAC present  Rhythm: sinus rhythm        Biotel heart monitor 6/2019  -High degree symptomatic AV Block    Zio Patch 4/2019  -Only 1.5 days of monitoring.  -High-grade AV block for 4.4 seconds at 7:23 AM.  -Second-degree AV block for 10 seconds at 8:53 PM.  -Average heart rate 86.    TTE 11/2018  Left ventricular systolic function is normal. Estimated EF = 65%.  Left ventricular wall thickness is consistent with mild concentric hypertrophy.  Left ventricular diastolic dysfunction (grade I a) consistent with impaired relaxation.  Left atrial cavity size is borderline dilated.  Trace-to-mild aortic valve regurgitation is present.    Results for orders placed during the hospital encounter of 06/27/22    Adult Transthoracic Echo Complete W/ Cont if Necessary Per Protocol    Interpretation Summary  · Left ventricular ejection fraction appears to be 61 - 65%. Left ventricular systolic function is normal.  · Left ventricular wall thickness is consistent with mild to moderate concentric hypertrophy.  · Mild aortic valve regurgitation is present.    C 7/2017  There is mild to moderate diffuse tortuosity of the vessels.  There is evidence of small vessel disease in the distal portion of a small RPL S.  There is no obstructive coronary artery disease.  Normal left ventricular ejection fraction with no regional wall motion abnormalities    CT PE Protocol 7/2017 - No evidence of pulmonary embolus or other acute chest disease.    PFTs 11/2018 - moderate restrictive lung disease     PFTs 4/2021  No obstruction, no change with bronchodilator, mild restriction, mild reduction in DLCO overcorrects for alveolar volume.    DEVICE INTERROGATION: [facility], Interrogation date 10/3/22- RA pacing <1%, RV pacing <1%. Threshold and impedances are acceptable. Battery voltage is 6+ years.  Multiple episodes of short runs of SVT, longest 15 seconds.    DEVICE INTERROGATION:  [facility], Interrogation date 6/5/23- RA pacing  1%, RV pacing 2%. Threshold and impedances are acceptable. Battery voltage is 6 yrs.  139 nonsustained episodes of A-fib, occasionally with RVR.  Longest 4 minutes 51 seconds    Assessment and Plan:     Dyspnea  ALYSIA   History of PE  Obesity  Mild restrictive lung disease  -Continue CPAP use  -Followed by pulmonary    Complete AV block  Paroxysmal atrial fibrillation  Chest wall pain  -s/p [place] [facility] DC PPM 7/2019, Dr Gay  -Continue Eliquis, carvedilol    Small vessel, coronary artery disease of native artery of native heart with stable angina pectoris  -Continue statin, carvedilol  -Off aspirin due to concomitant Eliquis    Essential hypertension  -Continue current medications    - Return in about 1 year (around 6/5/2024) for Next scheduled follow-up with device interrogation - [facility]; also, 12-lead EKG.      Johny Sommer MD, MSc, FACC, [facility]  Interventional Cardiology  [facility]

## 2023-07-31 RX ORDER — SODIUM PICOSULFATE, MAGNESIUM OXIDE, AND ANHYDROUS CITRIC ACID 10; 3.5; 12 MG/160ML; G/160ML; G/160ML
350 LIQUID ORAL TAKE AS DIRECTED
Qty: 350 ML | Refills: 0 | Status: SHIPPED | OUTPATIENT
Start: 2023-07-31

## 2023-08-07 DIAGNOSIS — G90.59 COMPLEX REGIONAL PAIN SYNDROME TYPE 1 AFFECTING OTHER SITE: ICD-10-CM

## 2023-08-07 RX ORDER — LEVETIRACETAM 250 MG/1
TABLET ORAL
Qty: 30 TABLET | Refills: 1 | Status: SHIPPED | OUTPATIENT
Start: 2023-08-07

## 2023-08-07 NOTE — TELEPHONE ENCOUNTER
Rx Refill Note  Requested Prescriptions     Pending Prescriptions Disp Refills    levETIRAcetam (KEPPRA) 250 MG tablet [Pharmacy Med Name: LEVETIRACETAM 250MG TABLETS] 180 tablet 0     Sig: TAKE 1 TABLET BY MOUTH TWICE DAILY      Last office visit with prescribing clinician: 5/4/2023   Last telemedicine visit with prescribing clinician: Visit date not found   Next office visit with prescribing clinician: 8/12/2023                         Would you like a call back once the refill request has been completed: [] Yes [] No    If the office needs to give you a call back, can they leave a voicemail: [] Yes [] No    April CAROLINE Jones  08/07/23, 10:53 EDT

## 2023-08-13 PROCEDURE — 93296 REM INTERROG EVL PM/IDS: CPT | Performed by: INTERNAL MEDICINE

## 2023-08-13 PROCEDURE — 93294 REM INTERROG EVL PM/LDLS PM: CPT | Performed by: INTERNAL MEDICINE

## 2023-08-14 ENCOUNTER — OFFICE VISIT (OUTPATIENT)
Dept: FAMILY MEDICINE CLINIC | Facility: CLINIC | Age: 58
End: 2023-08-14
Payer: COMMERCIAL

## 2023-08-14 VITALS
WEIGHT: 272 LBS | DIASTOLIC BLOOD PRESSURE: 76 MMHG | BODY MASS INDEX: 42.69 KG/M2 | HEIGHT: 67 IN | SYSTOLIC BLOOD PRESSURE: 128 MMHG

## 2023-08-14 DIAGNOSIS — E11.649 TYPE 2 DIABETES MELLITUS WITH HYPOGLYCEMIA WITHOUT COMA, WITHOUT LONG-TERM CURRENT USE OF INSULIN: Primary | ICD-10-CM

## 2023-08-14 DIAGNOSIS — D64.9 ANEMIA, UNSPECIFIED TYPE: ICD-10-CM

## 2023-08-14 LAB
EXPIRATION DATE: NORMAL
HBA1C MFR BLD: 6.3 %
Lab: NORMAL

## 2023-08-14 RX ORDER — CARVEDILOL 6.25 MG/1
6.25 TABLET ORAL 2 TIMES DAILY
Qty: 180 TABLET | Refills: 3 | Status: SHIPPED | OUTPATIENT
Start: 2023-08-14

## 2023-08-14 NOTE — PROGRESS NOTES
"Established Patient Office Visit      Patient Name: Kellen Esparza  : 1965   MRN: [id number]   Care Team: Patient Care Team:  Ryne Puente DO as PCP - General (Family Medicine)  Johny Sommer MD as Consulting Physician (Cardiology)  Sonya Gay MD as Consulting Physician (Cardiology)  Deb Guajardo APRN as Nurse Practitioner (Pulmonary Disease)  Sal Sheldon MD (Pain Medicine)  Luis Saeed MD as Consulting Physician (Otolaryngology)    Chief Complaint:    Chief Complaint   Patient presents with    Fatigue    Dizziness     Pt co fatigue and dizziness for approx 1 month       History of Present Illness: Kellen Esparza is a 57 y.o. female who is here today for chief complaint.    HPI    Dizzy and lightheaded for the last month. Feels out of breath walking from car to building. Denies bleeding or tarry stools. Feels \"clammy but on the inside.\" Constant, feels like the more she sleeps the worse she feels. She is on Eliquis for PAF. Down 20lbs since May, A1c down from 7.1% to 6.3%.    This patient is accompanied by their self who contributes to the history of their care.    The following portions of the patient's history were reviewed and updated as appropriate: allergies, current medications, past family history, past medical history, past social history, past surgical history and problem list.    Subjective      Review of Systems:   Review of Systems - See HPI    Past Medical History:   Past Medical History:   Diagnosis Date    Arthritis     Arthritis of back     Carpal tunnel syndrome     Chlamydia     CPAP (continuous positive airway pressure) dependence     Depression     Diabetes mellitus     doesnt check sugar     GERD (gastroesophageal reflux disease)     History of COVID-19 2022    Hypertension     Migraine     Musculoligamentous strain     Pacemaker     Pulmonary embolism     Sleep apnea     CPAP machine was recalled, waiting for new one.    Wears eyeglasses  "       Past Surgical History:   Past Surgical History:   Procedure Laterality Date    BREAST BIOPSY Left 2014    CARDIAC CATHETERIZATION Left 08/10/2017    Procedure: Cardiac Catheterization/Vascular Study;  Surgeon: Johny Sommer MD;  Location:  [facility];  Service:     CARDIAC ELECTROPHYSIOLOGY PROCEDURE N/A 07/19/2019    Procedure: Pacemaker DC new;  Surgeon: Sonya Gay MD;  Location:  [facility];  Service: Cardiology    CHOLECYSTECTOMY      COLONOSCOPY      COLONOSCOPY N/A 05/03/2022    Procedure: COLONOSCOPY;  Surgeon: Bryant Mejia MD;  Location:  [facility];  Service: Gastroenterology;  Laterality: N/A;    ENDOSCOPY      HYSTERECTOMY  11/2014    INSERT / REPLACE / REMOVE PACEMAKER      JOINT REPLACEMENT Right     knee    KNEE SURGERY Right 12/2017    total replacement    NASAL SINUS SURGERY      OOPHORECTOMY  11/2014    PACEMAKER IMPLANTATION      PULMONARY EMBOLISM SURGERY      right lung    SPINAL CORD STIMULATOR IMPLANT N/A 09/10/2020    Procedure: SPINAL CORD STIMULATOR INSERTION PHASE 1, UPPER THORACIC PLACEMENT ;  Surgeon: Victor Manuel Geiger MD;  Location:  [facility];  Service: Neurosurgery;  Laterality: N/A;    TUBAL ABDOMINAL LIGATION         Family History:   Family History   Problem Relation Age of Onset    Breast cancer Mother 42    Heart failure Mother     Diabetes Mother     No Known Problems Father     Hypertension Sister     Hypertension Brother     Heart failure Son     Hypertension Maternal Grandmother     No Known Problems Paternal Grandmother     Hypertension Maternal Grandfather     No Known Problems Paternal Grandfather     Ovarian cancer Neg Hx        Social History:   Social History     Socioeconomic History    Marital status: Single   Tobacco Use    Smoking status: Never    Smokeless tobacco: Never   Vaping Use    Vaping Use: Never used   Substance and Sexual Activity    Alcohol use: No     Comment: rare    Drug use: No     "Sexual activity: Yes     Partners: Male     Birth control/protection: Surgical, Hysterectomy       Tobacco History:   Social History     Tobacco Use   Smoking Status Never   Smokeless Tobacco Never       Medications:     Current Outpatient Medications:     atorvastatin (LIPITOR) 80 MG tablet, TAKE 1 TABLET BY MOUTH DAILY, Disp: 90 tablet, Rfl: 3    carvedilol (COREG) 6.25 MG tablet, Take 1 tablet by mouth 2 (Two) Times a Day., Disp: 180 tablet, Rfl: 3    cyclobenzaprine (FLEXERIL) 10 MG tablet, Take 1 tablet by mouth 3 (Three) Times a Day., Disp: , Rfl:     Eliquis 5 MG tablet tablet, TAKE 1 TABLET BY MOUTH TWICE DAILY, Disp: 180 tablet, Rfl: 3    levETIRAcetam (KEPPRA) 250 MG tablet, TAKE 1 TABLET BY MOUTH TWICE DAILY, Disp: 30 tablet, Rfl: 1    oxyCODONE-acetaminophen (PERCOCET) 5-325 MG per tablet, Take 1 tablet by mouth 3 (Three) Times a Day., Disp: , Rfl:     Ozempic, 0.25 or 0.5 MG/DOSE, 2 MG/3ML solution pen-injector, Inject 0.5 mg under the skin into the appropriate area as directed 1 (One) Time Per Week., Disp: 3 mL, Rfl: 2    pregabalin (LYRICA) 150 MG capsule, Take 1 capsule by mouth 2 (Two) Times a Day., Disp: 60 capsule, Rfl: 1    valsartan (DIOVAN) 40 MG tablet, TAKE 1 TABLET BY MOUTH EVERY MORNING, Disp: 90 tablet, Rfl: 3    amoxicillin-clavulanate (AUGMENTIN) 875-125 MG per tablet, Take 1 tablet by mouth 2 (Two) Times a Day for 10 days., Disp: 20 tablet, Rfl: 0    Sod Picosulfate-Mag Ox-Cit Acd (Clenpiq) 10-3.5-12 MG-GM -GM/160ML solution, Take 350 mL by mouth Take As Directed. (Patient not taking: Reported on 8/14/2023), Disp: 350 mL, Rfl: 0    Allergies:   Allergies   Allergen Reactions    Metformin GI Intolerance    Codeine Itching    Lisinopril Cough       Objective   Objective     Physical Exam:  Vital Signs:   Vitals:    08/14/23 1619   BP: 128/76   BP Location: Left arm   Patient Position: Sitting   Cuff Size: Adult   Weight: 123 kg (272 lb)   Height: 170.2 cm (67\")     Body mass index is 42.6 " kg/mý.     Physical Exam  Nursing note reviewed  Const: NAD, A&Ox4, Pleasant, Cooperative  Eyes: EOMI, no conjunctivitis  ENT: No nasal discharge present, neck supple  Cardiac: Regular rate and rhythm, no cyanosis  Resp: Respiratory rate within normal limits, no increased work of breathing, no audible wheezing or retractions noted  GI: No distention or ascites  MSK: Motor and sensation grossly intact in bilateral upper extremities  Neurologic: CN II-XII grossly intact  Psych: Appropriate mood and behavior.  Skin: Warm, dry  Procedures/Radiology     Procedures  No radiology results for the last 7 days     Assessment & Plan   Assessment / Plan      Assessment/Plan:   Problems Addressed This Visit  Diagnoses and all orders for this visit:    1. Type 2 diabetes mellitus with hypoglycemia without coma, without long-term current use of insulin (Primary)  -     POC Glycosylated Hemoglobin (Hb A1C)    2. Anemia, unspecified type  -     Basic Metabolic Panel; Future  -     Reticulocytes; Future  -     CBC (No Diff); Future  -     Vitamin B12; Future  -     Folate; Future  -     Ferritin; Future  -     Iron Profile; Future      Problem List Items Addressed This Visit          Endocrine and Metabolic    Diabetes mellitus - Primary    Overview     Did not tolerate metformin.  She was started on Ozempic 0.25 mg weekly and Januvia 50 mg daily on 2/24/2020.  She tolerated both of these medications well without side effects.  A1c 11/2/21 5.7%., improved from 6.0% in March and unchanged from July 2021. In July 2021 we increased her Ozempic to 0.5mg and DC Januvia  -Appears she DC Ozempic over last year, unclear why. A1c today 7.1% uncontrolled. Recommend restarting Ozempic.         Relevant Orders    POC Glycosylated Hemoglobin (Hb A1C) (Completed)     Other Visit Diagnoses       Anemia, unspecified type        Relevant Orders    Basic Metabolic Panel (Completed)    Reticulocytes (Completed)    CBC (No Diff) (Completed)    Vitamin  B12 (Completed)    Folate (Completed)    Ferritin (Completed)    Iron Profile (Completed)            There are no Patient Instructions on file for this visit.    Follow Up:   Return in about 4 days (around 8/18/2023) for video visit.        DO RBENNAN Rivero RD  [facility]  [address]  [facility] [phone]  Fax [phone]  Phone [phone]

## 2023-08-30 ENCOUNTER — LAB (OUTPATIENT)
Dept: LAB | Facility: HOSPITAL | Age: 58
End: 2023-08-30
Payer: COMMERCIAL

## 2023-08-30 DIAGNOSIS — D64.9 ANEMIA, UNSPECIFIED TYPE: ICD-10-CM

## 2023-08-30 LAB
DEPRECATED RDW RBC AUTO: 43.1 FL (ref 37–54)
ERYTHROCYTE [DISTWIDTH] IN BLOOD BY AUTOMATED COUNT: 14.7 % (ref 12.3–15.4)
FOLATE SERPL-MCNC: 7.93 NG/ML (ref 4.78–24.2)
HCT VFR BLD AUTO: 38.1 % (ref 34–46.6)
HGB BLD-MCNC: 12.5 G/DL (ref 12–15.9)
MCH RBC QN AUTO: 26.9 PG (ref 26.6–33)
MCHC RBC AUTO-ENTMCNC: 32.8 G/DL (ref 31.5–35.7)
MCV RBC AUTO: 82.1 FL (ref 79–97)
PLATELET # BLD AUTO: 178 10*3/MM3 (ref 140–450)
PMV BLD AUTO: 12.4 FL (ref 6–12)
RBC # BLD AUTO: 4.64 10*6/MM3 (ref 3.77–5.28)
RETICS # AUTO: 0.05 10*6/MM3 (ref 0.02–0.13)
RETICS/RBC NFR AUTO: 1.16 % (ref 0.7–1.9)
VIT B12 BLD-MCNC: 445 PG/ML (ref 211–946)
WBC NRBC COR # BLD: 8.06 10*3/MM3 (ref 3.4–10.8)

## 2023-08-30 PROCEDURE — 82607 VITAMIN B-12: CPT

## 2023-08-30 PROCEDURE — 80048 BASIC METABOLIC PNL TOTAL CA: CPT

## 2023-08-30 PROCEDURE — 85045 AUTOMATED RETICULOCYTE COUNT: CPT

## 2023-08-30 PROCEDURE — 82746 ASSAY OF FOLIC ACID SERUM: CPT

## 2023-08-30 PROCEDURE — 84466 ASSAY OF TRANSFERRIN: CPT

## 2023-08-30 PROCEDURE — 83540 ASSAY OF IRON: CPT

## 2023-08-30 PROCEDURE — 85027 COMPLETE CBC AUTOMATED: CPT

## 2023-08-30 PROCEDURE — 82728 ASSAY OF FERRITIN: CPT

## 2023-08-31 LAB
ANION GAP SERPL CALCULATED.3IONS-SCNC: 13 MMOL/L (ref 5–15)
BUN SERPL-MCNC: 11 MG/DL (ref 6–20)
BUN/CREAT SERPL: 14.1 (ref 7–25)
CALCIUM SPEC-SCNC: 9.8 MG/DL (ref 8.6–10.5)
CHLORIDE SERPL-SCNC: 105 MMOL/L (ref 98–107)
CO2 SERPL-SCNC: 24 MMOL/L (ref 22–29)
CREAT SERPL-MCNC: 0.78 MG/DL (ref 0.57–1)
EGFRCR SERPLBLD CKD-EPI 2021: 87.6 ML/MIN/1.73
FERRITIN SERPL-MCNC: 341 NG/ML (ref 13–150)
GLUCOSE SERPL-MCNC: 107 MG/DL (ref 65–99)
IRON 24H UR-MRATE: 91 MCG/DL (ref 37–145)
IRON SATN MFR SERPL: 23 % (ref 20–50)
POTASSIUM SERPL-SCNC: 4.2 MMOL/L (ref 3.5–5.2)
SODIUM SERPL-SCNC: 142 MMOL/L (ref 136–145)
TIBC SERPL-MCNC: 404 MCG/DL (ref 298–536)
TRANSFERRIN SERPL-MCNC: 271 MG/DL (ref 200–360)

## 2023-09-08 ENCOUNTER — OFFICE VISIT (OUTPATIENT)
Dept: FAMILY MEDICINE CLINIC | Facility: CLINIC | Age: 58
End: 2023-09-08
Payer: COMMERCIAL

## 2023-09-08 ENCOUNTER — LAB (OUTPATIENT)
Dept: LAB | Facility: HOSPITAL | Age: 58
End: 2023-09-08
Payer: COMMERCIAL

## 2023-09-08 ENCOUNTER — HOSPITAL ENCOUNTER (OUTPATIENT)
Dept: CT IMAGING | Facility: HOSPITAL | Age: 58
Discharge: HOME | End: 2023-09-08
Payer: COMMERCIAL

## 2023-09-08 VITALS
WEIGHT: 277.6 LBS | TEMPERATURE: 98.6 F | SYSTOLIC BLOOD PRESSURE: 122 MMHG | DIASTOLIC BLOOD PRESSURE: 62 MMHG | HEIGHT: 67 IN | BODY MASS INDEX: 43.57 KG/M2

## 2023-09-08 DIAGNOSIS — R53.83 OTHER FATIGUE: ICD-10-CM

## 2023-09-08 DIAGNOSIS — Z86.711 HISTORY OF PULMONARY EMBOLUS (PE): ICD-10-CM

## 2023-09-08 DIAGNOSIS — F51.01 PRIMARY INSOMNIA: ICD-10-CM

## 2023-09-08 DIAGNOSIS — Z86.711 HISTORY OF PULMONARY EMBOLUS (PE): Primary | ICD-10-CM

## 2023-09-08 LAB
D DIMER PPP FEU-MCNC: 0.52 MCGFEU/ML (ref 0–0.59)
HCYS SERPL-MCNC: 8.6 UMOL/L (ref 0–15)

## 2023-09-08 PROCEDURE — 25510000001 IOPAMIDOL PER 1 ML: Performed by: FAMILY MEDICINE

## 2023-09-08 PROCEDURE — 85379 FIBRIN DEGRADATION QUANT: CPT

## 2023-09-08 PROCEDURE — 71275 CT ANGIOGRAPHY CHEST: CPT

## 2023-09-08 PROCEDURE — 83090 ASSAY OF HOMOCYSTEINE: CPT

## 2023-09-08 RX ORDER — TRAZODONE HYDROCHLORIDE 150 MG/1
150 TABLET ORAL NIGHTLY
Qty: 30 TABLET | Refills: 2 | Status: SHIPPED | OUTPATIENT
Start: 2023-09-08

## 2023-09-08 RX ORDER — CYANOCOBALAMIN 1000 UG/ML
1000 INJECTION, SOLUTION INTRAMUSCULAR; SUBCUTANEOUS ONCE
Status: COMPLETED | OUTPATIENT
Start: 2023-09-08 | End: 2023-09-08

## 2023-09-08 RX ADMIN — CYANOCOBALAMIN 1000 MCG: 1000 INJECTION, SOLUTION INTRAMUSCULAR; SUBCUTANEOUS at 16:05

## 2023-09-08 RX ADMIN — IOPAMIDOL 89 ML: 755 INJECTION, SOLUTION INTRAVENOUS at 17:41

## 2023-09-08 NOTE — PROGRESS NOTES
Established Patient Office Visit      Patient Name: Kellen Esparza  : 1965   MRN: [id number]   Care Team: Patient Care Team:  Ryne Puente DO as PCP - General (Family Medicine)  Johny Sommer MD as Consulting Physician (Cardiology)  Sonya Gay MD as Consulting Physician (Cardiology)  Deb Guajardo APRN as Nurse Practitioner (Pulmonary Disease)  Sal Sheldon MD (Pain Medicine)  Luis Saeed MD as Consulting Physician (Otolaryngology)    Chief Complaint:    Chief Complaint   Patient presents with    Labs Only     Pt wants to discuss recent labs       History of Present Illness: Kellen Esparza is a 57 y.o. female who is here today for chief complaint.    HPI    Injection right knee next week, both hands on the 1st    Has been off eliquis for about 2-3 months    This patient is accompanied by their self who contributes to the history of their care.    The following portions of the patient's history were reviewed and updated as appropriate: allergies, current medications, past family history, past medical history, past social history, past surgical history and problem list.    Subjective      Review of Systems:   Review of Systems - See HPI    Past Medical History:   Past Medical History:   Diagnosis Date    Arthritis     Arthritis of back     Carpal tunnel syndrome     Chlamydia     CPAP (continuous positive airway pressure) dependence     Depression     Diabetes mellitus     doesnt check sugar     GERD (gastroesophageal reflux disease)     History of COVID-19 2022    Hypertension     Migraine     Musculoligamentous strain     Pacemaker     Pulmonary embolism     Sleep apnea     CPAP machine was recalled, waiting for new one.    Wears eyeglasses        Past Surgical History:   Past Surgical History:   Procedure Laterality Date    BREAST BIOPSY Left     CARDIAC CATHETERIZATION Left 08/10/2017    Procedure: Cardiac Catheterization/Vascular Study;  Surgeon: Johny  ALMA Sommer MD;  Location:  [facility];  Service:     CARDIAC ELECTROPHYSIOLOGY PROCEDURE N/A 07/19/2019    Procedure: Pacemaker DC new;  Surgeon: Sonya Gay MD;  Location:  [facility];  Service: Cardiology    CHOLECYSTECTOMY      COLONOSCOPY      COLONOSCOPY N/A 05/03/2022    Procedure: COLONOSCOPY;  Surgeon: Bryant Mejia MD;  Location:  [facility];  Service: Gastroenterology;  Laterality: N/A;    ENDOSCOPY      HYSTERECTOMY  11/2014    INSERT / REPLACE / REMOVE PACEMAKER      JOINT REPLACEMENT Right     knee    KNEE SURGERY Right 12/2017    total replacement    NASAL SINUS SURGERY      OOPHORECTOMY  11/2014    PACEMAKER IMPLANTATION      PULMONARY EMBOLISM SURGERY      right lung    SPINAL CORD STIMULATOR IMPLANT N/A 09/10/2020    Procedure: SPINAL CORD STIMULATOR INSERTION PHASE 1, UPPER THORACIC PLACEMENT ;  Surgeon: Victor Manuel Geiger MD;  Location:  [facility];  Service: Neurosurgery;  Laterality: N/A;    TUBAL ABDOMINAL LIGATION         Family History:   Family History   Problem Relation Age of Onset    Breast cancer Mother 42    Heart failure Mother     Diabetes Mother     No Known Problems Father     Hypertension Sister     Hypertension Brother     Heart failure Son     Hypertension Maternal Grandmother     No Known Problems Paternal Grandmother     Hypertension Maternal Grandfather     No Known Problems Paternal Grandfather     Ovarian cancer Neg Hx        Social History:   Social History     Socioeconomic History    Marital status: Single   Tobacco Use    Smoking status: Never    Smokeless tobacco: Never   Vaping Use    Vaping Use: Never used   Substance and Sexual Activity    Alcohol use: No     Comment: rare    Drug use: No    Sexual activity: Yes     Partners: Male     Birth control/protection: Surgical, Hysterectomy       Tobacco History:   Social History     Tobacco Use   Smoking Status Never   Smokeless Tobacco Never       Medications:  "    Current Outpatient Medications:     atorvastatin (LIPITOR) 80 MG tablet, TAKE 1 TABLET BY MOUTH DAILY, Disp: 90 tablet, Rfl: 3    carvedilol (COREG) 6.25 MG tablet, Take 1 tablet by mouth 2 (Two) Times a Day., Disp: 180 tablet, Rfl: 3    cyclobenzaprine (FLEXERIL) 10 MG tablet, Take 1 tablet by mouth 3 (Three) Times a Day., Disp: , Rfl:     Eliquis 5 MG tablet tablet, TAKE 1 TABLET BY MOUTH TWICE DAILY, Disp: 180 tablet, Rfl: 3    levETIRAcetam (KEPPRA) 250 MG tablet, TAKE 1 TABLET BY MOUTH TWICE DAILY, Disp: 30 tablet, Rfl: 1    oxyCODONE-acetaminophen (PERCOCET) 5-325 MG per tablet, Take 1 tablet by mouth 3 (Three) Times a Day., Disp: , Rfl:     Ozempic, 0.25 or 0.5 MG/DOSE, 2 MG/3ML solution pen-injector, Inject 0.5 mg under the skin into the appropriate area as directed 1 (One) Time Per Week., Disp: 3 mL, Rfl: 2    pregabalin (LYRICA) 150 MG capsule, Take 1 capsule by mouth 2 (Two) Times a Day., Disp: 60 capsule, Rfl: 1    Sod Picosulfate-Mag Ox-Cit Acd (Clenpiq) 10-3.5-12 MG-GM -GM/160ML solution, Take 350 mL by mouth Take As Directed., Disp: 350 mL, Rfl: 0    valsartan (DIOVAN) 40 MG tablet, TAKE 1 TABLET BY MOUTH EVERY MORNING, Disp: 90 tablet, Rfl: 3    traZODone (DESYREL) 150 MG tablet, Take 1 tablet by mouth Every Night., Disp: 30 tablet, Rfl: 2    Allergies:   Allergies   Allergen Reactions    Metformin GI Intolerance    Codeine Itching    Lisinopril Cough       Objective   Objective     Physical Exam:  Vital Signs:   Vitals:    09/08/23 1525   BP: 122/62   BP Location: Left arm   Patient Position: Sitting   Cuff Size: Adult   Temp: 98.6 °F (37 °C)   TempSrc: Infrared   Weight: 126 kg (277 lb 9.6 oz)   Height: 170.2 cm (67\")     Body mass index is 43.48 kg/m².     Physical Exam  Nursing note reviewed  Const: NAD, A&Ox4, Pleasant, Cooperative  Eyes: EOMI, no conjunctivitis  ENT: No nasal discharge present, neck supple  Cardiac: Regular rate and rhythm, no cyanosis  Resp: Respiratory rate within normal " limits, no increased work of breathing, no audible wheezing or retractions noted  GI: No distention or ascites  MSK: Motor and sensation grossly intact in bilateral upper extremities  Neurologic: CN II-XII grossly intact  Psych: Appropriate mood and behavior.  Skin: Warm, dry  Procedures/Radiology     Procedures  No radiology results for the last 7 days     Assessment & Plan   Assessment / Plan      Assessment/Plan:   Problems Addressed This Visit  Diagnoses and all orders for this visit:    1. History of pulmonary embolus (PE) (Primary)  -     D-dimer, Quantitative; Future  -     Homocysteine; Future  -     CT Chest Pulmonary Embolism With Contrast; Future    2. Other fatigue  -     D-dimer, Quantitative; Future  -     Homocysteine; Future  -     CT Chest Pulmonary Embolism With Contrast; Future  -     cyanocobalamin injection 1,000 mcg    3. Primary insomnia  -     traZODone (DESYREL) 150 MG tablet; Take 1 tablet by mouth Every Night.  Dispense: 30 tablet; Refill: 2      Problem List Items Addressed This Visit          Coag and Thromboembolic    History of pulmonary embolus (PE) - Primary    Relevant Orders    D-dimer, Quantitative (Completed)    Homocysteine (Completed)    CT Chest Pulmonary Embolism With Contrast (Completed)     Other Visit Diagnoses       Other fatigue        Relevant Medications    cyanocobalamin injection 1,000 mcg (Completed)    Other Relevant Orders    D-dimer, Quantitative (Completed)    Homocysteine (Completed)    CT Chest Pulmonary Embolism With Contrast (Completed)    Primary insomnia        Relevant Medications    traZODone (DESYREL) 150 MG tablet                Patient Instructions   Start Eliquis back up  REstart Ozempic    Follow Up:   Return in about 3 months (around 12/8/2023) for with A1c;.        DO BRENNAN Rivero RD  [facility]  [address]  [facility] [address]  Fax [phone]  Phone  [phone]

## 2023-09-22 ENCOUNTER — OFFICE VISIT (OUTPATIENT)
Dept: FAMILY MEDICINE CLINIC | Facility: CLINIC | Age: 58
End: 2023-09-22
Payer: COMMERCIAL

## 2023-09-22 VITALS
WEIGHT: 276.6 LBS | HEIGHT: 67 IN | DIASTOLIC BLOOD PRESSURE: 90 MMHG | BODY MASS INDEX: 43.41 KG/M2 | OXYGEN SATURATION: 99 % | HEART RATE: 73 BPM | SYSTOLIC BLOOD PRESSURE: 142 MMHG

## 2023-09-22 DIAGNOSIS — G47.33 OSA ON CPAP: Primary | ICD-10-CM

## 2023-09-22 DIAGNOSIS — Z99.89 OSA ON CPAP: Primary | ICD-10-CM

## 2023-09-22 PROCEDURE — 99213 OFFICE O/P EST LOW 20 MIN: CPT | Performed by: FAMILY MEDICINE

## 2023-09-22 NOTE — Clinical Note
CPAP to DME. Previously had, her machine was recalled this year and has been off for about 3 months.

## 2023-09-22 NOTE — PROGRESS NOTES
Established Patient Office Visit      Patient Name: Kellen Esparza  : 1965   MRN: [id number]   Care Team: Patient Care Team:  Ryne Puente DO as PCP - General (Family Medicine)  Johny Sommer MD as Consulting Physician (Cardiology)  Sonya Gay MD as Consulting Physician (Cardiology)  Deb Guajardo APRN as Nurse Practitioner (Pulmonary Disease)  Sal Sheldon MD (Pain Medicine)  Luis Saeed MD as Consulting Physician (Otolaryngology)    Chief Complaint:    Chief Complaint   Patient presents with    fmla paperwork    Diabetes       History of Present Illness: Kellen Esparza is a 57 y.o. female who is here today for chief complaint.    HPI    They had a recall on her CPAP machine (Afshin). Has been without it for 2-3 months. Certainly contributing to her fatigue.    This patient is accompanied by their self who contributes to the history of their care.    The following portions of the patient's history were reviewed and updated as appropriate: allergies, current medications, past family history, past medical history, past social history, past surgical history and problem list.    Subjective      Review of Systems:   Review of Systems - See HPI    Past Medical History:   Past Medical History:   Diagnosis Date    Arthritis     Arthritis of back     Carpal tunnel syndrome     Chlamydia     CPAP (continuous positive airway pressure) dependence     Depression     Diabetes mellitus     doesnt check sugar     GERD (gastroesophageal reflux disease)     History of COVID-19 2022    Hypertension     Migraine     Musculoligamentous strain     Pacemaker     Pulmonary embolism     Sleep apnea     CPAP machine was recalled, waiting for new one.    Wears eyeglasses        Past Surgical History:   Past Surgical History:   Procedure Laterality Date    BREAST BIOPSY Left     CARDIAC CATHETERIZATION Left 08/10/2017    Procedure: Cardiac Catheterization/Vascular Study;  Surgeon:  Johny Sommer MD;  Location:  [facility];  Service:     CARDIAC ELECTROPHYSIOLOGY PROCEDURE N/A 07/19/2019    Procedure: Pacemaker DC new;  Surgeon: Sonya Gay MD;  Location:  [facility];  Service: Cardiology    CHOLECYSTECTOMY      COLONOSCOPY      COLONOSCOPY N/A 05/03/2022    Procedure: COLONOSCOPY;  Surgeon: Bryant Mejia MD;  Location:  [facility];  Service: Gastroenterology;  Laterality: N/A;    ENDOSCOPY      HYSTERECTOMY  11/2014    INSERT / REPLACE / REMOVE PACEMAKER      JOINT REPLACEMENT Right     knee    KNEE SURGERY Right 12/2017    total replacement    NASAL SINUS SURGERY      OOPHORECTOMY  11/2014    PACEMAKER IMPLANTATION      PULMONARY EMBOLISM SURGERY      right lung    SPINAL CORD STIMULATOR IMPLANT N/A 09/10/2020    Procedure: SPINAL CORD STIMULATOR INSERTION PHASE 1, UPPER THORACIC PLACEMENT ;  Surgeon: Victor Manuel Geiger MD;  Location:  [facility];  Service: Neurosurgery;  Laterality: N/A;    TUBAL ABDOMINAL LIGATION         Family History:   Family History   Problem Relation Age of Onset    Breast cancer Mother 42    Heart failure Mother     Diabetes Mother     No Known Problems Father     Hypertension Sister     Hypertension Brother     Heart failure Son     Hypertension Maternal Grandmother     No Known Problems Paternal Grandmother     Hypertension Maternal Grandfather     No Known Problems Paternal Grandfather     Ovarian cancer Neg Hx        Social History:   Social History     Socioeconomic History    Marital status: Single   Tobacco Use    Smoking status: Never    Smokeless tobacco: Never   Vaping Use    Vaping Use: Never used   Substance and Sexual Activity    Alcohol use: No     Comment: rare    Drug use: No    Sexual activity: Yes     Partners: Male     Birth control/protection: Surgical, Hysterectomy       Tobacco History:   Social History     Tobacco Use   Smoking Status Never   Smokeless Tobacco Never       Medications:  "    Current Outpatient Medications:     atorvastatin (LIPITOR) 80 MG tablet, TAKE 1 TABLET BY MOUTH DAILY, Disp: 90 tablet, Rfl: 3    carvedilol (COREG) 6.25 MG tablet, Take 1 tablet by mouth 2 (Two) Times a Day., Disp: 180 tablet, Rfl: 3    cyclobenzaprine (FLEXERIL) 10 MG tablet, Take 1 tablet by mouth 3 (Three) Times a Day., Disp: , Rfl:     Eliquis 5 MG tablet tablet, TAKE 1 TABLET BY MOUTH TWICE DAILY, Disp: 180 tablet, Rfl: 3    levETIRAcetam (KEPPRA) 250 MG tablet, TAKE 1 TABLET BY MOUTH TWICE DAILY, Disp: 30 tablet, Rfl: 1    oxyCODONE-acetaminophen (PERCOCET) 5-325 MG per tablet, Take 1 tablet by mouth 3 (Three) Times a Day., Disp: , Rfl:     Ozempic, 0.25 or 0.5 MG/DOSE, 2 MG/3ML solution pen-injector, Inject 0.5 mg under the skin into the appropriate area as directed 1 (One) Time Per Week., Disp: 3 mL, Rfl: 2    pregabalin (LYRICA) 150 MG capsule, Take 1 capsule by mouth 2 (Two) Times a Day., Disp: 60 capsule, Rfl: 1    Sod Picosulfate-Mag Ox-Cit Acd (Clenpiq) 10-3.5-12 MG-GM -GM/160ML solution, Take 350 mL by mouth Take As Directed., Disp: 350 mL, Rfl: 0    traZODone (DESYREL) 150 MG tablet, Take 1 tablet by mouth Every Night., Disp: 30 tablet, Rfl: 2    valsartan (DIOVAN) 40 MG tablet, TAKE 1 TABLET BY MOUTH EVERY MORNING, Disp: 90 tablet, Rfl: 3    Allergies:   Allergies   Allergen Reactions    Metformin GI Intolerance    Codeine Itching    Lisinopril Cough       Objective   Objective     Physical Exam:  Vital Signs:   Vitals:    09/22/23 0919   BP: 142/90   BP Location: Left arm   Patient Position: Sitting   Cuff Size: Adult   Pulse: 73   SpO2: 99%   Weight: 125 kg (276 lb 9.6 oz)   Height: 170.2 cm (67\")     Body mass index is 43.32 kg/m².     Physical Exam  Nursing note reviewed  Const: NAD, A&Ox4, Pleasant, Cooperative  Eyes: EOMI, no conjunctivitis  ENT: No nasal discharge present, neck supple  Cardiac: Regular rate and rhythm, no cyanosis  Resp: Respiratory rate within normal limits, no increased " work of breathing, no audible wheezing or retractions noted  GI: No distention or ascites  MSK: Motor and sensation grossly intact in bilateral upper extremities  Neurologic: CN II-XII grossly intact  Psych: Appropriate mood and behavior.  Skin: Warm, dry  Procedures/Radiology     Procedures  No radiology results for the last 7 days     Assessment & Plan   Assessment / Plan      Assessment/Plan:   Problems Addressed This Visit  Diagnoses and all orders for this visit:    1. ALYSIA on CPAP (Primary)  -     PAP Therapy  -     Ambulatory Referral to Sleep Medicine      Problem List Items Addressed This Visit    None  Visit Diagnoses       ALYSIA on CPAP    -  Primary    Relevant Orders    PAP Therapy    Ambulatory Referral to Sleep Medicine            There are no Patient Instructions on file for this visit.    Follow Up:   Return in about 3 months (around 12/22/2023).        DO BRENNAN Rivero RD  [facility]IMARY [facility]  [address] [facility] [address]  Fax [phone]  Phone [phone]

## 2023-10-06 DIAGNOSIS — G90.59 COMPLEX REGIONAL PAIN SYNDROME TYPE 1 AFFECTING OTHER SITE: ICD-10-CM

## 2023-10-09 RX ORDER — LEVETIRACETAM 250 MG/1
TABLET ORAL
Qty: 30 TABLET | Refills: 1 | Status: SHIPPED | OUTPATIENT
Start: 2023-10-09

## 2023-11-20 ENCOUNTER — TELEPHONE (OUTPATIENT)
Dept: CARDIOLOGY | Facility: CLINIC | Age: 58
End: 2023-11-20
Payer: COMMERCIAL

## 2023-11-20 ENCOUNTER — OFFICE VISIT (OUTPATIENT)
Dept: ORTHOPEDIC SURGERY | Facility: CLINIC | Age: 58
End: 2023-11-20
Payer: COMMERCIAL

## 2023-11-20 VITALS
SYSTOLIC BLOOD PRESSURE: 120 MMHG | WEIGHT: 250 LBS | HEIGHT: 71 IN | BODY MASS INDEX: 35 KG/M2 | DIASTOLIC BLOOD PRESSURE: 84 MMHG

## 2023-11-20 DIAGNOSIS — G56.03 BILATERAL CARPAL TUNNEL SYNDROME: Primary | ICD-10-CM

## 2023-11-20 PROCEDURE — 99214 OFFICE O/P EST MOD 30 MIN: CPT | Performed by: STUDENT IN AN ORGANIZED HEALTH CARE EDUCATION/TRAINING PROGRAM

## 2023-11-20 NOTE — TELEPHONE ENCOUNTER
Patient is to have right open carpal tunnel release with Dr. Hanks. Patient is needing pre-operative cardiac risk assessment and medication recommendations. Please advise.

## 2023-11-20 NOTE — PROGRESS NOTES
[facility] Orthopedic     Office Visit       Date: 11/20/2023   Patient Name: Kellen Esparza  MRN: [id number]  YOB: 1965    Referring Physician: Ervin Strange MD     Chief Complaint:   Chief Complaint   Patient presents with    Left Hand - Pain    Right Hand - Pain     History of Present Illness:   Kellen Esparza is a 57 y.o. female right hand dominant presenting to clinic with complaints of bilateral, right > left hand, numbness and tingling. This has been present for 2 years and has been progressive with time. She endorses numbness and tingling mostly to her thumb, index and long fingers that initially began at night, causing to awaken and shake out her hands. The ulnar most digits are not affected. She has been using a wrist brace at night for 6 months with minimal to no improvements. She has previously had an EMG/NCS and has received 3 CSI's by her pain management provider.  She is most interested in discussing surgery.     Patient has a history of a. Fib on eliquis, a pacemaker, and diabetes with last HgA1c 6.3.     Subjective   Review of Systems:   Review of Systems   Constitutional: Negative.  Negative for chills, fatigue and fever.   HENT: Negative.  Negative for congestion and dental problem.    Eyes: Negative.  Negative for blurred vision.   Respiratory: Negative.  Negative for shortness of breath.    Cardiovascular: Negative.  Negative for leg swelling.   Gastrointestinal: Negative.  Negative for abdominal pain.   Endocrine: Negative.  Negative for polyuria.   Genitourinary: Negative.  Negative for difficulty urinating.   Musculoskeletal:  Positive for arthralgias.   Skin: Negative.    Allergic/Immunologic: Negative.    Neurological: Negative.    Hematological: Negative.  Negative for adenopathy.   Psychiatric/Behavioral: Negative.  Negative for behavioral problems.         Pertinent review of systems per  "HPI    I reviewed the patient's chief complaint, history of present illness, review of systems, past medical history, surgical history, family history, social history, medications and allergy list in the EMR on 11/20/2023 and agree with the findings above.    Objective    Quality Measures:   ACP:   ACP discussion was declined by the patient.      Tobacco:   Kellen Esparza  reports that she has never smoked. She has never used smokeless tobacco..     Vital Signs:   Vitals:    11/20/23 1119   BP: 120/84   Weight: 113 kg (250 lb)   Height: 180.3 cm (70.98\")     BMI:      General: No acute distress. Alert and oriented.     Ortho Exam:  Examination of bilateral upper extremities demonstrates enough deformity.  No skin lesions or abrasions.  No thenar or hypothenar atrophy.  Patient is able to make a composite fist and oppose the thumb to the small finger.  4 5 APB strength on the right and 5/5 on the left.  5/5 FDI strength bilaterally.  Two-point discrimination bilaterally is 4 mm.  Sensation is decreased in the median nerve distribution on the right and intact on the left.  Sensation tact light touch throughout the ulnar nerve distribution bilaterally.  Negative Tinel's at the wrist and elbow bilaterally.  Positive Durkan's and Phalen's bilaterally.  Nontender at the A1 pulleys and no catching or locking elicited during examination.  Warm and well-perfused distally.    .CTS-6 Questionnaire         Left Hand  Symptoms and History  Numbness in the median nerve territory  3.5 (3.5)   Nocturnal numbness     4 (4)   Physical Examination  Thenar atrophy and/or weakness   0 (5)    Positive Phalen's test     5 (5)   Loss of 2-point Discrimination >5 mm  0 (4.5)  Positive Tinel sign     0 (4)                Total    12.5 (26)           Right Hand  Symptoms and History  Numbness in the median nerve territory  3.5 (3.5)   Nocturnal numbness     4 (4)   Physical Examination  Thenar atrophy and/or weakness   0 (5)    Positive " Phalen's test     5 (5)   Loss of 2-point Discrimination >5 mm  0 (4.5)  Positive Tinel sign     0 (4)     Total    12.5 (26)    A patient with a score of > 12 has an 80% probability of electrodiagnostically positive carpal tunnel syndrome.     A patient with a score of > 5 has an 25% probability of electrodiagnostically positive carpal tunnel syndrome.                Imaging / Studies:    Imaging Results (Last 24 Hours)       ** No results found for the last 24 hours. **        EMG/NCS performed on 9/2/2022 demonstrates bilateral carpal tunnel syndrome with motor and sensory involvement.  Severity not noted.    Assessment / Plan    Assessment/Plan:   Kellen Pan a 57 y.o. female with bilateral carpal tunnel syndrome.     I discussed with the patient their clinical and electrodiagnostic findings demonstrate carpal tunnel syndrome.  The pathophysiology of the condition, including compression of the median nerve in the carpal tunnel, was explained in detail.  It was also discussed that with more severe symptomatology, such as persistent and worsening paresthesias, that permanent nerve damage may result, which may make improvement after surgery less predictable.  Both conservative and surgical options were discussed.  Conservative treatments in the form of: observation, gentle nerve gliding exercises, night time splinting, and injection were presented.  Operative treatment in the form of open carpal tunnel release was presented and reiterated that the goal of surgery is to prevent further compression and damage to the nerve.  Patient has had progressive symptoms over the past 2 years and has failed 6 months of bracing and 3 previous corticosteroid injections.  She is most interested in surgical treatment to the right upper extremity.  After expressing understanding of all options, the patient elects to proceed with right open carpal tunnel release.      The risks and benefits of the procedure were discussed with  the patient and/or appropriate guardian, which include but are not limited to: the risk of bleeding, pain, infection, wound complications, neurovascular damage, post-operative stiffness, tendon and/or ligament injury, persistent pain, need for additional surgeries in the future, and general risks from anesthesia. Carpal tunnel risks: Specific risks include: persistent numbness and tingling as the goal of surgery is to prevent further worsening of symptoms, damage to palmar cutaneous nerve, damage to recurrent motor branch as a portion of these branches are transligamentous, persistent weakness and pillar pain. We also discussed the post-operative rehabilitation, length of immobilization, the need for therapy, and the overall expected outcomes from the procedure. Proper time was given to answer the patient's questions regarding the procedure. The patient expressed understanding. Knowing what the risks are and what the conservative treatment is, the patient elected to forgo any further conservative treatment options and proceed with the surgical intervention.  She will need to obtain clearance from her cardiologist because she has a pacemaker. They were agreeable with the plan.  All questions and concerns were addressed.      ICD-10-CM ICD-9-CM   1. Bilateral carpal tunnel syndrome  G56.03 354.0     Follow Up:   Return for Follow Up- After surgery.      Amanda Hanks MD  [facility] [place] Orthopedic & Hand Surgeon

## 2023-12-13 ENCOUNTER — OUTSIDE FACILITY SERVICE (OUTPATIENT)
Dept: ORTHOPEDIC SURGERY | Facility: CLINIC | Age: 58
End: 2023-12-13
Payer: MEDICAID

## 2023-12-13 ENCOUNTER — DOCUMENTATION (OUTPATIENT)
Age: 58
End: 2023-12-13
Payer: MEDICAID

## 2023-12-13 PROCEDURE — 64721 CARPAL TUNNEL SURGERY: CPT | Performed by: STUDENT IN AN ORGANIZED HEALTH CARE EDUCATION/TRAINING PROGRAM

## 2023-12-13 NOTE — PROGRESS NOTES
ORTHOPEDIC OPERATIVE REPORT    PATIENT NAME: Kellen Esparza     MRN: [id number]    LOCATION: [facility]    DATE OF SURGERY: 12/13/23    PREOPERATIVE DIAGNOSIS:   Right carpal Tunnel Syndrome    POSTOPERATIVE DIAGNOSIS:  Right carpal Tunnel Syndrome     PROCEDURE PERFORMED:  Right carpal Tunnel Release    CPT CODE:  45813    SURGEON: Amanda Hanks MD     ANESTHESIA: Local 50:50 mixture of 0.5% Marcaine mixed with 1% lidocaine with 1:100,000 epinephrine    SPECIMENS: None    TOURNIQUET TIME: 9 minutes    ESTIMATED BLOOD LOSS: Minimal    COMPLICATIONS: None    IMPLANTS: None    INDICATIONS FOR PROCEDURE:  Kellen Esparza presented to clinic with complaints of numbness and tingling to bilateral hands, right greater than left, involving predominantly the thumb, index, and long fingers. Clinical examination was consistent with carpal tunnel syndrome. Electrodiagnostic studies were obtained. These demonstrated bilateral carpal tunnel syndrome. The patient was offered conservative treatment including nerve gliding exercises, wrist braces, and corticosteroid injection. The patient continues to endorse persistent numbness and tingling, failing to improve with conservative measures. They were offered an open carpal tunnel release. After discussion of the risks, benefits, alternatives and prognosis, they elected to proceed with surgical treatment.     The risks and benefits of the procedure were discussed with the patient and/or appropriate guardian, which include but are not limited to: the risk of bleeding, pain, infection, wound complications, neurovascular damage, post-operative stiffness, tendon and/or ligament injury, nerve injury, persistent pain, need for additional surgeries in the future, and general risks from anesthesia. Carpal tunnel specific risks include: persistent numbness and tingling as the goal of surgery is to prevent further worsening of symptoms, damage to the palmar cutaneous nerve branch, damage to  recurrent motor branch as a portion of these branches are transligamentous, persistent weakness of the hand and pillar pain. We also discussed the post-operative course including, incision healing/care, lifting restrictions, possible need for therapy, and the overall expected outcomes from the procedure. Proper time was given to answer the patient's questions regarding the procedure. The patient expressed understanding. Knowing what the risks are and what the conservative treatment is, the patient elected to forgo any further conservative treatment options and proceed with the surgical intervention.    DESCRIPTION OF PROCEDURE:   The patient was identified in the preoperative holding area utilizing two patient identifiers. A preoperative block was performed using local anesthetic consisting of a 50:50 mixture of 0.5% Marcaine mixed with 1% lidocaine with 1:100,000 epinephrine. A total of 7cc was used and injected proximally to block the palmar cutaneous nerve branch and then superficially along the planned carpal tunnel incision. They were taken back to the operating room and placed supine on the operative table.  A forearm tourniquet was placed and the operative extremity was prepped and draped in a standard sterile fashion. A surgical time out was performed that confirmed the correct site, procedure and laterality.     An incision was drawn out in line with the radial border of the ring finger starting just proximal to Oden's cardinal line and ending 0.5 cm distal to the distal wrist crease. An esmarch was used to exsanguinate the limb, and the tourniquet was inflated to 250 mmHg.  A 15 blade was used to incise through the skin and subcutaneous tissue. Littler scissors were used to bluntly dissect down to the palmar fascia. A self-retaining blunt retractor was placed, exposing the palmar fascia. A 15 blade was used to incise through the palmar fascia in line with the skin incision.  The ulnar based fat was  encountered and elevated from radial to ulnar allowing the self-retaining retractor to be placed deeper, retracting the fat from the surgical field. A palmaris brevis was not encountered. The transverse carpal ligament as exposed. A 15 blade was used to incise through the transverse carpal ligament into the carpal tunnel under direct visualization. A combination of scissors and a 15 blade were used to complete the dissection distally until fat was encountered from the superficial palmar arch.  A tight fascial band was noted most distal aspect of the ligament which appeared to be causing compression of the nerve.  Once the distal dissection was confirmed completed, the retractor was reversed and attention was turned proximally. A scalpel was used to incise the transverse carpal ligament proximally under direct visualization. A freer elevator was used to release adhesions from superficial and deep to the transverse carpal ligament proximally. Scissors were used to complete the proximal dissection releasing up to the level of the antebrachial fascia. There was no evidence of a transligamentous recurrent motor branch.     Once the transverse carpal ligament was fully released, a visual inspection of the carpal tunnel demonstrated a complete release without evidence additional pathology. The tourniquet was deflated and hemostasis was achieved at the wound edges. The wound was irrigated with normal saline and closed with 4-0 nylon. 2x2 gauze, xeroform and an ace wrap were placed. All counts were correct at the end of the case.     POSTOPERATIVE PLAN:  No lifting greater than 5 pounds with the operative extremity.  2.  Over the counter Tylenol and/or Advil/Aleve/Motrin for pain control.   3.  Dressings may be removed in 5 days and replaced with a dry daily dressing.  4.  Follow up in 10-14 days as scheduled.    Amanda Hanks MD  [facility]

## 2023-12-28 ENCOUNTER — OFFICE VISIT (OUTPATIENT)
Dept: ORTHOPEDIC SURGERY | Facility: CLINIC | Age: 58
End: 2023-12-28
Payer: MEDICAID

## 2023-12-28 VITALS — TEMPERATURE: 96.8 F

## 2023-12-28 DIAGNOSIS — G90.59 COMPLEX REGIONAL PAIN SYNDROME TYPE 1 AFFECTING OTHER SITE: ICD-10-CM

## 2023-12-28 DIAGNOSIS — G56.03 BILATERAL CARPAL TUNNEL SYNDROME: Primary | ICD-10-CM

## 2023-12-28 PROCEDURE — 1159F MED LIST DOCD IN RCRD: CPT | Performed by: STUDENT IN AN ORGANIZED HEALTH CARE EDUCATION/TRAINING PROGRAM

## 2023-12-28 PROCEDURE — 99024 POSTOP FOLLOW-UP VISIT: CPT | Performed by: STUDENT IN AN ORGANIZED HEALTH CARE EDUCATION/TRAINING PROGRAM

## 2023-12-28 PROCEDURE — 1160F RVW MEDS BY RX/DR IN RCRD: CPT | Performed by: STUDENT IN AN ORGANIZED HEALTH CARE EDUCATION/TRAINING PROGRAM

## 2023-12-28 RX ORDER — LEVETIRACETAM 250 MG/1
TABLET ORAL
Qty: 30 TABLET | Refills: 1 | Status: SHIPPED | OUTPATIENT
Start: 2023-12-28

## 2023-12-28 NOTE — PROGRESS NOTES
[facility]     Post Operative Office Visit      Date: 12/28/2023   Patient Name: Kellen Esparza  MRN: [id number]  YOB: 1965    Chief Complaint:   Chief Complaint   Patient presents with    Post-op     2 week s/p Right carpal Tunnel Release (DOS 12/13/23)     History of Present Illness:   Kellen Esparza is a 58 y.o. female status post right carpal tunnel release, DOS 12/13/2023.  Patient has been doing well postoperatively.  She feels the numbness and tingling in her right hand has improved nearly 80% since surgery.  She has had no wound complications.  No other complaints or concerns.    Subjective   Review of Systems:   Review of Systems   Constitutional: Negative.  Negative for chills, fatigue and fever.   HENT: Negative.  Negative for congestion and dental problem.    Eyes: Negative.  Negative for blurred vision.   Respiratory: Negative.  Negative for shortness of breath.    Cardiovascular: Negative.  Negative for leg swelling.   Gastrointestinal: Negative.  Negative for abdominal pain.   Endocrine: Negative.  Negative for polyuria.   Genitourinary: Negative.  Negative for difficulty urinating.   Musculoskeletal:  Positive for arthralgias.   Skin: Negative.    Allergic/Immunologic: Negative.    Neurological: Negative.    Hematological: Negative.  Negative for adenopathy.   Psychiatric/Behavioral: Negative.  Negative for behavioral problems.         I reviewed the patient's chief complaint, history of present illness, review of systems, past medical history, surgical history, family history, social history, medications and allergy list in the EMR on 12/28/2023 and agree with the findings above.    Objective    Vital Signs:   Vitals:    12/28/23 0929   Temp: 96.8 °F (36 °C)       General Appearance: No acute distress. Alert and oriented.     Ortho Exam:  Examination of the right upper extremity demonstrates a healing  surgical incision overlying the carpal tunnel.  Sutures were removed today in clinic without evidence of underlying wound dehiscence, erythema, or drainage.  There is dry skin noted around the incision.  The patient is appropriately tender to palpation along the incision.  She is able make a composite fist and oppose the thumb to small finger.  Sensation is grossly intact throughout the median and ulnar nerve distributions.  Warm and well-perfused distally.    Imaging / Studies:    Imaging Results (Last 24 Hours)       ** No results found for the last 24 hours. **          Assessment / Plan    Assessment/Plan:   Kellen Esparza is a 58 y.o. female status post right carpal tunnel release, DOS 12/13/2023.    The patient is doing well postoperatively.  Sutures were removed today in clinic.  I counseled her on gentle scar massage and wrist/digit range of motion.  She may begin to use the hand without restriction as she tolerates.  The patient is also interested in pursuing a left carpal tunnel release when she is healed from her current surgery.  Will discuss this at her next postoperative visit in 1 month.  All questions and concerns were addressed.  She was agreeable with the plan.      ICD-10-CM ICD-9-CM   1. Bilateral carpal tunnel syndrome  G56.03 354.0     Follow Up:   Return in about 4 weeks (around 1/25/2024) for Follow Up.      Amanda Hanks MD  [facility]

## 2023-12-28 NOTE — TELEPHONE ENCOUNTER
Rx Refill Note  Requested Prescriptions     Pending Prescriptions Disp Refills    levETIRAcetam (KEPPRA) 250 MG tablet [Pharmacy Med Name: LEVETIRACETAM 250MG TABLETS] 30 tablet 1     Sig: TAKE 1 TABLET BY MOUTH TWICE DAILY      Last office visit with prescribing clinician: 9/22/2023     Next office visit with prescribing clinician: 1/3/2024       Parris Hewitt MA  12/28/23, 12:39 EST

## 2023-12-29 DIAGNOSIS — F51.01 PRIMARY INSOMNIA: ICD-10-CM

## 2023-12-29 DIAGNOSIS — E11.9 TYPE 2 DIABETES MELLITUS WITHOUT COMPLICATION, WITHOUT LONG-TERM CURRENT USE OF INSULIN: ICD-10-CM

## 2023-12-29 DIAGNOSIS — I10 ESSENTIAL HYPERTENSION: ICD-10-CM

## 2023-12-29 RX ORDER — VALSARTAN 40 MG/1
40 TABLET ORAL EVERY MORNING
Qty: 90 TABLET | Refills: 3 | Status: SHIPPED | OUTPATIENT
Start: 2023-12-29

## 2023-12-29 RX ORDER — TRAZODONE HYDROCHLORIDE 150 MG/1
150 TABLET ORAL NIGHTLY
Qty: 30 TABLET | Refills: 2 | Status: SHIPPED | OUTPATIENT
Start: 2023-12-29

## 2023-12-29 NOTE — TELEPHONE ENCOUNTER
Rx Refill Note  Requested Prescriptions     Pending Prescriptions Disp Refills    traZODone (DESYREL) 150 MG tablet [Pharmacy Med Name: TRAZODONE 150MG (HUNDRED-FIFTY) TAB] 30 tablet 2     Sig: TAKE 1 TABLET BY MOUTH EVERY NIGHT      Last office visit with prescribing clinician: 9/22/2023   Last telemedicine visit with prescribing clinician: Visit date not found   Next office visit with prescribing clinician: 1/3/2024                         Would you like a call back once the refill request has been completed: [] Yes [] No    If the office needs to give you a call back, can they leave a voicemail: [] Yes [] No    Elida Recinos MA  12/29/23, 15:54 EST

## 2024-01-18 NOTE — ADDENDUM NOTE
Addended by: HETAL ESPINOZA on: 6/1/2020 09:59 AM     Modules accepted: Orders     Where Is Your Wart Located?: L thumb

## 2024-02-05 ENCOUNTER — PRIOR AUTHORIZATION (OUTPATIENT)
Dept: FAMILY MEDICINE CLINIC | Facility: CLINIC | Age: 59
End: 2024-02-05
Payer: MEDICAID

## 2024-02-05 NOTE — TELEPHONE ENCOUNTER
(Key: [id number]) - [id number]  Ozempic (0.25 or 0.5 MG/DOSE) 2MG/3ML pen-injectors  Status: PA Request  Created: February 5th, 2024 [phone]  Sent: February 5th, 2024

## 2024-02-05 NOTE — TELEPHONE ENCOUNTER
This request has not been approved. Based on the information sent for review, the requested drug did not meet our guideline rules. To get the request approved, your doctor needs to show that you have met the guideline rules below. If you have questions, please call your doctor. In some cases, the requested drug or alternatives offered may have other guideline rules that need to be met. Your provider requested Ozempic pens to manage your condition of type 2 diabetes mellitus (a chronic condition in which your body is resistant to insulin, a hormone that regulates your blood sugar) without chronic (long term) kidney disease. For the treatment of type 2 diabetes mellitus without chronic kidney disease, our guideline named GLP-1 RECEPTOR AGONISTS (reviewed for Ozempic pens) requires you have tried metformin and it did not work for you. This request has been denied because we do not have information that you meet the requirements above. Please work with your doctor to use a different medication or get us more information if it will allow us to approve this request. A written notification letter will follow with additional details.

## 2024-02-11 PROCEDURE — 93294 REM INTERROG EVL PM/LDLS PM: CPT | Performed by: INTERNAL MEDICINE

## 2024-02-11 PROCEDURE — 93296 REM INTERROG EVL PM/IDS: CPT | Performed by: INTERNAL MEDICINE

## 2024-03-21 DIAGNOSIS — G90.59 COMPLEX REGIONAL PAIN SYNDROME TYPE 1 AFFECTING OTHER SITE: ICD-10-CM

## 2024-03-22 RX ORDER — LEVETIRACETAM 250 MG/1
TABLET ORAL
Qty: 90 TABLET | Refills: 1 | Status: SHIPPED | OUTPATIENT
Start: 2024-03-22

## 2024-03-28 DIAGNOSIS — F51.01 PRIMARY INSOMNIA: ICD-10-CM

## 2024-03-28 RX ORDER — TRAZODONE HYDROCHLORIDE 150 MG/1
150 TABLET ORAL NIGHTLY
Qty: 90 TABLET | Refills: 1 | Status: SHIPPED | OUTPATIENT
Start: 2024-03-28

## 2024-05-06 ENCOUNTER — HOSPITAL ENCOUNTER (EMERGENCY)
Facility: HOSPITAL | Age: 59
Discharge: HOME | End: 2024-05-07
Attending: EMERGENCY MEDICINE
Payer: MEDICAID

## 2024-05-06 ENCOUNTER — APPOINTMENT (OUTPATIENT)
Dept: GENERAL RADIOLOGY | Facility: HOSPITAL | Age: 59
End: 2024-05-06
Payer: MEDICAID

## 2024-05-06 VITALS
BODY MASS INDEX: 35 KG/M2 | RESPIRATION RATE: 18 BRPM | HEIGHT: 71 IN | OXYGEN SATURATION: 98 % | TEMPERATURE: 97.7 F | DIASTOLIC BLOOD PRESSURE: 114 MMHG | HEART RATE: 80 BPM | SYSTOLIC BLOOD PRESSURE: 173 MMHG | WEIGHT: 250 LBS

## 2024-05-06 DIAGNOSIS — S83.92XA SPRAIN OF LEFT KNEE, UNSPECIFIED LIGAMENT, INITIAL ENCOUNTER: Primary | ICD-10-CM

## 2024-05-06 PROCEDURE — 73560 X-RAY EXAM OF KNEE 1 OR 2: CPT

## 2024-05-06 PROCEDURE — 99283 EMERGENCY DEPT VISIT LOW MDM: CPT

## 2024-05-06 RX ORDER — METHOCARBAMOL 750 MG/1
750 TABLET, FILM COATED ORAL ONCE
Status: COMPLETED | OUTPATIENT
Start: 2024-05-06 | End: 2024-05-07

## 2024-05-07 RX ADMIN — METHOCARBAMOL 750 MG: 750 TABLET ORAL at 00:12

## 2024-05-07 NOTE — ED PROVIDER NOTES
Subjective   History of Present Illness  Is a 58-year-old female with a history of arthritis and chronic pain presenting to the emergency department with some left knee pain.  The patient states that she was bending over 3 days ago putting on her shoe when she felt a pop in her left knee.  She has been having some pain since then.  She is having some pain in the medial aspect of her left knee.  Patient is still able to bear weight, however induces pain.  She denies any other injuries.  Does not have any fevers or chills.  No headache or change in vision.  No focal weakness.  Chest pain or shortness of breath.  No abdominal pain or vomiting.    History provided by:  Patient   used: No        Review of Systems   Constitutional: Negative.    Respiratory: Negative.     Musculoskeletal:  Positive for arthralgias and myalgias.   Neurological: Negative.        Past Medical History:   Diagnosis Date    Arthritis     Arthritis of back     Carpal tunnel syndrome     Chlamydia     CPAP (continuous positive airway pressure) dependence     DDD (degenerative disc disease), cervical 09/06/2017    Depression     Diabetes mellitus     doesnt check sugar     GERD (gastroesophageal reflux disease)     History of COVID-19 01/2022    Hypertension     Knee swelling     Migraine     Musculoligamentous strain     Pacemaker     Pulmonary embolism 1997    Sleep apnea     CPAP machine was recalled, waiting for new one.    Wears eyeglasses        Allergies   Allergen Reactions    Metformin GI Intolerance    Codeine Itching    Lisinopril Cough       Past Surgical History:   Procedure Laterality Date    BREAST BIOPSY Left 2014    CARDIAC CATHETERIZATION Left 08/10/2017    Procedure: Cardiac Catheterization/Vascular Study;  Surgeon: Johny Sommer MD;  Location: [facility] CATH INVASIVE LOCATION;  Service:     CARDIAC ELECTROPHYSIOLOGY PROCEDURE N/A 07/19/2019    Procedure: Pacemaker DC new;  Surgeon: Sonya Gay MD;   Location:  [facility] CATH INVASIVE LOCATION;  Service: Cardiology    CHOLECYSTECTOMY      COLONOSCOPY      COLONOSCOPY N/A 05/03/2022    Procedure: COLONOSCOPY;  Surgeon: Bryant Mejia MD;  Location:  [facility]OSCOPY;  Service: Gastroenterology;  Laterality: N/A;    ENDOSCOPY      HYSTERECTOMY  11/2014    INSERT / REPLACE / REMOVE PACEMAKER      JOINT REPLACEMENT Right     knee    KNEE SURGERY Right 12/2017    total replacement    NASAL SINUS SURGERY      OOPHORECTOMY  11/2014    PACEMAKER IMPLANTATION      PULMONARY EMBOLISM SURGERY      right lung    SPINAL CORD STIMULATOR IMPLANT N/A 09/10/2020    Procedure: SPINAL CORD STIMULATOR INSERTION PHASE 1, UPPER THORACIC PLACEMENT ;  Surgeon: Victor Manuel Geiger MD;  Location:  [facility] OR;  Service: Neurosurgery;  Laterality: N/A;    TUBAL ABDOMINAL LIGATION         Family History   Problem Relation Age of Onset    Breast cancer Mother 42    Heart failure Mother     Diabetes Mother     No Known Problems Father     Hypertension Sister     Hypertension Brother     Heart failure Son     Hypertension Maternal Grandmother     No Known Problems Paternal Grandmother     Hypertension Maternal Grandfather     No Known Problems Paternal Grandfather     Ovarian cancer Neg Hx        Social History     Socioeconomic History    Marital status: Single   Tobacco Use    Smoking status: Never    Smokeless tobacco: Never   Vaping Use    Vaping status: Never Used   Substance and Sexual Activity    Alcohol use: No     Comment: rare    Drug use: No    Sexual activity: Yes     Partners: Male     Birth control/protection: Hysterectomy, Surgical           Objective   Physical Exam  Vitals and nursing note reviewed.   Constitutional:       General: She is not in acute distress.     Appearance: She is not ill-appearing or toxic-appearing.   Cardiovascular:      Pulses: Normal pulses.   Musculoskeletal:      Comments: Patient has some minor tenderness to palpation of the medial aspect of left knee.   No obvious deformity.  Range of motion intact, however elicits pain.  The patient is ambulatory.  Compartment soft.  Neurovascular intact.   Neurological:      General: No focal deficit present.      Mental Status: She is alert.         Procedures           ED Course  ED Course as of 05/07/24 0010   Mon May 06, 2024   2252 BP(!): 173/114 [JK]   2252 Temp: 97.7 °F (36.5 °C) [JK]   2252 Temp src: Oral [JK]   2252 Heart Rate: 80 [JK]   2252 Resp: 18 [JK]   2252 SpO2: 98 % [JK]   2252 Device (Oxygen Therapy): room air  Interpretation:  Patient's repeat vitals, telemetry tracing, and pulse oximetry tracing were directly viewed and interpreted by myself.  Normal sinus rhythm [JK]   Tue May 07, 2024   0009 XR Knee 1 or 2 View Left  Interpretation:  Imaging was directly visualized by myself, per my interpretations, x-ray of the left knee was unremarkable. [JK]   0009 On reevaluation, the patient's pain is improved.  Range of motion appears intact.  Repeat examination does not show any evidence of compartment syndrome or neurovascular compromise.  Patient is given care instructions for the injury.  Extremities to be propped up and elevated.  They are to ice injury for 15 minutes, to avoid any frostbite.   [JK]   0009 I had a discussion with the patient/family regarding diagnosis, diagnostic results, treatment plan, and medications. The patient/family indicated understanding of these instructions. I spent adequate time at the bedside prior to discharge necessary to discuss the aftercare instructions, giving patient education, providing explanations of the results of our evaluations/findings, and my decision making to assure that the patient/family understand the plan of care. Time was allotted to answer questions at that time and throughout the ED course. Patient is required to maintain timely follow up, as discussed. I also discussed the potential for the development of an acute emergent condition requiring further  evaluation, return to the ER, admission, or even surgical intervention.  I encouraged the patient to return to the emergency department immediately for any concerns, worsening symptoms, new complaints, or if symptoms persist and they are unable to seek follow-up in a timely fashion. The patient/family expressed understanding and agreement with this plan    Shared decision making:   After full review of the patient's clinical presentation, review of any work-up including but not limited to laboratory studies and radiology obtained, I had a discussion with the patient.  Treatment options were discussed as well as the risks, benefits and consequences.  I discussed all findings with the patient and family members if available.  During the discussion, treatment goals were understood by all as well as any misconceptions which were addressed with the patient.  Ample time was given for any questions they may have had.  They are in agreement with the treatment plan as well as final disposition. [JK]      ED Course User Index  [JK] Boris Ruiz MD                                     [facility] reviewed by Boris Ruiz MD       Medical Decision Making  This is a 58-year-old female presented to the emergency department with some pain to the left knee.  The patient symptoms seem most consistent with a sprain versus a strain.  Possibly to her medial collateral ligament.  There is no evidence of neurovascular compromise or compartment syndrome at this time.  Patient is ambulatory.  Provided symptomatic treatment.  Imaging obtained.      Differential diagnosis: Left knee sprain, strain, contusion, fracture, dislocation      Amount and/or Complexity of Data Reviewed  External Data Reviewed: labs, radiology and notes.     Details: External laboratories, imaging as well as notes were reviewed personally by myself.  All relevant studies were used to guide decision making.     Date of previous record: 9/8/2023    Source of  note: Primary physician    Summary: Patient was seen evaluate for routine visit.  Did review basic laboratory studies on file as well as a previous chest x-ray.  Records reviewed    Radiology: ordered and independent interpretation performed. Decision-making details documented in ED Course.    Risk  Prescription drug management.        Final diagnoses:   Sprain of left knee, unspecified ligament, initial encounter       ED Disposition  ED Disposition       ED Disposition   Discharge    Condition   Stable    Comment   --               Epifanio Crespo MD  [address] [id number] [phone]    Call in 1 day           Medication List      No changes were made to your prescriptions during this visit.            Boris Ruiz MD  05/07/24 0010

## 2024-05-15 ENCOUNTER — OFFICE VISIT (OUTPATIENT)
Age: 59
End: 2024-05-15
Payer: MEDICAID

## 2024-05-15 VITALS
WEIGHT: 277.3 LBS | DIASTOLIC BLOOD PRESSURE: 88 MMHG | SYSTOLIC BLOOD PRESSURE: 144 MMHG | BODY MASS INDEX: 42.03 KG/M2 | HEIGHT: 68 IN

## 2024-05-15 DIAGNOSIS — M25.462 EFFUSION OF LEFT KNEE: ICD-10-CM

## 2024-05-15 DIAGNOSIS — M17.12 ARTHRITIS OF LEFT KNEE: Primary | ICD-10-CM

## 2024-05-15 PROCEDURE — 1159F MED LIST DOCD IN RCRD: CPT | Performed by: STUDENT IN AN ORGANIZED HEALTH CARE EDUCATION/TRAINING PROGRAM

## 2024-05-15 PROCEDURE — 99214 OFFICE O/P EST MOD 30 MIN: CPT | Performed by: STUDENT IN AN ORGANIZED HEALTH CARE EDUCATION/TRAINING PROGRAM

## 2024-05-15 PROCEDURE — 3079F DIAST BP 80-89 MM HG: CPT | Performed by: STUDENT IN AN ORGANIZED HEALTH CARE EDUCATION/TRAINING PROGRAM

## 2024-05-15 PROCEDURE — 1160F RVW MEDS BY RX/DR IN RCRD: CPT | Performed by: STUDENT IN AN ORGANIZED HEALTH CARE EDUCATION/TRAINING PROGRAM

## 2024-05-15 PROCEDURE — 3077F SYST BP >= 140 MM HG: CPT | Performed by: STUDENT IN AN ORGANIZED HEALTH CARE EDUCATION/TRAINING PROGRAM

## 2024-05-15 NOTE — PROGRESS NOTES
[facility] [place] [facility] Office Visit     Office Visit       Date: 05/15/2024   Patient Name: Kellen Esparza  MRN: [id number]  YOB: 1965    Referring Physician: Referring, Self     Chief Complaint:   Chief Complaint   Patient presents with    Left Knee - Pain     Sprain 5/4/24- seen 2/13/2023 Primary osteoarthritis of left knee       History of Present Illness:   Kellen Esparza is a 58 y.o. female who presents with left knee pain for 13 day(s). Onset twisting injury. Pain is localized to the medial joint line, lateral joint line, anterior knee and is a 9/10 on the pain scale. Pain is described as throbbing, stabbing, and shooting. Associated symptoms include pain, swelling, stiffness, and giving way/buckling. The pain is worse with walking, standing, sitting, climbing stairs, sleeping, working, leisure, lying on affected side, rising from seated position, and any movement of the joint; ice, heat, and pain medication and/or NSAID make it better. Previous treatments have included: cane/walker and steroid injection (last injection 2/13/2023 Dr Najera) since symptom onset. Although some transient relief was reported with these interventions, these conservative measures have failed and symptoms have persisted. The patient is limited in daily activities and has had a significant decrease in quality of life as a result. She denies fevers, chills, or constitutional symptoms.    Subjective   Review of Systems: Review of Systems   Constitutional:  Negative for chills, fever, unexpected weight gain and unexpected weight loss.   HENT:  Negative for congestion, postnasal drip and rhinorrhea.    Eyes:  Negative for blurred vision.   Respiratory:  Negative for shortness of breath.    Cardiovascular:  Negative for leg swelling.   Gastrointestinal:  Negative for abdominal pain, nausea and vomiting.   Genitourinary:  Negative for difficulty urinating.   Musculoskeletal:   Positive for arthralgias and joint swelling. Negative for gait problem and myalgias.   Skin:  Negative for skin lesions and wound.   Neurological:  Negative for dizziness, weakness, light-headedness and numbness.   Hematological:  Does not bruise/bleed easily.   Psychiatric/Behavioral:  Negative for depressed mood.    All other systems reviewed and are negative.       I have reviewed the following portions of the patient's history:History of Present Illness and review of systems.    Past Medical History:   Past Medical History:   Diagnosis Date    Arthritis     Arthritis of back     Carpal tunnel syndrome     Chlamydia     CPAP (continuous positive airway pressure) dependence     DDD (degenerative disc disease), cervical 09/06/2017    Depression     Diabetes mellitus     doesnt check sugar     GERD (gastroesophageal reflux disease)     History of COVID-19 01/2022    Hypertension     Knee swelling     Migraine     Musculoligamentous strain     Pacemaker     Pulmonary embolism 1997    Sleep apnea     CPAP machine was recalled, waiting for new one.    Wears eyeglasses        Past Surgical History:   Past Surgical History:   Procedure Laterality Date    BREAST BIOPSY Left 2014    CARDIAC CATHETERIZATION Left 08/10/2017    Procedure: Cardiac Catheterization/Vascular Study;  Surgeon: Johny Sommer MD;  Location:  [facility];  Service:     CARDIAC ELECTROPHYSIOLOGY PROCEDURE N/A 07/19/2019    Procedure: Pacemaker DC new;  Surgeon: Sonya Gay MD;  Location:  [facility];  Service: Cardiology    CHOLECYSTECTOMY      COLONOSCOPY      COLONOSCOPY N/A 05/03/2022    Procedure: COLONOSCOPY;  Surgeon: Bryant Mejia MD;  Location:  [facility];  Service: Gastroenterology;  Laterality: N/A;    ENDOSCOPY      HYSTERECTOMY  11/2014    INSERT / REPLACE / REMOVE PACEMAKER      JOINT REPLACEMENT Right     knee    KNEE SURGERY Right 12/2017    total replacement    NASAL SINUS  SURGERY      OOPHORECTOMY  11/2014    PACEMAKER IMPLANTATION      PULMONARY EMBOLISM SURGERY      right lung    SPINAL CORD STIMULATOR IMPLANT N/A 09/10/2020    Procedure: SPINAL CORD STIMULATOR INSERTION PHASE 1, UPPER THORACIC PLACEMENT ;  Surgeon: Victor Manuel Geiger MD;  Location: [facility];  Service: Neurosurgery;  Laterality: N/A;    TUBAL ABDOMINAL LIGATION         Family History:   Family History   Problem Relation Age of Onset    Breast cancer Mother 42    Heart failure Mother     Diabetes Mother     No Known Problems Father     Hypertension Sister     Hypertension Brother     Heart failure Son     Hypertension Maternal Grandmother     No Known Problems Paternal Grandmother     Hypertension Maternal Grandfather     No Known Problems Paternal Grandfather     Ovarian cancer Neg Hx        Social History:   Social History     Socioeconomic History    Marital status: Single   Tobacco Use    Smoking status: Never    Smokeless tobacco: Never   Vaping Use    Vaping status: Never Used   Substance and Sexual Activity    Alcohol use: No     Comment: rare    Drug use: No    Sexual activity: Yes     Partners: Male     Birth control/protection: Hysterectomy, Surgical       Medications:   Current Outpatient Medications:     atorvastatin (LIPITOR) 80 MG tablet, TAKE 1 TABLET BY MOUTH DAILY, Disp: 90 tablet, Rfl: 3    carvedilol (COREG) 6.25 MG tablet, Take 1 tablet by mouth 2 (Two) Times a Day., Disp: 180 tablet, Rfl: 3    cyclobenzaprine (FLEXERIL) 10 MG tablet, Take 1 tablet by mouth 3 (Three) Times a Day., Disp: , Rfl:     Eliquis 5 MG tablet tablet, TAKE 1 TABLET BY MOUTH TWICE DAILY, Disp: 180 tablet, Rfl: 3    levETIRAcetam (KEPPRA) 250 MG tablet, TAKE 1 TABLET BY MOUTH TWICE DAILY, Disp: 90 tablet, Rfl: 1    oxyCODONE-acetaminophen (PERCOCET) 5-325 MG per tablet, Take 1 tablet by mouth 3 (Three) Times a Day., Disp: , Rfl:     Ozempic, 0.25 or 0.5 MG/DOSE, 2 MG/3ML solution pen-injector, Inject 0.5 mg under the skin  "into the appropriate area as directed 1 (One) Time Per Week., Disp: 3 mL, Rfl: 2    pregabalin (LYRICA) 150 MG capsule, Take 1 capsule by mouth 2 (Two) Times a Day., Disp: 60 capsule, Rfl: 1    Sod Picosulfate-Mag Ox-Cit Acd (Clenpiq) 10-3.5-12 MG-GM -GM/160ML solution, Take 350 mL by mouth Take As Directed., Disp: 350 mL, Rfl: 0    traZODone (DESYREL) 150 MG tablet, TAKE 1 TABLET BY MOUTH EVERY NIGHT, Disp: 90 tablet, Rfl: 1    valsartan (DIOVAN) 40 MG tablet, Take 1 tablet by mouth Every Morning., Disp: 90 tablet, Rfl: 3    Allergies:   Allergies   Allergen Reactions    Metformin GI Intolerance    Codeine Itching    Lisinopril Cough       I reviewed the patient's chief complaint, history of present illness, review of systems, past medical history, surgical history, family history, social history, medications and allergy list.     Objective    Vital Signs:   Vitals:    05/15/24 0845   BP: 144/88   Weight: 126 kg (277 lb 4.8 oz)   Height: 172.7 cm (68\")     Body mass index is 42.16 kg/m².   Class 3 Severe Obesity (BMI >=40). Obesity-related health conditions include the following: hypertension, coronary heart disease, diabetes mellitus, and dyslipidemias. Obesity is newly identified. BMI is is above average; BMI management plan is completed. We discussed portion control and increasing exercise.      Patient reports that she is a non-smoker and has not ever been a smoker.  This behavior was applauded and she was encouraged to continue in smoking cessation.  We will continue to monitor at subsequent visits.    Ortho Exam:   Skin: Right Lower Extremity: normal. Left Lower Extremity: normal.   Psychiatric: Orientation: oriented to time, place, and person. Mood and Affect: normal mood and affect and active and alert.   Left knee exam:   There is swelling and effusion but no warmth or erythema of the knee.    The skin is clear.    Overall the alignment of the knee is varus   The patient has 5/5 strength with ankle plantar " flexion, dorsiflexion, inversion, eversion, and great toe extension.    Sensation is grossly present to light touch in the superficial peroneal, deep peroneal, and tibial nerve distributions.    The dorsalis pedis pulse is 2+ and the foot is well perfused with brisk capillary refill.   Active ROM is 0° to 110°.   Passive ROM is 0° to 110°.   The knee shows pseudolaxity with valgus stress testing, anterior/posterior drawer sign, and Lachman testing.    There is tenderness to palpation over the medial/lateral joint line. Patellar grind test is positive.   Hip ROM is full and painless.  right knee exam:   Normal knee contour.   No evidence of swelling or effusion. Full range of motion.    Results Review:   Imaging Results (Last 24 Hours)       ** No results found for the last 24 hours. **        I personally reviewed and interpreted radiographs of the left knee from 5/6/2024.  No acute fracture or dislocation.  There is medial and patellofemoral joint space narrowing with osteophytic lipping of the medial joint space.    Procedures    Assessment / Plan    Assessment/Plan:   Diagnoses and all orders for this visit:    1. Arthritis of left knee (Primary)    2. Effusion of left knee    Left knee pain and swelling ongoing since 5/4/2024.  She felt a pop with pain in the anterior medial aspect of her knee.  There is been significant swelling of the knee since that time without any significant relief.  She has been on pain medications as provided by her pain clinic including Percocet.  However, symptoms have persisted.  Radiographs of the left knee show medial and patellofemoral joint space narrowing.  We reviewed the radiographic findings  in the office today.  I discussed with her comprehensive nonoperative treatment plan.  Today, we will start treatment with a Dry-Jason hinged knee brace to address the pseudolaxity and near falls that she is having.  I have encouraged her to ice the knee multiple times daily especially with  working at home.  I will prescribe Celebrex to help with pain and swelling.  She can continue with her other medications as previously prescribed.  I will see her back in 3 weeks to monitor response and decide on additional treatment including ultrasound-guided interventions.    Previous imaging studies reviewed: 5/6/2024-radiographs of the left knee.    Previous documentation review: 5/6/2024-emergency department-Boris Barksdale MD.    Previous laboratory results reviewed: 8/14/2023 - hgb a1c 6.3%.    Follow Up:   Return in about 3 weeks (around 6/5/2024) for Recheck.      Caesar Najera MD  [facility] [place] [facility]

## 2024-06-05 ENCOUNTER — PRIOR AUTHORIZATION (OUTPATIENT)
Dept: FAMILY MEDICINE CLINIC | Facility: CLINIC | Age: 59
End: 2024-06-05

## 2024-06-05 ENCOUNTER — TELEPHONE (OUTPATIENT)
Dept: FAMILY MEDICINE CLINIC | Facility: CLINIC | Age: 59
End: 2024-06-05

## 2024-06-05 ENCOUNTER — LAB (OUTPATIENT)
Dept: LAB | Facility: HOSPITAL | Age: 59
End: 2024-06-05
Payer: MEDICAID

## 2024-06-05 ENCOUNTER — OFFICE VISIT (OUTPATIENT)
Age: 59
End: 2024-06-05
Payer: MEDICAID

## 2024-06-05 ENCOUNTER — OFFICE VISIT (OUTPATIENT)
Dept: FAMILY MEDICINE CLINIC | Facility: CLINIC | Age: 59
End: 2024-06-05
Payer: MEDICAID

## 2024-06-05 VITALS
DIASTOLIC BLOOD PRESSURE: 120 MMHG | BODY MASS INDEX: 41.98 KG/M2 | HEIGHT: 68 IN | SYSTOLIC BLOOD PRESSURE: 208 MMHG | WEIGHT: 277 LBS

## 2024-06-05 VITALS
SYSTOLIC BLOOD PRESSURE: 180 MMHG | OXYGEN SATURATION: 97 % | DIASTOLIC BLOOD PRESSURE: 110 MMHG | BODY MASS INDEX: 41.22 KG/M2 | HEIGHT: 68 IN | WEIGHT: 272 LBS | HEART RATE: 88 BPM

## 2024-06-05 DIAGNOSIS — M17.12 ARTHRITIS OF LEFT KNEE: Primary | ICD-10-CM

## 2024-06-05 DIAGNOSIS — R06.09 DYSPNEA ON EXERTION: ICD-10-CM

## 2024-06-05 DIAGNOSIS — I16.1 HYPERTENSIVE EMERGENCY: ICD-10-CM

## 2024-06-05 DIAGNOSIS — Z12.11 COLON CANCER SCREENING: ICD-10-CM

## 2024-06-05 DIAGNOSIS — E11.65 TYPE 2 DIABETES MELLITUS WITH HYPERGLYCEMIA, WITHOUT LONG-TERM CURRENT USE OF INSULIN: Primary | ICD-10-CM

## 2024-06-05 DIAGNOSIS — E11.65 TYPE 2 DIABETES MELLITUS WITH HYPERGLYCEMIA, WITHOUT LONG-TERM CURRENT USE OF INSULIN: ICD-10-CM

## 2024-06-05 DIAGNOSIS — Z11.59 ENCOUNTER FOR HEPATITIS C SCREENING TEST FOR LOW RISK PATIENT: ICD-10-CM

## 2024-06-05 DIAGNOSIS — I10 ESSENTIAL HYPERTENSION: ICD-10-CM

## 2024-06-05 DIAGNOSIS — I16.0 HYPERTENSIVE URGENCY: ICD-10-CM

## 2024-06-05 LAB
BASOPHILS # BLD AUTO: 0.03 10*3/MM3 (ref 0–0.2)
BASOPHILS NFR BLD AUTO: 0.4 % (ref 0–1.5)
D DIMER PPP FEU-MCNC: 0.42 MCGFEU/ML (ref 0–0.6)
DEPRECATED RDW RBC AUTO: 39.8 FL (ref 37–54)
EOSINOPHIL # BLD AUTO: 0.03 10*3/MM3 (ref 0–0.4)
EOSINOPHIL NFR BLD AUTO: 0.4 % (ref 0.3–6.2)
ERYTHROCYTE [DISTWIDTH] IN BLOOD BY AUTOMATED COUNT: 13.8 % (ref 12.3–15.4)
EXPIRATION DATE: ABNORMAL
HBA1C MFR BLD: 7.3 % (ref 4.5–5.7)
HCT VFR BLD AUTO: 39.9 % (ref 34–46.6)
HGB BLD-MCNC: 13 G/DL (ref 12–15.9)
IMM GRANULOCYTES # BLD AUTO: 0.03 10*3/MM3 (ref 0–0.05)
IMM GRANULOCYTES NFR BLD AUTO: 0.4 % (ref 0–0.5)
LYMPHOCYTES # BLD AUTO: 1.65 10*3/MM3 (ref 0.7–3.1)
LYMPHOCYTES NFR BLD AUTO: 20.1 % (ref 19.6–45.3)
Lab: ABNORMAL
MCH RBC QN AUTO: 26.5 PG (ref 26.6–33)
MCHC RBC AUTO-ENTMCNC: 32.6 G/DL (ref 31.5–35.7)
MCV RBC AUTO: 81.3 FL (ref 79–97)
MONOCYTES # BLD AUTO: 0.13 10*3/MM3 (ref 0.1–0.9)
MONOCYTES NFR BLD AUTO: 1.6 % (ref 5–12)
NEUTROPHILS NFR BLD AUTO: 6.32 10*3/MM3 (ref 1.7–7)
NEUTROPHILS NFR BLD AUTO: 77.1 % (ref 42.7–76)
NRBC BLD AUTO-RTO: 0 /100 WBC (ref 0–0.2)
PLATELET # BLD AUTO: 181 10*3/MM3 (ref 140–450)
PMV BLD AUTO: 12.4 FL (ref 6–12)
RBC # BLD AUTO: 4.91 10*6/MM3 (ref 3.77–5.28)
WBC NRBC COR # BLD AUTO: 8.19 10*3/MM3 (ref 3.4–10.8)

## 2024-06-05 PROCEDURE — 83036 HEMOGLOBIN GLYCOSYLATED A1C: CPT | Performed by: FAMILY MEDICINE

## 2024-06-05 PROCEDURE — 82570 ASSAY OF URINE CREATININE: CPT

## 2024-06-05 PROCEDURE — 3080F DIAST BP >= 90 MM HG: CPT | Performed by: STUDENT IN AN ORGANIZED HEALTH CARE EDUCATION/TRAINING PROGRAM

## 2024-06-05 PROCEDURE — 1125F AMNT PAIN NOTED PAIN PRSNT: CPT | Performed by: FAMILY MEDICINE

## 2024-06-05 PROCEDURE — 80050 GENERAL HEALTH PANEL: CPT

## 2024-06-05 PROCEDURE — 3077F SYST BP >= 140 MM HG: CPT | Performed by: STUDENT IN AN ORGANIZED HEALTH CARE EDUCATION/TRAINING PROGRAM

## 2024-06-05 PROCEDURE — 3080F DIAST BP >= 90 MM HG: CPT | Performed by: FAMILY MEDICINE

## 2024-06-05 PROCEDURE — 3051F HG A1C>EQUAL 7.0%<8.0%: CPT | Performed by: FAMILY MEDICINE

## 2024-06-05 PROCEDURE — 99214 OFFICE O/P EST MOD 30 MIN: CPT | Performed by: FAMILY MEDICINE

## 2024-06-05 PROCEDURE — 84550 ASSAY OF BLOOD/URIC ACID: CPT

## 2024-06-05 PROCEDURE — 3077F SYST BP >= 140 MM HG: CPT | Performed by: FAMILY MEDICINE

## 2024-06-05 PROCEDURE — 83880 ASSAY OF NATRIURETIC PEPTIDE: CPT

## 2024-06-05 PROCEDURE — 85379 FIBRIN DEGRADATION QUANT: CPT

## 2024-06-05 PROCEDURE — 20611 DRAIN/INJ JOINT/BURSA W/US: CPT | Performed by: STUDENT IN AN ORGANIZED HEALTH CARE EDUCATION/TRAINING PROGRAM

## 2024-06-05 PROCEDURE — 99213 OFFICE O/P EST LOW 20 MIN: CPT | Performed by: STUDENT IN AN ORGANIZED HEALTH CARE EDUCATION/TRAINING PROGRAM

## 2024-06-05 PROCEDURE — 86803 HEPATITIS C AB TEST: CPT

## 2024-06-05 PROCEDURE — 82043 UR ALBUMIN QUANTITATIVE: CPT

## 2024-06-05 RX ORDER — FUROSEMIDE 40 MG/1
TABLET ORAL
Qty: 20 TABLET | Refills: 0 | Status: SHIPPED | OUTPATIENT
Start: 2024-06-05

## 2024-06-05 RX ORDER — PREGABALIN 200 MG/1
1 CAPSULE ORAL EVERY 12 HOURS SCHEDULED
COMMUNITY
Start: 2024-06-03

## 2024-06-05 RX ORDER — TIRZEPATIDE 2.5 MG/.5ML
2.5 INJECTION, SOLUTION SUBCUTANEOUS
Qty: 6 ML | Refills: 0 | Status: SHIPPED | OUTPATIENT
Start: 2024-06-05

## 2024-06-05 RX ORDER — BUPIVACAINE HYDROCHLORIDE 5 MG/ML
4 INJECTION, SOLUTION EPIDURAL; INTRACAUDAL
Status: COMPLETED | OUTPATIENT
Start: 2024-06-05 | End: 2024-06-05

## 2024-06-05 RX ORDER — LIDOCAINE HYDROCHLORIDE 10 MG/ML
4 INJECTION, SOLUTION EPIDURAL; INFILTRATION; INTRACAUDAL; PERINEURAL
Status: COMPLETED | OUTPATIENT
Start: 2024-06-05 | End: 2024-06-05

## 2024-06-05 RX ORDER — TRIAMCINOLONE ACETONIDE 40 MG/ML
80 INJECTION, SUSPENSION INTRA-ARTICULAR; INTRAMUSCULAR
Status: COMPLETED | OUTPATIENT
Start: 2024-06-05 | End: 2024-06-05

## 2024-06-05 RX ORDER — OXYCODONE AND ACETAMINOPHEN 7.5; 325 MG/1; MG/1
TABLET ORAL
COMMUNITY
Start: 2024-06-03

## 2024-06-05 RX ADMIN — LIDOCAINE HYDROCHLORIDE 4 ML: 10 INJECTION, SOLUTION EPIDURAL; INFILTRATION; INTRACAUDAL; PERINEURAL at 09:28

## 2024-06-05 RX ADMIN — BUPIVACAINE HYDROCHLORIDE 4 ML: 5 INJECTION, SOLUTION EPIDURAL; INTRACAUDAL at 09:28

## 2024-06-05 RX ADMIN — TRIAMCINOLONE ACETONIDE 80 MG: 40 INJECTION, SUSPENSION INTRA-ARTICULAR; INTRAMUSCULAR at 09:28

## 2024-06-05 NOTE — ASSESSMENT & PLAN NOTE
She does have rather severe whitecoat hypertension, but given her symptoms over the last few weeks as well as her leg swelling, I am inclined to think her severe hypertension today is mostly accurate.  -Labs today as ordered.  Referral to the hypertension clinic at [facility].  In the meantime I would like her to increase her carvedilol to 2 tabs twice daily, and increase her valsartan to 2 tablets every morning.  Will also send in a short prescription for Lasix

## 2024-06-05 NOTE — ASSESSMENT & PLAN NOTE
Has been off of Ozempic for a few months after changing jobs and insurance, plan to change to Mounjaro.  A1c today 7.3%.

## 2024-06-05 NOTE — PROGRESS NOTES
[facility] Office Follow Up Visit     Office Follow Up      Date: 06/05/2024   Patient Name: Kellen Esparza  MRN: [id number]  YOB: 1965    Referring Physician: Ryne Puente DO     Chief Complaint:   Chief Complaint   Patient presents with    Follow-up     3 week follow up -- Arthritis of left knee; Effusion of left knee       History of Present Illness: Kellen Esparza is a 58 y.o. female who is here today for follow up on left knee pain and swelling ongoing from left knee arthritis.  Been dealing with symptoms for 6 weeks.  At last visit, we started her on a Dry-Jason hinged knee brace, Celebrex, and ice.  Symptoms are now worse.  Pain is a 9/10.  Further, she has elevated blood pressures today and notes a headache.    Subjective   Review of Systems: Review of Systems   Constitutional:  Negative for chills, fever, unexpected weight gain and unexpected weight loss.   HENT:  Negative for congestion, postnasal drip and rhinorrhea.    Eyes:  Negative for blurred vision.   Respiratory:  Negative for shortness of breath.    Cardiovascular:  Negative for leg swelling.   Gastrointestinal:  Negative for abdominal pain, nausea and vomiting.   Genitourinary:  Negative for difficulty urinating.   Musculoskeletal:  Positive for arthralgias. Negative for gait problem, joint swelling and myalgias.   Skin:  Negative for skin lesions and wound.   Neurological:  Negative for dizziness, weakness, light-headedness and numbness.   Hematological:  Does not bruise/bleed easily.   Psychiatric/Behavioral:  Negative for depressed mood.         Medications:   Current Outpatient Medications:     atorvastatin (LIPITOR) 80 MG tablet, TAKE 1 TABLET BY MOUTH DAILY, Disp: 90 tablet, Rfl: 3    carvedilol (COREG) 6.25 MG tablet, Take 1 tablet by mouth 2 (Two) Times a Day., Disp: 180 tablet, Rfl: 3    cyclobenzaprine (FLEXERIL) 10 MG tablet, Take 1 tablet by mouth 3 (Three)  "Times a Day., Disp: , Rfl:     Eliquis 5 MG tablet tablet, TAKE 1 TABLET BY MOUTH TWICE DAILY, Disp: 180 tablet, Rfl: 3    levETIRAcetam (KEPPRA) 250 MG tablet, TAKE 1 TABLET BY MOUTH TWICE DAILY, Disp: 90 tablet, Rfl: 1    oxyCODONE-acetaminophen (PERCOCET) 7.5-325 MG per tablet, , Disp: , Rfl:     Ozempic, 0.25 or 0.5 MG/DOSE, 2 MG/3ML solution pen-injector, Inject 0.5 mg under the skin into the appropriate area as directed 1 (One) Time Per Week., Disp: 3 mL, Rfl: 2    pregabalin (LYRICA) 200 MG capsule, Take 1 capsule by mouth Every 12 (Twelve) Hours., Disp: , Rfl:     Sod Picosulfate-Mag Ox-Cit Acd (Clenpiq) 10-3.5-12 MG-GM -GM/160ML solution, Take 350 mL by mouth Take As Directed., Disp: 350 mL, Rfl: 0    traZODone (DESYREL) 150 MG tablet, TAKE 1 TABLET BY MOUTH EVERY NIGHT, Disp: 90 tablet, Rfl: 1    valsartan (DIOVAN) 40 MG tablet, Take 1 tablet by mouth Every Morning., Disp: 90 tablet, Rfl: 3    Allergies:   Allergies   Allergen Reactions    Metformin GI Intolerance    Codeine Itching    Lisinopril Cough       I have reviewed and updated the patient's chief complaint, history of present illness, review of systems, past medical history, surgical history, family history, social history, medications and allergy list as appropriate.     Objective    Vital Signs:   Vitals:    06/05/24 0859   BP: (!) 208/120   Weight: 126 kg (277 lb)   Height: 172.7 cm (67.99\")     Body mass index is 42.13 kg/m².  Class 3 Severe Obesity (BMI >=40). Obesity-related health conditions include the following: hypertension, coronary heart disease, diabetes mellitus, and dyslipidemias. Obesity is newly identified. BMI is is above average; BMI management plan is completed. We discussed portion control and increasing exercise.      Patient reports that she is a non-smoker and has not ever been a smoker.  This behavior was applauded and she was encouraged to continue in smoking cessation.  We will continue to monitor at subsequent visits.   "   Ortho Exam:   Left knee exam:   There is swelling and effusion but no warmth or erythema of the knee.    The skin is clear.    Overall the alignment of the knee is varus   The patient has 5/5 strength with ankle plantar flexion, dorsiflexion, inversion, eversion, and great toe extension.    Sensation is grossly present to light touch in the superficial peroneal, deep peroneal, and tibial nerve distributions.    The dorsalis pedis pulse is 2+ and the foot is well perfused with brisk capillary refill.   Active ROM is 0° to 110°.   The knee shows pseudolaxity with valgus stress testing, anterior/posterior drawer sign, and Lachman testing.    There is tenderness to palpation over the medial/lateral joint line. Patellar grind test is positive.   Hip ROM is full and painless.  right knee exam:   Normal knee contour.   No evidence of swelling or effusion. Full range of motion.    Results Review:   Imaging Results (Last 24 Hours)       Procedure Component Value Units Date/Time    US Guided Injection [[id number]] Resulted: 06/05/24 0944     Updated: 06/05/24 0952            Procedures    Assessment / Plan    Assessment/Plan:   Diagnoses and all orders for this visit:    1. Arthritis of left knee (Primary)  -     US Guided Injection    2. Hypertensive emergency    Other orders  -     - Large Joint Arthrocentesis: L knee    Follow-up on left knee pain and swelling from primary knee osteoarthritis.  Symptoms worse today compared to that despite hinged knee brace, Celebrex, and ice.  Pain is also contributing to her hypertensive case.  Blood pressure is grossly elevated in the office today even on repeat measurements.  She notes a headache and some dizziness but no chest pain or shortness of breath at this time.  She has an appointment in a couple of hours with her primary care physician.  Notes that she did take her blood pressure medication this morning.  Advised her to present to the emergency department if she develops any  chest pain or shortness of breath.  Otherwise, I think it is reasonable to allow her to take advantage of that appointment later today.  Advise coming off the Celebrex temporarily to avoid any counter actions with hypertensive medication.  Instead today, we will inject the left knee with cortisone to treat her pain and swelling.  Continue with the bracing as needed.  Follow-up with me in 8 weeks.    Follow Up:   Return in about 8 weeks (around 7/31/2024) for Recheck.      Caesar Najera MD  [facility] [place] [facility]

## 2024-06-05 NOTE — PROGRESS NOTES
Established Patient Office Visit      Patient Name: Kellen Esparza  : 1965   MRN: [id number]   Care Team: Patient Care Team:  Ryne Puente DO as PCP - General (Family Medicine)  Johny Sommer MD as Consulting Physician (Cardiology)  Sonya Gay MD as Consulting Physician (Cardiology)  Deb Guajardo APRN as Nurse Practitioner (Pulmonary Disease)  Sal Sheldon MD (Pain Medicine)  Luis Saeed MD as Consulting Physician (Otolaryngology)    Chief Complaint:    Chief Complaint   Patient presents with   • Foot Swelling     Tingling feeling   • Knee Pain     Pt. States she had Keen injection        History of Present Illness: Kellen Esparza is a 58 y.o. female who is here today for chief complaint.    HPI    Here today for regular 3-month follow-up, but has no concerns.  Her feet have been swelling more over the last couple months, particularly since she has been out of Ozempic since insurance would not cover originally.  She is still having shortness of breath and dyspnea on exertion, requests referral to pulmonology.  She also requests a referral for a colonoscopy.    This patient is accompanied by their self who contributes to the history of their care.    The following portions of the patient's history were reviewed and updated as appropriate: allergies, current medications, past family history, past medical history, past social history, past surgical history and problem list.    Subjective      Review of Systems:   Review of Systems - See HPI    Past Medical History:   Past Medical History:   Diagnosis Date   • Arthritis    • Arthritis of back    • Carpal tunnel syndrome    • Chlamydia    • CPAP (continuous positive airway pressure) dependence    • DDD (degenerative disc disease), cervical 2017   • Depression    • Diabetes mellitus     doesnt check sugar    • GERD (gastroesophageal reflux disease)    • History of COVID-19 2022   • Hypertension    • Knee swelling     • Migraine    • Musculoligamentous strain    • Pacemaker    • Pulmonary embolism 1997   • Sleep apnea     CPAP machine was recalled, waiting for new one.   • Wears eyeglasses        Past Surgical History:   Past Surgical History:   Procedure Laterality Date   • BREAST BIOPSY Left 2014   • CARDIAC CATHETERIZATION Left 08/10/2017    Procedure: Cardiac Catheterization/Vascular Study;  Surgeon: Johny Sommer MD;  Location:  [facility];  Service:    • CARDIAC ELECTROPHYSIOLOGY PROCEDURE N/A 07/19/2019    Procedure: Pacemaker DC new;  Surgeon: Sonya Gay MD;  Location:  [facility];  Service: Cardiology   • CHOLECYSTECTOMY     • COLONOSCOPY     • COLONOSCOPY N/A 05/03/2022    Procedure: COLONOSCOPY;  Surgeon: Bryant Mejia MD;  Location:  [facility];  Service: Gastroenterology;  Laterality: N/A;   • ENDOSCOPY     • HYSTERECTOMY  11/2014   • INSERT / REPLACE / REMOVE PACEMAKER     • JOINT REPLACEMENT Right     knee   • KNEE SURGERY Right 12/2017    total replacement   • NASAL SINUS SURGERY     • OOPHORECTOMY  11/2014   • PACEMAKER IMPLANTATION     • PULMONARY EMBOLISM SURGERY      right lung   • SPINAL CORD STIMULATOR IMPLANT N/A 09/10/2020    Procedure: SPINAL CORD STIMULATOR INSERTION PHASE 1, UPPER THORACIC PLACEMENT ;  Surgeon: Victor Manuel Geiger MD;  Location:  [facility];  Service: Neurosurgery;  Laterality: N/A;   • TUBAL ABDOMINAL LIGATION         Family History:   Family History   Problem Relation Age of Onset   • Breast cancer Mother 42   • Heart failure Mother    • Diabetes Mother    • No Known Problems Father    • Hypertension Sister    • Hypertension Brother    • Heart failure Son    • Hypertension Maternal Grandmother    • No Known Problems Paternal Grandmother    • Hypertension Maternal Grandfather    • No Known Problems Paternal Grandfather    • Ovarian cancer Neg Hx        Social History:   Social History     Socioeconomic History   • Marital  status: Single   Tobacco Use   • Smoking status: Never     Passive exposure: Never   • Smokeless tobacco: Never   Vaping Use   • Vaping status: Never Used   Substance and Sexual Activity   • Alcohol use: No     Comment: rare   • Drug use: No   • Sexual activity: Yes     Partners: Male     Birth control/protection: Hysterectomy, Surgical       Tobacco History:   Social History     Tobacco Use   Smoking Status Never   • Passive exposure: Never   Smokeless Tobacco Never       Medications:     Current Outpatient Medications:   •  atorvastatin (LIPITOR) 80 MG tablet, TAKE 1 TABLET BY MOUTH DAILY, Disp: 90 tablet, Rfl: 3  •  carvedilol (COREG) 6.25 MG tablet, Take 1 tablet by mouth 2 (Two) Times a Day., Disp: 180 tablet, Rfl: 3  •  cyclobenzaprine (FLEXERIL) 10 MG tablet, Take 1 tablet by mouth 3 (Three) Times a Day., Disp: , Rfl:   •  Eliquis 5 MG tablet tablet, TAKE 1 TABLET BY MOUTH TWICE DAILY, Disp: 180 tablet, Rfl: 3  •  levETIRAcetam (KEPPRA) 250 MG tablet, TAKE 1 TABLET BY MOUTH TWICE DAILY, Disp: 90 tablet, Rfl: 1  •  oxyCODONE-acetaminophen (PERCOCET) 7.5-325 MG per tablet, , Disp: , Rfl:   •  pregabalin (LYRICA) 200 MG capsule, Take 1 capsule by mouth Every 12 (Twelve) Hours., Disp: , Rfl:   •  Sod Picosulfate-Mag Ox-Cit Acd (Clenpiq) 10-3.5-12 MG-GM -GM/160ML solution, Take 350 mL by mouth Take As Directed., Disp: 350 mL, Rfl: 0  •  traZODone (DESYREL) 150 MG tablet, TAKE 1 TABLET BY MOUTH EVERY NIGHT, Disp: 90 tablet, Rfl: 1  •  valsartan (DIOVAN) 40 MG tablet, Take 1 tablet by mouth Every Morning., Disp: 90 tablet, Rfl: 3  •  furosemide (Lasix) 40 MG tablet, Take 1 tab PO daily for 5 days, then take 1 tab PO up to 3 times per week for swelling, Disp: 20 tablet, Rfl: 0  •  Mounjaro 2.5 MG/0.5ML solution pen-injector pen, Inject 0.5 mL under the skin into the appropriate area as directed Every 7 (Seven) Days., Disp: 6 mL, Rfl: 0  No current facility-administered medications for this visit.    Allergies:  "  Allergies   Allergen Reactions   • Metformin GI Intolerance   • Codeine Itching   • Lisinopril Cough       Objective   Objective     Physical Exam:  Vital Signs:   Vitals:    06/05/24 1212   BP: (!) 180/110   Pulse: 88   SpO2: 97%   Weight: 123 kg (272 lb)   Height: 172.7 cm (67.99\")   PainSc:   8   PainLoc: Knee     Body mass index is 41.37 kg/m².     Physical Exam  Nursing note reviewed  Const: NAD, A&Ox4, Pleasant, Cooperative  Eyes: EOMI, no conjunctivitis  ENT: No nasal discharge present, neck supple  Cardiac: Regular rate and rhythm, no cyanosis  Procedures/Radiology     Procedures  No radiology results for the last 7 days     Assessment & Plan   Assessment / Plan      Assessment/Plan:   Problems Addressed This Visit  Diagnoses and all orders for this visit:    1. Type 2 diabetes mellitus with hyperglycemia, without long-term current use of insulin (Primary)  Assessment & Plan:  Has been off of Ozempic for a few months after changing jobs and insurance, plan to change to Mounjaro.  A1c today 7.3%.    Orders:  -     POC Glycosylated Hemoglobin (Hb A1C)  -     Microalbumin / Creatinine Urine Ratio - Urine, Clean Catch; Future  -     Mounjaro 2.5 MG/0.5ML solution pen-injector pen; Inject 0.5 mL under the skin into the appropriate area as directed Every 7 (Seven) Days.  Dispense: 6 mL; Refill: 0    2. Hypertensive urgency  -     proBNP; Future  -     CBC & Differential; Future  -     Comprehensive Metabolic Panel; Future  -     D-dimer, Quantitative; Future  -     Uric Acid; Future  -     Microalbumin / Creatinine Urine Ratio - Urine, Clean Catch; Future  -     TSH Rfx On Abnormal To Free T4; Future  -     Ambulatory Referral to [facility] [place] - [facility]    3. Dyspnea on exertion  -     proBNP; Future  -     CBC & Differential; Future  -     Comprehensive Metabolic Panel; Future  -     D-dimer, Quantitative; Future  -     Uric Acid; Future  -     Microalbumin / Creatinine Urine Ratio - Urine, " Clean Catch; Future  -     Ambulatory Referral to Pulmonology    4. Essential hypertension  Assessment & Plan:  She does have rather severe whitecoat hypertension, but given her symptoms over the last few weeks as well as her leg swelling, I am inclined to think her severe hypertension today is mostly accurate.  -Labs today as ordered.  Referral to the hypertension clinic at [facility].  In the meantime I would like her to increase her carvedilol to 2 tabs twice daily, and increase her valsartan to 2 tablets every morning.  Will also send in a short prescription for Lasix    Orders:  -     proBNP; Future  -     CBC & Differential; Future  -     Comprehensive Metabolic Panel; Future  -     D-dimer, Quantitative; Future  -     Uric Acid; Future  -     Microalbumin / Creatinine Urine Ratio - Urine, Clean Catch; Future  -     Ambulatory Referral to [facility] [place] - [place]    5. Encounter for hepatitis C screening test for low risk patient  -     Hepatitis C Antibody; Future    6. Colon cancer screening  -     Ambulatory Referral For Screening Colonoscopy    Other orders  -     furosemide (Lasix) 40 MG tablet; Take 1 tab PO daily for 5 days, then take 1 tab PO up to 3 times per week for swelling  Dispense: 20 tablet; Refill: 0      Problem List Items Addressed This Visit          Cardiac and Vasculature    Dyspnea on exertion    Overview     PFT, 12/5/18: No air way obstruction, moderate restrictive lung disease         Relevant Orders    proBNP    CBC & Differential    Comprehensive Metabolic Panel    D-dimer, Quantitative    Uric Acid    Microalbumin / Creatinine Urine Ratio - Urine, Clean Catch    Ambulatory Referral to Pulmonology    Essential hypertension    Overview     24-hour ambulatory blood pressure monitor 4/3/2019: Overall average 135/89, 82 bpm.  Awake average 138/92, 83 bpm.  Sleep average 126/77, 80 bpm.         Current Assessment & Plan     She does have rather severe whitecoat hypertension, but  given her symptoms over the last few weeks as well as her leg swelling, I am inclined to think her severe hypertension today is mostly accurate.  -Labs today as ordered.  Referral to the hypertension clinic at [facility].  In the meantime I would like her to increase her carvedilol to 2 tabs twice daily, and increase her valsartan to 2 tablets every morning.  Will also send in a short prescription for Lasix         Relevant Medications    furosemide (Lasix) 40 MG tablet    Other Relevant Orders    proBNP    CBC & Differential    Comprehensive Metabolic Panel    D-dimer, Quantitative    Uric Acid    Microalbumin / Creatinine Urine Ratio - Urine, Clean Catch    Ambulatory Referral to [facility] [place] - [place]       Endocrine and Metabolic    Diabetes mellitus - Primary    Overview     Did not tolerate metformin.  Came off Januvia in August 2021         Current Assessment & Plan     Has been off of Ozempic for a few months after changing jobs and insurance, plan to change to Mounjaro.  A1c today 7.3%.         Relevant Medications    Mounjaro 2.5 MG/0.5ML solution pen-injector pen    Other Relevant Orders    POC Glycosylated Hemoglobin (Hb A1C) (Completed)    Microalbumin / Creatinine Urine Ratio - Urine, Clean Catch     Other Visit Diagnoses       Hypertensive urgency        Relevant Medications    furosemide (Lasix) 40 MG tablet    Other Relevant Orders    proBNP    CBC & Differential    Comprehensive Metabolic Panel    D-dimer, Quantitative    Uric Acid    Microalbumin / Creatinine Urine Ratio - Urine, Clean Catch    TSH Rfx On Abnormal To Free T4    Ambulatory Referral to [facility] [place] - [place]    Encounter for hepatitis C screening test for low risk patient        Relevant Orders    Hepatitis C Antibody    Colon cancer screening        Relevant Orders    Ambulatory Referral For Screening Colonoscopy              Patient Instructions   Take 2 carvedilol twice per day for next week  Take 2  valsartan per day  Fluid pill to help with swelling    Follow Up:   No follow-ups on file.        DO BRENNAN Rivero RD  [facility]  [address]  [facility] [address]  Fax [phone]  Phone [phone]

## 2024-06-05 NOTE — TELEPHONE ENCOUNTER
Caller: Kellen Esparza    Relationship to patient: Self    Best call back number: [phone]    Patient is needing: PHARMACY STATES THE MOUNJARO NEEDS PRIOR AUTHORIZATION

## 2024-06-05 NOTE — PATIENT INSTRUCTIONS
Take 2 carvedilol twice per day for next week  Take 2 valsartan per day  Fluid pill to help with swelling

## 2024-06-05 NOTE — PROGRESS NOTES
Procedure   - Large Joint Arthrocentesis: L knee on 6/5/2024 9:28 AM  Indications: pain  Details: 21 G needle, ultrasound-guided anterolateral approach  Medications: 4 mL bupivacaine (PF) 0.5 %; 4 mL lidocaine PF 1% 1 %; 80 mg triamcinolone acetonide 40 MG/ML  Outcome: tolerated well, no immediate complications  Procedure, treatment alternatives, risks and benefits explained, specific risks discussed. Consent was given by the patient. Immediately prior to procedure a time out was called to verify the correct patient, procedure, equipment, support staff and site/side marked as required. Patient was prepped and draped in the usual sterile fashion.

## 2024-06-06 LAB
ALBUMIN SERPL-MCNC: 4.6 G/DL (ref 3.5–5.2)
ALBUMIN UR-MCNC: 12.3 MG/DL
ALBUMIN/GLOB SERPL: 1.3 G/DL
ALP SERPL-CCNC: 98 U/L (ref 39–117)
ALT SERPL W P-5'-P-CCNC: 22 U/L (ref 1–33)
ANION GAP SERPL CALCULATED.3IONS-SCNC: 12.3 MMOL/L (ref 5–15)
AST SERPL-CCNC: 16 U/L (ref 1–32)
BILIRUB SERPL-MCNC: 0.7 MG/DL (ref 0–1.2)
BUN SERPL-MCNC: 8 MG/DL (ref 8–23)
BUN/CREAT SERPL: 9.6 (ref 7–25)
CALCIUM SPEC-SCNC: 10.3 MG/DL (ref 8.6–10.5)
CHLORIDE SERPL-SCNC: 101 MMOL/L (ref 98–107)
CO2 SERPL-SCNC: 26.7 MMOL/L (ref 22–29)
CREAT SERPL-MCNC: 0.83 MG/DL (ref 0.57–1)
CREAT UR-MCNC: 126.3 MG/DL
EGFRCR SERPLBLD CKD-EPI 2021: 80.8 ML/MIN/1.73
GLOBULIN UR ELPH-MCNC: 3.6 GM/DL
GLUCOSE SERPL-MCNC: 163 MG/DL (ref 65–99)
HCV AB SER QL: NORMAL
MICROALBUMIN/CREAT UR: 97.4 MG/G (ref 0–29)
NT-PROBNP SERPL-MCNC: 45.4 PG/ML (ref 0–900)
POTASSIUM SERPL-SCNC: 4.4 MMOL/L (ref 3.5–5.2)
PROT SERPL-MCNC: 8.2 G/DL (ref 6–8.5)
SODIUM SERPL-SCNC: 140 MMOL/L (ref 136–145)
TSH SERPL DL<=0.05 MIU/L-ACNC: 0.42 UIU/ML (ref 0.27–4.2)
URATE SERPL-MCNC: 6 MG/DL (ref 2.4–5.7)

## 2024-06-06 NOTE — PROGRESS NOTES
"Chief Complaint  Hypertension    Subjective      History of Present Illness {  Problem List  Visit  Diagnosis   Encounters  Notes  Medications  Labs  Result Review Imaging  Media :23}     Kellen Esparza, 58 y.o. female with hypertension, small vessel CAD, high degree AV block status post pacemaker, diastolic dysfunction, diabetes, hypertension, hyperlipidemia, history of PE who presents today for evaluation of hypertension at the request of Dr. Puenet.    Patient recent follow-up with Dr. Puente and blood pressure was 180/110.  Her carvedilol was increased to 12.5 mg twice a day and valsartan was increased to 80 mg.    For about 3 months she has been having increased fatigue, NIX, headache. She does not typically check her BP at home. She reports any normal activity wears her out now. She denies exertional chest pain but does have some chest discomfort with aggravation/anxiety.    She is followed by Dr. Sommer, last visit 6/2023. Last echo 2022 showed normal LVEF. LHC 2017 showed mild to moderate diffuse tortuosity, evidence of small vessel disease.     Cardiac risks: Hypertension, diabetes, hyperlipidemia, age, obesity    Objective     Vital Signs:   Vitals:    06/11/24 0819 06/11/24 0820 06/11/24 0835 06/11/24 0848   BP: 180/97 (!) 180/105 179/94 168/90   BP Location: Left arm Left arm Left arm Left arm   Patient Position: Sitting Standing     Pulse: 66 69 64    SpO2: 97%      Weight: 124 kg (274 lb)      Height: 180.3 cm (71\")        Body mass index is 38.22 kg/m².  Physical Exam  Vitals reviewed.   Constitutional:       Appearance: Normal appearance.   HENT:      Head: Normocephalic.   Neck:      Vascular: No carotid bruit.   Cardiovascular:      Rate and Rhythm: Normal rate and regular rhythm.      Pulses: Normal pulses.      Heart sounds: Normal heart sounds, S1 normal and S2 normal. No murmur heard.  Pulmonary:      Effort: Pulmonary effort is normal. No respiratory distress.      Breath sounds: " Normal breath sounds.   Chest:      Chest wall: No tenderness.   Abdominal:      General: Abdomen is flat.      Palpations: Abdomen is soft.   Musculoskeletal:      Cervical back: Neck supple.      Right lower leg: No edema.      Left lower leg: No edema.   Skin:     General: Skin is warm and dry.   Neurological:      General: No focal deficit present.      Mental Status: She is alert and oriented to person, place, and time. Mental status is at baseline.   Psychiatric:         Mood and Affect: Mood normal.         Behavior: Behavior normal.         Thought Content: Thought content normal.                Data Reviewed:{ Labs  Result Review  Imaging  Med Tab  Media :23}   Cardiology Scan (05/12/2024)  Adult Transthoracic Echo Complete W/ Cont if Necessary Per Protocol (06/27/2022 16:24)  Microalbumin / Creatinine Urine Ratio - Urine, Clean Catch (06/05/2024 12:58)  Hepatitis C Antibody (06/05/2024 12:52)  TSH Rfx On Abnormal To Free T4 (06/05/2024 12:52)  Uric Acid (06/05/2024 12:52)  D-dimer, Quantitative (06/05/2024 12:52)  Comprehensive Metabolic Panel (06/05/2024 12:52)  CBC & Differential (06/05/2024 12:52)  proBNP (06/05/2024 12:52)  POC Glycosylated Hemoglobin (Hb A1C) (06/05/2024 12:30)    Assessment & Plan   Assessment and Plan {CC Problem List  Visit Diagnosis  ROS  Review (Popup)  Health Maintenance  Quality  BestPractice  Medications  SmartSets  SnapShot Encounters  Media :23}     1. Uncontrolled hypertension  -Will add amlodipine due to history of small vessel disease.  We did discuss risks of lower extremity swelling and to call if she experiences any  -Blood pressure did steadily improve in the office.  Will continue carvedilol 12.5 mg twice a day and valsartan 80 mg nightly.  I advised her to call or send me a message next week if blood pressures are still high.  Discussed goal blood pressure of less than 130/80.  Discussed low-sodium diet  - ECG 12 Lead; Future  - Stress Test With Pet  Myocardial Perfusion; Future  - Adult Transthoracic Echo Complete W/ Cont if Necessary Per Protocol; Future    2. Coronary artery disease of bypass graft of native heart with stable angina pectoris  -History of small vessel disease per left heart catheterization in 2017.  Due to worsening exertional symptoms we will proceed with stress test in a couple weeks after blood pressure is better controlled  -Patient able to walk on treadmill due to exercise limitations and needs pet imaging secondary to body habitus  - Stress Test With Pet Myocardial Perfusion; Future  - Adult Transthoracic Echo Complete W/ Cont if Necessary Per Protocol; Future    3. Chest pain, unspecified type  -Chest pain seems to be somewhat atypical but with new exertional dyspnea, cannot rule out anginal equivalent and therefore we will proceed with stress test  - ECG 12 Lead; Future    4. Dyspnea on exertion    - ECG 12 Lead; Future  - Stress Test With Pet Myocardial Perfusion; Future  - Adult Transthoracic Echo Complete W/ Cont if Necessary Per Protocol; Future          Follow Up {Instructions Charge Capture  Follow-up Communications :23}     Return in about 2 weeks (around 6/25/2024) for HTN, Office follow up.      Patient was given instructions and counseling regarding her condition or for health maintenance advice. Please see specific information pulled into the AVS if appropriate.  Patient was instructed to call the [facility] with any questions, concerns, or worsening symptoms.    Dictated Utilizing Dragon Dictation   Please note that portions of this note were completed with a voice recognition program.   Part of this note may be an electronic transcription/translation of spoken language to printed text using the Dragon Dictation System.

## 2024-06-11 ENCOUNTER — OFFICE VISIT (OUTPATIENT)
Dept: CARDIOLOGY | Facility: HOSPITAL | Age: 59
End: 2024-06-11
Payer: MEDICAID

## 2024-06-11 ENCOUNTER — PREP FOR SURGERY (OUTPATIENT)
Dept: OTHER | Facility: HOSPITAL | Age: 59
End: 2024-06-11
Payer: MEDICAID

## 2024-06-11 VITALS
BODY MASS INDEX: 38.36 KG/M2 | HEART RATE: 64 BPM | WEIGHT: 274 LBS | HEIGHT: 71 IN | OXYGEN SATURATION: 97 % | SYSTOLIC BLOOD PRESSURE: 168 MMHG | DIASTOLIC BLOOD PRESSURE: 90 MMHG

## 2024-06-11 DIAGNOSIS — I25.708 CORONARY ARTERY DISEASE OF BYPASS GRAFT OF NATIVE HEART WITH STABLE ANGINA PECTORIS: ICD-10-CM

## 2024-06-11 DIAGNOSIS — Z12.11 COLON CANCER SCREENING: Primary | ICD-10-CM

## 2024-06-11 DIAGNOSIS — R07.9 CHEST PAIN, UNSPECIFIED TYPE: Primary | ICD-10-CM

## 2024-06-11 DIAGNOSIS — Z86.010 HISTORY OF COLON POLYPS: ICD-10-CM

## 2024-06-11 DIAGNOSIS — I10 UNCONTROLLED HYPERTENSION: ICD-10-CM

## 2024-06-11 DIAGNOSIS — R06.09 DYSPNEA ON EXERTION: ICD-10-CM

## 2024-06-11 PROCEDURE — 99214 OFFICE O/P EST MOD 30 MIN: CPT | Performed by: NURSE PRACTITIONER

## 2024-06-11 PROCEDURE — 3080F DIAST BP >= 90 MM HG: CPT | Performed by: NURSE PRACTITIONER

## 2024-06-11 PROCEDURE — 1159F MED LIST DOCD IN RCRD: CPT | Performed by: NURSE PRACTITIONER

## 2024-06-11 PROCEDURE — 3077F SYST BP >= 140 MM HG: CPT | Performed by: NURSE PRACTITIONER

## 2024-06-11 PROCEDURE — 1160F RVW MEDS BY RX/DR IN RCRD: CPT | Performed by: NURSE PRACTITIONER

## 2024-06-11 RX ORDER — AMLODIPINE BESYLATE 5 MG/1
5 TABLET ORAL DAILY
Qty: 30 TABLET | Refills: 3 | Status: SHIPPED | OUTPATIENT
Start: 2024-06-11

## 2024-06-11 NOTE — PATIENT INSTRUCTIONS
Start amlodipine 5mg daily  Continue carvedilol 12.5mg twice a day and valsartan 80mg nightly  We will schedule an echo and stress test  Call me next week if BPs continue to be high  BRING BP CUFF AND READINGS TO NEXT VISIT

## 2024-06-12 ENCOUNTER — TELEPHONE (OUTPATIENT)
Dept: GASTROENTEROLOGY | Facility: CLINIC | Age: 59
End: 2024-06-12
Payer: MEDICAID

## 2024-06-12 PROBLEM — Z86.0100 HISTORY OF COLON POLYPS: Status: ACTIVE | Noted: 2024-06-11

## 2024-06-12 PROBLEM — Z12.11 COLON CANCER SCREENING: Status: ACTIVE | Noted: 2024-06-11

## 2024-06-12 PROBLEM — Z86.010 HISTORY OF COLON POLYPS: Status: ACTIVE | Noted: 2024-06-11

## 2024-06-12 NOTE — TELEPHONE ENCOUNTER
PT IS ON THE ENDO SCHEDULE FOR A COLON WITH DR BAILEY ON 8/6 AND HAS A PACEMAKER AND IS ON ELIQUIS MANAGED BY DR METCALF.

## 2024-06-19 ENCOUNTER — TELEPHONE (OUTPATIENT)
Dept: FAMILY MEDICINE CLINIC | Facility: CLINIC | Age: 59
End: 2024-06-19
Payer: MEDICAID

## 2024-06-19 NOTE — TELEPHONE ENCOUNTER
Patient called about her mounjaro denial, wanted to know if there is another medication that can be tried. She stated ozempic was also denied and is not sure what else to try because the metformin but it gave her diarrhea. Please advise.

## 2024-06-24 NOTE — PROGRESS NOTES
"[facility] Follow up    CHIEF COMPLAINT    Dyspnea on exertion, ALYSIA, morbid obesity    HISTORY OF PRESENT ILLNESS    HPI:   Kellen Esparza is a 58 y.o.female followed by pulmonary regarding her ALYSIA on CPAP, morbid obesity, and dyspnea on exertion.     Patient was last seen in the office by CORRIE Olguin in July 2022.  Carries a history of ALYSIA on CPAP but at that time was not compliant and her PAP therapy was returned.  Due to symptoms of daytime somnolence, fatigue, nonrestorative sleep a send home sleep study was performed in order to requalify her for PAP therapy, however she did not complete this.    She does carry a history of AVB s/p PPM anticoagulated on Eliquis. She is followed by Dr. Sommer.     Interval history:   The patient has not been seen in the office since 2022.  She did not complete her sleep study at that time.  She still has ongoing symptoms of nonrestorative sleep, daytime somnolence, excessive fatigue, and witnessed episodes of snoring.  At this point she does desire to repeat a sleep study and pursue PAP therapy as she \"cannot take it anymore\".    Does have some shortness of breath occasionally accompanied by wheezing.  Has never been diagnosed with any lung disease and denies any recurrent respiratory issues.    She is followed by cardiology and most recent echo was unrevealing.    Denies recent ED visits, hospitalizations, or exacerbations.     Denies fever, chills, night sweats, or hemoptysis. No recent sick contacts. No chest pain or palpitations. Denies lower extremity swelling or calf tenderness.     Patient Active Problem List   Diagnosis    Chest pain    Dyspnea on exertion    Essential hypertension    Pain of right breast    Lower extremity edema    Diabetes mellitus    Abnormal stress test    Migraine    History of pulmonary embolus (PE)    Hyperlipidemia    DDD (degenerative disc disease), cervical    Coronary artery disease of native artery of native heart with stable " angina pectoris    AV block, complete (S/P PPM 7/2019)    Obesity, Class II, BMI 35-39.9    ALYSIA (obstructive sleep apnea)    Pain in pacemaker pocket    Paroxysmal atrial fibrillation    Complex regional pain syndrome type I    Chronic anticoagulation    Pacemaker    Morbid obesity    Entrapment neuropathy    Encounter for fitting and adjustment of neuropacemaker of spinal cord    Mechanical low back pain    Presence of neurostimulator    Thoracic spondylosis without myelopathy    Myofascial pain    Menopausal syndrome (hot flashes)    Bilateral carpal tunnel syndrome    Colon cancer screening    History of colon polyps       Allergies   Allergen Reactions    Metformin GI Intolerance    Codeine Itching    Lisinopril Cough       Current Outpatient Medications:     albuterol sulfate  (90 Base) MCG/ACT inhaler, Inhale 2 puffs Every 4 (Four) Hours As Needed for Wheezing., Disp: 18 g, Rfl: 11    amLODIPine (NORVASC) 5 MG tablet, Take 1 tablet by mouth Daily., Disp: 30 tablet, Rfl: 3    atorvastatin (LIPITOR) 80 MG tablet, TAKE 1 TABLET BY MOUTH DAILY, Disp: 90 tablet, Rfl: 3    carvedilol (COREG) 6.25 MG tablet, Take 1 tablet by mouth 2 (Two) Times a Day. (Patient taking differently: Take 2 tablets by mouth 2 (Two) Times a Day.), Disp: 180 tablet, Rfl: 3    cyclobenzaprine (FLEXERIL) 10 MG tablet, Take 1 tablet by mouth 3 (Three) Times a Day., Disp: , Rfl:     Eliquis 5 MG tablet tablet, TAKE 1 TABLET BY MOUTH TWICE DAILY, Disp: 180 tablet, Rfl: 3    Fluticasone-Salmeterol (ADVAIR/WIXELA) 250-50 MCG/ACT DISKUS, Inhale 1 puff 2 (Two) Times a Day., Disp: 60 each, Rfl: 5    furosemide (Lasix) 40 MG tablet, Take 1 tab PO daily for 5 days, then take 1 tab PO up to 3 times per week for swelling, Disp: 20 tablet, Rfl: 0    levETIRAcetam (KEPPRA) 250 MG tablet, TAKE 1 TABLET BY MOUTH TWICE DAILY, Disp: 90 tablet, Rfl: 1    Mounjaro 2.5 MG/0.5ML solution pen-injector pen, Inject 0.5 mL under the skin into the appropriate  "area as directed Every 7 (Seven) Days., Disp: 6 mL, Rfl: 0    oxyCODONE-acetaminophen (PERCOCET) 7.5-325 MG per tablet, As Needed., Disp: , Rfl:     pregabalin (LYRICA) 200 MG capsule, Take 1 capsule by mouth Every 12 (Twelve) Hours., Disp: , Rfl:     Sod Picosulfate-Mag Ox-Cit Acd (Clenpiq) 10-3.5-12 MG-GM -GM/160ML solution, Take 350 mL by mouth Take As Directed. (Patient not taking: Reported on 6/11/2024), Disp: 350 mL, Rfl: 0    traZODone (DESYREL) 150 MG tablet, TAKE 1 TABLET BY MOUTH EVERY NIGHT (Patient taking differently: Take 1 tablet by mouth As Needed.), Disp: 90 tablet, Rfl: 1    valsartan (DIOVAN) 40 MG tablet, Take 1 tablet by mouth Every Morning. (Patient taking differently: Take 1 tablet by mouth 2 (Two) Times a Day.), Disp: 90 tablet, Rfl: 3  No current facility-administered medications for this visit.  MEDICATION LIST AND ALLERGIES REVIEWED.    Social History     Tobacco Use    Smoking status: Never     Passive exposure: Never    Smokeless tobacco: Never   Vaping Use    Vaping status: Never Used   Substance Use Topics    Alcohol use: No     Comment: rare    Drug use: No       FAMILY AND SOCIAL HISTORY REVIEWED.    Review of Systems   Constitutional:  Positive for fatigue. Negative for chills and fever.   Respiratory:  Positive for shortness of breath and wheezing. Negative for cough and chest tightness.    Cardiovascular:  Negative for chest pain, palpitations and leg swelling.   Skin:  Negative for color change.   Psychiatric/Behavioral:  Positive for sleep disturbance.    All other systems reviewed and are negative.  .    /90   Pulse 86   Temp 97.5 °F (36.4 °C) (Infrared)   Ht 180.3 cm (70.98\")   Wt 126 kg (278 lb)   LMP 10/31/2014 (LMP Unknown) Comment: Mammogram 2014 info given   SpO2 99% Comment: rom air at rest  BMI 38.79 kg/m²     Immunization History   Administered Date(s) Administered    COVID-19 (PFIZER) Purple Cap Monovalent 04/02/2021, 04/30/2021    Fluzone (or Fluarix & " Flulaval for VFC) >6mos 11/02/2021       Physical Exam  Vitals reviewed.   Constitutional:       General: She is not in acute distress.     Appearance: Normal appearance.   HENT:      Head:      Comments: Mallampati 4 airway  Cardiovascular:      Rate and Rhythm: Normal rate and regular rhythm.      Pulses: Normal pulses.      Heart sounds: Normal heart sounds.   Pulmonary:      Effort: Pulmonary effort is normal. No respiratory distress.      Breath sounds: No wheezing, rhonchi or rales.   Skin:     General: Skin is warm and dry.   Neurological:      Mental Status: She is alert.         RESULTS    PFTs:  6/25/2024: No airway obstruction by ATS guidelines.  No restriction.  Residual volume 1.7% consistent with air trapping.  Normal DLCO.    Imaging:   CXR PA/lateral  Awaiting final MD interpretation. I will contact the patient if abnormal results.     PROBLEM LIST    Problem List Items Addressed This Visit       Dyspnea on exertion - Primary    Overview     PFT, 12/5/18: No air way obstruction, moderate restrictive lung disease         Relevant Medications    Fluticasone-Salmeterol (ADVAIR/WIXELA) 250-50 MCG/ACT DISKUS    albuterol sulfate  (90 Base) MCG/ACT inhaler    Other Relevant Orders    XR Chest PA & Lateral (Completed)    Spirometry with Diffusion Capacity & Lung Volumes (Completed)    ALYSIA (obstructive sleep apnea)    Relevant Orders    Home Sleep Study    Morbid obesity     Other Visit Diagnoses       Wheezing        Relevant Medications    Fluticasone-Salmeterol (ADVAIR/WIXELA) 250-50 MCG/ACT DISKUS    albuterol sulfate  (90 Base) MCG/ACT inhaler            DISCUSSION    Ms. Esparza was seen today for follow-up and is stable from a pulmonary standpoint.    She does have symptoms of nonrestorative sleep, daytime somnolence, fatigue, and witnessed episodes of snoring with prior diagnosis of ALYSIA as well as obesity and Mallampati 4 airway.    ALYSIA  Obesity  The patient does have symptoms concerning  for ALYSIA including daytime somnolence, non-restorative sleep, fatigue, and witnessed episodes of snoring.   At this time we will proceed with a home sleep study. I will call the patient with those results.   If the study confirms sleep apnea, initiate PAP therapy with follow-up in 31-90 days for compliance check.   We also discussed alternate treatment strategies for sleep apnea including an oral mandibular adjustment appliance, surgical repair, and/or device insertion.  Body mass index is 38.79 kg/m². I did encourage weight loss as this can contribute towards ALYSIA.  This has been very difficult to her due to chronic pain issues which is being followed by the pain clinic.  Explained that untreated sleep apnea can contribute to additional health problems including cardiac and metabolic issues, which she currently has.  We also discussed good sleep regimen such as laying in the lateral position, avoiding alcohol or sedatives prior to bedtime, regular exercise, weight loss, avoiding caffeine in the evenings, and going to bed and getting up at the same time every day.     Dyspnea on exertion  Likely multifactorial in the setting of her physical deconditioning, obesity, untreated ALYSIA, and possible asthma.  She does frequently wheeze with exertion.  I will trial her on an ICS/LABA inhaler.  It appears Advair is preferred by her insurance.  She will be placed on Advair 250 1 puff twice daily.  Albuterol rescue inhaler every 4-6 hours as needed  Has had an elevated AEC on prior labs, but most recent labs were unrevealing.  PFTs today are unrevealing for obstruction or restriction.  She does have some air trapping.    Return to clinic in 3 months or sooner if needed.    Moderate level of Medical Decision Making complexity based on 2 or more chronic stable illnesses and prescription drug management.    Electronically signed by CORRIE Loaiza, 06/25/24, 3:47 PM EDT.    Please note that portions of this note were completed  with a voice recognition program.      CC: Ryne Puente, DO

## 2024-06-25 ENCOUNTER — OFFICE VISIT (OUTPATIENT)
Dept: PULMONOLOGY | Facility: CLINIC | Age: 59
End: 2024-06-25
Payer: MEDICAID

## 2024-06-25 ENCOUNTER — HOSPITAL ENCOUNTER (OUTPATIENT)
Dept: CARDIOLOGY | Facility: HOSPITAL | Age: 59
Discharge: HOME | End: 2024-06-25
Payer: MEDICAID

## 2024-06-25 ENCOUNTER — APPOINTMENT (OUTPATIENT)
Facility: HOSPITAL | Age: 59
End: 2024-06-25
Payer: MEDICAID

## 2024-06-25 VITALS
HEART RATE: 73 BPM | BODY MASS INDEX: 38.27 KG/M2 | DIASTOLIC BLOOD PRESSURE: 82 MMHG | OXYGEN SATURATION: 97 % | HEIGHT: 71 IN | SYSTOLIC BLOOD PRESSURE: 150 MMHG | WEIGHT: 273.37 LBS

## 2024-06-25 VITALS
OXYGEN SATURATION: 99 % | TEMPERATURE: 97.5 F | WEIGHT: 278 LBS | BODY MASS INDEX: 38.92 KG/M2 | SYSTOLIC BLOOD PRESSURE: 148 MMHG | DIASTOLIC BLOOD PRESSURE: 90 MMHG | HEIGHT: 71 IN | HEART RATE: 86 BPM

## 2024-06-25 DIAGNOSIS — E66.01 MORBID OBESITY: ICD-10-CM

## 2024-06-25 DIAGNOSIS — R06.2 WHEEZING: ICD-10-CM

## 2024-06-25 DIAGNOSIS — R06.09 DYSPNEA ON EXERTION: ICD-10-CM

## 2024-06-25 DIAGNOSIS — R06.09 DYSPNEA ON EXERTION: Primary | ICD-10-CM

## 2024-06-25 DIAGNOSIS — I25.708 CORONARY ARTERY DISEASE OF BYPASS GRAFT OF NATIVE HEART WITH STABLE ANGINA PECTORIS: ICD-10-CM

## 2024-06-25 DIAGNOSIS — I10 UNCONTROLLED HYPERTENSION: ICD-10-CM

## 2024-06-25 DIAGNOSIS — G47.33 OSA (OBSTRUCTIVE SLEEP APNEA): ICD-10-CM

## 2024-06-25 LAB
BH CV REST NUCLEAR ISOTOPE DOSE: 29.7 MCI
BH CV STRESS BP STAGE 1: NORMAL
BH CV STRESS BP STAGE 3: NORMAL
BH CV STRESS COMMENTS STAGE 1: NORMAL
BH CV STRESS DOSE REGADENOSON STAGE 1: 0.4
BH CV STRESS DURATION MIN STAGE 1: 1
BH CV STRESS DURATION MIN STAGE 2: 1
BH CV STRESS DURATION MIN STAGE 3: 1
BH CV STRESS DURATION MIN STAGE 4: 1
BH CV STRESS HR STAGE 1: 82
BH CV STRESS HR STAGE 2: 88
BH CV STRESS HR STAGE 3: 85
BH CV STRESS HR STAGE 4: 82
BH CV STRESS NUCLEAR ISOTOPE DOSE: 30.2 MCI
BH CV STRESS O2 STAGE 1: 97
BH CV STRESS O2 STAGE 2: 97
BH CV STRESS O2 STAGE 3: 100
BH CV STRESS O2 STAGE 4: 98
BH CV STRESS PROTOCOL 1: NORMAL
BH CV STRESS RECOVERY BP: NORMAL MMHG
BH CV STRESS RECOVERY HR: 83 BPM
BH CV STRESS RECOVERY O2: 98 %
BH CV STRESS STAGE 1: 1
BH CV STRESS STAGE 2: 2
BH CV STRESS STAGE 3: 3
BH CV STRESS STAGE 4: 4
LV EF NUC BP: 73 %
MAXIMAL PREDICTED HEART RATE: 160 BPM
PERCENT MAX PREDICTED HR: 56.25 %
STRESS BASELINE BP: NORMAL MMHG
STRESS BASELINE HR: 73 BPM
STRESS O2 SAT REST: 97 %
STRESS PERCENT HR: 66 %
STRESS POST EXERCISE DUR MIN: 4 MIN
STRESS POST EXERCISE DUR SEC: 0 SEC
STRESS POST O2 SAT PEAK: 99 %
STRESS POST PEAK BP: NORMAL MMHG
STRESS POST PEAK HR: 90 BPM
STRESS TARGET HR: 136 BPM

## 2024-06-25 PROCEDURE — 94726 PLETHYSMOGRAPHY LUNG VOLUMES: CPT | Performed by: NURSE PRACTITIONER

## 2024-06-25 PROCEDURE — 93306 TTE W/DOPPLER COMPLETE: CPT | Performed by: INTERNAL MEDICINE

## 2024-06-25 PROCEDURE — 93016 CV STRESS TEST SUPVJ ONLY: CPT | Performed by: INTERNAL MEDICINE

## 2024-06-25 PROCEDURE — 94010 BREATHING CAPACITY TEST: CPT | Performed by: NURSE PRACTITIONER

## 2024-06-25 PROCEDURE — 25010000002 REGADENOSON 0.4 MG/5ML SOLUTION: Performed by: NURSE PRACTITIONER

## 2024-06-25 PROCEDURE — A9555 RB82 RUBIDIUM: HCPCS | Performed by: NURSE PRACTITIONER

## 2024-06-25 PROCEDURE — 94729 DIFFUSING CAPACITY: CPT | Performed by: NURSE PRACTITIONER

## 2024-06-25 PROCEDURE — 93018 CV STRESS TEST I&R ONLY: CPT | Performed by: INTERNAL MEDICINE

## 2024-06-25 PROCEDURE — 99214 OFFICE O/P EST MOD 30 MIN: CPT | Performed by: NURSE PRACTITIONER

## 2024-06-25 PROCEDURE — 78431 MYOCRD IMG PET RST&STRS CT: CPT

## 2024-06-25 PROCEDURE — 3077F SYST BP >= 140 MM HG: CPT | Performed by: NURSE PRACTITIONER

## 2024-06-25 PROCEDURE — 1160F RVW MEDS BY RX/DR IN RCRD: CPT | Performed by: NURSE PRACTITIONER

## 2024-06-25 PROCEDURE — 78431 MYOCRD IMG PET RST&STRS CT: CPT | Performed by: INTERNAL MEDICINE

## 2024-06-25 PROCEDURE — 3080F DIAST BP >= 90 MM HG: CPT | Performed by: NURSE PRACTITIONER

## 2024-06-25 PROCEDURE — 0 RUBIDIUM CHLORIDE: Performed by: NURSE PRACTITIONER

## 2024-06-25 PROCEDURE — 93017 CV STRESS TEST TRACING ONLY: CPT

## 2024-06-25 PROCEDURE — 1159F MED LIST DOCD IN RCRD: CPT | Performed by: NURSE PRACTITIONER

## 2024-06-25 PROCEDURE — 93306 TTE W/DOPPLER COMPLETE: CPT

## 2024-06-25 RX ORDER — ALBUTEROL SULFATE 90 UG/1
2 AEROSOL, METERED RESPIRATORY (INHALATION) EVERY 4 HOURS PRN
Qty: 18 G | Refills: 11 | Status: SHIPPED | OUTPATIENT
Start: 2024-06-25

## 2024-06-25 RX ORDER — FLUTICASONE PROPIONATE AND SALMETEROL 250; 50 UG/1; UG/1
1 POWDER RESPIRATORY (INHALATION)
Qty: 60 EACH | Refills: 5 | Status: SHIPPED | OUTPATIENT
Start: 2024-06-25

## 2024-06-25 RX ORDER — REGADENOSON 0.08 MG/ML
0.4 INJECTION, SOLUTION INTRAVENOUS ONCE
Status: COMPLETED | OUTPATIENT
Start: 2024-06-25 | End: 2024-06-25

## 2024-06-25 RX ADMIN — RUBIDIUM CHLORIDE RB-82 1 DOSE: 150 INJECTION, SOLUTION INTRAVENOUS at 09:21

## 2024-06-25 RX ADMIN — REGADENOSON 0.4 MG: 0.08 INJECTION, SOLUTION INTRAVENOUS at 09:22

## 2024-06-25 RX ADMIN — RUBIDIUM CHLORIDE RB-82 1 DOSE: 150 INJECTION, SOLUTION INTRAVENOUS at 09:12

## 2024-06-26 LAB
BH CV ECHO MEAS - AO MAX PG: 10.2 MMHG
BH CV ECHO MEAS - AO ROOT DIAM: 3.1 CM
BH CV ECHO MEAS - AO V2 MAX: 159.6 CM/SEC
BH CV ECHO MEAS - IVS/LVPW: 1 CM
BH CV ECHO MEAS - IVSD: 1.2 CM
BH CV ECHO MEAS - LA DIMENSION: 3.9 CM
BH CV ECHO MEAS - LAT PEAK E' VEL: 4.9 CM/SEC
BH CV ECHO MEAS - LV MAX PG: 6.5 MMHG
BH CV ECHO MEAS - LV MEAN PG: 3.5 MMHG
BH CV ECHO MEAS - LV V1 MAX: 127.5 CM/SEC
BH CV ECHO MEAS - LV V1 VTI: 25.7 CM
BH CV ECHO MEAS - LVIDD: 3.4 CM
BH CV ECHO MEAS - LVIDS: 2.4 CM
BH CV ECHO MEAS - LVOT DIAM: 2 CM
BH CV ECHO MEAS - LVPWD: 1.2 CM
BH CV ECHO MEAS - MED PEAK E' VEL: 5.6 CM/SEC
BH CV ECHO MEAS - MV A MAX VEL: 95.5 CM/SEC
BH CV ECHO MEAS - MV E MAX VEL: 75.9 CM/SEC
BH CV ECHO MEAS - MV E/A: 0.79
BH CV ECHO MEAS - MV MAX PG: 6 MMHG
BH CV ECHO MEAS - MV MEAN PG: 3 MMHG
BH CV ECHO MEAS - MV P1/2T: 55 MSEC
BH CV ECHO MEAS - MV V2 VTI: 30.47 CM
BH CV ECHO MEAS - MVA(P1/2T): 4 CM2
BH CV ECHO MEAS - PA ACC TIME: 0.09 SEC
BH CV ECHO MEAS - RAP SYSTOLE: 3 MMHG
BH CV ECHO MEAS - RVSP: 34.1 MMHG
BH CV ECHO MEAS - TAPSE (>1.6): 2.8 CM
BH CV ECHO MEAS - TR MAX PG: 31.1 MMHG
BH CV ECHO MEAS - TR MAX VEL: 278.7 CM/SEC
BH CV ECHO MEASUREMENTS AVERAGE E/E' RATIO: 14.46
BH CV XLRA - RV BASE: 4.4 CM
BH CV XLRA - RV LENGTH: 7.5 CM
BH CV XLRA - RV MID: 3.5 CM
BH CV XLRA - TDI S': 21 CM/SEC

## 2024-06-26 NOTE — PROGRESS NOTES
Your heart squeezes normally. There are some elevated blood pressures in the heart, but appears to be mild. It is important that we keep your blood pressure under good control. We will discuss this more at your follow up tomorrow

## 2024-06-26 NOTE — PROGRESS NOTES
Your stress test was low risk and showed no evidence of ischemia/significant heart blockage. Please let me know if you have any questions or concerns.

## 2024-07-08 DIAGNOSIS — G90.59 COMPLEX REGIONAL PAIN SYNDROME TYPE 1 AFFECTING OTHER SITE: ICD-10-CM

## 2024-07-08 RX ORDER — LEVETIRACETAM 250 MG/1
TABLET ORAL
Qty: 90 TABLET | Refills: 1 | Status: SHIPPED | OUTPATIENT
Start: 2024-07-08

## 2024-07-08 NOTE — TELEPHONE ENCOUNTER
Rx Refill Note  Requested Prescriptions     Pending Prescriptions Disp Refills    levETIRAcetam (KEPPRA) 250 MG tablet [Pharmacy Med Name: LEVETIRACETAM 250MG TABLETS] 90 tablet 1     Sig: TAKE 1 TABLET BY MOUTH TWICE DAILY      Last office visit with prescribing clinician: 6/5/2024   Last telemedicine visit with prescribing clinician: Visit date not found   Next office visit with prescribing clinician: 7/30/2024                         Would you like a call back once the refill request has been completed: [] Yes [] No    If the office needs to give you a call back, can they leave a voicemail: [] Yes [] No    Allyson Malave MA  07/08/24, 12:32 EDT

## 2024-07-08 NOTE — TELEPHONE ENCOUNTER
July 8, 2024 [address] [place] [address]  Phone: [phone]  Fax: [phone]                 Kellen Esparza  [address] [id number]  1965        Patient will be having a a colonoscopy by Dr. Bryant Mejia on August 6, 2024. The patient is needing cardiac clearance due to being on blood thinners. Please complete the following and fax back to the office.     Patient's was last seen in the office on:_____________________    Procedure/Test Performed:    _____ Stress Test       _____ Echocardiogram    _____ EKG     _____ Heart Cath    Based on the above test results and/or clinical evaluation it is my recommendation:    ____ Patient may proceed with the scheduled surgical procedure with an acceptable cardiac risk.    ____ Patient CAN NOT stop Plavix, Effient, Ticlid, Aspirin, Coumadin, Pradaxa, Xarelto, Eliquis, Savaysa, or Brilinta prior to procedure.     ____ Patient CAN stop Plavix, Effient, Ticlid, Aspirin, Coumadin, Pradaxa, Xarelto, Eliquis, Savaysa, or Brilinta  ______ days prior to procedure.    Please sign and date below.    Signature________________________________________   Date:_________________     Thank You    DONNA Mejia M.D.

## 2024-07-15 RX ORDER — FUROSEMIDE 40 MG/1
TABLET ORAL
Qty: 20 TABLET | Refills: 0 | Status: SHIPPED | OUTPATIENT
Start: 2024-07-15

## 2024-07-15 NOTE — TELEPHONE ENCOUNTER
Rx Refill Note  Requested Prescriptions     Pending Prescriptions Disp Refills    furosemide (LASIX) 40 MG tablet [Pharmacy Med Name: FUROSEMIDE 40MG TABLETS] 20 tablet 0     Sig: TAKE 1 TABLET BY MOUTH DAILY FOR 5 DAYS THEN TAKE 1 TABLET BY MOUTH UP TO 3 TIMES EVERY WEEK FOR SWELLING      Last office visit with prescribing clinician: 6/5/2024   Last telemedicine visit with prescribing clinician: Visit date not found   Next office visit with prescribing clinician: 7/22/2024                         Would you like a call back once the refill request has been completed: [] Yes [] No    If the office needs to give you a call back, can they leave a voicemail: [] Yes [] No    Allyson Malave MA  07/15/24, 12:50 EDT

## 2024-07-22 ENCOUNTER — OFFICE VISIT (OUTPATIENT)
Dept: FAMILY MEDICINE CLINIC | Facility: CLINIC | Age: 59
End: 2024-07-22
Payer: MEDICAID

## 2024-07-22 VITALS
DIASTOLIC BLOOD PRESSURE: 98 MMHG | HEART RATE: 74 BPM | SYSTOLIC BLOOD PRESSURE: 160 MMHG | BODY MASS INDEX: 39.49 KG/M2 | OXYGEN SATURATION: 95 % | RESPIRATION RATE: 16 BRPM | WEIGHT: 283 LBS

## 2024-07-22 DIAGNOSIS — I10 ESSENTIAL HYPERTENSION: ICD-10-CM

## 2024-07-22 DIAGNOSIS — E11.65 TYPE 2 DIABETES MELLITUS WITH HYPERGLYCEMIA, WITHOUT LONG-TERM CURRENT USE OF INSULIN: Primary | ICD-10-CM

## 2024-07-22 PROCEDURE — 99214 OFFICE O/P EST MOD 30 MIN: CPT | Performed by: FAMILY MEDICINE

## 2024-07-22 PROCEDURE — 1125F AMNT PAIN NOTED PAIN PRSNT: CPT | Performed by: FAMILY MEDICINE

## 2024-07-22 PROCEDURE — 3080F DIAST BP >= 90 MM HG: CPT | Performed by: FAMILY MEDICINE

## 2024-07-22 PROCEDURE — 3051F HG A1C>EQUAL 7.0%<8.0%: CPT | Performed by: FAMILY MEDICINE

## 2024-07-22 PROCEDURE — 3077F SYST BP >= 140 MM HG: CPT | Performed by: FAMILY MEDICINE

## 2024-07-22 RX ORDER — TIRZEPATIDE 2.5 MG/.5ML
2.5 INJECTION, SOLUTION SUBCUTANEOUS
Qty: 2 ML | Refills: 0 | Status: SHIPPED | OUTPATIENT
Start: 2024-07-22

## 2024-07-22 NOTE — ASSESSMENT & PLAN NOTE
Did not respond to increasing carvedilol to 2 tabs BID and valsartan 40mg to BID. Will try changing her valsartan to Edarbi 80mg.

## 2024-07-22 NOTE — ASSESSMENT & PLAN NOTE
Will try pushing through PA for Mounjaro again, sakina stated she needed to have tried metformin which she has done in 2017, could not tolerate.

## 2024-07-22 NOTE — PROGRESS NOTES
Established Patient Office Visit      Patient Name: Kellen Esparza  : 1965   MRN: [id number]   Care Team: Patient Care Team:  Ryne Puente DO as PCP - General (Family Medicine)  Johny Sommer MD as Consulting Physician (Cardiology)  Sonya Gay MD as Consulting Physician (Cardiology)  Deb Guajardo APRN as Nurse Practitioner (Pulmonary Disease)  Sal Sheldon MD (Pain Medicine)  Luis Saeed MD as Consulting Physician (Otolaryngology)    Chief Complaint:    Chief Complaint   Patient presents with    Hypertension       History of Present Illness: Kellen Esparza is a 58 y.o. female who is here today for chief complaint.    HPI    FMLA paperwork needs to be more specific regarding reasons for days off (ie. Pain management/doctors appt once per month,   So say 3 days per month for doctors' appts and 2 days per month for symptom control/recovery)    This patient is accompanied by their self who contributes to the history of their care.    The following portions of the patient's history were reviewed and updated as appropriate: allergies, current medications, past family history, past medical history, past social history, past surgical history and problem list.    Subjective      Review of Systems:   Review of Systems - See HPI    Past Medical History:   Past Medical History:   Diagnosis Date    Arthritis     Arthritis of back     Carpal tunnel syndrome     Chlamydia     CPAP (continuous positive airway pressure) dependence     DDD (degenerative disc disease), cervical 2017    Depression     Diabetes mellitus     doesnt check sugar     GERD (gastroesophageal reflux disease)     History of COVID-19 2022    Hypertension     Knee swelling     Migraine     Musculoligamentous strain     Pacemaker     Pulmonary embolism     Sleep apnea     CPAP machine was recalled, waiting for new one.    Wears eyeglasses        Past Surgical History:   Past Surgical History:    Procedure Laterality Date    BREAST BIOPSY Left 2014    CARDIAC CATHETERIZATION Left 08/10/2017    Procedure: Cardiac Catheterization/Vascular Study;  Surgeon: Johny Sommer MD;  Location:  [facility];  Service:     CARDIAC ELECTROPHYSIOLOGY PROCEDURE N/A 07/19/2019    Procedure: Pacemaker DC new;  Surgeon: Sonya Gay MD;  Location:  [facility];  Service: Cardiology    CHOLECYSTECTOMY      COLONOSCOPY      COLONOSCOPY N/A 05/03/2022    Procedure: COLONOSCOPY;  Surgeon: Bryant Mejia MD;  Location:  [facility];  Service: Gastroenterology;  Laterality: N/A;    ENDOSCOPY      HYSTERECTOMY  11/2014    INSERT / REPLACE / REMOVE PACEMAKER      JOINT REPLACEMENT Right     knee    KNEE SURGERY Right 12/2017    total replacement    NASAL SINUS SURGERY      OOPHORECTOMY  11/2014    PACEMAKER IMPLANTATION      PULMONARY EMBOLISM SURGERY      right lung    SPINAL CORD STIMULATOR IMPLANT N/A 09/10/2020    Procedure: SPINAL CORD STIMULATOR INSERTION PHASE 1, UPPER THORACIC PLACEMENT ;  Surgeon: Victor Manuel Geiger MD;  Location:  [facility];  Service: Neurosurgery;  Laterality: N/A;    TUBAL ABDOMINAL LIGATION         Family History:   Family History   Problem Relation Age of Onset    Breast cancer Mother 42    Heart failure Mother     Diabetes Mother     No Known Problems Father     Hypertension Sister     Hypertension Brother     Heart failure Son     Hypertension Maternal Grandmother     No Known Problems Paternal Grandmother     Hypertension Maternal Grandfather     No Known Problems Paternal Grandfather     Ovarian cancer Neg Hx        Social History:   Social History     Socioeconomic History    Marital status:    Tobacco Use    Smoking status: Never     Passive exposure: Never    Smokeless tobacco: Never   Vaping Use    Vaping status: Never Used   Substance and Sexual Activity    Alcohol use: No     Comment: rare    Drug use: No    Sexual activity: Yes      Partners: Male     Birth control/protection: Hysterectomy, Surgical       Tobacco History:   Social History     Tobacco Use   Smoking Status Never    Passive exposure: Never   Smokeless Tobacco Never       Medications:     Current Outpatient Medications:     albuterol sulfate  (90 Base) MCG/ACT inhaler, Inhale 2 puffs Every 4 (Four) Hours As Needed for Wheezing., Disp: 18 g, Rfl: 11    amLODIPine (NORVASC) 5 MG tablet, Take 1 tablet by mouth Daily., Disp: 30 tablet, Rfl: 3    atorvastatin (LIPITOR) 80 MG tablet, TAKE 1 TABLET BY MOUTH DAILY, Disp: 90 tablet, Rfl: 3    carvedilol (COREG) 6.25 MG tablet, Take 1 tablet by mouth 2 (Two) Times a Day. (Patient taking differently: Take 2 tablets by mouth 2 (Two) Times a Day.), Disp: 180 tablet, Rfl: 3    cyclobenzaprine (FLEXERIL) 10 MG tablet, Take 1 tablet by mouth 3 (Three) Times a Day., Disp: , Rfl:     Eliquis 5 MG tablet tablet, TAKE 1 TABLET BY MOUTH TWICE DAILY, Disp: 180 tablet, Rfl: 3    Fluticasone-Salmeterol (ADVAIR/WIXELA) 250-50 MCG/ACT DISKUS, Inhale 1 puff 2 (Two) Times a Day., Disp: 60 each, Rfl: 5    furosemide (LASIX) 40 MG tablet, TAKE 1 TABLET BY MOUTH DAILY FOR 5 DAYS THEN TAKE 1 TABLET BY MOUTH UP TO 3 TIMES EVERY WEEK FOR SWELLING, Disp: 20 tablet, Rfl: 0    levETIRAcetam (KEPPRA) 250 MG tablet, TAKE 1 TABLET BY MOUTH TWICE DAILY, Disp: 90 tablet, Rfl: 1    Mounjaro 2.5 MG/0.5ML solution pen-injector pen, Inject 0.5 mL under the skin into the appropriate area as directed Every 7 (Seven) Days. Indications: Type 2 Diabetes, Disp: 2 mL, Rfl: 0    oxyCODONE-acetaminophen (PERCOCET) 7.5-325 MG per tablet, As Needed., Disp: , Rfl:     pregabalin (LYRICA) 200 MG capsule, Take 1 capsule by mouth Every 12 (Twelve) Hours., Disp: , Rfl:     traZODone (DESYREL) 150 MG tablet, TAKE 1 TABLET BY MOUTH EVERY NIGHT (Patient taking differently: Take 1 tablet by mouth As Needed.), Disp: 90 tablet, Rfl: 1    azilsartan medoxomil (Edarbi) 80 MG tablet tablet,  Take 1 tablet by mouth Daily. To replace valsartan, Disp: 30 tablet, Rfl: 2    Sod Picosulfate-Mag Ox-Cit Acd (Clenpiq) 10-3.5-12 MG-GM -GM/160ML solution, Take 350 mL by mouth Take As Directed. (Patient not taking: Reported on 6/11/2024), Disp: 350 mL, Rfl: 0    Allergies:   Allergies   Allergen Reactions    Metformin GI Intolerance    Codeine Itching    Lisinopril Cough       Objective   Objective     Physical Exam:  Vital Signs:   Vitals:    07/22/24 0821   BP: 160/98   Pulse: 74   Resp: 16   SpO2: 95%   Weight: 128 kg (283 lb)     Body mass index is 39.49 kg/m².     Physical Exam  Nursing note reviewed  Const: NAD, A&Ox4, Pleasant, Cooperative  Eyes: EOMI, no conjunctivitis  ENT: No nasal discharge present, neck supple  Cardiac: Regular rate and rhythm, no cyanosis  Resp: Respiratory rate within normal limits, no increased work of breathing, no audible wheezing or retractions noted  GI: No distention or ascites  MSK: Motor and sensation grossly intact in bilateral upper extremities  Neurologic: CN II-XII grossly intact  Psych: Appropriate mood and behavior.  Skin: Warm, dry  Procedures/Radiology     Procedures  No radiology results for the last 7 days     Assessment & Plan   Assessment / Plan      Assessment/Plan:   Problems Addressed This Visit  Diagnoses and all orders for this visit:    1. Type 2 diabetes mellitus with hyperglycemia, without long-term current use of insulin (Primary)  Assessment & Plan:  Will try pushing through PA for Mounjaro again, denial stated she needed to have tried metformin which she has done in 2017, could not tolerate.    Orders:  -     Mounjaro 2.5 MG/0.5ML solution pen-injector pen; Inject 0.5 mL under the skin into the appropriate area as directed Every 7 (Seven) Days. Indications: Type 2 Diabetes  Dispense: 2 mL; Refill: 0    2. Essential hypertension  Assessment & Plan:  Did not respond to increasing carvedilol to 2 tabs BID and valsartan 40mg to BID. Will try changing her  valsartan to Edarbi 80mg.    Orders:  -     azilsartan medoxomil (Edarbi) 80 MG tablet tablet; Take 1 tablet by mouth Daily. To replace valsartan  Dispense: 30 tablet; Refill: 2      Problem List Items Addressed This Visit          Cardiac and Vasculature    Essential hypertension    Overview     24-hour ambulatory blood pressure monitor 4/3/2019: Overall average 135/89, 82 bpm.  Awake average 138/92, 83 bpm.  Sleep average 126/77, 80 bpm.         Current Assessment & Plan     Did not respond to increasing carvedilol to 2 tabs BID and valsartan 40mg to BID. Will try changing her valsartan to Edarbi 80mg.         Relevant Medications    azilsartan medoxomil (Edarbi) 80 MG tablet tablet       Endocrine and Metabolic    Diabetes mellitus - Primary    Overview     Did not tolerate metformin in 2017.  Came off Januvia in August 2021         Current Assessment & Plan     Will try pushing through PA for Mounjaro again, denial stated she needed to have tried metformin which she has done in 2017, could not tolerate.         Relevant Medications    Mounjaro 2.5 MG/0.5ML solution pen-injector pen         Patient Instructions   [email or web address]    Follow Up:   Return in about 3 months (around 10/22/2024) for with A1c;.    DO BRENNAN Rivero RD  [facility]  [address]  [facility] [address]  Fax [phone]  Phone [phone]

## 2024-07-22 NOTE — Clinical Note
Could we try her PA for mounjaro again? The denial said she needs to have tried metformin but she did and didn't tolerate it discontinue 8/2017). Thank you!

## 2024-07-23 ENCOUNTER — PRIOR AUTHORIZATION (OUTPATIENT)
Dept: FAMILY MEDICINE CLINIC | Facility: CLINIC | Age: 59
End: 2024-07-23
Payer: MEDICAID

## 2024-07-23 NOTE — TELEPHONE ENCOUNTER
(Kay: [id number]) - [id number]  Edarbi 80MG tablets  Status: PA Response - Approved  Created: July 22nd, 2024 [phone]  Sent: July 23rd, 2024    Approved today  The request has been approved. The authorization is effective from 07/23/2024 to 07/23/2025, as long as the member is enrolled in their current health plan. A written notification letter will follow with additional details.

## 2024-07-25 ENCOUNTER — PATIENT ROUNDING (BHMG ONLY) (OUTPATIENT)
Dept: URGENT CARE | Facility: CLINIC | Age: 59
End: 2024-07-25
Payer: MEDICAID

## 2024-07-25 ENCOUNTER — TELEPHONE (OUTPATIENT)
Dept: FAMILY MEDICINE CLINIC | Facility: CLINIC | Age: 59
End: 2024-07-25

## 2024-07-25 RX ORDER — APIXABAN 5 MG/1
TABLET, FILM COATED ORAL
Qty: 180 TABLET | Refills: 3 | OUTPATIENT
Start: 2024-07-25

## 2024-07-25 NOTE — TELEPHONE ENCOUNTER
Caller: Isaias Kellen Ann    Relationship: Self    Best call back number: [phone]     What form or medical record are you requesting: ACCOMODATION FORMS    Who is requesting this form or medical record from you: PATIENT    How would you like to receive the form or medical records (pick-up, mail, fax): FORMS WHERE EMAILED TO PROVIDER  If fax, what is the fax number:   If mail, what is the address: NA  If pick-up, provide patient with address and location details    Timeframe paperwork needed: ASAP    Additional notes: PATIENT IS WANTING TO CHECK THE STATUS OF THE PAPER WORK SHE WAS NEEDING FILLED OUT. PLEASE CALL AND UPDATE PATIENT.

## 2024-07-29 NOTE — PROGRESS NOTES
"Chief Complaint  Hypertension    Subjective      History of Present Illness {  Problem List  Visit  Diagnosis   Encounters  Notes  Medications  Labs  Result Review Imaging  Media :23}     Kellen Esparza, 58 y.o. female with hypertension, small vessel CAD, high degree AV block status post pacemaker, diastolic dysfunction to follow up on hypertension.     She is followed by Dr. Sommer, last visit 6/2023. Last echo 2022 showed normal LVEF. [facility] 2017 showed mild to moderate diffuse tortuosity, evidence of small vessel disease. She had a normal MPS 6/25/24. She was started on amlodipine due to history of small vessel disease and uncontrolled hypertension. Her valsartan was changed to Edarbi by her PCP, but was just started yesterday.    She notes ongoing elevated blood pressures, increased fatigue and NIX. She reports that she has seen pulmonary and her CPAP was recalled and awaiting another sleep apnea. Inhalers do seem to help. She reports SBPs are running 108-160 at home but only checked a few readings at home and hasn't checked since last month. She says it usually is 150-160 at appts. She does note worsening edema and is miserable with this.  She is unable to sleep due to swelling and pain.  She was started on furosemide by PCP which she took for several days and now uses as needed.  She denies any significant improvement with this    Cardiac risks: Hypertension, diabetes, hyperlipidemia, age, obesity    Objective     Vital Signs:   Vitals:    07/30/24 1432   BP: 169/95   BP Location: Left arm   Patient Position: Sitting   Pulse: 70   SpO2: 97%   Weight: 129 kg (285 lb)   Height: 180.3 cm (71\")       Body mass index is 39.75 kg/m².  Physical Exam  Vitals reviewed.   Constitutional:       Appearance: Normal appearance.   HENT:      Head: Normocephalic.   Neck:      Vascular: No carotid bruit.   Cardiovascular:      Rate and Rhythm: Normal rate and regular rhythm.      Pulses: Normal pulses.      Heart " sounds: Normal heart sounds, S1 normal and S2 normal. No murmur heard.  Pulmonary:      Effort: Pulmonary effort is normal. No respiratory distress.      Breath sounds: Normal breath sounds.   Chest:      Chest wall: No tenderness.   Abdominal:      General: Abdomen is flat.      Palpations: Abdomen is soft.   Musculoskeletal:      Cervical back: Neck supple.      Right lower le+ Pitting Edema present.      Left lower le+ Pitting Edema present.   Skin:     General: Skin is warm and dry.   Neurological:      General: No focal deficit present.      Mental Status: She is alert and oriented to person, place, and time. Mental status is at baseline.   Psychiatric:         Mood and Affect: Mood normal.         Behavior: Behavior normal.         Thought Content: Thought content normal.                Data Reviewed:{ Labs  Result Review  Imaging  Med Tab  Media :23}   Cardiology Scan (2024)  Adult Transthoracic Echo Complete W/ Cont if Necessary Per Protocol (2022 16:24)  Microalbumin / Creatinine Urine Ratio - Urine, Clean Catch (2024 12:58)  Hepatitis C Antibody (2024 12:52)  TSH Rfx On Abnormal To Free T4 (2024 12:52)  Uric Acid (2024 12:52)  D-dimer, Quantitative (2024 12:52)  Comprehensive Metabolic Panel (2024 12:52)  CBC & Differential (2024 12:52)  proBNP (2024 12:52)  POC Glycosylated Hemoglobin (Hb A1C) (2024 12:30)    Adult Transthoracic Echo Complete W/ Cont if Necessary Per Protocol (2024 11:24)  Stress Test With Pet Myocardial Perfusion (2024 09:15)    Assessment & Plan   Assessment and Plan {CC Problem List  Visit Diagnosis  ROS  Review (Popup)  Health Maintenance  Quality  BestPractice  Medications  SmartSets  SnapShot Encounters  Media :23}     1. Uncontrolled hypertension  -Stop amlodipine due to worsening lower extremity edema.  Will increase carvedilol to 25 mg twice a day.  Will also start spironolactone  due to lower extremity edema and uncontrolled hypertension.  Her PCP change valsartan to Edarbi and she just picked it up yesterday  -Repeat BMP next week when she sees Shreya DENTON  -Likely exacerbated by recent lack of CPAP    2. Bilateral lower extremity edema  -Stop amlodipine  -Start spironolactone 25 mg daily.  -Advised to take furosemide 40 mg for 3 days and then as needed  -Repeat BMP in 1 week  -Will add proBNP to labs from PAT today    3. Coronary artery disease of bypass graft of native heart with stable angina pectoris  -History of small vessel disease per left heart catheterization in 2017.  -Normal stress PET 6/2024    4. Shortness of breath  -Evidence of diastolic dysfunction with high left atrial pressures on echo 6/2024  -Add proBNP to labs today  -Start spironolactone  - Consider addition of Jardiance  -Could consider changing Edarbi to Entresto if blood pressure remains elevated              Follow Up {Instructions Charge Capture  Follow-up Communications :23}     Return if symptoms worsen or fail to improve.      Patient was given instructions and counseling regarding her condition or for health maintenance advice. Please see specific information pulled into the AVS if appropriate.  Patient was instructed to call the [facility] with any questions, concerns, or worsening symptoms.    Dictated Utilizing Dragon Dictation   Please note that portions of this note were completed with a voice recognition program.   Part of this note may be an electronic transcription/translation of spoken language to printed text using the Dragon Dictation System.

## 2024-07-30 ENCOUNTER — PRE-ADMISSION TESTING (OUTPATIENT)
Dept: PREADMISSION TESTING | Facility: HOSPITAL | Age: 59
End: 2024-07-30
Payer: MEDICAID

## 2024-07-30 ENCOUNTER — OFFICE VISIT (OUTPATIENT)
Dept: CARDIOLOGY | Facility: HOSPITAL | Age: 59
End: 2024-07-30
Payer: MEDICAID

## 2024-07-30 VITALS
HEART RATE: 70 BPM | BODY MASS INDEX: 39.9 KG/M2 | SYSTOLIC BLOOD PRESSURE: 169 MMHG | WEIGHT: 285 LBS | HEIGHT: 71 IN | DIASTOLIC BLOOD PRESSURE: 95 MMHG | OXYGEN SATURATION: 97 %

## 2024-07-30 VITALS — BODY MASS INDEX: 39.88 KG/M2 | WEIGHT: 284.83 LBS | HEIGHT: 71 IN

## 2024-07-30 DIAGNOSIS — I10 UNCONTROLLED HYPERTENSION: Primary | ICD-10-CM

## 2024-07-30 DIAGNOSIS — R06.02 SHORTNESS OF BREATH: ICD-10-CM

## 2024-07-30 DIAGNOSIS — R06.09 DYSPNEA ON EXERTION: ICD-10-CM

## 2024-07-30 DIAGNOSIS — I10 UNCONTROLLED HYPERTENSION: ICD-10-CM

## 2024-07-30 DIAGNOSIS — R07.9 CHEST PAIN, UNSPECIFIED TYPE: ICD-10-CM

## 2024-07-30 DIAGNOSIS — R60.0 BILATERAL LOWER EXTREMITY EDEMA: ICD-10-CM

## 2024-07-30 DIAGNOSIS — I25.708 CORONARY ARTERY DISEASE OF BYPASS GRAFT OF NATIVE HEART WITH STABLE ANGINA PECTORIS: ICD-10-CM

## 2024-07-30 LAB
DEPRECATED RDW RBC AUTO: 40.2 FL (ref 37–54)
ERYTHROCYTE [DISTWIDTH] IN BLOOD BY AUTOMATED COUNT: 13.7 % (ref 12.3–15.4)
HCT VFR BLD AUTO: 36.1 % (ref 34–46.6)
HGB BLD-MCNC: 11.8 G/DL (ref 12–15.9)
INR PPP: 0.99 (ref 0.89–1.12)
MCH RBC QN AUTO: 26.5 PG (ref 26.6–33)
MCHC RBC AUTO-ENTMCNC: 32.7 G/DL (ref 31.5–35.7)
MCV RBC AUTO: 81.1 FL (ref 79–97)
NT-PROBNP SERPL-MCNC: 44.7 PG/ML (ref 0–900)
PLATELET # BLD AUTO: 170 10*3/MM3 (ref 140–450)
PMV BLD AUTO: 12.1 FL (ref 6–12)
POTASSIUM SERPL-SCNC: 4.1 MMOL/L (ref 3.5–5.2)
PROTHROMBIN TIME: 13.2 SECONDS (ref 12.2–14.5)
RBC # BLD AUTO: 4.45 10*6/MM3 (ref 3.77–5.28)
WBC NRBC COR # BLD AUTO: 6.83 10*3/MM3 (ref 3.4–10.8)

## 2024-07-30 PROCEDURE — 99214 OFFICE O/P EST MOD 30 MIN: CPT | Performed by: NURSE PRACTITIONER

## 2024-07-30 PROCEDURE — 93005 ELECTROCARDIOGRAM TRACING: CPT

## 2024-07-30 PROCEDURE — 85610 PROTHROMBIN TIME: CPT

## 2024-07-30 PROCEDURE — 85027 COMPLETE CBC AUTOMATED: CPT

## 2024-07-30 PROCEDURE — 84132 ASSAY OF SERUM POTASSIUM: CPT

## 2024-07-30 PROCEDURE — 83880 ASSAY OF NATRIURETIC PEPTIDE: CPT

## 2024-07-30 PROCEDURE — 36415 COLL VENOUS BLD VENIPUNCTURE: CPT

## 2024-07-30 RX ORDER — SPIRONOLACTONE 25 MG/1
25 TABLET ORAL DAILY
Qty: 30 TABLET | Refills: 1 | Status: SHIPPED | OUTPATIENT
Start: 2024-07-30

## 2024-07-30 RX ORDER — SODIUM PICOSULFATE, MAGNESIUM OXIDE, AND ANHYDROUS CITRIC ACID 10; 3.5; 12 MG/160ML; G/160ML; G/160ML
350 LIQUID ORAL TAKE AS DIRECTED
Qty: 350 ML | Refills: 0 | Status: SHIPPED | OUTPATIENT
Start: 2024-07-30

## 2024-07-30 RX ORDER — AMOXICILLIN 875 MG/1
875 TABLET, COATED ORAL 2 TIMES DAILY
COMMUNITY
Start: 2024-07-24 | End: 2024-08-03

## 2024-07-30 RX ORDER — CARVEDILOL 25 MG/1
25 TABLET ORAL 2 TIMES DAILY
Qty: 60 TABLET | Refills: 3 | Status: SHIPPED | OUTPATIENT
Start: 2024-07-30

## 2024-07-30 NOTE — PATIENT INSTRUCTIONS
Stop amlodipine  Increase carvedilol to 25mg twice a day  Add spironolactone  Recheck labs next week at appt with Shreya  Monitor BPs daily with new cuff (preferably an Omron cuff) and bring to next visit  Take furosemide for 3 days to help with swelling  Continue Edarbi

## 2024-07-30 NOTE — PAT
An arrival time for procedure was not provided during PAT visit. If patient had any questions or concerns about their arrival time, they were instructed to contact their surgeon/physician.  Additionally, if the patient referred to an arrival time that was acquired from their my chart account, patient was encouraged to verify that time with their surgeon/physician. Arrival times are NOT provided in Pre Admission Testing Department.    Per Anesthesia Request, patient instructed not to take their ACE/ARB medications on the AM of surgery.    Patient denies any current skin issues.     Per Anesthesia Request, patient instructed not to take their ACE/ARB medications on the AM of surgery.    Verified patient previously completed cardiology visit for cardiac risk assessment in preparation for upcoming procedure, completion of 12-lead ECG within six months, and risk assessment letter reviewed. No further interventions required.   Cardiac risk assessment from CORRIE Livingston on 7/8/24 on chart.     Device check from 5/9/24 in Epic.     Pt stated that she tested positive for strep and has been on Amoxicillin since 7/24/24 and will complete on 8/3/24. PAT RN notified Ary,  for Dr. Mejia, of pt's strep and antibiotics.

## 2024-07-31 LAB
QT INTERVAL: 388 MS
QTC INTERVAL: 424 MS

## 2024-08-06 ENCOUNTER — PRIOR AUTHORIZATION (OUTPATIENT)
Dept: FAMILY MEDICINE CLINIC | Facility: CLINIC | Age: 59
End: 2024-08-06

## 2024-08-06 ENCOUNTER — ANESTHESIA (OUTPATIENT)
Dept: GASTROENTEROLOGY | Facility: HOSPITAL | Age: 59
End: 2024-08-06
Payer: MEDICAID

## 2024-08-06 ENCOUNTER — OFFICE VISIT (OUTPATIENT)
Dept: FAMILY MEDICINE CLINIC | Facility: CLINIC | Age: 59
End: 2024-08-06
Payer: MEDICAID

## 2024-08-06 ENCOUNTER — ANESTHESIA EVENT (OUTPATIENT)
Dept: GASTROENTEROLOGY | Facility: HOSPITAL | Age: 59
End: 2024-08-06
Payer: MEDICAID

## 2024-08-06 ENCOUNTER — HOSPITAL ENCOUNTER (OUTPATIENT)
Facility: HOSPITAL | Age: 59
Setting detail: HOSPITAL OUTPATIENT SURGERY
Discharge: HOME | End: 2024-08-06
Attending: INTERNAL MEDICINE | Admitting: INTERNAL MEDICINE
Payer: MEDICAID

## 2024-08-06 VITALS
RESPIRATION RATE: 16 BRPM | BODY MASS INDEX: 39.09 KG/M2 | HEART RATE: 75 BPM | DIASTOLIC BLOOD PRESSURE: 88 MMHG | OXYGEN SATURATION: 96 % | HEIGHT: 71 IN | SYSTOLIC BLOOD PRESSURE: 132 MMHG | WEIGHT: 279.2 LBS

## 2024-08-06 VITALS
DIASTOLIC BLOOD PRESSURE: 77 MMHG | HEART RATE: 74 BPM | RESPIRATION RATE: 24 BRPM | TEMPERATURE: 97 F | SYSTOLIC BLOOD PRESSURE: 132 MMHG | OXYGEN SATURATION: 98 %

## 2024-08-06 DIAGNOSIS — E11.65 TYPE 2 DIABETES MELLITUS WITH HYPERGLYCEMIA, WITHOUT LONG-TERM CURRENT USE OF INSULIN: Primary | ICD-10-CM

## 2024-08-06 DIAGNOSIS — Z51.81 THERAPEUTIC DRUG MONITORING: ICD-10-CM

## 2024-08-06 DIAGNOSIS — G90.59 COMPLEX REGIONAL PAIN SYNDROME TYPE 1 AFFECTING OTHER SITE: ICD-10-CM

## 2024-08-06 DIAGNOSIS — Z86.010 HISTORY OF COLON POLYPS: ICD-10-CM

## 2024-08-06 DIAGNOSIS — Z12.11 COLON CANCER SCREENING: ICD-10-CM

## 2024-08-06 LAB — GLUCOSE BLDC GLUCOMTR-MCNC: 163 MG/DL (ref 70–130)

## 2024-08-06 PROCEDURE — 25010000002 PROPOFOL 10 MG/ML EMULSION: Performed by: NURSE ANESTHETIST, CERTIFIED REGISTERED

## 2024-08-06 PROCEDURE — 82948 REAGENT STRIP/BLOOD GLUCOSE: CPT

## 2024-08-06 PROCEDURE — 99214 OFFICE O/P EST MOD 30 MIN: CPT | Performed by: FAMILY MEDICINE

## 2024-08-06 PROCEDURE — 1125F AMNT PAIN NOTED PAIN PRSNT: CPT | Performed by: FAMILY MEDICINE

## 2024-08-06 PROCEDURE — 25810000003 SODIUM CHLORIDE 0.9 % SOLUTION: Performed by: NURSE ANESTHETIST, CERTIFIED REGISTERED

## 2024-08-06 PROCEDURE — S0260 H&P FOR SURGERY: HCPCS | Performed by: INTERNAL MEDICINE

## 2024-08-06 PROCEDURE — 25010000002 LIDOCAINE 1 % SOLUTION: Performed by: NURSE ANESTHETIST, CERTIFIED REGISTERED

## 2024-08-06 PROCEDURE — 3079F DIAST BP 80-89 MM HG: CPT | Performed by: FAMILY MEDICINE

## 2024-08-06 PROCEDURE — 45385 COLONOSCOPY W/LESION REMOVAL: CPT | Performed by: INTERNAL MEDICINE

## 2024-08-06 PROCEDURE — 88305 TISSUE EXAM BY PATHOLOGIST: CPT | Performed by: INTERNAL MEDICINE

## 2024-08-06 PROCEDURE — 3051F HG A1C>EQUAL 7.0%<8.0%: CPT | Performed by: FAMILY MEDICINE

## 2024-08-06 PROCEDURE — 3075F SYST BP GE 130 - 139MM HG: CPT | Performed by: FAMILY MEDICINE

## 2024-08-06 PROCEDURE — 45380 COLONOSCOPY AND BIOPSY: CPT | Performed by: INTERNAL MEDICINE

## 2024-08-06 RX ORDER — SEMAGLUTIDE 1.34 MG/ML
INJECTION, SOLUTION SUBCUTANEOUS
Qty: 3 ML | Refills: 0 | Status: SHIPPED | OUTPATIENT
Start: 2024-08-06 | End: 2024-10-15

## 2024-08-06 RX ORDER — IPRATROPIUM BROMIDE AND ALBUTEROL SULFATE 2.5; .5 MG/3ML; MG/3ML
3 SOLUTION RESPIRATORY (INHALATION) ONCE AS NEEDED
Status: DISCONTINUED | OUTPATIENT
Start: 2024-08-06 | End: 2024-08-06 | Stop reason: HOSPADM

## 2024-08-06 RX ORDER — SODIUM CHLORIDE 9 MG/ML
INJECTION, SOLUTION INTRAVENOUS CONTINUOUS PRN
Status: DISCONTINUED | OUTPATIENT
Start: 2024-08-06 | End: 2024-08-06 | Stop reason: SURG

## 2024-08-06 RX ORDER — ONDANSETRON 2 MG/ML
4 INJECTION INTRAMUSCULAR; INTRAVENOUS ONCE AS NEEDED
Status: DISCONTINUED | OUTPATIENT
Start: 2024-08-06 | End: 2024-08-06 | Stop reason: HOSPADM

## 2024-08-06 RX ORDER — LIDOCAINE HYDROCHLORIDE 10 MG/ML
INJECTION, SOLUTION INFILTRATION; PERINEURAL AS NEEDED
Status: DISCONTINUED | OUTPATIENT
Start: 2024-08-06 | End: 2024-08-06 | Stop reason: SURG

## 2024-08-06 RX ORDER — PROPOFOL 10 MG/ML
VIAL (ML) INTRAVENOUS AS NEEDED
Status: DISCONTINUED | OUTPATIENT
Start: 2024-08-06 | End: 2024-08-06 | Stop reason: SURG

## 2024-08-06 RX ORDER — TIRZEPATIDE 2.5 MG/.5ML
2.5 INJECTION, SOLUTION SUBCUTANEOUS
Qty: 2 ML | Refills: 0 | Status: SHIPPED | OUTPATIENT
Start: 2024-08-06 | End: 2024-08-06 | Stop reason: CLARIF

## 2024-08-06 RX ADMIN — LIDOCAINE HYDROCHLORIDE 50 MG: 10 INJECTION, SOLUTION INFILTRATION; PERINEURAL at 10:09

## 2024-08-06 RX ADMIN — PROPOFOL 30 MG: 10 INJECTION, EMULSION INTRAVENOUS at 10:20

## 2024-08-06 RX ADMIN — PROPOFOL 30 MG: 10 INJECTION, EMULSION INTRAVENOUS at 10:18

## 2024-08-06 RX ADMIN — PROPOFOL 20 MG: 10 INJECTION, EMULSION INTRAVENOUS at 10:27

## 2024-08-06 RX ADMIN — SODIUM CHLORIDE: 9 INJECTION, SOLUTION INTRAVENOUS at 10:05

## 2024-08-06 RX ADMIN — PROPOFOL 20 MG: 10 INJECTION, EMULSION INTRAVENOUS at 10:14

## 2024-08-06 RX ADMIN — PROPOFOL 80 MG: 10 INJECTION, EMULSION INTRAVENOUS at 10:09

## 2024-08-06 RX ADMIN — PROPOFOL 20 MG: 10 INJECTION, EMULSION INTRAVENOUS at 10:31

## 2024-08-06 RX ADMIN — PROPOFOL 20 MG: 10 INJECTION, EMULSION INTRAVENOUS at 10:16

## 2024-08-06 RX ADMIN — PROPOFOL 20 MG: 10 INJECTION, EMULSION INTRAVENOUS at 10:23

## 2024-08-06 RX ADMIN — PROPOFOL 30 MG: 10 INJECTION, EMULSION INTRAVENOUS at 10:11

## 2024-08-06 NOTE — TELEPHONE ENCOUNTER
(Key: [id number]) - [id number]  Mounjaro 2.5MG/0.5ML pen-injectors  status: PA Request  Created: August 6th, 2024  Sent: August 6th, 2024    Approved today by [place] [employer] 2017  The request has been approved. The authorization is effective from 08/06/2024 to 02/05/2025, as long as the member is enrolled in their current health plan. The request was approved as submitted. A written notification letter will follow with additional details.

## 2024-08-06 NOTE — ANESTHESIA PREPROCEDURE EVALUATION
Anesthesia Evaluation     Patient summary reviewed and Nursing notes reviewed   NPO Solid Status: > 8 hours  NPO Liquid Status: > 2 hours           Airway   TM distance: >3 FB  Neck ROM: full  No difficulty expected  Dental          Pulmonary     breath sounds clear to auscultation  (+) pulmonary embolism,shortness of breath, sleep apnea  Cardiovascular     ECG reviewed  Rhythm: regular  Rate: normal    (+) pacemaker interrogated 1-3 months ago, hypertension, CAD, dysrhythmias Paroxysmal Atrial Fib, DVT, hyperlipidemia      Neuro/Psych  (+) headaches, numbness, psychiatric history  GI/Hepatic/Renal/Endo    (+) morbid obesity, GERD, diabetes mellitus    Musculoskeletal     (+) back pain, chronic pain  Abdominal    Substance History      OB/GYN          Other   arthritis,         Phys Exam Other: Sinus rhythm with 1st degree AV block with occasional ventricular-paced complexes  Nonspecific T wave abnormality     Left ventricular systolic function is normal. Left ventricular ejection fraction appears to be 61 - 65%.  ·  Left ventricular wall thickness is consistent with mild concentric hypertrophy.  ·  Left ventricular diastolic function is consistent with (grade Ia w/high LAP) impaired relaxation.  ·  Mild mitral valve regurgitation is present.  ·  Mild tricuspid valve regurgitation is present.  ·  Estimated right ventricular systolic pressure from tricuspid regurgitation is mildly elevated (35-45 mmHg).    Left ventricular ejection fraction is hyperdynamic (Calculated EF > 70%).  ·  Myocardial perfusion imaging indicates a normal myocardial perfusion study with no evidence of ischemia.  ·  Impressions are consistent with a low risk study.                     Anesthesia Plan    ASA 3     general     intravenous induction     Anesthetic plan, risks, benefits, and alternatives have been provided, discussed and informed consent has been obtained with: patient.    Plan discussed with CRNA.    CODE STATUS:

## 2024-08-06 NOTE — H&P
[facility]    Referring Provider: Bryant Mejia MD    Reason for Consultation: here for colonoscopy    Chief complaint here for colonoscopy    History of present illness:  Kellen Esparza is a 58 y.o. female who presents to inpatient endoscopy for screening colonoscopy. H/o inadequate prep colon 2022.     Allergies:  Metformin, Codeine, and Lisinopril    Scheduled Meds:       Infusions:  No current facility-administered medications for this encounter.      PRN Meds:      Home Meds:  Medications Prior to Admission   Medication Sig Dispense Refill Last Dose    atorvastatin (LIPITOR) 80 MG tablet TAKE 1 TABLET BY MOUTH DAILY 90 tablet 3 8/5/2024    azilsartan medoxomil (Edarbi) 80 MG tablet tablet Take 1 tablet by mouth Daily. To replace valsartan 30 tablet 2 8/5/2024    carvedilol (COREG) 25 MG tablet Take 1 tablet by mouth 2 (Two) Times a Day. 60 tablet 3 8/6/2024    cyclobenzaprine (FLEXERIL) 10 MG tablet Take 1 tablet by mouth 3 (Three) Times a Day.   8/5/2024    Fluticasone-Salmeterol (ADVAIR/WIXELA) 250-50 MCG/ACT DISKUS Inhale 1 puff 2 (Two) Times a Day. 60 each 5 Past Week    furosemide (LASIX) 40 MG tablet TAKE 1 TABLET BY MOUTH DAILY FOR 5 DAYS THEN TAKE 1 TABLET BY MOUTH UP TO 3 TIMES EVERY WEEK FOR SWELLING 20 tablet 0 Past Week    levETIRAcetam (KEPPRA) 250 MG tablet TAKE 1 TABLET BY MOUTH TWICE DAILY 90 tablet 1 8/6/2024    oxyCODONE-acetaminophen (PERCOCET) 7.5-325 MG per tablet As Needed.   8/6/2024    pregabalin (LYRICA) 200 MG capsule Take 1 capsule by mouth Every 12 (Twelve) Hours.   8/6/2024    Sod Picosulfate-Mag Ox-Cit Acd (Clenpiq) 10-3.5-12 MG-GM -GM/160ML solution Take 350 mL by mouth Take As Directed. 350 mL 0 8/5/2024    spironolactone (ALDACTONE) 25 MG tablet Take 1 tablet by mouth Daily. 30 tablet 1 8/5/2024    traZODone (DESYREL) 150 MG tablet TAKE 1 TABLET BY MOUTH EVERY NIGHT (Patient taking differently: Take 1 tablet by mouth As Needed.) 90 tablet 1 Past Week     albuterol sulfate  (90 Base) MCG/ACT inhaler Inhale 2 puffs Every 4 (Four) Hours As Needed for Wheezing. 18 g 11     apixaban (Eliquis) 5 MG tablet tablet Take 1 tablet by mouth 2 (Two) Times a Day. 180 tablet 1 8/3/2024       ROS: Review of Systems  All other systems reviewed and are negative.    PAST MED HX: Pt  has a past medical history of Arthritis, Arthritis of back, Carpal tunnel syndrome, Chlamydia, CPAP (continuous positive airway pressure) dependence, DDD (degenerative disc disease), cervical (09/06/2017), Deep vein thrombosis, Depression, Diabetes mellitus, Elevated cholesterol, GERD (gastroesophageal reflux disease), History of COVID-19 (01/2022), Hypertension, Knee swelling, Migraine, Musculoligamentous strain, Pacemaker, Pulmonary embolism (1997), Sleep apnea, and Wears eyeglasses.  PAST SURG HX: Pt  has a past surgical history that includes Oophorectomy (11/2014); Cholecystectomy; Tubal ligation; Breast biopsy (Left, 2014); Hysterectomy (11/2014); Colonoscopy; Esophagogastroduodenoscopy; Nasal sinus surgery; Pulmonary embolism surgery; Knee surgery (Right, 12/2017); Cardiac catheterization (Left, 08/10/2017); Joint replacement (Right); Cardiac electrophysiology procedure (N/A, 07/19/2019); Insert / replace / remove pacemaker; Pacemaker Implantation; Spinal cord stimulator implant (N/A, 09/10/2020); and Colonoscopy (N/A, 05/03/2022).  FAM HX: family history includes Breast cancer (age of onset: 42) in her mother; Diabetes in her mother; Heart failure in her mother and son; Hypertension in her brother, maternal grandfather, maternal grandmother, and sister; No Known Problems in her father, paternal grandfather, and paternal grandmother.  SOC HX: Pt  reports that she has never smoked. She has never been exposed to tobacco smoke. She has never used smokeless tobacco. She reports that she does not drink alcohol and does not use drugs.    /89 (Patient Position: Lying)   Pulse 74   Temp 97.4  °F (36.3 °C) (Temporal)   Resp 18   LMP 10/31/2014 (LMP Unknown) Comment: Mammogram 2014 info given   SpO2 97%     Physical Exam  Wt Readings from Last 3 Encounters:   07/30/24 129 kg (285 lb)   07/30/24 129 kg (284 lb 13.4 oz)   07/24/24 128 kg (282 lb 3 oz)   ,body mass index is unknown because there is no height or weight on file.    General Appearance:  Vitals as above. no acute distress  Head/face:  Normocephalic, atraumatic  Eyes:   EOMI, no conjunctivitis or icterus   Nose/Sinuses:  Nares patent bilaterally without discharge or lesions  Mouth/Throat:  Normal oral movements without dyskinesia  Neck:  trachea is midline, no thyromegaly  Lungs:  Normal work of breathing effort, no overt rales  Heart:  Regular rate, no overt palpable thrill or grade VI M  Abdomen:  Nondistended, no guarding or rebound tenderness  Neurologic:  Alert; no focal deficits; age appropriate behavior and speech  Psychiatric: mood and affect are congruent  Vascular: extremities without edema  Skin: no rash or cyanosis.          Results Review:   I reviewed the patient's new clinical results.    Lab Results   Component Value Date    WBC 6.83 07/30/2024    HGB 11.8 (L) 07/30/2024    HCT 36.1 07/30/2024    MCV 81.1 07/30/2024     07/30/2024       Lab Results   Component Value Date    GLUCOSE 163 (H) 06/05/2024    BUN 8 06/05/2024    CREATININE 0.83 06/05/2024    EGFRIFAFRI 80 12/09/2021    BCR 9.6 06/05/2024    CO2 26.7 06/05/2024    CALCIUM 10.3 06/05/2024    ALBUMIN 4.6 06/05/2024    AST 16 06/05/2024    ALT 22 06/05/2024       ASSESSMENTS/PLANS  1.) Health care  maintenance  To screening colonoscopy  The risk of endoscopy was discussed including the risk of anesthesia, bowel perforation, bleeding, missed lesion, infection, and death should a severe complication occur.  The patient determined that the diagnostic benefit outweighed the risk.             I discussed the patient's findings and my recommendations with the  patient    Bryant Mejia MD  08/06/24  10:01 EDT

## 2024-08-06 NOTE — ANESTHESIA POSTPROCEDURE EVALUATION
Patient: Kellen Esparza    Procedure Summary       Date: 08/06/24 Room / Location:  [facility]    Anesthesia Start: 1005 Anesthesia Stop: 1042    Procedure: COLONOSCOPY Diagnosis:       Colon cancer screening      History of colon polyps      (Colon cancer screening [Z12.11])      (History of colon polyps [Z86.010])    Surgeons: Bryant Mejia MD Provider: Donovan Lawrence MD    Anesthesia Type: general ASA Status: 3            Anesthesia Type: general    Vitals  No vitals data found for the desired time range.          Post Anesthesia Care and Evaluation    Patient location during evaluation: PACU  Patient participation: complete - patient participated  Level of consciousness: sleepy but conscious  Pain management: adequate    Airway patency: patent  Anesthetic complications: No anesthetic complications  PONV Status: none  Cardiovascular status: hemodynamically stable and acceptable  Respiratory status: nonlabored ventilation, acceptable and nasal cannula  Hydration status: acceptable

## 2024-08-12 ENCOUNTER — TELEPHONE (OUTPATIENT)
Dept: ORTHOPEDIC SURGERY | Facility: CLINIC | Age: 59
End: 2024-08-12
Payer: MEDICAID

## 2024-08-12 NOTE — TELEPHONE ENCOUNTER
----- Message from [facility] sent at 8/12/2024 12:24 PM EDT -----  Regarding: Appointment Request  Contact: [phone]  Appointment Request From: Kellen Esparza    With Provider: Rian Najera [[facility]]    Preferred Date Range: 8/12/2024 – 8/12/2024    Preferred Times: Any Time    Reason for visit: Follow-up    Comments:  Keen Pain - unable to put any pressue on the left knee.  This has been going on for about 2 weeks now (again)

## 2024-08-12 NOTE — TELEPHONE ENCOUNTER
HuB OK TO RELAY AND SCHEDULE    Called patient and was unable to leave voicemail. Patient can schedule a f/u with Dr. Najera at his next available.

## 2024-08-14 LAB — DRUGS UR: NORMAL

## 2024-08-15 ENCOUNTER — OFFICE VISIT (OUTPATIENT)
Age: 59
End: 2024-08-15
Payer: MEDICAID

## 2024-08-15 VITALS
BODY MASS INDEX: 39.06 KG/M2 | DIASTOLIC BLOOD PRESSURE: 100 MMHG | SYSTOLIC BLOOD PRESSURE: 132 MMHG | WEIGHT: 279 LBS | HEIGHT: 71 IN

## 2024-08-15 DIAGNOSIS — M17.12 ARTHRITIS OF LEFT KNEE: Primary | ICD-10-CM

## 2024-08-15 NOTE — PROGRESS NOTES
[facility] [place] [place] [facility] Follow Up Visit     Office Follow Up      Date: 08/15/2024   Patient Name: Kellen Esparza  MRN: [id number]  YOB: 1965    Referring Physician: No ref. provider found     Chief Complaint:   Chief Complaint   Patient presents with    Follow-up     2 month follow up -- Arthritis of left knee       History of Present Illness: Kellen Esparza is a 58 y.o. female who returns to clinic today for follow up on left knee pain. Her pain is a 9 /10 on the pain scale. Patient has tried the following previous treatments bracing, heat, and ice. She mentions current symptoms of same as prior . She states that these treatments have worsened.    Subjective     Review of Systems: Review of Systems   Constitutional:  Negative for chills, fever, unexpected weight gain and unexpected weight loss.   HENT:  Negative for congestion, postnasal drip and rhinorrhea.    Eyes:  Negative for blurred vision.   Respiratory:  Negative for shortness of breath.    Cardiovascular:  Negative for leg swelling.   Gastrointestinal:  Negative for abdominal pain, nausea and vomiting.   Genitourinary:  Negative for difficulty urinating.   Musculoskeletal:  Positive for arthralgias. Negative for gait problem, joint swelling and myalgias.   Skin:  Negative for skin lesions and wound.   Neurological:  Negative for dizziness, weakness, light-headedness and numbness.   Hematological:  Does not bruise/bleed easily.   Psychiatric/Behavioral:  Negative for depressed mood.         Medications:   Current Outpatient Medications:     albuterol sulfate  (90 Base) MCG/ACT inhaler, Inhale 2 puffs Every 4 (Four) Hours As Needed for Wheezing., Disp: 18 g, Rfl: 11    apixaban (Eliquis) 5 MG tablet tablet, Take 1 tablet by mouth 2 (Two) Times a Day., Disp: 180 tablet, Rfl: 1    atorvastatin (LIPITOR) 80 MG tablet, TAKE 1 TABLET BY MOUTH DAILY, Disp: 90 tablet, Rfl: 3    azilsartan  medoxomil (Edarbi) 80 MG tablet tablet, Take 1 tablet by mouth Daily. To replace valsartan, Disp: 30 tablet, Rfl: 2    carvedilol (COREG) 25 MG tablet, Take 1 tablet by mouth 2 (Two) Times a Day., Disp: 60 tablet, Rfl: 3    cyclobenzaprine (FLEXERIL) 10 MG tablet, Take 1 tablet by mouth 3 (Three) Times a Day., Disp: , Rfl:     Fluticasone-Salmeterol (ADVAIR/WIXELA) 250-50 MCG/ACT DISKUS, Inhale 1 puff 2 (Two) Times a Day., Disp: 60 each, Rfl: 5    furosemide (LASIX) 40 MG tablet, TAKE 1 TABLET BY MOUTH DAILY FOR 5 DAYS THEN TAKE 1 TABLET BY MOUTH UP TO 3 TIMES EVERY WEEK FOR SWELLING, Disp: 20 tablet, Rfl: 0    levETIRAcetam (KEPPRA) 250 MG tablet, TAKE 1 TABLET BY MOUTH TWICE DAILY, Disp: 90 tablet, Rfl: 1    oxyCODONE-acetaminophen (PERCOCET) 7.5-325 MG per tablet, As Needed., Disp: , Rfl:     Ozempic, 0.25 or 0.5 MG/DOSE, 2 MG/1.5ML solution pen-injector, Inject 0.25 mg under the skin into the appropriate area as directed 1 (One) Time Per Week for 28 days, THEN 0.5 mg 1 (One) Time Per Week for 42 days., Disp: 3 mL, Rfl: 0    pregabalin (LYRICA) 200 MG capsule, Take 1 capsule by mouth Every 12 (Twelve) Hours., Disp: , Rfl:     Sod Picosulfate-Mag Ox-Cit Acd (Clenpiq) 10-3.5-12 MG-GM -GM/160ML solution, Take 350 mL by mouth Take As Directed., Disp: 350 mL, Rfl: 0    spironolactone (ALDACTONE) 25 MG tablet, Take 1 tablet by mouth Daily., Disp: 30 tablet, Rfl: 1    traZODone (DESYREL) 150 MG tablet, TAKE 1 TABLET BY MOUTH EVERY NIGHT (Patient taking differently: Take 1 tablet by mouth As Needed.), Disp: 90 tablet, Rfl: 1    Allergies:   Allergies   Allergen Reactions    Metformin Diarrhea, Nausea And Vomiting and GI Intolerance    Codeine Itching    Lisinopril Cough       I have reviewed and updated the patient's chief complaint, history of present illness, review of systems, past medical history, surgical history, family history, social history, medications and allergy list as appropriate.     Objective      Vital  "Signs:   Vitals:    08/15/24 0927   BP: 132/100   Weight: 127 kg (279 lb)   Height: 180.3 cm (70.98\")     Body mass index is 38.93 kg/m².  Class 3 Severe Obesity (BMI >=40). Obesity-related health conditions include the following: hypertension, coronary heart disease, diabetes mellitus, and dyslipidemias. Obesity is newly identified. BMI is is above average; BMI management plan is completed. We discussed portion control and increasing exercise.      Patient reports that she is a non-smoker and has not ever been a smoker.  This behavior was applauded and she was encouraged to continue in smoking cessation.  We will continue to monitor at subsequent visits.     Ortho Exam:   Left knee exam:   There is swelling and effusion but no warmth or erythema of the knee.    The skin is clear.    Overall the alignment of the knee is varus   The patient has 5/5 strength with ankle plantar flexion, dorsiflexion, inversion, eversion, and great toe extension.    Sensation is grossly present to light touch in the superficial peroneal, deep peroneal, and tibial nerve distributions.    The dorsalis pedis pulse is 2+ and the foot is well perfused with brisk capillary refill.   Active ROM is 0° to 110°.   The knee shows pseudolaxity with valgus stress testing, anterior/posterior drawer sign, and Lachman testing.    There is tenderness to palpation over the medial/lateral joint line. Patellar grind test is positive.   Hip ROM is full and painless.    Results Review:   Imaging Results (Last 24 Hours)       ** No results found for the last 24 hours. **            Procedures    Assessment / Plan      Assessment/Plan:   Diagnoses and all orders for this visit:    1. Arthritis of left knee (Primary)  -     Large Joint Arthrocentesis; Future    Follow-up on left knee osteoarthritis.  Pain was improved for approximately 1 week with ultrasound-guided corticosteroid injection to the knee.  However, symptoms have returned.  She has pain along the " medial joint line as well as feelings of instability.  Her knee brace that was given to her at last visit is not significantly helping. We will fit her for a new brace today. I will also order viscosupplementation injections. She will follow up to begin that series.    Follow Up:   No follow-ups on file.      Caesar Najera MD  [facility]

## 2024-08-22 ENCOUNTER — HOSPITAL ENCOUNTER (OUTPATIENT)
Dept: SLEEP MEDICINE | Facility: HOSPITAL | Age: 59
Discharge: HOME | End: 2024-08-22
Admitting: NURSE PRACTITIONER
Payer: MEDICAID

## 2024-08-22 VITALS — HEIGHT: 71 IN | BODY MASS INDEX: 39.2 KG/M2 | WEIGHT: 279.98 LBS

## 2024-08-22 DIAGNOSIS — G47.33 OSA (OBSTRUCTIVE SLEEP APNEA): ICD-10-CM

## 2024-08-22 PROCEDURE — 95806 SLEEP STUDY UNATT&RESP EFFT: CPT

## 2024-08-26 DIAGNOSIS — I10 ESSENTIAL HYPERTENSION: ICD-10-CM

## 2024-08-26 DIAGNOSIS — G47.33 OSA (OBSTRUCTIVE SLEEP APNEA): Primary | ICD-10-CM

## 2024-08-26 NOTE — PROGRESS NOTES
Subjective   Kellen Esparza is a 58 y.o. female.     Chief Complaint   Patient presents with    Pain       History of Present Illness     Kellen Esparza presents today for   Chief Complaint   Patient presents with    Pain     she is in need of chronic disease followup. she denies acute complaints today.    This patient is accompanied by their self who contributes to the history of their care.    The following portions of the patient's history were reviewed and updated as appropriate: allergies, current medications, past family history, past medical history, past social history, past surgical history and problem list.    Current Outpatient Medications   Medication Instructions    albuterol sulfate  (90 Base) MCG/ACT inhaler 2 puffs, Inhalation, Every 4 Hours PRN    apixaban (ELIQUIS) 5 mg, Oral, 2 Times Daily    atorvastatin (LIPITOR) 80 mg, Oral, Daily    azilsartan medoxomil (EDARBI) 80 mg, Oral, Daily, To replace valsartan    carvedilol (COREG) 25 mg, Oral, 2 Times Daily    cyclobenzaprine (FLEXERIL) 10 mg, Oral, 3 Times Daily    Fluticasone-Salmeterol (ADVAIR/WIXELA) 250-50 MCG/ACT DISKUS 1 puff, Inhalation, 2 Times Daily - RT    furosemide (LASIX) 40 MG tablet TAKE 1 TABLET BY MOUTH DAILY FOR 5 DAYS THEN TAKE 1 TABLET BY MOUTH UP TO 3 TIMES EVERY WEEK FOR SWELLING    levETIRAcetam (KEPPRA) 250 MG tablet TAKE 1 TABLET BY MOUTH TWICE DAILY    oxyCODONE-acetaminophen (PERCOCET) 7.5-325 MG per tablet As Needed.    Ozempic, 0.25 or 0.5 MG/DOSE, 2 MG/1.5ML solution pen-injector Inject 0.25 mg under the skin into the appropriate area as directed 1 (One) Time Per Week for 28 days, THEN 0.5 mg 1 (One) Time Per Week for 42 days.    pregabalin (LYRICA) 200 MG capsule 1 capsule, Oral, Every 12 Hours Scheduled    Sod Picosulfate-Mag Ox-Cit Acd (Clenpiq) 10-3.5-12 MG-GM -GM/160ML solution 350 mL, Oral, Take As Directed    spironolactone (ALDACTONE) 25 mg, Oral, Daily    traZODone (DESYREL) 150 mg, Oral, Nightly      Active Ambulatory Problems     Diagnosis Date Noted    Chest pain 07/21/2017    Dyspnea on exertion 07/21/2017    Essential hypertension 07/21/2017    Pain of right breast 07/21/2017    Lower extremity edema 07/21/2017    Diabetes mellitus 07/21/2017    Abnormal stress test 08/03/2017    Migraine 08/10/2017    History of pulmonary embolus (PE) 01/01/1997    Hyperlipidemia 08/10/2017    DDD (degenerative disc disease), cervical 09/06/2017    Coronary artery disease of native artery of native heart with stable angina pectoris 09/11/2017    AV block, complete (S/P PPM 7/2019) 04/30/2019    Obesity, Class II, BMI 35-39.9 08/02/2019    ALYSIA (obstructive sleep apnea) 08/02/2019    Pain in pacemaker pocket 11/13/2019    Paroxysmal atrial fibrillation 11/13/2019    Complex regional pain syndrome type I 11/14/2019    Chronic anticoagulation 11/14/2019    Pacemaker 11/14/2019    Morbid obesity 11/14/2019    Entrapment neuropathy 11/14/2019    Encounter for fitting and adjustment of neuropacemaker of spinal cord 07/27/2020    Mechanical low back pain 08/18/2020    Presence of neurostimulator 06/17/2021    Thoracic spondylosis without myelopathy 06/22/2021    Myofascial pain 06/22/2021    Menopausal syndrome (hot flashes) 04/11/2022    Bilateral carpal tunnel syndrome 11/20/2023    Colon cancer screening 06/11/2024    History of colon polyps 06/11/2024     Resolved Ambulatory Problems     Diagnosis Date Noted    Restrictive pattern on PFT's without evidence of ILD 08/02/2019     Past Medical History:   Diagnosis Date    Arthritis     Arthritis of back     Carpal tunnel syndrome     Chlamydia     CPAP (continuous positive airway pressure) dependence     Deep vein thrombosis     Depression     Elevated cholesterol     GERD (gastroesophageal reflux disease)     History of COVID-19 01/2022    Hypertension     Knee swelling     Musculoligamentous strain     Pulmonary embolism 1997    Sleep apnea     Wears eyeglasses      Past  Surgical History:   Procedure Laterality Date    BREAST BIOPSY Left 2014    CARDIAC CATHETERIZATION Left 08/10/2017    Procedure: Cardiac Catheterization/Vascular Study;  Surgeon: Johny Sommer MD;  Location:  [facility];  Service:     CARDIAC ELECTROPHYSIOLOGY PROCEDURE N/A 07/19/2019    Procedure: Pacemaker DC new;  Surgeon: Sonya Gay MD;  Location:  [facility];  Service: Cardiology    CHOLECYSTECTOMY      COLONOSCOPY      COLONOSCOPY N/A 05/03/2022    Procedure: COLONOSCOPY;  Surgeon: Bryant Mejia MD;  Location:  [facility];  Service: Gastroenterology;  Laterality: N/A;    COLONOSCOPY N/A 8/6/2024    Procedure: COLONOSCOPY;  Surgeon: Bryant Mejia MD;  Location:  [facility];  Service: Gastroenterology;  Laterality: N/A;    ENDOSCOPY      HYSTERECTOMY  11/2014    INSERT / REPLACE / REMOVE PACEMAKER      JOINT REPLACEMENT Right     knee    KNEE SURGERY Right 12/2017    total replacement    NASAL SINUS SURGERY      OOPHORECTOMY  11/2014    PACEMAKER IMPLANTATION      PULMONARY EMBOLISM SURGERY      right lung    SPINAL CORD STIMULATOR IMPLANT N/A 09/10/2020    Procedure: SPINAL CORD STIMULATOR INSERTION PHASE 1, UPPER THORACIC PLACEMENT ;  Surgeon: Victor Manuel Geiger MD;  Location:  [facility];  Service: Neurosurgery;  Laterality: N/A;    TUBAL ABDOMINAL LIGATION       Family History   Problem Relation Age of Onset    Breast cancer Mother 42    Heart failure Mother     Diabetes Mother     No Known Problems Father     Hypertension Sister     Hypertension Brother     Heart failure Son     Hypertension Maternal Grandmother     No Known Problems Paternal Grandmother     Hypertension Maternal Grandfather     No Known Problems Paternal Grandfather     Ovarian cancer Neg Hx      Social History     Socioeconomic History    Marital status:    Tobacco Use    Smoking status: Never     Passive exposure: Never    Smokeless tobacco: Never   Vaping  "Use    Vaping status: Never Used   Substance and Sexual Activity    Alcohol use: No     Comment: rare    Drug use: No    Sexual activity: Yes     Partners: Male     Birth control/protection: Hysterectomy, Surgical       Review of Systems  Review of Systems -  General ROS: negative for - chills, fever or night sweats  Cardiovascular ROS: no chest pain or dyspnea on exertion  Gastrointestinal ROS: no abdominal pain, change in bowel habits, or black or bloody stools  Genito-Urinary ROS: no trouble voiding or gross hematuria    Objective   Blood pressure 132/88, pulse 75, resp. rate 16, height 180.3 cm (71\"), weight 127 kg (279 lb 3.2 oz), last menstrual period 10/31/2014, SpO2 96%, not currently breastfeeding.  Nursing note reviewed  Physical Exam  Const: NAD, A&Ox4, Pleasant, Cooperative  Eyes: EOMI, no conjunctivitis  ENT: No nasal discharge present, neck supple  Cardiac: Regular rate and rhythm, no cyanosis  Resp: Respiratory rate within normal limits, no increased work of breathing, no audible wheezing or retractions noted  GI: No distention or ascites  Procedures  Assessment & Plan     Problem List Items Addressed This Visit          Endocrine and Metabolic    Diabetes mellitus - Primary    Overview     Did not tolerate metformin in 2017.  Came off Januvia in August 2021         Relevant Medications    Ozempic, 0.25 or 0.5 MG/DOSE, 2 MG/1.5ML solution pen-injector       Neuro    Complex regional pain syndrome type I     Other Visit Diagnoses       Therapeutic drug monitoring        Relevant Orders    Compliance Drug Analysis, Ur - Urine, Clean Catch          See patient diagnoses and orders along with patient instructions for assessment, plan, and changes to care for patient.    There are no Patient Instructions on file for this visit.    No follow-ups on file.    Ambulatory progress note signed and attested to by Ryne Puente D.O.    Disclaimer to patients: The 21st Century Cares Act makes medical notes like " these available to patients in the interest of transparency. However, please be advised that this is still a medical document. It is intended as [id number] communication. Many sections may include medical language or jargon, abbreviations, and additional verbiage that are unfamiliar or confusing. In some ways it may come across as blunt, direct, or may be summarized in order to clearly and concisely communicate the most crucial information to medical professionals. It may also include mentions of conditions that are unlikely but considered as part of the differential diagnosis, including serious disorders. These are not always discussed at length at the time of appointment because their likelihood is so low, but may be included in a medical note to make it clear what has been considered and/or ruled out as part of a work-up. Medical documents are intended to carry relevant information, facts as evident, and the personal clinical opinion of the physician. If you have any questions regarding this medical document, please bring them to the attention of the physician at your next scheduled appointment.

## 2024-08-29 ENCOUNTER — CLINICAL SUPPORT (OUTPATIENT)
Age: 59
End: 2024-08-29
Payer: MEDICAID

## 2024-08-29 ENCOUNTER — OFFICE VISIT (OUTPATIENT)
Dept: CARDIOLOGY | Facility: CLINIC | Age: 59
End: 2024-08-29
Payer: MEDICAID

## 2024-08-29 VITALS
OXYGEN SATURATION: 97 % | SYSTOLIC BLOOD PRESSURE: 110 MMHG | HEIGHT: 71 IN | HEART RATE: 77 BPM | WEIGHT: 281 LBS | BODY MASS INDEX: 39.34 KG/M2 | DIASTOLIC BLOOD PRESSURE: 66 MMHG

## 2024-08-29 DIAGNOSIS — E78.2 MIXED HYPERLIPIDEMIA: ICD-10-CM

## 2024-08-29 DIAGNOSIS — I25.118 CORONARY ARTERY DISEASE OF NATIVE ARTERY OF NATIVE HEART WITH STABLE ANGINA PECTORIS: Primary | ICD-10-CM

## 2024-08-29 DIAGNOSIS — I48.0 PAROXYSMAL ATRIAL FIBRILLATION: ICD-10-CM

## 2024-08-29 DIAGNOSIS — I10 ESSENTIAL HYPERTENSION: ICD-10-CM

## 2024-08-29 DIAGNOSIS — I44.2 AV BLOCK, COMPLETE: ICD-10-CM

## 2024-08-29 DIAGNOSIS — M17.12 ARTHRITIS OF LEFT KNEE: Primary | ICD-10-CM

## 2024-08-29 RX ORDER — LIDOCAINE HYDROCHLORIDE 10 MG/ML
5 INJECTION, SOLUTION EPIDURAL; INFILTRATION; INTRACAUDAL; PERINEURAL
Status: COMPLETED | OUTPATIENT
Start: 2024-08-29 | End: 2024-08-29

## 2024-08-29 RX ORDER — AMLODIPINE BESYLATE 5 MG/1
5 TABLET ORAL
COMMUNITY
Start: 2024-08-24 | End: 2024-08-29

## 2024-08-29 RX ADMIN — LIDOCAINE HYDROCHLORIDE 5 ML: 10 INJECTION, SOLUTION EPIDURAL; INFILTRATION; INTRACAUDAL; PERINEURAL at 16:01

## 2024-08-29 NOTE — PROGRESS NOTES
Procedure   - Large Joint Arthrocentesis: L knee on 8/29/2024 4:01 PM  Indications: pain  Details: 21 G needle, ultrasound-guided anterolateral approach  Medications: 30 mg Hyaluronan 30 MG/2ML; 5 mL lidocaine PF 1% 1 %  Outcome: tolerated well, no immediate complications  Procedure, treatment alternatives, risks and benefits explained, specific risks discussed. Consent was given by the patient. Immediately prior to procedure a time out was called to verify the correct patient, procedure, equipment, support staff and site/side marked as required. Patient was prepped and draped in the usual sterile fashion.        58-year-old female with left knee pain from left knee osteoarthritis.  Here today for ultrasound-guided Orthovisc injection #1 of a 3 part series.  Ultrasound guidance used for proper needle placement.  Procedure was explained in detail prior to starting.  All questions answered to the best of my ability.  Procedure was completed without complication.  See procedure note above.  She will follow-up in 1 week for injection #2.

## 2024-08-29 NOTE — PROGRESS NOTES
[facility] AT [address], [address]  [place], [place], [address]    [phone]  [email or web address]           OUTPATIENT CLINIC FOLLOW UP NOTE    Patient Care Team:  Patient Care Team:  Ryne Puente DO as PCP - General (Family Medicine)  Johny Sommer MD as Consulting Physician (Cardiology)  Sonya Gay MD as Consulting Physician (Cardiology)  Deb Guajardo APRN as Nurse Practitioner (Pulmonary Disease)  Luis Saeed MD as Consulting Physician (Otolaryngology)  Mami Lamb APRN as Nurse Practitioner (Cardiology)  Sal Sheldon MD (Pain Medicine)  Rian Najera MD as Surgeon (Orthopedic Surgery)    Subjective:     Chief complaint: Dyspnea, AV block, pacemaker site pain    HPI:    Kellen Esparza is a 58 y.o. female.  Cardiac Problem List:  Small-vessel coronary artery disease:  [facility], 07/26/2017: EF 70%. Medium-sized infarct pattern located in the anterior wall with no significant ischemia noted. This finding is worse on resting images which is suggestive of possible breast attenuation artifact instead of an infarction.  [facility], 08/10/2017: Mild-to-moderate diffuse tortuosity of the vessels. Evidence of small vessel disease in the distal portion of a small RPL S. No obstructive CAD.   High degree AV Block:  o, 04/29/2019: Only 1.5 days of monitoring. High grade AV block for 4.4 seconds at 7:23 am. 2nd degree AV block for 10 seconds at 8:53 pm. Average HR 86 bpm.  o, 06/18/2019: Only one day of monitoring. 2.9% PACs. 1st degree AV block, brief 2nd degree type I AVB.   [facility], 06/24/2019: High degree AV block.  Pacemaker implant, 07/19/2019, Dr. Gay: [place] [employer] DR model L311 serial #[id number]. DDDR .  Significant neuropathic pain at the pacemaker site.  Mild diastolic dysfunction:  Echo, 11/28/2018: EF 65% with mild concentric LVH. Grade I a diastolic dysfunction. Left atrium borderline dilated.  Paroxysmal atrial  fibrillation:  Noted on device interrogation, 11/13/2019.   CHADS-VASc = 5 (HTN, DM, Female, Vasc disease, history of PE).    Multiple episodes of atrial fibrillation all less than 1 minute.  Diabetes  Hypertension:  24h BP monitor, 04/09/2019: 45% > 140 mmHg systolic, max 164, min 103. Average HR 82 max 111, min 56.  Hair loss with valsartan  Hyperlipidemia  Migraines  History of PE, in the 1990s  COVID pneumonia January 2022  Carpal tunnel  ALYSIA, on CPAP.  Followed by Dr. Quispe.  CPAP recalled, 2021    Presents today for follow up.  Persistent fatigue, chest discomfort, edema and dyspnea. Recent stress test without ischemia.  Recent echo with normal EF and some evidence of diastolic dysfunction. Started on spironolactone.     Review of Systems:  As noted in the HPI    PFSH:  Patient Active Problem List   Diagnosis    Chest pain    Dyspnea on exertion    Essential hypertension    Pain of right breast    Lower extremity edema    Diabetes mellitus    Abnormal stress test    Migraine    History of pulmonary embolus (PE)    Hyperlipidemia    DDD (degenerative disc disease), cervical    Coronary artery disease of native artery of native heart with stable angina pectoris    AV block, complete (S/P PPM 7/2019)    Obesity, Class II, BMI 35-39.9    ALYSIA (obstructive sleep apnea)    Pain in pacemaker pocket    Paroxysmal atrial fibrillation    Complex regional pain syndrome type I    Chronic anticoagulation    Pacemaker    Morbid obesity    Entrapment neuropathy    Encounter for fitting and adjustment of neuropacemaker of spinal cord    Mechanical low back pain    Presence of neurostimulator    Thoracic spondylosis without myelopathy    Myofascial pain    Menopausal syndrome (hot flashes)    Bilateral carpal tunnel syndrome    Colon cancer screening    History of colon polyps         Current Outpatient Medications:     albuterol sulfate  (90 Base) MCG/ACT inhaler, Inhale 2 puffs Every 4 (Four) Hours As Needed for  Wheezing., Disp: 18 g, Rfl: 11    amLODIPine (NORVASC) 5 MG tablet, 1 tablet., Disp: , Rfl:     apixaban (Eliquis) 5 MG tablet tablet, Take 1 tablet by mouth 2 (Two) Times a Day., Disp: 180 tablet, Rfl: 1    atorvastatin (LIPITOR) 80 MG tablet, TAKE 1 TABLET BY MOUTH DAILY, Disp: 90 tablet, Rfl: 3    azilsartan medoxomil (Edarbi) 80 MG tablet tablet, Take 1 tablet by mouth Daily. To replace valsartan, Disp: 30 tablet, Rfl: 2    carvedilol (COREG) 25 MG tablet, Take 1 tablet by mouth 2 (Two) Times a Day., Disp: 60 tablet, Rfl: 3    cyclobenzaprine (FLEXERIL) 10 MG tablet, Take 1 tablet by mouth 3 (Three) Times a Day., Disp: , Rfl:     Fluticasone-Salmeterol (ADVAIR/WIXELA) 250-50 MCG/ACT DISKUS, Inhale 1 puff 2 (Two) Times a Day., Disp: 60 each, Rfl: 5    furosemide (LASIX) 40 MG tablet, TAKE 1 TABLET BY MOUTH DAILY FOR 5 DAYS THEN TAKE 1 TABLET BY MOUTH UP TO 3 TIMES EVERY WEEK FOR SWELLING, Disp: 20 tablet, Rfl: 0    levETIRAcetam (KEPPRA) 250 MG tablet, TAKE 1 TABLET BY MOUTH TWICE DAILY, Disp: 90 tablet, Rfl: 1    oxyCODONE-acetaminophen (PERCOCET) 7.5-325 MG per tablet, As Needed., Disp: , Rfl:     Ozempic, 0.25 or 0.5 MG/DOSE, 2 MG/1.5ML solution pen-injector, Inject 0.25 mg under the skin into the appropriate area as directed 1 (One) Time Per Week for 28 days, THEN 0.5 mg 1 (One) Time Per Week for 42 days., Disp: 3 mL, Rfl: 0    pregabalin (LYRICA) 200 MG capsule, Take 1 capsule by mouth Every 12 (Twelve) Hours., Disp: , Rfl:     Sod Picosulfate-Mag Ox-Cit Acd (Clenpiq) 10-3.5-12 MG-GM -GM/160ML solution, Take 350 mL by mouth Take As Directed., Disp: 350 mL, Rfl: 0    spironolactone (ALDACTONE) 25 MG tablet, Take 1 tablet by mouth Daily., Disp: 30 tablet, Rfl: 1    traZODone (DESYREL) 150 MG tablet, TAKE 1 TABLET BY MOUTH EVERY NIGHT (Patient taking differently: Take 1 tablet by mouth As Needed.), Disp: 90 tablet, Rfl: 1  No current facility-administered medications for this visit.    Allergies   Allergen  "Reactions    Metformin Diarrhea, Nausea And Vomiting and GI Intolerance    Codeine Itching    Lisinopril Cough        reports that she has never smoked. She has never been exposed to tobacco smoke. She has never used smokeless tobacco.      Objective:   /66 (BP Location: Right arm, Patient Position: Sitting)   Pulse 77   Ht 180.3 cm (71\")   Wt 127 kg (281 lb)   LMP 10/31/2014 (LMP Unknown) Comment: Mammogram 2014 info given   SpO2 97%   BMI 39.19 kg/m²   CONSTITUTIONAL: No acute distress  RESPIRATORY: Normal effort. Clear to auscultation bilaterally without wheezing or rales  CARDIOVASCULAR: Regular rate and rhythm with normal S1 and S2.  AZALIA at RUSB without radiation not audible today.  No carotid bruit bilaterally  PERIPHERAL VASCULAR: Normal radial pulse.      Labs:  Lab Results   Component Value Date    ALT 22 06/05/2024    AST 16 06/05/2024     Lab Results   Component Value Date    CHOL 167 05/04/2023    TRIG 69 05/04/2023    HDL 56 05/04/2023    CREATININE 0.83 06/05/2024       Diagnostic Data:    Procedures    [employer] heart monitor 6/2019  -High degree symptomatic AV Block    Zio Patch 4/2019  -Only 1.5 days of monitoring.  -High-grade AV block for 4.4 seconds at 7:23 AM.  -Second-degree AV block for 10 seconds at 8:53 PM.  -Average heart rate 86.    TTE 11/2018  Left ventricular systolic function is normal. Estimated EF = 65%.  Left ventricular wall thickness is consistent with mild concentric hypertrophy.  Left ventricular diastolic dysfunction (grade I a) consistent with impaired relaxation.  Left atrial cavity size is borderline dilated.  Trace-to-mild aortic valve regurgitation is present.    Results for orders placed during the hospital encounter of 06/25/24    Adult Transthoracic Echo Complete W/ Cont if Necessary Per Protocol    Interpretation Summary    Left ventricular systolic function is normal. Left ventricular ejection fraction appears to be 61 - 65%.    Left ventricular wall " thickness is consistent with mild concentric hypertrophy.    Left ventricular diastolic function is consistent with (grade Ia w/high LAP) impaired relaxation.    Mild mitral valve regurgitation is present.    Mild tricuspid valve regurgitation is present.    Estimated right ventricular systolic pressure from tricuspid regurgitation is mildly elevated (35-45 mmHg).    [facility] 7/2017  There is mild to moderate diffuse tortuosity of the vessels.  There is evidence of small vessel disease in the distal portion of a small RPL S.  There is no obstructive coronary artery disease.  Normal left ventricular ejection fraction with no regional wall motion abnormalities    CT PE Protocol 7/2017 - No evidence of pulmonary embolus or other acute chest disease.    PFTs 11/2018 - moderate restrictive lung disease     PFTs 4/2021  No obstruction, no change with bronchodilator, mild restriction, mild reduction in DLCO overcorrects for alveolar volume.    DEVICE INTERROGATION: [facility], Interrogation date 10/3/22- RA pacing <1%, RV pacing <1%. Threshold and impedances are acceptable. Battery voltage is 6+ years.  Multiple episodes of short runs of SVT, longest 15 seconds.    DEVICE INTERROGATION:  [facility], Interrogation date 6/5/23- RA pacing 1%, RV pacing 2%. Threshold and impedances are acceptable. Battery voltage is 6 yrs.  139 nonsustained episodes of A-fib, occasionally with RVR.  Longest 4 minutes 51 seconds    DEVICE INTERROGATION:  [facility], Interrogation date 8/29/2024- RA pacing 2%, RV pacing 1%. Threshold and impedances are acceptable. Battery voltage is 5. 5750 mode switches, <1% total.      Assessment and Plan:     Dyspnea  Diastolic dysfunction  ALYSIA   History of PE  Obesity  Mild restrictive lung disease  -Recent echo with normal EF, evidence of diastolic dysfunction.   -Continue spironolactone, Edarbi, carvedilol, Lasix as needed.   -Continue CPAP use  -Repeat BMP to check renal function after starting spironolactone.     Complete AV  block  Paroxysmal atrial fibrillation  Chest wall pain  -s/p [facility] Sci DC PPM 7/2019, Dr Gay  -Stable device interrogation today.   -Continue Eliquis, carvedilol    Small vessel, coronary artery disease of native artery of native heart with stable angina pectoris  -Ongoing chest discomfort, dyspnea, fatigue.  Recent stress test without ischemia.   -Stable EKG today.   -Recommend evaluating for non-cardiac etiologies of chest discomfort.  Consider GI evaluation.   -Continue statin, carvedilol  -Off aspirin due to concomitant Eliquis    Essential hypertension  -Blood pressure stable.  -Continue current medications    - Return in about 1 year (around 8/29/2025) for Next scheduled follow up with EKG .      Electronically signed by CORRIE Mendez, 08/29/24, 3:05 PM EDT.

## 2024-08-30 ENCOUNTER — TELEPHONE (OUTPATIENT)
Dept: SLEEP MEDICINE | Facility: CLINIC | Age: 59
End: 2024-08-30
Payer: MEDICAID

## 2024-09-03 RX ORDER — FUROSEMIDE 40 MG
TABLET ORAL
Qty: 90 TABLET | Refills: 0 | Status: SHIPPED | OUTPATIENT
Start: 2024-09-03

## 2024-09-05 ENCOUNTER — CLINICAL SUPPORT (OUTPATIENT)
Age: 59
End: 2024-09-05
Payer: MEDICAID

## 2024-09-05 DIAGNOSIS — M17.12 ARTHRITIS OF LEFT KNEE: Primary | ICD-10-CM

## 2024-09-05 NOTE — PROGRESS NOTES
Procedure   - Large Joint Arthrocentesis: L knee on 9/5/2024 3:11 PM  Indications: pain  Details: 21 G needle, ultrasound-guided superolateral approach  Medications: 30 mg Hyaluronan 30 MG/2ML  Outcome: tolerated well, no immediate complications  Procedure, treatment alternatives, risks and benefits explained, specific risks discussed. Consent was given by the patient. Immediately prior to procedure a time out was called to verify the correct patient, procedure, equipment, support staff and site/side marked as required. Patient was prepped and draped in the usual sterile fashion.        58-year-old female with left knee pain from left knee osteoarthritis.  Here today for ultrasound-guided Orthovisc injection #2 of a 3 part series.  Ultrasound guidance used for proper needle placement.  Procedure was explained in detail prior to starting.  All questions answered to the best of my ability.  Procedure was completed without complication.  See procedure note above.  She will follow-up in 1 week for injection #3.

## 2024-09-06 ENCOUNTER — TELEPHONE (OUTPATIENT)
Dept: ORTHOPEDIC SURGERY | Facility: CLINIC | Age: 59
End: 2024-09-06
Payer: MEDICAID

## 2024-09-06 NOTE — TELEPHONE ENCOUNTER
Hub staff attempted to follow warm transfer process and was unsuccessful     Caller: Kellen Esparza    Relationship to patient: Self    Best call back number: [phone] (home)       Patient is needing: PATIENT CALLED IN  FOR WORK NOTE FOR TODAY DUE TO ORTHOVISC INJECTION FROM YESTERDAY 9-5-24 PAIN AND UNABLE TO BEAR TOO MUCH WEIGHT THIS MORNING DUE TO SWELLING  . PATIENT HAS KEPT KNEE  PROPPED UP WITH ICE EVERY OTHER 20 MINUTES , WHICH IS GIVING PATIENT SOME RELIEVE  PLEASE UPLOAD NOTE  TO [employer].

## 2024-09-12 ENCOUNTER — CLINICAL SUPPORT (OUTPATIENT)
Age: 59
End: 2024-09-12
Payer: MEDICAID

## 2024-09-12 DIAGNOSIS — M17.12 ARTHRITIS OF LEFT KNEE: Primary | ICD-10-CM

## 2024-09-12 RX ORDER — LIDOCAINE HYDROCHLORIDE 10 MG/ML
4 INJECTION, SOLUTION EPIDURAL; INFILTRATION; INTRACAUDAL; PERINEURAL
Status: COMPLETED | OUTPATIENT
Start: 2024-09-12 | End: 2024-09-12

## 2024-09-12 RX ADMIN — LIDOCAINE HYDROCHLORIDE 4 ML: 10 INJECTION, SOLUTION EPIDURAL; INFILTRATION; INTRACAUDAL; PERINEURAL at 15:43

## 2024-09-12 NOTE — PROGRESS NOTES
Procedure   - Large Joint Arthrocentesis: L knee on 9/12/2024 3:43 PM  Indications: pain  Details: 21 G needle, ultrasound-guided superolateral approach  Medications: 4 mL lidocaine PF 1% 1 %; 30 mg Hyaluronan 30 MG/2ML  Outcome: tolerated well, no immediate complications  Procedure, treatment alternatives, risks and benefits explained, specific risks discussed. Consent was given by the patient. Immediately prior to procedure a time out was called to verify the correct patient, procedure, equipment, support staff and site/side marked as required. Patient was prepped and draped in the usual sterile fashion.        58-year-old female with left knee pain from left knee osteoarthritis.  Here today for ultrasound-guided Orthovisc injection #3 of a 3 part series.  Ultrasound guidance used for proper needle placement.  Procedure was explained in detail prior to starting.  All questions answered to the best of my ability.  Procedure was completed without complication.  See procedure note above.  She will follow-up as symptoms dictate.

## 2024-09-18 ENCOUNTER — TELEPHONE (OUTPATIENT)
Age: 59
End: 2024-09-18
Payer: MEDICAID

## 2024-09-19 DIAGNOSIS — E11.65 TYPE 2 DIABETES MELLITUS WITH HYPERGLYCEMIA, WITHOUT LONG-TERM CURRENT USE OF INSULIN: ICD-10-CM

## 2024-09-20 RX ORDER — SEMAGLUTIDE 0.68 MG/ML
INJECTION, SOLUTION SUBCUTANEOUS
Qty: 3 ML | Refills: 1 | Status: SHIPPED | OUTPATIENT
Start: 2024-09-20

## 2024-09-23 DIAGNOSIS — F51.01 PRIMARY INSOMNIA: ICD-10-CM

## 2024-09-23 RX ORDER — SPIRONOLACTONE 25 MG/1
25 TABLET ORAL DAILY
Qty: 30 TABLET | Refills: 1 | Status: SHIPPED | OUTPATIENT
Start: 2024-09-23

## 2024-09-23 RX ORDER — TRAZODONE HYDROCHLORIDE 150 MG/1
150 TABLET ORAL NIGHTLY
Qty: 90 TABLET | Refills: 1 | Status: SHIPPED | OUTPATIENT
Start: 2024-09-23

## 2024-10-01 ENCOUNTER — OFFICE VISIT (OUTPATIENT)
Dept: PULMONOLOGY | Facility: CLINIC | Age: 59
End: 2024-10-01
Payer: MEDICAID

## 2024-10-01 VITALS
DIASTOLIC BLOOD PRESSURE: 90 MMHG | SYSTOLIC BLOOD PRESSURE: 120 MMHG | HEART RATE: 71 BPM | HEIGHT: 71 IN | BODY MASS INDEX: 38.92 KG/M2 | OXYGEN SATURATION: 95 % | TEMPERATURE: 97.7 F | WEIGHT: 278 LBS

## 2024-10-01 DIAGNOSIS — G47.33 OSA (OBSTRUCTIVE SLEEP APNEA): Primary | ICD-10-CM

## 2024-10-01 DIAGNOSIS — E66.812 OBESITY, CLASS II, BMI 35-39.9: ICD-10-CM

## 2024-10-01 DIAGNOSIS — G90.59 COMPLEX REGIONAL PAIN SYNDROME TYPE 1 AFFECTING OTHER SITE: ICD-10-CM

## 2024-10-01 DIAGNOSIS — R06.09 DYSPNEA ON EXERTION: ICD-10-CM

## 2024-10-01 PROCEDURE — 99214 OFFICE O/P EST MOD 30 MIN: CPT | Performed by: NURSE PRACTITIONER

## 2024-10-01 PROCEDURE — 3074F SYST BP LT 130 MM HG: CPT | Performed by: NURSE PRACTITIONER

## 2024-10-01 PROCEDURE — 3080F DIAST BP >= 90 MM HG: CPT | Performed by: NURSE PRACTITIONER

## 2024-10-01 RX ORDER — LEVETIRACETAM 250 MG/1
250 TABLET ORAL 2 TIMES DAILY
Qty: 90 TABLET | Refills: 1 | OUTPATIENT
Start: 2024-10-01

## 2024-10-01 RX ORDER — AMLODIPINE BESYLATE 5 MG/1
TABLET ORAL
COMMUNITY
Start: 2024-09-23

## 2024-10-01 NOTE — PROGRESS NOTES
[facility] Follow up    CHIEF COMPLAINT    Dyspnea on exertion, ALYSIA, morbid obesity    HISTORY OF PRESENT ILLNESS    HPI:   Kellen Esparza is a 58 y.o.female followed by pulmonary regarding her ALYSIA on CPAP, morbid obesity, and dyspnea on exertion.     Patient was last seen in the office by CORRIE Olguin in July 2022.  Carries a history of ALYSIA on CPAP but at that time was not compliant and her PAP therapy was returned.  Due to symptoms of daytime somnolence, fatigue, nonrestorative sleep a send home sleep study was performed in order to requalify her for PAP therapy, however she did not complete this.    PFTs unrevealing for obstruction, no restriction, and did show a residual volume of 127% consistent with a air trapping, and a normal DLCO.    She is a lifelong non-smoker.    She does carry a history of AVB s/p PPM anticoagulated on Eliquis. She is followed by Dr. Sommer.  Most recent echocardiogram was unrevealing.    Interval history:   The patient was last seen in the office by me in June 2024 after not being seen for nearly 2 years with ongoing symptoms of nonrestorative sleep, daytime somnolence, excessive fatigue, and witnessed episodes of snoring.  A home sleep study was performed which showed an AHI of 12.5 and she was implemented on PAP therapy.    Unfortunately despite PAP therapy she continues to be symptomatic.  Per her compliance report, she is used this for only 19 days but 79% of the time has had greater than 4 hours usage with an AHI of 3.4.    Lab work does show some anemia.    Continues to be very short of breath with activity.  She is followed by Dr. Sommer with most recent echocardiogram and stress test from June 2024 unrevealing.    Denies recent ED visits, hospitalizations, or exacerbations.     Denies fever, chills, night sweats, or hemoptysis. No recent sick contacts. No chest pain or palpitations. Denies lower extremity swelling or calf tenderness.     Patient Active Problem List    Diagnosis    Chest pain    Dyspnea on exertion    Essential hypertension    Pain of right breast    Lower extremity edema    Diabetes mellitus    Abnormal stress test    Migraine    History of pulmonary embolus (PE)    Hyperlipidemia    DDD (degenerative disc disease), cervical    Coronary artery disease of native artery of native heart with stable angina pectoris    AV block, complete (S/P PPM 7/2019)    Obesity, Class II, BMI 35-39.9    ALYSIA (obstructive sleep apnea)    Pain in pacemaker pocket    Paroxysmal atrial fibrillation    Complex regional pain syndrome type I    Chronic anticoagulation    Pacemaker    Morbid obesity    Entrapment neuropathy    Encounter for fitting and adjustment of neuropacemaker of spinal cord    Mechanical low back pain    Presence of neurostimulator    Thoracic spondylosis without myelopathy    Myofascial pain    Menopausal syndrome (hot flashes)    Bilateral carpal tunnel syndrome    Colon cancer screening    History of colon polyps       Allergies   Allergen Reactions    Metformin Diarrhea, Nausea And Vomiting and GI Intolerance    Codeine Itching    Lisinopril Cough       Current Outpatient Medications:     albuterol sulfate  (90 Base) MCG/ACT inhaler, Inhale 2 puffs Every 4 (Four) Hours As Needed for Wheezing., Disp: 18 g, Rfl: 11    amLODIPine (NORVASC) 5 MG tablet, , Disp: , Rfl:     apixaban (Eliquis) 5 MG tablet tablet, Take 1 tablet by mouth 2 (Two) Times a Day., Disp: 180 tablet, Rfl: 1    atorvastatin (LIPITOR) 80 MG tablet, TAKE 1 TABLET BY MOUTH DAILY, Disp: 90 tablet, Rfl: 3    azilsartan medoxomil (Edarbi) 80 MG tablet tablet, Take 1 tablet by mouth Daily. To replace valsartan, Disp: 30 tablet, Rfl: 2    carvedilol (COREG) 25 MG tablet, Take 1 tablet by mouth 2 (Two) Times a Day., Disp: 60 tablet, Rfl: 3    cyclobenzaprine (FLEXERIL) 10 MG tablet, Take 1 tablet by mouth 3 (Three) Times a Day., Disp: , Rfl:     Fluticasone-Salmeterol (ADVAIR/WIXELA) 250-50  "MCG/ACT DISKUS, Inhale 1 puff 2 (Two) Times a Day., Disp: 60 each, Rfl: 5    furosemide (LASIX) 40 MG tablet, TAKE 1 TABLET BY MOUTH DAILY FOR 5 DAYS THEN TAKE 1 TABLET BY MOUTH UP TO 3 TIMES EVERY WEEK FOR SWELLING, Disp: 90 tablet, Rfl: 0    levETIRAcetam (KEPPRA) 250 MG tablet, TAKE 1 TABLET BY MOUTH TWICE DAILY, Disp: 90 tablet, Rfl: 1    oxyCODONE-acetaminophen (PERCOCET) 7.5-325 MG per tablet, As Needed., Disp: , Rfl:     Ozempic, 0.25 or 0.5 MG/DOSE, 2 MG/3ML solution pen-injector, INJECT 0.25 MG UNDER THE SKIN EVERY WEEK FOR 4 WEEKS, THEN INCREASE TO 0.5MG WEEKLY, Disp: 3 mL, Rfl: 1    pregabalin (LYRICA) 200 MG capsule, Take 1 capsule by mouth Every 12 (Twelve) Hours., Disp: , Rfl:     Sod Picosulfate-Mag Ox-Cit Acd (Clenpiq) 10-3.5-12 MG-GM -GM/160ML solution, Take 350 mL by mouth Take As Directed., Disp: 350 mL, Rfl: 0    spironolactone (ALDACTONE) 25 MG tablet, TAKE 1 TABLET BY MOUTH DAILY, Disp: 30 tablet, Rfl: 1    traZODone (DESYREL) 150 MG tablet, TAKE 1 TABLET BY MOUTH EVERY NIGHT, Disp: 90 tablet, Rfl: 1  MEDICATION LIST AND ALLERGIES REVIEWED.    Social History     Tobacco Use    Smoking status: Never     Passive exposure: Never    Smokeless tobacco: Never   Vaping Use    Vaping status: Never Used   Substance Use Topics    Alcohol use: No     Comment: rare    Drug use: No       FAMILY AND SOCIAL HISTORY REVIEWED.    Review of Systems   Constitutional:  Positive for fatigue. Negative for chills and fever.   Respiratory:  Positive for shortness of breath and wheezing. Negative for cough and chest tightness.    Cardiovascular:  Negative for chest pain, palpitations and leg swelling.   Skin:  Negative for color change.   Psychiatric/Behavioral:  Positive for sleep disturbance.    All other systems reviewed and are negative.  .    /90   Pulse 71   Temp 97.7 °F (36.5 °C)   Ht 180.3 cm (70.98\")   Wt 126 kg (278 lb)   LMP 10/31/2014 (LMP Unknown) Comment: Mammogram 2014 info given   SpO2 95% " Comment: room air at rest  BMI 38.79 kg/m²     Immunization History   Administered Date(s) Administered    COVID-19 (PFIZER) Purple Cap Monovalent 04/02/2021, 04/30/2021    Fluzone (or Fluarix & Flulaval for VFC) >6mos 11/02/2021       Physical Exam  Vitals reviewed.   Constitutional:       General: She is not in acute distress.     Appearance: Normal appearance.   HENT:      Head:      Comments: Mallampati 4 airway  Cardiovascular:      Rate and Rhythm: Normal rate and regular rhythm.      Pulses: Normal pulses.      Heart sounds: Normal heart sounds.   Pulmonary:      Effort: Pulmonary effort is normal. No respiratory distress.      Breath sounds: No wheezing, rhonchi or rales.   Skin:     General: Skin is warm and dry.   Neurological:      Mental Status: She is alert.         RESULTS    PFTs:  6/25/2024: No airway obstruction by ATS guidelines.  No restriction.  Residual volume 127% consistent with air trapping.  Normal DLCO.    Imaging:   CXR PA/lateral 6/25/2024  Impression:  1.No acute radiographic abnormality is identified.    Home sleep study 8/23/2024  IMPRESSION:         1.  Mild obstructive sleep apnea.  AHI of 12.9       2.  Nocturnal hypoxemia. (8.3% time below 90% O2 sat)       3.  Snoring    PAP settings:   Pressure: 4-20 cm H2O  Ramp time: 15 minutes  Ramp pressure: 4  EPR: 2    Compliance report: (range 9/12 - 9/30/2024)  Day usage: 19/19 days (100%)  Days usage (>4 hrs/day): 79%  Average use (days used): 5 hours 10 minutes  95th percentile pressure: 10.5  Leak 95th percentile pressure: 37.3  AHI: 3.4    PROBLEM LIST    Problem List Items Addressed This Visit       Dyspnea on exertion    Overview     PFT, 12/5/18: No air way obstruction, moderate restrictive lung disease         Obesity, Class II, BMI 35-39.9    ALYSIA (obstructive sleep apnea) - Primary     DISCUSSION    Ms. Esparza was seen today for follow-up.    Due to symptoms of nonrestorative sleep, daytime somnolence, fatigue, and witnessed  episodes of snoring with prior diagnosis of ALYSIA as well as obesity and Mallampati 4 airway she underwent a home sleep study which did show sleep apnea with an AHI of 12.9 implemented on PAP therapy with reduction in the AHI to 3.4.  Unfortunately despite therapy she continues to be very symptomatic.    Does have ongoing shortness of breath with exertion.    ALYSIA  Obesity  HST did confirm ALYSIA with an AHI of 12.9 reduced to 3.4 on PAP therapy.  Unfortunately she continues to be very symptomatic.  Her 95th percentile pressure is 10.5 which seems to be adequate therefore I do not feel she would benefit from a titration study at this time.  She does have a significant leak at 37.3 and I have encouraged her to contact her [employer] company to see if there is an additional mask that may be a better option for her.   We also discussed alternate treatment strategies for sleep apnea including an oral mandibular adjustment appliance, surgical repair, and/or device insertion.  I did encourage weight loss as this can contribute towards ALYSIA.  This has been very difficult to her due to chronic pain issues which is being followed by the pain clinic.  Explained that untreated sleep apnea can contribute to additional health problems including cardiac and metabolic issues, which she currently has.  We also discussed good sleep regimen such as laying in the lateral position, avoiding alcohol or sedatives prior to bedtime, regular exercise, weight loss, avoiding caffeine in the evenings, and going to bed and getting up at the same time every day.     Dyspnea on exertion  Fatigue  Likely multifactorial in the setting of her physical deconditioning, obesity, ALYSIA, and possible asthma.  She does frequently wheeze with exertion but is not audible on exam today. PFTs today were unrevealing for obstruction or restriction. She does have some air trapping. Continue Advair 250 1 puff twice daily.  Rinse mouth after use to prevent thrush. Albuterol  rescue inhaler every 4-6 hours as needed.  Has had an elevated AEC on prior labs, but most recent labs were unrevealing.  Stress echocardiogram was unrevealing.  CPX testing.  Consider CT of the chest to evaluate for intraparenchymal etiologies.  Does have a history of PE and is anticoagulated on Eliquis.  Denies any missed doses.  CTA of the chest from September 2023 was unrevealing for pulmonary embolism.  Does appear to be slightly anemic and I do encourage her to take iron supplementation.  Thyroid studies benign.     Return to clinic in 3 months with Dr. Durham or sooner if needed.    I personally spent a total of 31 minutes on patient visit today including chart review, face to face with the patient obtaining the history and physical exam, review of pertinent images and tests, counseling and discussion and/or coordination of care as described above, and documentation.  Total time excludes time spent on other separate services such as performing procedures or test interpretation, if applicable.    Please note that portions of this note were completed with a voice recognition program.      CC: Ryne Puente DO

## 2024-10-03 DIAGNOSIS — G90.59 COMPLEX REGIONAL PAIN SYNDROME TYPE 1 AFFECTING OTHER SITE: ICD-10-CM

## 2024-10-04 DIAGNOSIS — G90.59 COMPLEX REGIONAL PAIN SYNDROME TYPE 1 AFFECTING OTHER SITE: ICD-10-CM

## 2024-10-04 RX ORDER — LEVETIRACETAM 250 MG/1
250 TABLET ORAL 2 TIMES DAILY
Qty: 180 TABLET | Refills: 0 | Status: SHIPPED | OUTPATIENT
Start: 2024-10-04

## 2024-10-04 RX ORDER — LEVETIRACETAM 250 MG/1
TABLET ORAL
Qty: 90 TABLET | Refills: 1 | OUTPATIENT
Start: 2024-10-04

## 2024-10-04 NOTE — TELEPHONE ENCOUNTER
Caller: Kellen Esparza    Relationship: Self    Best call back number: [phone]     Requested Prescriptions:   Requested Prescriptions     Pending Prescriptions Disp Refills    levETIRAcetam (KEPPRA) 250 MG tablet 90 tablet 1     Sig: Take 1 tablet by mouth 2 (Two) Times a Day.        Pharmacy where request should be sent: [id number] [place] [address] AT [facility] [phone] [facility] [phone] FX     Last office visit with prescribing clinician: 8/6/2024   Last telemedicine visit with prescribing clinician: Visit date not found   Next office visit with prescribing clinician: 10/14/2024     Additional details provided by patient: THIS WAS DENIED AS TOO SOON, BUT PATIENT TAKES 2X/DAY AND ONLY 90 WERE SENT (WITH ONE REFILL) RESULTING IN HER RUNNING OUT AROUND 10/1/2024. PLEASE SEND  QUANTITY AND 1 REFILL TO ACCOUNT FOR THE CORRECT AMOUNT FOR A 90 DAY SUPPLY WITH EACH REFILL.    Does the patient have less than a 3 day supply:  [x] Yes  [] No    Would you like a call back once the refill request has been completed: [] Yes [x] No    If the office needs to give you a call back, can they leave a voicemail: [] Yes [x] No    Ivon Jaeger Rep   10/04/24 12:38 EDT

## 2024-10-08 ENCOUNTER — OFFICE VISIT (OUTPATIENT)
Dept: FAMILY MEDICINE CLINIC | Facility: CLINIC | Age: 59
End: 2024-10-08
Payer: MEDICAID

## 2024-10-08 VITALS
DIASTOLIC BLOOD PRESSURE: 90 MMHG | OXYGEN SATURATION: 98 % | WEIGHT: 278.4 LBS | BODY MASS INDEX: 38.98 KG/M2 | HEART RATE: 72 BPM | HEIGHT: 71 IN | SYSTOLIC BLOOD PRESSURE: 144 MMHG

## 2024-10-08 DIAGNOSIS — M25.511 ACUTE PAIN OF RIGHT SHOULDER: Primary | ICD-10-CM

## 2024-10-08 PROCEDURE — 99213 OFFICE O/P EST LOW 20 MIN: CPT | Performed by: FAMILY MEDICINE

## 2024-10-08 PROCEDURE — 96372 THER/PROPH/DIAG INJ SC/IM: CPT | Performed by: FAMILY MEDICINE

## 2024-10-08 PROCEDURE — 3051F HG A1C>EQUAL 7.0%<8.0%: CPT | Performed by: FAMILY MEDICINE

## 2024-10-08 PROCEDURE — 3080F DIAST BP >= 90 MM HG: CPT | Performed by: FAMILY MEDICINE

## 2024-10-08 PROCEDURE — 1125F AMNT PAIN NOTED PAIN PRSNT: CPT | Performed by: FAMILY MEDICINE

## 2024-10-08 PROCEDURE — 3077F SYST BP >= 140 MM HG: CPT | Performed by: FAMILY MEDICINE

## 2024-10-08 RX ORDER — KETOROLAC TROMETHAMINE 30 MG/ML
30 INJECTION, SOLUTION INTRAMUSCULAR; INTRAVENOUS ONCE
Status: COMPLETED | OUTPATIENT
Start: 2024-10-08 | End: 2024-10-08

## 2024-10-08 RX ADMIN — KETOROLAC TROMETHAMINE 30 MG: 30 INJECTION, SOLUTION INTRAMUSCULAR; INTRAVENOUS at 15:27

## 2024-10-09 ENCOUNTER — APPOINTMENT (OUTPATIENT)
Dept: CT IMAGING | Facility: HOSPITAL | Age: 59
End: 2024-10-09
Payer: MEDICAID

## 2024-10-09 ENCOUNTER — HOSPITAL ENCOUNTER (EMERGENCY)
Facility: HOSPITAL | Age: 59
Discharge: HOME | End: 2024-10-09
Attending: EMERGENCY MEDICINE
Payer: MEDICAID

## 2024-10-09 ENCOUNTER — APPOINTMENT (OUTPATIENT)
Dept: GENERAL RADIOLOGY | Facility: HOSPITAL | Age: 59
End: 2024-10-09
Payer: MEDICAID

## 2024-10-09 VITALS
RESPIRATION RATE: 16 BRPM | SYSTOLIC BLOOD PRESSURE: 179 MMHG | DIASTOLIC BLOOD PRESSURE: 109 MMHG | HEIGHT: 71 IN | OXYGEN SATURATION: 99 % | BODY MASS INDEX: 38.5 KG/M2 | TEMPERATURE: 98.5 F | HEART RATE: 86 BPM | WEIGHT: 275 LBS

## 2024-10-09 DIAGNOSIS — M62.838 CERVICAL PARASPINAL MUSCLE SPASM: ICD-10-CM

## 2024-10-09 DIAGNOSIS — M54.12 CERVICAL RADICULOPATHY: Primary | ICD-10-CM

## 2024-10-09 PROCEDURE — 73080 X-RAY EXAM OF ELBOW: CPT

## 2024-10-09 PROCEDURE — 96372 THER/PROPH/DIAG INJ SC/IM: CPT

## 2024-10-09 PROCEDURE — 72125 CT NECK SPINE W/O DYE: CPT

## 2024-10-09 PROCEDURE — 99284 EMERGENCY DEPT VISIT MOD MDM: CPT

## 2024-10-09 PROCEDURE — 25010000002 MORPHINE PER 10 MG: Performed by: EMERGENCY MEDICINE

## 2024-10-09 RX ORDER — DIAZEPAM 5 MG
5 TABLET ORAL ONCE
Status: COMPLETED | OUTPATIENT
Start: 2024-10-09 | End: 2024-10-09

## 2024-10-09 RX ORDER — DIAZEPAM 2 MG
2 TABLET ORAL EVERY 8 HOURS PRN
Qty: 9 TABLET | Refills: 0 | Status: SHIPPED | OUTPATIENT
Start: 2024-10-09 | End: 2024-10-12

## 2024-10-09 RX ORDER — MORPHINE SULFATE 4 MG/ML
4 INJECTION, SOLUTION INTRAMUSCULAR; INTRAVENOUS ONCE
Status: COMPLETED | OUTPATIENT
Start: 2024-10-09 | End: 2024-10-09

## 2024-10-09 RX ADMIN — MORPHINE SULFATE 4 MG: 4 INJECTION, SOLUTION INTRAMUSCULAR; INTRAVENOUS at 23:34

## 2024-10-09 RX ADMIN — DIAZEPAM 5 MG: 5 TABLET ORAL at 23:35

## 2024-10-09 NOTE — Clinical Note
[facility] EMERGENCY DEPARTMENT  [address]  [facility] [address]  Phone: [phone]    Kellen Esparza was seen and treated in our emergency department on 10/9/2024.  She may return to work on 10/11/2024.         Thank you for choosing [facility].    Boris Ruiz MD

## 2024-10-10 NOTE — ED PROVIDER NOTES
Subjective   History of Present Illness  Is a 58-year-old female with past medical history of atrial fibrillation and chronic pain presenting to the emergency department with some right arm and neck pain.  The patient states that she woke up on Monday morning was having some significant pain.  She states that she thought she slept awkwardly.  Pain is starting on the right side of her neck and radiates down to her elbow.  She is having difficulty moving her right arm secondary to the pain.  Has been taking her normal home chronic pain medications, however they are not helping with her symptoms.  She denies any direct trauma to the area.  Does not have any swelling.  No fevers or chills.  No headache or change in vision.  No focal weakness.  No chest pain or shortness of breath.  No abdominal pain or vomiting    History provided by:  Patient   used: No        Review of Systems   Constitutional:  Negative for chills and fever.   HENT:  Negative for congestion, ear pain and sore throat.    Eyes:  Negative for visual disturbance.   Respiratory:  Negative for shortness of breath.    Cardiovascular:  Negative for chest pain.   Gastrointestinal:  Negative for abdominal pain.   Genitourinary:  Negative for difficulty urinating.   Musculoskeletal:  Positive for arthralgias and myalgias.   Skin:  Negative for rash.   Neurological:  Negative for dizziness, weakness and numbness.   Psychiatric/Behavioral:  Negative for agitation.        Past Medical History:   Diagnosis Date    Arthritis     Arthritis of back     Atrial fibrillation     Carpal tunnel syndrome     Chlamydia     CPAP (continuous positive airway pressure) dependence     DDD (degenerative disc disease), cervical 09/06/2017    Deep vein thrombosis     Depression     Diabetes mellitus     doesnt check sugar     Elevated cholesterol     GERD (gastroesophageal reflux disease)     History of COVID-19 01/2022    Hypertension     Knee swelling      Migraine     Musculoligamentous strain     Pacemaker     Pulmonary embolism 1997    Sleep apnea     CPAP machine was recalled, waiting for new one.    Wears eyeglasses        Allergies   Allergen Reactions    Metformin Diarrhea, Nausea And Vomiting and GI Intolerance    Codeine Itching    Lisinopril Cough       Past Surgical History:   Procedure Laterality Date    BREAST BIOPSY Left 2014    CARDIAC CATHETERIZATION Left 08/10/2017    Procedure: Cardiac Catheterization/Vascular Study;  Surgeon: Johny Sommer MD;  Location:  [facility];  Service:     CARDIAC ELECTROPHYSIOLOGY PROCEDURE N/A 07/19/2019    Procedure: Pacemaker DC new;  Surgeon: Sonya Gay MD;  Location:  [facility];  Service: Cardiology    CHOLECYSTECTOMY      COLONOSCOPY      COLONOSCOPY N/A 05/03/2022    Procedure: COLONOSCOPY;  Surgeon: Bryant Mejia MD;  Location:  [facility];  Service: Gastroenterology;  Laterality: N/A;    COLONOSCOPY N/A 8/6/2024    Procedure: COLONOSCOPY;  Surgeon: Bryant Mejia MD;  Location:  [facility];  Service: Gastroenterology;  Laterality: N/A;    ENDOSCOPY      HYSTERECTOMY  11/2014    INSERT / REPLACE / REMOVE PACEMAKER      JOINT REPLACEMENT Right     knee    KNEE SURGERY Right 12/2017    total replacement    NASAL SINUS SURGERY      OOPHORECTOMY  11/2014    PACEMAKER IMPLANTATION      PULMONARY EMBOLISM SURGERY      right lung    SPINAL CORD STIMULATOR IMPLANT N/A 09/10/2020    Procedure: SPINAL CORD STIMULATOR INSERTION PHASE 1, UPPER THORACIC PLACEMENT ;  Surgeon: Victor Manuel Geiger MD;  Location:  [facility];  Service: Neurosurgery;  Laterality: N/A;    TUBAL ABDOMINAL LIGATION         Family History   Problem Relation Age of Onset    Breast cancer Mother 42    Heart failure Mother     Diabetes Mother     No Known Problems Father     Hypertension Sister     Hypertension Brother     Heart failure Son     Hypertension Maternal Grandmother     No  Known Problems Paternal Grandmother     Hypertension Maternal Grandfather     No Known Problems Paternal Grandfather     Ovarian cancer Neg Hx        Social History     Socioeconomic History    Marital status:    Tobacco Use    Smoking status: Never     Passive exposure: Never    Smokeless tobacco: Never   Vaping Use    Vaping status: Never Used   Substance and Sexual Activity    Alcohol use: No     Comment: rare    Drug use: No    Sexual activity: Not Currently     Partners: Male     Birth control/protection: Hysterectomy, Surgical           Objective   Physical Exam  Vitals and nursing note reviewed.   Constitutional:       General: She is not in acute distress.     Appearance: She is not ill-appearing or toxic-appearing.   HENT:      Mouth/Throat:      Pharynx: No posterior oropharyngeal erythema.   Eyes:      Conjunctiva/sclera: Conjunctivae normal.      Pupils: Pupils are equal, round, and reactive to light.   Neck:      Comments: Patient has mild tenderness to palpation on the right side of the neck in the paraspinal muscles.  There is no midline tenderness or deformity.  No meningismus  Cardiovascular:      Rate and Rhythm: Normal rate and regular rhythm.   Pulmonary:      Effort: Pulmonary effort is normal. No respiratory distress.   Abdominal:      General: Abdomen is flat. There is no distension.      Palpations: There is no mass.      Tenderness: There is no abdominal tenderness. There is no guarding or rebound.   Musculoskeletal:         General: No deformity.      Comments: Mild tenderness to palpation in the right elbow.  There is no swelling of the arm.  No redness.  Compartments are soft.  Neurovascular intact.   Skin:     General: Skin is warm.      Findings: No rash.   Neurological:      General: No focal deficit present.      Mental Status: She is alert and oriented to person, place, and time.      Motor: No weakness.         Procedures           ED Course  ED Course as of 10/09/24 2913    Wed Oct 09, 2024   2138 BP(!): 189/110 [JK]   2138 Temp: 98.6 °F (37 °C) [JK]   2138 Heart Rate: 81 [JK]   2138 Resp: 18 [JK]   2138 SpO2: 99 %  Interpretation:  Patient's repeat vitals, telemetry tracing, and pulse oximetry tracing were directly viewed and interpreted by myself.   O2 sat 99% on room air, interpreted as normal  Telemetry rhythm strip revealed a rate of 81 bpm, interpreted as normal sinus rhythm [JK]   2330 CT Cervical Spine Without Contrast [JK]   2330 XR Elbow 3+ View Right  Interpretation:  Imaging was directly visualized by myself, per my interpretations, x-ray of the right elbow was unremarkable.  CT cervical spine showed some degenerative changes. [JK]   2330 On reevaluation, the patient's pain is improved.  Symptoms seem most consistent with cervical radiculopathy.  We did have a long discussion about using heat and ice to relieve the tension in the area.  Will also place the patient on a short course of muscle relaxers.  She will continue to follow-up with her PCP and pain management.  Given strict return precautions.  Verbalized understanding [JK]   2330 I had a discussion with the patient/family regarding diagnosis, diagnostic results, treatment plan, and medications. The patient/family indicated understanding of these instructions. I spent adequate time at the bedside prior to discharge necessary to discuss the aftercare instructions, giving patient education, providing explanations of the results of our evaluations/findings, and my decision making to assure that the patient/family understand the plan of care. Time was allotted to answer questions at that time and throughout the ED course. Patient is required to maintain timely follow up, as discussed. I also discussed the potential for the development of an acute emergent condition requiring further evaluation, return to the ER, admission, or even surgical intervention.  I encouraged the patient to return to the emergency department  immediately for any concerns, worsening symptoms, new complaints, or if symptoms persist and they are unable to seek follow-up in a timely fashion. The patient/family expressed understanding and agreement with this plan    Shared decision making:   After full review of the patient's clinical presentation, review of any work-up including but not limited to laboratory studies and radiology obtained, I had a discussion with the patient.  Treatment options were discussed as well as the risks, benefits and consequences.  I discussed all findings with the patient and family members if available.  During the discussion, treatment goals were understood by all as well as any misconceptions which were addressed with the patient.  Ample time was given for any questions they may have had.  They are in agreement with the treatment plan as well as final disposition. [JK]      ED Course User Index  [JK] Boris Ruiz MD                                     [facility] reviewed by Boris Ruiz MD       Medical Decision Making  This is a 58-year-old female with a history of atrial fibrillation presenting to the emergency department with some right arm and neck pain.  The patient symptoms seem consistent with a cervical radiculopathy and muscle spasm.  There is no evidence of neurovascular compromise on exam.  There is no swelling or finding consistent with a DVT.  Patient provided symptomatic treatment.  Imaging obtained.      Differential diagnosis: Cervical spasm, strain, radiculopathy, right arm contusion, sprain, fracture, chronic pain    Amount and/or Complexity of Data Reviewed  External Data Reviewed: labs, radiology, ECG and notes.     Details: External laboratories, imaging as well as notes were reviewed personally by myself.  All relevant studies were used to guide decision making.     Date of previous record: 8/29/2024    Source of note: Cardiology    Summary:  Patient was seen and evaluated for routine visit.  I  did review basic laboratory studies on file as well as a previous chest x-ray and EKG.  Records reviewed    Radiology: ordered and independent interpretation performed. Decision-making details documented in ED Course.    Risk  Prescription drug management.        Final diagnoses:   Cervical radiculopathy   Cervical paraspinal muscle spasm       ED Disposition  ED Disposition       ED Disposition   Discharge    Condition   Stable    Comment   --               Ryne Puente DO  [address]  [place] [id number] [phone]    Call in 1 day           Medication List        New Prescriptions      diazePAM 2 MG tablet  Commonly known as: VALIUM  Take 1 tablet by mouth Every 8 (Eight) Hours As Needed for Muscle Spasms for up to 3 days.               Where to Get Your Medications        These medications were sent to [facility][id number] - [place], [place] - [address] AT [facility] - [phone]  - [phone] FX  [address] [facility] [address]      Phone: [phone]   diazePAM 2 MG tablet            Boris Ruiz MD  10/09/24 3865

## 2024-10-11 NOTE — PROGRESS NOTES
Established Patient Office Visit      Patient Name: Kellen Esparza  : 1965   MRN: [id number]   Care Team: Patient Care Team:  Ryne Puente DO as PCP - General (Family Medicine)  Johny Sommer MD as Consulting Physician (Cardiology)  Sonya Gay MD as Consulting Physician (Cardiology)  Deb Guajardo APRN as Nurse Practitioner (Pulmonary Disease)  Luis Saeed MD as Consulting Physician (Otolaryngology)  Mami Lamb APRN as Nurse Practitioner (Cardiology)  Sal Sheldon MD (Pain Medicine)  Rian Najera MD as Surgeon (Orthopedic Surgery)    Chief Complaint:    Chief Complaint   Patient presents with    Arm Pain     Pt. States she noticed in the middle of the night Right Arm Pain that woke her up from sleeping. Pt. States she thought she slept wrong but it feels worse then she felt this morning.       History of Present Illness: Kellen Esparza is a 58 y.o. female who is here today for chief complaint.    HPI    She woke up in the middle of the night with right arm pain, initially thought she just slept on it wrong but has worsened through the morning.    This patient is accompanied by their self who contributes to the history of their care.    The following portions of the patient's history were reviewed and updated as appropriate: allergies, current medications, past family history, past medical history, past social history, past surgical history and problem list.    Subjective      Review of Systems:   Review of Systems - See HPI    Past Medical History:   Past Medical History:   Diagnosis Date    Arthritis     Arthritis of back     Atrial fibrillation     Carpal tunnel syndrome     Chlamydia     CPAP (continuous positive airway pressure) dependence     DDD (degenerative disc disease), cervical 2017    Deep vein thrombosis     Depression     Diabetes mellitus     doesnt check sugar     Elevated cholesterol     GERD (gastroesophageal reflux disease)      History of COVID-19 01/2022    Hypertension     Knee swelling     Migraine     Musculoligamentous strain     Pacemaker     Pulmonary embolism 1997    Sleep apnea     CPAP machine was recalled, waiting for new one.    Wears eyeglasses        Past Surgical History:   Past Surgical History:   Procedure Laterality Date    BREAST BIOPSY Left 2014    CARDIAC CATHETERIZATION Left 08/10/2017    Procedure: Cardiac Catheterization/Vascular Study;  Surgeon: Johny Sommer MD;  Location:  [facility];  Service:     CARDIAC ELECTROPHYSIOLOGY PROCEDURE N/A 07/19/2019    Procedure: Pacemaker DC new;  Surgeon: Sonya Gay MD;  Location:  [facility];  Service: Cardiology    CHOLECYSTECTOMY      COLONOSCOPY      COLONOSCOPY N/A 05/03/2022    Procedure: COLONOSCOPY;  Surgeon: Bryant Mejia MD;  Location:  [facility];  Service: Gastroenterology;  Laterality: N/A;    COLONOSCOPY N/A 8/6/2024    Procedure: COLONOSCOPY;  Surgeon: Bryant Mejia MD;  Location:  [facility];  Service: Gastroenterology;  Laterality: N/A;    ENDOSCOPY      HYSTERECTOMY  11/2014    INSERT / REPLACE / REMOVE PACEMAKER      JOINT REPLACEMENT Right     knee    KNEE SURGERY Right 12/2017    total replacement    NASAL SINUS SURGERY      OOPHORECTOMY  11/2014    PACEMAKER IMPLANTATION      PULMONARY EMBOLISM SURGERY      right lung    SPINAL CORD STIMULATOR IMPLANT N/A 09/10/2020    Procedure: SPINAL CORD STIMULATOR INSERTION PHASE 1, UPPER THORACIC PLACEMENT ;  Surgeon: Victor Manuel Geiger MD;  Location:  [facility];  Service: Neurosurgery;  Laterality: N/A;    TUBAL ABDOMINAL LIGATION         Family History:   Family History   Problem Relation Age of Onset    Breast cancer Mother 42    Heart failure Mother     Diabetes Mother     No Known Problems Father     Hypertension Sister     Hypertension Brother     Heart failure Son     Hypertension Maternal Grandmother     No Known Problems Paternal  Grandmother     Hypertension Maternal Grandfather     No Known Problems Paternal Grandfather     Ovarian cancer Neg Hx        Social History:   Social History     Socioeconomic History    Marital status:    Tobacco Use    Smoking status: Never     Passive exposure: Never    Smokeless tobacco: Never   Vaping Use    Vaping status: Never Used   Substance and Sexual Activity    Alcohol use: No     Comment: rare    Drug use: No    Sexual activity: Not Currently     Partners: Male     Birth control/protection: Hysterectomy, Surgical       Tobacco History:   Social History     Tobacco Use   Smoking Status Never    Passive exposure: Never   Smokeless Tobacco Never       Medications:   Outpatient Medications Prior to Visit   Medication Sig Dispense Refill    albuterol sulfate  (90 Base) MCG/ACT inhaler Inhale 2 puffs Every 4 (Four) Hours As Needed for Wheezing. 18 g 11    amLODIPine (NORVASC) 5 MG tablet       apixaban (Eliquis) 5 MG tablet tablet Take 1 tablet by mouth 2 (Two) Times a Day. 180 tablet 1    atorvastatin (LIPITOR) 80 MG tablet TAKE 1 TABLET BY MOUTH DAILY 90 tablet 3    azilsartan medoxomil (Edarbi) 80 MG tablet tablet Take 1 tablet by mouth Daily. To replace valsartan 30 tablet 2    carvedilol (COREG) 25 MG tablet Take 1 tablet by mouth 2 (Two) Times a Day. 60 tablet 3    cyclobenzaprine (FLEXERIL) 10 MG tablet Take 1 tablet by mouth 3 (Three) Times a Day.      Fluticasone-Salmeterol (ADVAIR/WIXELA) 250-50 MCG/ACT DISKUS Inhale 1 puff 2 (Two) Times a Day. 60 each 5    furosemide (LASIX) 40 MG tablet TAKE 1 TABLET BY MOUTH DAILY FOR 5 DAYS THEN TAKE 1 TABLET BY MOUTH UP TO 3 TIMES EVERY WEEK FOR SWELLING 90 tablet 0    levETIRAcetam (KEPPRA) 250 MG tablet Take 1 tablet by mouth 2 (Two) Times a Day. 180 tablet 0    oxyCODONE-acetaminophen (PERCOCET) 7.5-325 MG per tablet As Needed.      Ozempic, 0.25 or 0.5 MG/DOSE, 2 MG/3ML solution pen-injector INJECT 0.25 MG UNDER THE SKIN EVERY WEEK FOR 4  "WEEKS, THEN INCREASE TO 0.5MG WEEKLY 3 mL 1    pregabalin (LYRICA) 200 MG capsule Take 1 capsule by mouth Every 12 (Twelve) Hours.      Sod Picosulfate-Mag Ox-Cit Acd (Clenpiq) 10-3.5-12 MG-GM -GM/160ML solution Take 350 mL by mouth Take As Directed. 350 mL 0    spironolactone (ALDACTONE) 25 MG tablet TAKE 1 TABLET BY MOUTH DAILY 30 tablet 1    traZODone (DESYREL) 150 MG tablet TAKE 1 TABLET BY MOUTH EVERY NIGHT 90 tablet 1     No facility-administered medications prior to visit.        Allergies:   Allergies   Allergen Reactions    Metformin Diarrhea, Nausea And Vomiting and GI Intolerance    Codeine Itching    Lisinopril Cough       Objective   Objective     Physical Exam:  Vital Signs:   Vitals:    10/08/24 1517   BP: 144/90   Pulse: 72   SpO2: 98%   Weight: 126 kg (278 lb 6.4 oz)   Height: 180.3 cm (70.98\")   PainSc: 10-Worst pain ever   PainLoc: Arm     Body mass index is 38.85 kg/m².     Physical Exam  Nursing note reviewed  Const: NAD, A&Ox4, Pleasant, Cooperative  Eyes: EOMI, no conjunctivitis  ENT: No nasal discharge present, neck supple  Cardiac: Regular rate and rhythm, no cyanosis  Resp: Respiratory rate within normal limits, no increased work of breathing, no audible wheezing or retractions noted  GI: No distention or ascites  MSK: Global right shoulder pain and tenderness to palpation  Procedures/Radiology     Procedures  CT Cervical Spine Without Contrast    Result Date: 10/9/2024  Impression: CT scan of the cervical spine with multiplanar reconstructions demonstrating multilevel degenerative change. Electronically Signed: Jamie Garcia MD  10/9/2024 10:40 PM EDT  Workstation ID: [id number]    XR Elbow 3+ View Right    Result Date: 10/9/2024  Impression: No acute osseous abnormality. Electronically Signed: Boris Rowley MD  10/9/2024 10:37 PM EDT  Workstation ID: [id number]      Assessment & Plan   Assessment / Plan      Assessment/Plan:   Problems Addressed This Visit  Diagnoses and all orders for " this visit:    1. Acute pain of right shoulder (Primary)  -     ketorolac (TORADOL) injection 30 mg      Problem List Items Addressed This Visit    None  Visit Diagnoses       Acute pain of right shoulder    -  Primary    Relevant Medications    ketorolac (TORADOL) injection 30 mg (Completed)          No orders of the defined types were placed in this encounter.      Some limited range of motion and pain with any internal or external rotation of the right shoulder.  Exam consistent with frozen shoulder, but if she does not improve with the injections today would recommend further workup for possible septic joint versus cervical radiculopathy, although her pain is more with shoulder motion then neck motion today.    There are no Patient Instructions on file for this visit.    Follow Up:   No follow-ups on file.        DO BRENNAN Rivero RD  [facility]  [address]  [facility] [address]  Fax [phone]  Phone [phone]    Disclaimer to patients: The 21st Century Cares Act makes medical notes like these available to patients in the interest of transparency. However, please be advised that this is still a medical document. It is intended as [id number] communication. Many sections may include medical language or jargon, abbreviations, and additional verbiage that are unfamiliar or confusing. In some ways it may come across as blunt, direct, or may be summarized in order to clearly and concisely communicate the most crucial information to medical professionals. It may also include mentions of conditions that are unlikely but considered as part of the differential diagnosis, including serious disorders. These are not always discussed at length at the time of appointment because their likelihood is so low, but may be included in a medical note to make it clear what has been considered and/or ruled out as part of a work-up. Medical  documents are intended to carry relevant information, facts as evident, and the personal clinical opinion of the physician. If you have any questions regarding this medical document, please bring them to the attention of the physician at your next scheduled appointment.

## 2024-10-17 ENCOUNTER — OFFICE VISIT (OUTPATIENT)
Dept: FAMILY MEDICINE CLINIC | Facility: CLINIC | Age: 59
End: 2024-10-17
Payer: MEDICAID

## 2024-10-17 ENCOUNTER — TELEPHONE (OUTPATIENT)
Dept: FAMILY MEDICINE CLINIC | Facility: CLINIC | Age: 59
End: 2024-10-17

## 2024-10-17 VITALS
HEART RATE: 72 BPM | HEIGHT: 71 IN | SYSTOLIC BLOOD PRESSURE: 154 MMHG | WEIGHT: 275.4 LBS | DIASTOLIC BLOOD PRESSURE: 98 MMHG | OXYGEN SATURATION: 98 % | BODY MASS INDEX: 38.56 KG/M2

## 2024-10-17 DIAGNOSIS — M54.12 CERVICAL RADICULOPATHY: Primary | ICD-10-CM

## 2024-10-17 DIAGNOSIS — M25.511 ACUTE PAIN OF RIGHT SHOULDER: ICD-10-CM

## 2024-10-17 RX ORDER — METHYLPREDNISOLONE 4 MG/1
TABLET ORAL
Qty: 21 EACH | Refills: 0 | Status: SHIPPED | OUTPATIENT
Start: 2024-10-17

## 2024-10-17 RX ORDER — DIAZEPAM 2 MG/1
2 TABLET ORAL EVERY 8 HOURS PRN
Qty: 12 TABLET | Refills: 0 | Status: SHIPPED | OUTPATIENT
Start: 2024-10-17

## 2024-10-17 NOTE — TELEPHONE ENCOUNTER
Caller: Kellen Esparza    Relationship: Self    Best call back number: [phone]     Requested Prescriptions: VALIUM          Pharmacy where request should be sent: [id number] [place] [address] AT [facility] [phone]  - [phone] FX     Last office visit with prescribing clinician: 10/17/2024   Last telemedicine visit with prescribing clinician: Visit date not found   Next office visit with prescribing clinician: 12/10/2024     Additional details provided by patient: PATIENT WENT TO THE PHARMACY AND THEY DID NOT HAVE THE PRESCRIPTION FOR THE VALIUM       Would you like a call back once the refill request has been completed: [] Yes [x] No    If the office needs to give you a call back, can they leave a voicemail: [] Yes [x] No    Meka Rutledge MA   10/17/24 11:33 EDT

## 2024-10-17 NOTE — LETTER
October 17, 2024     Patient: Kellen Esparza   YOB: 1965   Date of Visit: 10/17/2024       To Whom It May Concern:    It is my medical opinion that Kellen Esparza should remain out of work until 10/21/24 .           Sincerely,        Ryne Puente,     CC: No Recipients

## 2024-10-23 RX ORDER — AMLODIPINE BESYLATE 5 MG/1
5 TABLET ORAL DAILY
Qty: 30 TABLET | Refills: 0 | Status: SHIPPED | OUTPATIENT
Start: 2024-10-23

## 2024-10-24 RX ORDER — CARVEDILOL 25 MG/1
25 TABLET ORAL 2 TIMES DAILY
Qty: 180 TABLET | Refills: 3 | Status: SHIPPED | OUTPATIENT
Start: 2024-10-24

## 2024-10-27 DIAGNOSIS — I10 ESSENTIAL HYPERTENSION: ICD-10-CM

## 2024-10-28 RX ORDER — AZILSARTAN KAMEDOXOMIL 80 MG/1
TABLET ORAL
Qty: 30 TABLET | Refills: 2 | Status: SHIPPED | OUTPATIENT
Start: 2024-10-28

## 2024-10-28 NOTE — TELEPHONE ENCOUNTER
Rx Refill Note  Requested Prescriptions     Pending Prescriptions Disp Refills    Edarbi 80 MG tablet tablet [Pharmacy Med Name: EDARBI 80MG TABLETS] 30 tablet 2     Sig: TAKE 1 TABLET BY MOUTH DAILY. REPLACE VALSARTAN      Last office visit with prescribing clinician: 10/17/2024   Last telemedicine visit with prescribing clinician: Visit date not found   Next office visit with prescribing clinician: 12/10/2024                         Would you like a call back once the refill request has been completed: [] Yes [] No    If the office needs to give you a call back, can they leave a voicemail: [] Yes [] No    Allyson Malave MA  10/28/24, 13:33 EDT

## 2024-11-03 NOTE — PROGRESS NOTES
Established Patient Office Visit      Patient Name: Kellen Esparza  : 1965   MRN: [id number]   Care Team: Patient Care Team:  Ryne Puente DO as PCP - General (Family Medicine)  Johny Sommer MD as Consulting Physician (Cardiology)  Sonya Gay MD as Consulting Physician (Cardiology)  Deb Guajardo APRN as Nurse Practitioner (Pulmonary Disease)  Luis Saeed MD as Consulting Physician (Otolaryngology)  Mami Lamb APRN as Nurse Practitioner (Cardiology)  Sal Sheldon MD (Pain Medicine)  Rian Najera MD as Surgeon (Orthopedic Surgery)    Chief Complaint:    Chief Complaint   Patient presents with    Hospital Follow Up Visit      ED on 10/09/2024, pain was under control with medication but pain has returned.        History of Present Illness: Kellen Esparza is a 58 y.o. female who is here today for chief complaint.    HPI    Initially got better with medication from the ER, but pain has returned    This patient is accompanied by their self who contributes to the history of their care.    The following portions of the patient's history were reviewed and updated as appropriate: allergies, current medications, past family history, past medical history, past social history, past surgical history and problem list.    Subjective      Review of Systems:   Review of Systems - See HPI    Past Medical History:   Past Medical History:   Diagnosis Date    Arthritis     Arthritis of back     Atrial fibrillation     Carpal tunnel syndrome     CHF (congestive heart failure)     Chlamydia     CPAP (continuous positive airway pressure) dependence     DDD (degenerative disc disease), cervical 2017    Deep vein thrombosis     Depression     Diabetes mellitus     doesnt check sugar     Elevated cholesterol     GERD (gastroesophageal reflux disease)     History of COVID-19 2022    Hypertension     Knee swelling     Migraine     Musculoligamentous strain     Pacemaker      Pulmonary embolism 1997    Sleep apnea     CPAP machine was recalled, waiting for new one.    Wears eyeglasses        Past Surgical History:   Past Surgical History:   Procedure Laterality Date    BREAST BIOPSY Left 2014    CARDIAC CATHETERIZATION Left 08/10/2017    Procedure: Cardiac Catheterization/Vascular Study;  Surgeon: Johny Sommer MD;  Location:  [facility];  Service:     CARDIAC ELECTROPHYSIOLOGY PROCEDURE N/A 07/19/2019    Procedure: Pacemaker DC new;  Surgeon: Sonya Gay MD;  Location:  [facility];  Service: Cardiology    CHOLECYSTECTOMY      COLONOSCOPY      COLONOSCOPY N/A 05/03/2022    Procedure: COLONOSCOPY;  Surgeon: Bryant Mejia MD;  Location:  [facility];  Service: Gastroenterology;  Laterality: N/A;    COLONOSCOPY N/A 8/6/2024    Procedure: COLONOSCOPY;  Surgeon: Bryant Mejia MD;  Location:  [facility];  Service: Gastroenterology;  Laterality: N/A;    ENDOSCOPY      HYSTERECTOMY  11/2014    INSERT / REPLACE / REMOVE PACEMAKER      JOINT REPLACEMENT Right     knee    KNEE SURGERY Right 12/2017    total replacement    NASAL SINUS SURGERY      OOPHORECTOMY  11/2014    PACEMAKER IMPLANTATION      PULMONARY EMBOLISM SURGERY      right lung    SPINAL CORD STIMULATOR IMPLANT N/A 09/10/2020    Procedure: SPINAL CORD STIMULATOR INSERTION PHASE 1, UPPER THORACIC PLACEMENT ;  Surgeon: Victor Manuel Geiger MD;  Location:  [facility];  Service: Neurosurgery;  Laterality: N/A;    TUBAL ABDOMINAL LIGATION         Family History:   Family History   Problem Relation Age of Onset    Breast cancer Mother 42    Heart failure Mother     Diabetes Mother     No Known Problems Father     Hypertension Sister     Hypertension Brother     Heart failure Son     Hypertension Maternal Grandmother     No Known Problems Paternal Grandmother     Hypertension Maternal Grandfather     No Known Problems Paternal Grandfather     Ovarian cancer Neg Hx         Social History:   Social History     Socioeconomic History    Marital status:    Tobacco Use    Smoking status: Never     Passive exposure: Never    Smokeless tobacco: Never   Vaping Use    Vaping status: Never Used   Substance and Sexual Activity    Alcohol use: No     Comment: rare    Drug use: No    Sexual activity: Not Currently     Partners: Male     Birth control/protection: Hysterectomy, Surgical       Tobacco History:   Social History     Tobacco Use   Smoking Status Never    Passive exposure: Never   Smokeless Tobacco Never       Medications:   Outpatient Medications Prior to Visit   Medication Sig Dispense Refill    albuterol sulfate  (90 Base) MCG/ACT inhaler Inhale 2 puffs Every 4 (Four) Hours As Needed for Wheezing. 18 g 11    apixaban (Eliquis) 5 MG tablet tablet Take 1 tablet by mouth 2 (Two) Times a Day. 180 tablet 1    atorvastatin (LIPITOR) 80 MG tablet TAKE 1 TABLET BY MOUTH DAILY 90 tablet 3    cyclobenzaprine (FLEXERIL) 10 MG tablet Take 1 tablet by mouth 3 (Three) Times a Day.      Fluticasone-Salmeterol (ADVAIR/WIXELA) 250-50 MCG/ACT DISKUS Inhale 1 puff 2 (Two) Times a Day. 60 each 5    furosemide (LASIX) 40 MG tablet TAKE 1 TABLET BY MOUTH DAILY FOR 5 DAYS THEN TAKE 1 TABLET BY MOUTH UP TO 3 TIMES EVERY WEEK FOR SWELLING 90 tablet 0    levETIRAcetam (KEPPRA) 250 MG tablet Take 1 tablet by mouth 2 (Two) Times a Day. 180 tablet 0    oxyCODONE-acetaminophen (PERCOCET) 7.5-325 MG per tablet As Needed.      Ozempic, 0.25 or 0.5 MG/DOSE, 2 MG/3ML solution pen-injector INJECT 0.25 MG UNDER THE SKIN EVERY WEEK FOR 4 WEEKS, THEN INCREASE TO 0.5MG WEEKLY 3 mL 1    pregabalin (LYRICA) 200 MG capsule Take 1 capsule by mouth Every 12 (Twelve) Hours.      Sod Picosulfate-Mag Ox-Cit Acd (Clenpiq) 10-3.5-12 MG-GM -GM/160ML solution Take 350 mL by mouth Take As Directed. 350 mL 0    spironolactone (ALDACTONE) 25 MG tablet TAKE 1 TABLET BY MOUTH DAILY 30 tablet 1    traZODone (DESYREL) 150  "MG tablet TAKE 1 TABLET BY MOUTH EVERY NIGHT 90 tablet 1    amLODIPine (NORVASC) 5 MG tablet       azilsartan medoxomil (Edarbi) 80 MG tablet tablet Take 1 tablet by mouth Daily. To replace valsartan 30 tablet 2    carvedilol (COREG) 25 MG tablet Take 1 tablet by mouth 2 (Two) Times a Day. 60 tablet 3     No facility-administered medications prior to visit.        Allergies:   Allergies   Allergen Reactions    Metformin Diarrhea, Nausea And Vomiting and GI Intolerance    Codeine Itching    Lisinopril Cough       Objective   Objective     Physical Exam:  Vital Signs:   Vitals:    10/17/24 1007   BP: 154/98   Pulse: 72   SpO2: 98%   Weight: 125 kg (275 lb 6.4 oz)   Height: 180.3 cm (70.98\")     Body mass index is 38.43 kg/m².     Physical Exam  Nursing note reviewed  Const: NAD, A&Ox4, Pleasant, Cooperative  Eyes: EOMI, no conjunctivitis  ENT: No nasal discharge present, neck supple  Cardiac: Regular rate and rhythm, no cyanosis  Resp: Respiratory rate within normal limits, no increased work of breathing, no audible wheezing or retractions noted  GI: No distention or ascites  MSK: Motor and sensation grossly intact in bilateral upper extremities  Neurologic: CN II-XII grossly intact  Psych: Appropriate mood and behavior.  Skin: Warm, dry  Procedures/Radiology     Procedures  No radiology results for the last 7 days     Assessment & Plan   Assessment / Plan      Assessment/Plan:   Problems Addressed This Visit  Diagnoses and all orders for this visit:    1. Cervical radiculopathy (Primary)  -     methylPREDNISolone (MEDROL) 4 MG dose pack; Take as directed on package instructions.  Dispense: 21 each; Refill: 0  -     Ambulatory Referral to Physical Therapy for Evaluation & Treatment  -     Ambulatory Referral to Orthopedic Surgery  -     diazePAM (Valium) 2 MG tablet; Take 1 tablet by mouth Every 8 (Eight) Hours As Needed for Muscle Spasms.  Dispense: 12 tablet; Refill: 0    2. Acute pain of right shoulder      Problem " List Items Addressed This Visit    None  Visit Diagnoses       Cervical radiculopathy    -  Primary    Relevant Medications    methylPREDNISolone (MEDROL) 4 MG dose pack    diazePAM (Valium) 2 MG tablet    Other Relevant Orders    Ambulatory Referral to Physical Therapy for Evaluation & Treatment    Ambulatory Referral to Orthopedic Surgery (Completed)    Acute pain of right shoulder              Orders Placed This Encounter   Procedures    Ambulatory Referral to Physical Therapy for Evaluation & Treatment     Referral Priority:   Routine     Referral Type:   Physical Therapy     Referral Reason:   Specialty Services Required     Requested Specialty:   Physical Therapy     Number of Visits Requested:   1    Ambulatory Referral to Orthopedic Surgery     Referral Priority:   Routine     Referral Type:   Consultation     Referral Reason:   Specialty Services Required     Ref[facility] Location[facility]   [facility]     Requested Specialty:   Orthopedic Surgery     Number of Visits Requested:   1       There are no Patient Instructions on file for this visit.    Follow Up:   No follow-ups on file.        DO BRENNAN Rivero RD  [facility]  [address]  [facility] [address]  Fax [phone]  Phone [phone]    Disclaimer to patients: The 21st Century Cares Act makes medical notes like these available to patients in the interest of transparency. However, please be advised that this is still a medical document. It is intended as [id number]-[id number] communication. Many sections may include medical language or jargon, abbreviations, and additional verbiage that are unfamiliar or confusing. In some ways it may come across as blunt, direct, or may be summarized in order to clearly and concisely communicate the most crucial information to medical professionals. It may also include mentions of conditions that are unlikely but considered as part of the differential  diagnosis, including serious disorders. These are not always discussed at length at the time of appointment because their likelihood is so low, but may be included in a medical note to make it clear what has been considered and/or ruled out as part of a work-up. Medical documents are intended to carry relevant information, facts as evident, and the personal clinical opinion of the physician. If you have any questions regarding this medical document, please bring them to the attention of the physician at your next scheduled appointment.

## 2024-11-11 ENCOUNTER — TRANSCRIBE ORDERS (OUTPATIENT)
Dept: ADMINISTRATIVE | Facility: HOSPITAL | Age: 59
End: 2024-11-11
Payer: MEDICAID

## 2024-11-11 DIAGNOSIS — M54.2 CERVICALGIA: Primary | ICD-10-CM

## 2024-11-13 PROCEDURE — 93296 REM INTERROG EVL PM/IDS: CPT | Performed by: INTERNAL MEDICINE

## 2024-11-13 PROCEDURE — 93294 REM INTERROG EVL PM/LDLS PM: CPT | Performed by: INTERNAL MEDICINE

## 2024-11-20 ENCOUNTER — HOSPITAL ENCOUNTER (OUTPATIENT)
Dept: CT IMAGING | Facility: HOSPITAL | Age: 59
Discharge: HOME | End: 2024-11-20
Payer: MEDICAID

## 2024-11-20 ENCOUNTER — HOSPITAL ENCOUNTER (OUTPATIENT)
Dept: GENERAL RADIOLOGY | Facility: HOSPITAL | Age: 59
Discharge: HOME | End: 2024-11-20
Payer: MEDICAID

## 2024-11-20 VITALS
OXYGEN SATURATION: 96 % | SYSTOLIC BLOOD PRESSURE: 134 MMHG | RESPIRATION RATE: 18 BRPM | DIASTOLIC BLOOD PRESSURE: 98 MMHG | BODY MASS INDEX: 38.72 KG/M2 | TEMPERATURE: 97.2 F | HEART RATE: 69 BPM | HEIGHT: 71 IN | WEIGHT: 276.6 LBS

## 2024-11-20 DIAGNOSIS — M54.2 CERVICALGIA: ICD-10-CM

## 2024-11-20 LAB — GLUCOSE BLDC GLUCOMTR-MCNC: 102 MG/DL (ref 70–130)

## 2024-11-20 PROCEDURE — 62302 MYELOGRAPHY LUMBAR INJECTION: CPT

## 2024-11-20 PROCEDURE — 25510000001 IOPAMIDOL 41 % SOLUTION: Performed by: ORTHOPAEDIC SURGERY

## 2024-11-20 PROCEDURE — 72240 MYELOGRAPHY NECK SPINE: CPT

## 2024-11-20 PROCEDURE — 82948 REAGENT STRIP/BLOOD GLUCOSE: CPT

## 2024-11-20 PROCEDURE — 72125 CT NECK SPINE W/O DYE: CPT

## 2024-11-20 PROCEDURE — 25010000002 LIDOCAINE PF 1% 1 % SOLUTION: Performed by: PHYSICIAN ASSISTANT

## 2024-11-20 RX ORDER — IOPAMIDOL 408 MG/ML
20 INJECTION, SOLUTION INTRATHECAL
Status: COMPLETED | OUTPATIENT
Start: 2024-11-20 | End: 2024-11-20

## 2024-11-20 RX ORDER — LIDOCAINE HYDROCHLORIDE 10 MG/ML
5 INJECTION, SOLUTION EPIDURAL; INFILTRATION; INTRACAUDAL; PERINEURAL ONCE
Status: COMPLETED | OUTPATIENT
Start: 2024-11-20 | End: 2024-11-20

## 2024-11-20 RX ADMIN — IOPAMIDOL 20 ML: 408 INJECTION, SOLUTION INTRATHECAL at 11:46

## 2024-11-20 RX ADMIN — LIDOCAINE HYDROCHLORIDE 5 ML: 10 INJECTION, SOLUTION EPIDURAL; INFILTRATION; INTRACAUDAL; PERINEURAL at 11:46

## 2024-11-20 NOTE — POST-PROCEDURE NOTE
Radiology Procedure    Pre-procedure: procedure, risks discussed with patient. Patient indicated understanding and consented to procedure.     Procedure Performed: cervical myelogram     IV Sedation and/or Anesthesia:  No    Complications: none    Preliminary Findings: pending    Specimen Removed: none    Estimated Blood Loss:  0ml    Post-Procedure Diagnosis: pending    Post-Procedure Plan: ct C spine, encourage fluids, bed rest x 2 hours    Standard Discharge Instructions Given:yes     JACQUELYN Dunbar  11/20/24  11:39 EST

## 2024-11-21 ENCOUNTER — DOCUMENTATION (OUTPATIENT)
Dept: PULMONOLOGY | Facility: CLINIC | Age: 59
End: 2024-11-21

## 2024-11-21 ENCOUNTER — TELEPHONE (OUTPATIENT)
Dept: INFUSION THERAPY | Facility: HOSPITAL | Age: 59
End: 2024-11-21
Payer: MEDICAID

## 2024-11-21 ENCOUNTER — OFFICE VISIT (OUTPATIENT)
Dept: PULMONOLOGY | Facility: CLINIC | Age: 59
End: 2024-11-21
Payer: MEDICAID

## 2024-11-21 DIAGNOSIS — R06.09 DYSPNEA ON EXERTION: Primary | ICD-10-CM

## 2024-11-21 NOTE — PROGRESS NOTES
Mr. Esparza was here for CPX testing.  She signed informed consent to proceed with testing.    Pretest vitals:    /82, HR 60, SpO2 96%, Chandler 0    Physical Exam  Vitals and nursing note reviewed.   Constitutional:       Appearance: She is well-developed. She is not diaphoretic.   HENT:      Head: Normocephalic and atraumatic.   Eyes:      Pupils: Pupils are equal, round, and reactive to light.   Neck:      Thyroid: No thyromegaly.   Cardiovascular:      Rate and Rhythm: Normal rate and regular rhythm.      Heart sounds: Normal heart sounds. No murmur heard.     No friction rub. No gallop.   Pulmonary:      Effort: Pulmonary effort is normal. No respiratory distress.      Breath sounds: Normal breath sounds. No wheezing or rales.   Chest:      Chest wall: No tenderness.   Abdominal:      General: Bowel sounds are normal.      Palpations: Abdomen is soft.      Tenderness: There is no abdominal tenderness.   Musculoskeletal:         General: No swelling. Normal range of motion.      Cervical back: Normal range of motion and neck supple.   Lymphadenopathy:      Cervical: No cervical adenopathy.   Skin:     General: Skin is warm and dry.      Capillary Refill: Capillary refill takes less than 2 seconds.   Neurological:      Mental Status: She is alert and oriented to person, place, and time.   Psychiatric:         Mood and Affect: Mood normal.         Behavior: Behavior normal.       Posttest vitals:    /80, HR 70, SpO2 95%, Chandler 7    Ms. Esparza was here for CPX testing.  She did tolerate the testing today.  She did not achieve RER.  She only walked on the treadmill about a minute and 30 seconds.  She had to quit due to severe dyspnea and leg and back pain.  She did not have any wheezing post testing.  She has a follow-up with Dr. Durham in January and we will discuss the results with him at this time.    CORRIE Quinn

## 2024-11-25 RX ORDER — AMLODIPINE BESYLATE 5 MG/1
5 TABLET ORAL DAILY
Qty: 90 TABLET | Refills: 3 | Status: SHIPPED | OUTPATIENT
Start: 2024-11-25

## 2024-11-26 RX ORDER — SPIRONOLACTONE 25 MG/1
25 TABLET ORAL DAILY
Qty: 30 TABLET | Refills: 5 | Status: SHIPPED | OUTPATIENT
Start: 2024-11-26

## 2024-12-02 RX ORDER — FUROSEMIDE 40 MG/1
TABLET ORAL
Qty: 90 TABLET | Refills: 0 | Status: SHIPPED | OUTPATIENT
Start: 2024-12-02

## 2024-12-02 NOTE — TELEPHONE ENCOUNTER
Rx Refill Note  Requested Prescriptions     Pending Prescriptions Disp Refills    furosemide (LASIX) 40 MG tablet [Pharmacy Med Name: FUROSEMIDE 40MG TABLETS] 90 tablet 0     Sig: TAKE 1 TABLET BY MOUTH DAILY FOR 5 DAYS, THEN 1 TABLET THREE TIMES WEEKLY FOR SWELLING      Last office visit with prescribing clinician: 10/17/2024   Last telemedicine visit with prescribing clinician: Visit date not found   Next office visit with prescribing clinician: 12/10/2024     Meliza Alonzo MA  12/02/24, 10:41 EST

## 2025-01-02 ENCOUNTER — OFFICE VISIT (OUTPATIENT)
Dept: FAMILY MEDICINE CLINIC | Facility: CLINIC | Age: 60
End: 2025-01-02
Payer: COMMERCIAL

## 2025-01-02 ENCOUNTER — LAB (OUTPATIENT)
Dept: LAB | Facility: HOSPITAL | Age: 60
End: 2025-01-02
Payer: COMMERCIAL

## 2025-01-02 VITALS
HEIGHT: 71 IN | SYSTOLIC BLOOD PRESSURE: 136 MMHG | WEIGHT: 284.4 LBS | OXYGEN SATURATION: 99 % | HEART RATE: 80 BPM | DIASTOLIC BLOOD PRESSURE: 88 MMHG | BODY MASS INDEX: 39.81 KG/M2

## 2025-01-02 DIAGNOSIS — E11.65 TYPE 2 DIABETES MELLITUS WITH HYPERGLYCEMIA, WITHOUT LONG-TERM CURRENT USE OF INSULIN: Primary | ICD-10-CM

## 2025-01-02 DIAGNOSIS — E53.8 BIOTIN DEFICIENCY: ICD-10-CM

## 2025-01-02 DIAGNOSIS — G90.59 COMPLEX REGIONAL PAIN SYNDROME TYPE 1 AFFECTING OTHER SITE: ICD-10-CM

## 2025-01-02 DIAGNOSIS — E11.65 TYPE 2 DIABETES MELLITUS WITH HYPERGLYCEMIA, WITHOUT LONG-TERM CURRENT USE OF INSULIN: ICD-10-CM

## 2025-01-02 DIAGNOSIS — G57.93 NEUROPATHY OF BOTH FEET: ICD-10-CM

## 2025-01-02 DIAGNOSIS — Z51.81 THERAPEUTIC DRUG MONITORING: ICD-10-CM

## 2025-01-02 DIAGNOSIS — E53.8 FOLATE DEFICIENCY: ICD-10-CM

## 2025-01-02 DIAGNOSIS — F51.01 PRIMARY INSOMNIA: ICD-10-CM

## 2025-01-02 LAB
ALBUMIN SERPL-MCNC: 4.2 G/DL (ref 3.5–5.2)
ALBUMIN/GLOB SERPL: 1.2 G/DL
ALP SERPL-CCNC: 97 U/L (ref 39–117)
ALT SERPL W P-5'-P-CCNC: 17 U/L (ref 1–33)
ANION GAP SERPL CALCULATED.3IONS-SCNC: 10.3 MMOL/L (ref 5–15)
AST SERPL-CCNC: 15 U/L (ref 1–32)
BASOPHILS # BLD AUTO: 0.04 10*3/MM3 (ref 0–0.2)
BASOPHILS NFR BLD AUTO: 0.5 % (ref 0–1.5)
BILIRUB SERPL-MCNC: 0.5 MG/DL (ref 0–1.2)
BUN SERPL-MCNC: 14 MG/DL (ref 8–23)
BUN/CREAT SERPL: 14.3 (ref 7–25)
CALCIUM SPEC-SCNC: 9.7 MG/DL (ref 8.6–10.5)
CHLORIDE SERPL-SCNC: 101 MMOL/L (ref 98–107)
CO2 SERPL-SCNC: 28.7 MMOL/L (ref 22–29)
CREAT SERPL-MCNC: 0.98 MG/DL (ref 0.57–1)
DEPRECATED RDW RBC AUTO: 41 FL (ref 37–54)
EGFRCR SERPLBLD CKD-EPI 2021: 65.8 ML/MIN/1.73
EOSINOPHIL # BLD AUTO: 0.2 10*3/MM3 (ref 0–0.4)
EOSINOPHIL NFR BLD AUTO: 2.7 % (ref 0.3–6.2)
ERYTHROCYTE [DISTWIDTH] IN BLOOD BY AUTOMATED COUNT: 13.7 % (ref 12.3–15.4)
EXPIRATION DATE: ABNORMAL
FERRITIN SERPL-MCNC: 362 NG/ML (ref 13–150)
FOLATE SERPL-MCNC: 3.39 NG/ML (ref 4.78–24.2)
GLOBULIN UR ELPH-MCNC: 3.5 GM/DL
GLUCOSE SERPL-MCNC: 152 MG/DL (ref 65–99)
HBA1C MFR BLD: 6.6 % (ref 4.5–5.7)
HCT VFR BLD AUTO: 34 % (ref 34–46.6)
HGB BLD-MCNC: 11.1 G/DL (ref 12–15.9)
IMM GRANULOCYTES # BLD AUTO: 0.02 10*3/MM3 (ref 0–0.05)
IMM GRANULOCYTES NFR BLD AUTO: 0.3 % (ref 0–0.5)
IRON 24H UR-MRATE: 76 MCG/DL (ref 37–145)
IRON SATN MFR SERPL: 21 % (ref 20–50)
LYMPHOCYTES # BLD AUTO: 2.55 10*3/MM3 (ref 0.7–3.1)
LYMPHOCYTES NFR BLD AUTO: 34.7 % (ref 19.6–45.3)
Lab: ABNORMAL
MCH RBC QN AUTO: 27 PG (ref 26.6–33)
MCHC RBC AUTO-ENTMCNC: 32.6 G/DL (ref 31.5–35.7)
MCV RBC AUTO: 82.7 FL (ref 79–97)
MONOCYTES # BLD AUTO: 0.38 10*3/MM3 (ref 0.1–0.9)
MONOCYTES NFR BLD AUTO: 5.2 % (ref 5–12)
NEUTROPHILS NFR BLD AUTO: 4.16 10*3/MM3 (ref 1.7–7)
NEUTROPHILS NFR BLD AUTO: 56.6 % (ref 42.7–76)
NRBC BLD AUTO-RTO: 0 /100 WBC (ref 0–0.2)
PLATELET # BLD AUTO: 178 10*3/MM3 (ref 140–450)
PMV BLD AUTO: 12 FL (ref 6–12)
POTASSIUM SERPL-SCNC: 4.3 MMOL/L (ref 3.5–5.2)
PROT SERPL-MCNC: 7.7 G/DL (ref 6–8.5)
RBC # BLD AUTO: 4.11 10*6/MM3 (ref 3.77–5.28)
SODIUM SERPL-SCNC: 140 MMOL/L (ref 136–145)
TIBC SERPL-MCNC: 365 MCG/DL (ref 298–536)
TRANSFERRIN SERPL-MCNC: 245 MG/DL (ref 200–360)
TSH SERPL DL<=0.05 MIU/L-ACNC: 1.37 UIU/ML (ref 0.27–4.2)
URATE SERPL-MCNC: 7.2 MG/DL (ref 2.4–5.7)
VIT B12 BLD-MCNC: 397 PG/ML (ref 211–946)
WBC NRBC COR # BLD AUTO: 7.35 10*3/MM3 (ref 3.4–10.8)

## 2025-01-02 PROCEDURE — 82728 ASSAY OF FERRITIN: CPT

## 2025-01-02 PROCEDURE — 80177 DRUG SCRN QUAN LEVETIRACETAM: CPT

## 2025-01-02 PROCEDURE — 80053 COMPREHEN METABOLIC PANEL: CPT

## 2025-01-02 PROCEDURE — 84550 ASSAY OF BLOOD/URIC ACID: CPT

## 2025-01-02 PROCEDURE — 82746 ASSAY OF FOLIC ACID SERUM: CPT

## 2025-01-02 PROCEDURE — 83036 HEMOGLOBIN GLYCOSYLATED A1C: CPT | Performed by: FAMILY MEDICINE

## 2025-01-02 PROCEDURE — 84425 ASSAY OF VITAMIN B-1: CPT

## 2025-01-02 PROCEDURE — 82607 VITAMIN B-12: CPT

## 2025-01-02 PROCEDURE — 83540 ASSAY OF IRON: CPT

## 2025-01-02 PROCEDURE — 85025 COMPLETE CBC W/AUTO DIFF WBC: CPT

## 2025-01-02 PROCEDURE — 84591 ASSAY OF NOS VITAMIN: CPT

## 2025-01-02 PROCEDURE — 84466 ASSAY OF TRANSFERRIN: CPT

## 2025-01-02 PROCEDURE — 99214 OFFICE O/P EST MOD 30 MIN: CPT | Performed by: FAMILY MEDICINE

## 2025-01-02 PROCEDURE — 84443 ASSAY THYROID STIM HORMONE: CPT

## 2025-01-02 RX ORDER — SEMAGLUTIDE 0.68 MG/ML
0.5 INJECTION, SOLUTION SUBCUTANEOUS WEEKLY
Qty: 3 ML | Refills: 2 | Status: SHIPPED | OUTPATIENT
Start: 2025-01-02

## 2025-01-02 RX ORDER — LEVETIRACETAM 250 MG/1
250 TABLET ORAL 2 TIMES DAILY
Qty: 180 TABLET | Refills: 0 | Status: SHIPPED | OUTPATIENT
Start: 2025-01-02 | End: 2025-01-06 | Stop reason: DRUGHIGH

## 2025-01-02 RX ORDER — TRAZODONE HYDROCHLORIDE 150 MG/1
150 TABLET ORAL NIGHTLY
Qty: 90 TABLET | Refills: 1 | Status: SHIPPED | OUTPATIENT
Start: 2025-01-02

## 2025-01-02 NOTE — PATIENT INSTRUCTIONS
A1c today 6.6%  Labs today for neuropathy in feet  If unremarkable, we will refer to a neurologist for the feet

## 2025-01-02 NOTE — ASSESSMENT & PLAN NOTE
A1c TODAY IMPROVED AT 6.6%, TOLERATING oZEMPIC 0.5MG FAIRLY WELL ALBEIT WITH SOME SLIGHT WEEkly nausea.

## 2025-01-03 ENCOUNTER — PRIOR AUTHORIZATION (OUTPATIENT)
Dept: FAMILY MEDICINE CLINIC | Facility: CLINIC | Age: 60
End: 2025-01-03
Payer: COMMERCIAL

## 2025-01-03 NOTE — TELEPHONE ENCOUNTER
(Key: [id number]) - [id number]  Ozempic (0.25 or 0.5 MG/DOSE) 2MG/3ML pen-injectors  status: PA Request  Created: January 2nd, 2025 [phone]  Sent: January 3rd, 2025

## 2025-01-05 LAB
LEVETIRACETAM SERPL-MCNC: <2 UG/ML (ref 10–40)
VIT B1 BLD-SCNC: 67.4 NMOL/L (ref 66.5–200)

## 2025-01-06 LAB — BIOTIN SERPL-MCNC: <0.05 NG/ML (ref 0.05–0.83)

## 2025-01-06 RX ORDER — BIOTIN 10 MG
10 TABLET ORAL DAILY
Qty: 90 EACH | Refills: 0 | Status: SHIPPED | OUTPATIENT
Start: 2025-01-06

## 2025-01-06 RX ORDER — FOLIC ACID 1 MG/1
1 TABLET ORAL DAILY
Qty: 90 TABLET | Refills: 3 | Status: SHIPPED | OUTPATIENT
Start: 2025-01-06

## 2025-01-06 RX ORDER — LANOLIN ALCOHOL/MO/W.PET/CERES
1000 CREAM (GRAM) TOPICAL DAILY
Qty: 90 TABLET | Refills: 3 | Status: SHIPPED | OUTPATIENT
Start: 2025-01-06

## 2025-01-06 RX ORDER — LEVETIRACETAM 500 MG/1
500 TABLET ORAL 2 TIMES DAILY
Qty: 180 TABLET | Refills: 0 | Status: SHIPPED | OUTPATIENT
Start: 2025-01-06

## 2025-01-06 NOTE — PROGRESS NOTES
Established Patient Office Visit      Patient Name: Kellen Esparza  : 1965   MRN: [id number]   Care Team: Patient Care Team:  Ryne Puente DO as PCP - General (Family Medicine)  Johny Sommer MD as Consulting Physician (Cardiology)  Sonya Gay MD as Consulting Physician (Cardiology)  Deb Guajardo APRN as Nurse Practitioner (Pulmonary Disease)  Luis Saeed MD as Consulting Physician (Otolaryngology)  Mami Lamb APRN as Nurse Practitioner (Cardiology)  Sal Sheldon MD (Pain Medicine)  Rian Najera MD as Surgeon (Orthopedic Surgery)    Chief Complaint:    Chief Complaint   Patient presents with    Diabetes     3 month F/U with A1C    Numbness     In the feet, pretty constant.       History of Present Illness: Kellen Esparza is a 59 y.o. female who is here today for chief complaint.    HPI    Still having numbness in her feet, feels like her sugar is doing better overall.    This patient is accompanied by their self who contributes to the history of their care.    The following portions of the patient's history were reviewed and updated as appropriate: allergies, current medications, past family history, past medical history, past social history, past surgical history and problem list.    Subjective      Review of Systems:   Review of Systems - See HPI    Past Medical History:   Past Medical History:   Diagnosis Date    Arthritis     Arthritis of back     Atrial fibrillation     Carpal tunnel syndrome     CHF (congestive heart failure)     Chlamydia     CPAP (continuous positive airway pressure) dependence     DDD (degenerative disc disease), cervical 2017    Deep vein thrombosis     Depression     Diabetes mellitus     doesnt check sugar     Elevated cholesterol     GERD (gastroesophageal reflux disease)     History of COVID-19 2022    Hypertension     Knee swelling     Migraine     Musculoligamentous strain     Pacemaker     Pulmonary embolism      Sleep apnea     CPAP machine was recalled, waiting for new one.    Wears eyeglasses        Past Surgical History:   Past Surgical History:   Procedure Laterality Date    BREAST BIOPSY Left 2014    CARDIAC CATHETERIZATION Left 08/10/2017    Procedure: Cardiac Catheterization/Vascular Study;  Surgeon: Johny Sommer MD;  Location:  [facility];  Service:     CARDIAC ELECTROPHYSIOLOGY PROCEDURE N/A 07/19/2019    Procedure: Pacemaker DC new;  Surgeon: Sonya Gay MD;  Location:  [facility];  Service: Cardiology    CHOLECYSTECTOMY      COLONOSCOPY      COLONOSCOPY N/A 05/03/2022    Procedure: COLONOSCOPY;  Surgeon: Bryant Mejia MD;  Location: [facility];  Service: Gastroenterology;  Laterality: N/A;    COLONOSCOPY N/A 8/6/2024    Procedure: COLONOSCOPY;  Surgeon: Bryant Mejia MD;  Location:  [facility];  Service: Gastroenterology;  Laterality: N/A;    ENDOSCOPY      HYSTERECTOMY  11/2014    INSERT / REPLACE / REMOVE PACEMAKER      JOINT REPLACEMENT Right     knee    KNEE SURGERY Right 12/2017    total replacement    NASAL SINUS SURGERY      OOPHORECTOMY  11/2014    PACEMAKER IMPLANTATION      PULMONARY EMBOLISM SURGERY      right lung    SPINAL CORD STIMULATOR IMPLANT N/A 09/10/2020    Procedure: SPINAL CORD STIMULATOR INSERTION PHASE 1, UPPER THORACIC PLACEMENT ;  Surgeon: Victor Manuel Geiger MD;  Location:  [facility];  Service: Neurosurgery;  Laterality: N/A;    TUBAL ABDOMINAL LIGATION         Family History:   Family History   Problem Relation Age of Onset    Breast cancer Mother 42    Heart failure Mother     Diabetes Mother     No Known Problems Father     Hypertension Sister     Hypertension Brother     Heart failure Son     Hypertension Maternal Grandmother     No Known Problems Paternal Grandmother     Hypertension Maternal Grandfather     No Known Problems Paternal Grandfather     Ovarian cancer Neg Hx        Social History:   Social  History     Socioeconomic History    Marital status:    Tobacco Use    Smoking status: Never     Passive exposure: Never    Smokeless tobacco: Never   Vaping Use    Vaping status: Never Used   Substance and Sexual Activity    Alcohol use: No     Comment: rare    Drug use: No    Sexual activity: Not Currently     Partners: Male     Birth control/protection: Hysterectomy, Surgical       Tobacco History:   Social History     Tobacco Use   Smoking Status Never    Passive exposure: Never   Smokeless Tobacco Never       Medications:   Outpatient Medications Prior to Visit   Medication Sig Dispense Refill    albuterol sulfate  (90 Base) MCG/ACT inhaler Inhale 2 puffs Every 4 (Four) Hours As Needed for Wheezing. 18 g 11    amLODIPine (NORVASC) 5 MG tablet TAKE 1 TABLET BY MOUTH DAILY 90 tablet 3    atorvastatin (LIPITOR) 80 MG tablet TAKE 1 TABLET BY MOUTH DAILY 90 tablet 3    carvedilol (COREG) 25 MG tablet TAKE 1 TABLET BY MOUTH TWICE DAILY 180 tablet 3    cyclobenzaprine (FLEXERIL) 10 MG tablet Take 1 tablet by mouth 3 (Three) Times a Day.      Edarbi 80 MG tablet tablet TAKE 1 TABLET BY MOUTH DAILY. REPLACE VALSARTAN 30 tablet 2    Fluticasone-Salmeterol (ADVAIR/WIXELA) 250-50 MCG/ACT DISKUS Inhale 1 puff 2 (Two) Times a Day. 60 each 5    oxyCODONE-acetaminophen (PERCOCET) 7.5-325 MG per tablet As Needed.      pregabalin (LYRICA) 200 MG capsule Take 1 capsule by mouth Every 12 (Twelve) Hours.      spironolactone (ALDACTONE) 25 MG tablet TAKE 1 TABLET BY MOUTH DAILY 30 tablet 5    levETIRAcetam (KEPPRA) 250 MG tablet Take 1 tablet by mouth 2 (Two) Times a Day. 180 tablet 0    Ozempic, 0.25 or 0.5 MG/DOSE, 2 MG/3ML solution pen-injector INJECT 0.25 MG UNDER THE SKIN EVERY WEEK FOR 4 WEEKS, THEN INCREASE TO 0.5MG WEEKLY 3 mL 1    traZODone (DESYREL) 150 MG tablet TAKE 1 TABLET BY MOUTH EVERY NIGHT 90 tablet 1    apixaban (Eliquis) 5 MG tablet tablet Take 1 tablet by mouth 2 (Two) Times a Day. (Patient not  "taking: Reported on 1/2/2025) 180 tablet 1    furosemide (LASIX) 40 MG tablet TAKE 1 TABLET BY MOUTH DAILY FOR 5 DAYS, THEN 1 TABLET THREE TIMES WEEKLY FOR SWELLING (Patient not taking: Reported on 1/2/2025) 90 tablet 0    Sod Picosulfate-Mag Ox-Cit Acd (Clenpiq) 10-3.5-12 MG-GM -GM/160ML solution Take 350 mL by mouth Take As Directed. (Patient not taking: Reported on 1/2/2025) 350 mL 0    diazePAM (Valium) 2 MG tablet Take 1 tablet by mouth Every 8 (Eight) Hours As Needed for Muscle Spasms. (Patient not taking: Reported on 1/2/2025) 12 tablet 0    methylPREDNISolone (MEDROL) 4 MG dose pack Take as directed on package instructions. (Patient not taking: Reported on 1/2/2025) 21 each 0     No facility-administered medications prior to visit.        Allergies:   Allergies   Allergen Reactions    Metformin Diarrhea, Nausea And Vomiting and GI Intolerance    Codeine Itching    Lisinopril Cough       Objective   Objective     Physical Exam:  Vital Signs:   Vitals:    01/02/25 0822   BP: 136/88   Pulse: 80   SpO2: 99%   Weight: 129 kg (284 lb 6.4 oz)   Height: 180.3 cm (70.98\")     Body mass index is 39.68 kg/m².     Physical Exam  Nursing note reviewed  Const: NAD, A&Ox4, Pleasant, Cooperative  Eyes: EOMI, no conjunctivitis  ENT: No nasal discharge present, neck supple  Cardiac: Regular rate and rhythm, no cyanosis  Resp: Respiratory rate within normal limits, no increased work of breathing, no audible wheezing or retractions noted  GI: No distention or ascites  MSK: Motor and sensation grossly intact in bilateral upper extremities  Neurologic: CN II-XII grossly intact  Psych: Appropriate mood and behavior.  Skin: Warm, dry  Procedures/Radiology     Procedures  No radiology results for the last 7 days     Assessment & Plan   Assessment / Plan      Assessment/Plan:   Problems Addressed This Visit  Diagnoses and all orders for this visit:    1. Type 2 diabetes mellitus with hyperglycemia, without long-term current use of " insulin (Primary)  Assessment & Plan:  A1c TODAY IMPROVED AT 6.6%, TOLERATING oZEMPIC 0.5MG FAIRLY WELL ALBEIT WITH SOME SLIGHT WEEkly nausea.    Orders:  -     POC Glycosylated Hemoglobin (Hb A1C)  -     CBC & Differential; Future  -     Comprehensive Metabolic Panel; Future  -     TSH Rfx On Abnormal To Free T4; Future  -     Uric Acid; Future  -     Vitamin B12; Future  -     Iron Profile; Future  -     Ferritin; Future  -     Folate; Future  -     Vitamin B7 (Biotin); Future  -     Vitamin B1, Whole Blood; Future  -     Ozempic, 0.25 or 0.5 MG/DOSE, 2 MG/3ML solution pen-injector; Inject 0.5 mg under the skin into the appropriate area as directed 1 (One) Time Per Week.  Dispense: 3 mL; Refill: 2    2. Neuropathy of both feet  -     CBC & Differential; Future  -     Comprehensive Metabolic Panel; Future  -     TSH Rfx On Abnormal To Free T4; Future  -     Uric Acid; Future  -     Vitamin B12; Future  -     Iron Profile; Future  -     Ferritin; Future  -     Folate; Future  -     Vitamin B7 (Biotin); Future  -     Vitamin B1, Whole Blood; Future  -     folic acid (FOLVITE) 1 MG tablet; Take 1 tablet by mouth Daily.  Dispense: 90 tablet; Refill: 3  -     Vitamin B7 (Biotin); Future  -     Vitamin B12; Future  -     Folate; Future  -     CBC (No Diff); Future  -     Methylmalonic Acid, Serum; Future  -     Homocysteine; Future  -     Intrinsic Factor Ab; Future  -     vitamin B-12 (CYANOCOBALAMIN) 1000 MCG tablet; Take 1 tablet by mouth Daily.  Dispense: 90 tablet; Refill: 3    3. Therapeutic drug monitoring  -     Levetiracetam Level (Keppra); Future  -     Levetiracetam Level (Keppra); Future    4. Primary insomnia  -     traZODone (DESYREL) 150 MG tablet; Take 1 tablet by mouth Every Night.  Dispense: 90 tablet; Refill: 1    5. Complex regional pain syndrome type 1 affecting other site  -     Discontinue: levETIRAcetam (KEPPRA) 250 MG tablet; Take 1 tablet by mouth 2 (Two) Times a Day.  Dispense: 180 tablet;  Refill: 0  -     levETIRAcetam (KEPPRA) 500 MG tablet; Take 1 tablet by mouth 2 (Two) Times a Day.  Dispense: 180 tablet; Refill: 0    6. Folate deficiency  -     folic acid (FOLVITE) 1 MG tablet; Take 1 tablet by mouth Daily.  Dispense: 90 tablet; Refill: 3  -     Vitamin B12; Future  -     Folate; Future  -     CBC (No Diff); Future  -     Methylmalonic Acid, Serum; Future  -     Homocysteine; Future  -     Intrinsic Factor Ab; Future    7. Biotin deficiency  -     Biotin 10 MG tablet; Take 10 mg by mouth Daily. Recheck level in 3 months  Dispense: 90 each; Refill: 0  -     Vitamin B7 (Biotin); Future      Problem List Items Addressed This Visit          Endocrine and Metabolic    Diabetes mellitus - Primary    Overview     Did not tolerate metformin in 2017.  Came off Januvia in August 2021         Current Assessment & Plan     A1c TODAY IMPROVED AT 6.6%, TOLERATING oZEMPIC 0.5MG FAIRLY WELL ALBEIT WITH SOME SLIGHT WEEkly nausea.         Relevant Medications    Ozempic, 0.25 or 0.5 MG/DOSE, 2 MG/3ML solution pen-injector    Other Relevant Orders    POC Glycosylated Hemoglobin (Hb A1C) (Completed)    CBC & Differential (Completed)    Comprehensive Metabolic Panel (Completed)    TSH Rfx On Abnormal To Free T4 (Completed)    Uric Acid (Completed)    Vitamin B12 (Completed)    Iron Profile (Completed)    Ferritin (Completed)    Folate (Completed)    Vitamin B7 (Biotin) (Completed)    Vitamin B1, Whole Blood (Completed)       Neuro    Complex regional pain syndrome type I    Relevant Medications    levETIRAcetam (KEPPRA) 500 MG tablet    Neuropathy of both feet    Relevant Medications    folic acid (FOLVITE) 1 MG tablet    vitamin B-12 (CYANOCOBALAMIN) 1000 MCG tablet    Other Relevant Orders    CBC & Differential (Completed)    Comprehensive Metabolic Panel (Completed)    TSH Rfx On Abnormal To Free T4 (Completed)    Uric Acid (Completed)    Vitamin B12 (Completed)    Iron Profile (Completed)    Ferritin (Completed)     Folate (Completed)    Vitamin B7 (Biotin) (Completed)    Vitamin B1, Whole Blood (Completed)    Vitamin B7 (Biotin)    Vitamin B12    Folate    CBC (No Diff)    Methylmalonic Acid, Serum    Homocysteine    Intrinsic Factor Ab     Other Visit Diagnoses       Therapeutic drug monitoring        Relevant Orders    Levetiracetam Level (Keppra) (Completed)    Levetiracetam Level (Keppra)    Primary insomnia        Relevant Medications    traZODone (DESYREL) 150 MG tablet    Folate deficiency        Relevant Medications    folic acid (FOLVITE) 1 MG tablet    Other Relevant Orders    Vitamin B12    Folate    CBC (No Diff)    Methylmalonic Acid, Serum    Homocysteine    Intrinsic Factor Ab    Biotin deficiency        Relevant Medications    Biotin 10 MG tablet    Other Relevant Orders    Vitamin B7 (Biotin)          Low folate with mild anemia, will start supplement 1mg daily and recheck with add'l labs in 3 months  Low biotin as well, start 10mg daily  Start B12 1000mcg daily as well  Keppra level is subtherapeutic recommend increasing to 500mg BID    Patient Instructions   A1c today 6.6%  Labs today for neuropathy in feet  If unremarkable, we will refer to a neurologist for the feet    Follow Up:   Return in about 3 months (around 4/2/2025) for with A1c;.    DO BRENNAN Rivero RD  [facility]  [address] [facility] [address]  Fax [phone]  Phone [phone]    Disclaimer to patients: The 21[employer] Act makes medical notes like these available to patients in the interest of transparency. However, please be advised that this is still a medical document. It is intended as [id number] communication. Many sections may include medical language or jargon, abbreviations, and additional verbiage that are unfamiliar or confusing. In some ways it may come across as blunt, direct, or may be summarized in order to clearly and concisely  communicate the most crucial information to medical professionals. It may also include mentions of conditions that are unlikely but considered as part of the differential diagnosis, including serious disorders. These are not always discussed at length at the time of appointment because their likelihood is so low, but may be included in a medical note to make it clear what has been considered and/or ruled out as part of a work-up. Medical documents are intended to carry relevant information, facts as evident, and the personal clinical opinion of the physician. If you have any questions regarding this medical document, please bring them to the attention of the physician at your next scheduled appointment.

## 2025-01-07 DIAGNOSIS — E53.8 BIOTIN DEFICIENCY: ICD-10-CM

## 2025-01-08 RX ORDER — BIOTIN 10 MG
10 TABLET ORAL DAILY
Qty: 90 EACH | Refills: 0 | OUTPATIENT
Start: 2025-01-08

## 2025-01-08 NOTE — TELEPHONE ENCOUNTER
Rx Refill Note  Requested Prescriptions     Pending Prescriptions Disp Refills    Biotin 10 MG tablet 90 each 0     Sig: Take 10 mg by mouth Daily. Recheck level in 3 months      Last office visit with prescribing clinician: 1/2/2025   Last telemedicine visit with prescribing clinician: Visit date not found   Next office visit with prescribing clinician: 4/2/2025     REFUSED: DUPLICATE    Tiffanie Vieira MA  01/08/25, 12:57 EST

## 2025-03-05 DIAGNOSIS — G90.59 COMPLEX REGIONAL PAIN SYNDROME TYPE 1 AFFECTING OTHER SITE: ICD-10-CM

## 2025-03-05 RX ORDER — LEVETIRACETAM 500 MG/1
500 TABLET ORAL 2 TIMES DAILY
Qty: 180 TABLET | Refills: 0 | OUTPATIENT
Start: 2025-03-05

## 2025-03-26 ENCOUNTER — TELEPHONE (OUTPATIENT)
Dept: PULMONOLOGY | Facility: CLINIC | Age: 60
End: 2025-03-26
Payer: COMMERCIAL

## 2025-03-26 NOTE — TELEPHONE ENCOUNTER
Reached out  to patient regarding NO CALL/NO SHOW for their appointment today. Offered to reschedule patient, and advised of No Call No Show Policy.    PT VOICED UNDERSTANDING OF NS POLICY. PT HAS BEEN RESCHED AT THIS TIME.     WARNING 1*

## 2025-03-27 ENCOUNTER — OFFICE VISIT (OUTPATIENT)
Dept: FAMILY MEDICINE CLINIC | Facility: CLINIC | Age: 60
End: 2025-03-27
Payer: COMMERCIAL

## 2025-03-27 VITALS
BODY MASS INDEX: 39.81 KG/M2 | SYSTOLIC BLOOD PRESSURE: 142 MMHG | OXYGEN SATURATION: 98 % | HEART RATE: 86 BPM | HEIGHT: 71 IN | DIASTOLIC BLOOD PRESSURE: 88 MMHG | WEIGHT: 284.4 LBS

## 2025-03-27 DIAGNOSIS — I10 ESSENTIAL HYPERTENSION: ICD-10-CM

## 2025-03-27 DIAGNOSIS — E53.8 BIOTIN DEFICIENCY: ICD-10-CM

## 2025-03-27 DIAGNOSIS — I25.118 CORONARY ARTERY DISEASE OF NATIVE ARTERY OF NATIVE HEART WITH STABLE ANGINA PECTORIS: ICD-10-CM

## 2025-03-27 DIAGNOSIS — F51.01 PRIMARY INSOMNIA: ICD-10-CM

## 2025-03-27 DIAGNOSIS — E11.65 TYPE 2 DIABETES MELLITUS WITH HYPERGLYCEMIA, WITHOUT LONG-TERM CURRENT USE OF INSULIN: Primary | ICD-10-CM

## 2025-03-27 DIAGNOSIS — G90.59 COMPLEX REGIONAL PAIN SYNDROME TYPE 1 AFFECTING OTHER SITE: ICD-10-CM

## 2025-03-27 LAB
EXPIRATION DATE: ABNORMAL
HBA1C MFR BLD: 7.3 % (ref 4.5–5.7)
Lab: ABNORMAL

## 2025-03-27 RX ORDER — TIRZEPATIDE 5 MG/.5ML
5 INJECTION, SOLUTION SUBCUTANEOUS WEEKLY
Qty: 2 ML | Refills: 1 | Status: SHIPPED | OUTPATIENT
Start: 2025-03-27

## 2025-03-27 RX ORDER — LEVETIRACETAM 500 MG/1
500 TABLET ORAL 2 TIMES DAILY
Qty: 180 TABLET | Refills: 3 | Status: SHIPPED | OUTPATIENT
Start: 2025-03-27

## 2025-03-27 RX ORDER — TRAZODONE HYDROCHLORIDE 150 MG/1
150 TABLET ORAL NIGHTLY
Qty: 90 TABLET | Refills: 1 | Status: SHIPPED | OUTPATIENT
Start: 2025-03-27

## 2025-03-27 RX ORDER — AZILSARTAN KAMEDOXOMIL 80 MG/1
80 TABLET ORAL DAILY
Qty: 30 TABLET | Refills: 2 | Status: SHIPPED | OUTPATIENT
Start: 2025-03-27

## 2025-03-27 RX ORDER — TIRZEPATIDE 2.5 MG/.5ML
2.5 INJECTION, SOLUTION SUBCUTANEOUS WEEKLY
Qty: 2 ML | Refills: 0 | Status: SHIPPED | OUTPATIENT
Start: 2025-03-27

## 2025-03-27 RX ORDER — BIOTIN 10 MG
10 TABLET ORAL DAILY
Qty: 90 EACH | Refills: 0 | Status: SHIPPED | OUTPATIENT
Start: 2025-03-27

## 2025-03-27 NOTE — PROGRESS NOTES
Established Patient Office Visit      Patient Name: Kellen Esparza  : 1965   MRN: [id number]   Care Team: Patient Care Team:  Ryne Puente DO as PCP - General (Family Medicine)  Johny Sommer MD as Consulting Physician (Cardiology)  Sonya Gay MD as Consulting Physician (Cardiology)  Deb Guajardo APRN as Nurse Practitioner (Pulmonary Disease)  Luis Saeed MD as Consulting Physician (Otolaryngology)  Mami Lamb APRN as Nurse Practitioner (Cardiology)  Sal Sheldon MD (Pain Medicine)  Rian Najera MD as Surgeon (Orthopedic Surgery)    Chief Complaint:    Chief Complaint   Patient presents with    Diabetes     A1C    Hypertension     Medication needs PA       History of Present Illness: Kellen Esparza is a 59 y.o. female who is here today for chief complaint.    HPI    Routine follow-up on her diabetes.  PA for her Ozempic was denied again in January, this has been an ongoing issue with her insurance denying coverage. Also couldn't get Edarbi due to cost, will send to specialty pharmacy. Overall doing well though, trying to be more active in the nice weather, continuing with pain mgmt that's doing well.    Her insurance changed as of January.    This patient is accompanied by their self who contributes to the history of their care.    The following portions of the patient's history were reviewed and updated as appropriate: allergies, current medications, past family history, past medical history, past social history, past surgical history and problem list.    Subjective      Review of Systems:   Review of Systems - See HPI    Past Medical History:   Past Medical History:   Diagnosis Date    Arthritis     Arthritis of back     Atrial fibrillation     Carpal tunnel syndrome     CHF (congestive heart failure)     Chlamydia     CPAP (continuous positive airway pressure) dependence     DDD (degenerative disc disease), cervical 2017    Deep vein thrombosis      Depression     Diabetes mellitus     doesnt check sugar     Elevated cholesterol     GERD (gastroesophageal reflux disease)     History of COVID-19 01/2022    Hypertension     Knee swelling     Migraine     Musculoligamentous strain     Pacemaker     Pulmonary embolism 1997    Sleep apnea     CPAP machine was recalled, waiting for new one.    Wears eyeglasses        Past Surgical History:   Past Surgical History:   Procedure Laterality Date    BREAST BIOPSY Left 2014    CARDIAC CATHETERIZATION Left 08/10/2017    Procedure: Cardiac Catheterization/Vascular Study;  Surgeon: Johny Sommer MD;  Location:  [facility];  Service:     CARDIAC ELECTROPHYSIOLOGY PROCEDURE N/A 07/19/2019    Procedure: Pacemaker DC new;  Surgeon: oSnya Gay MD;  Location:  [facility];  Service: Cardiology    CHOLECYSTECTOMY      COLONOSCOPY      COLONOSCOPY N/A 05/03/2022    Procedure: COLONOSCOPY;  Surgeon: Bryant Mejia MD;  Location:  [facility];  Service: Gastroenterology;  Laterality: N/A;    COLONOSCOPY N/A 8/6/2024    Procedure: COLONOSCOPY;  Surgeon: Bryant Mejia MD;  Location:  [facility];  Service: Gastroenterology;  Laterality: N/A;    ENDOSCOPY      HYSTERECTOMY  11/2014    INSERT / REPLACE / REMOVE PACEMAKER      JOINT REPLACEMENT Right     knee    KNEE SURGERY Right 12/2017    total replacement    NASAL SINUS SURGERY      OOPHORECTOMY  11/2014    PACEMAKER IMPLANTATION      PULMONARY EMBOLISM SURGERY      right lung    SPINAL CORD STIMULATOR IMPLANT N/A 09/10/2020    Procedure: SPINAL CORD STIMULATOR INSERTION PHASE 1, UPPER THORACIC PLACEMENT ;  Surgeon: Victor Manuel Geiger MD;  Location:  [facility];  Service: Neurosurgery;  Laterality: N/A;    TUBAL ABDOMINAL LIGATION         Family History:   Family History   Problem Relation Age of Onset    Breast cancer Mother 42    Heart failure Mother     Diabetes Mother     No Known Problems Father     Hypertension  Sister     Hypertension Brother     Heart failure Son     Hypertension Maternal Grandmother     No Known Problems Paternal Grandmother     Hypertension Maternal Grandfather     No Known Problems Paternal Grandfather     Ovarian cancer Neg Hx        Social History:   Social History     Socioeconomic History    Marital status:    Tobacco Use    Smoking status: Never     Passive exposure: Never    Smokeless tobacco: Never   Vaping Use    Vaping status: Never Used   Substance and Sexual Activity    Alcohol use: No     Comment: rare    Drug use: No    Sexual activity: Not Currently     Partners: Male     Birth control/protection: Hysterectomy, Surgical       Tobacco History:   Social History     Tobacco Use   Smoking Status Never    Passive exposure: Never   Smokeless Tobacco Never       Medications:   Outpatient Medications Prior to Visit   Medication Sig Dispense Refill    albuterol sulfate  (90 Base) MCG/ACT inhaler Inhale 2 puffs Every 4 (Four) Hours As Needed for Wheezing. 18 g 11    carvedilol (COREG) 25 MG tablet TAKE 1 TABLET BY MOUTH TWICE DAILY 180 tablet 3    cyclobenzaprine (FLEXERIL) 10 MG tablet Take 1 tablet by mouth 3 (Three) Times a Day.      Fluticasone-Salmeterol (ADVAIR/WIXELA) 250-50 MCG/ACT DISKUS Inhale 1 puff 2 (Two) Times a Day. 60 each 5    folic acid (FOLVITE) 1 MG tablet Take 1 tablet by mouth Daily. 90 tablet 3    furosemide (LASIX) 40 MG tablet TAKE 1 TABLET BY MOUTH DAILY FOR 5 DAYS, THEN 1 TABLET THREE TIMES WEEKLY FOR SWELLING 90 tablet 0    oxyCODONE-acetaminophen (PERCOCET) 7.5-325 MG per tablet As Needed.      pregabalin (LYRICA) 200 MG capsule Take 1 capsule by mouth Every 12 (Twelve) Hours.      spironolactone (ALDACTONE) 25 MG tablet TAKE 1 TABLET BY MOUTH DAILY 30 tablet 5    traZODone (DESYREL) 150 MG tablet Take 1 tablet by mouth Every Night. 90 tablet 1    vitamin B-12 (CYANOCOBALAMIN) 1000 MCG tablet Take 1 tablet by mouth Daily. 90 tablet 3    levETIRAcetam  "(KEPPRA) 500 MG tablet Take 1 tablet by mouth 2 (Two) Times a Day. 180 tablet 0    amLODIPine (NORVASC) 5 MG tablet TAKE 1 TABLET BY MOUTH DAILY (Patient not taking: Reported on 3/27/2025) 90 tablet 3    apixaban (Eliquis) 5 MG tablet tablet Take 1 tablet by mouth 2 (Two) Times a Day. (Patient not taking: Reported on 3/27/2025) 180 tablet 1    atorvastatin (LIPITOR) 80 MG tablet TAKE 1 TABLET BY MOUTH DAILY (Patient not taking: Reported on 3/27/2025) 90 tablet 3    Sod Picosulfate-Mag Ox-Cit Acd (Clenpiq) 10-3.5-12 MG-GM -GM/160ML solution Take 350 mL by mouth Take As Directed. (Patient not taking: Reported on 3/27/2025) 350 mL 0    Biotin 10 MG tablet Take 10 mg by mouth Daily. Recheck level in 3 months (Patient not taking: Reported on 3/27/2025) 90 each 0    Edarbi 80 MG tablet tablet TAKE 1 TABLET BY MOUTH DAILY. REPLACE VALSARTAN (Patient not taking: Reported on 3/27/2025) 30 tablet 2    Ozempic, 0.25 or 0.5 MG/DOSE, 2 MG/3ML solution pen-injector Inject 0.5 mg under the skin into the appropriate area as directed 1 (One) Time Per Week. (Patient not taking: Reported on 3/27/2025) 3 mL 2     No facility-administered medications prior to visit.        Allergies:   Allergies   Allergen Reactions    Metformin Diarrhea, Nausea And Vomiting and GI Intolerance    Codeine Itching    Lisinopril Cough       Objective   Objective     Physical Exam:  Vital Signs:   Vitals:    03/27/25 0923   BP: 142/88   Pulse: 86   SpO2: 98%   Weight: 129 kg (284 lb 6.4 oz)   Height: 180.3 cm (70.98\")     Body mass index is 39.68 kg/m².     Physical Exam  Nursing note reviewed  Const: NAD, A&Ox4, Pleasant, Cooperative  Eyes: EOMI, no conjunctivitis  ENT: No nasal discharge present, neck supple  Cardiac: Regular rate and rhythm, no cyanosis  Resp: Respiratory rate within normal limits, no increased work of breathing, no audible wheezing or retractions noted  GI: No distention or ascites  MSK: Motor and sensation grossly intact in bilateral " upper extremities  Neurologic: CN II-XII grossly intact  Psych: Appropriate mood and behavior.  Skin: Warm, dry  Procedures/Radiology     Procedures  No radiology results for the last 7 days     Assessment & Plan   Assessment / Plan      Assessment/Plan:   Problems Addressed This Visit  Diagnoses and all orders for this visit:    1. Type 2 diabetes mellitus with hyperglycemia, without long-term current use of insulin (Primary)  Assessment & Plan:  A1c today worsening since January, up from 6.6 to 7.3%.  She is not currently taking any medications since the insurance denied the PA for her Ozempic.  -It looks like the PA for Ozempic did not go through, with them citing that they would only cover if she was diabetic.  She has a known history of diabetes for the last 10 years, with an A1c of 6.6% even as recently as January 2025.  She tolerates Ozempic well when insurance covers it and she can obtain it from the pharmacy.  Lab Results   Component Value Date    HGBA1C 7.3 (A) 03/27/2025   -It looks like her insurance may prefer Mounjaro, will send 2.5mg and 5mg in to pharmacy    Orders:  -     POC Glycosylated Hemoglobin (Hb A1C)    2. Essential hypertension  Assessment & Plan:  Did not respond to increasing carvedilol to 2 tabs BID and valsartan 40mg to BID. Will try changing her valsartan to Edarbi 80mg- expensive locally, will try sending to e-rx through [facility]. Encouraged to contact her cardiologist for other medications or follow-up otherwise.    Orders:  -     Edarbi 80 MG tablet tablet; Take 1 tablet by mouth Daily.  Dispense: 30 tablet; Refill: 2    3. Coronary artery disease of native artery of native heart with stable angina pectoris    4. Complex regional pain syndrome type 1 affecting other site  -     levETIRAcetam (KEPPRA) 500 MG tablet; Take 1 tablet by mouth 2 (Two) Times a Day.  Dispense: 180 tablet; Refill: 3    5. Biotin deficiency  -     Biotin 10 MG tablet; Take 10 mg by mouth Daily. Recheck level in  3 months  Dispense: 90 each; Refill: 0    Other orders  -     Mounjaro 2.5 MG/0.5ML solution auto-injector; Inject 2.5 mg under the skin into the appropriate area as directed 1 (One) Time Per Week.  Dispense: 2 mL; Refill: 0  -     Mounjaro 5 MG/0.5ML solution auto-injector; Inject 5 mg under the skin into the appropriate area as directed 1 (One) Time Per Week. Titration dose 2  Dispense: 2 mL; Refill: 1      Problem List Items Addressed This Visit          Cardiac and Vasculature    Essential hypertension    Overview   24-hour ambulatory blood pressure monitor 4/3/2019: Overall average 135/89, 82 bpm.  Awake average 138/92, 83 bpm.  Sleep average 126/77, 80 bpm.         Current Assessment & Plan   Did not respond to increasing carvedilol to 2 tabs BID and valsartan 40mg to BID. Will try changing her valsartan to Edarbi 80mg- expensive locally, will try sending to e-rx through [facility]. Encouraged to contact her cardiologist for other medications or follow-up otherwise.         Relevant Medications    Edarbi 80 MG tablet tablet    Coronary artery disease of native artery of native heart with stable angina pectoris    Overview     Cardiac PET 7-26-17 (Calculated EF = 70%).  Myocardial perfusion imaging indicates a medium-sized infarct pattern located in the anterior wall with no significant ischemia noted. This finding is worse on resting images which is suggestive of possible breast attenuation artifact instead of an infarction. Impressions are consistent with a study indicating uncertain risk.  Hx of non-obstructive, small vessel disease, [facility] 08/10/18  Echocardiogram 11/29/18: EF 65%, grade 1 diastolic dysfunction, trace to mild AR            Endocrine and Metabolic    Diabetes mellitus - Primary    Overview   Did not tolerate metformin in 2017.  Came off Januvia in August 2021         Current Assessment & Plan   A1c today worsening since January, up from 6.6 to 7.3%.  She is not currently taking any medications since  the insurance denied the PA for her Ozempic.  -It looks like the PA for Ozempic did not go through, with them citing that they would only cover if she was diabetic.  She has a known history of diabetes for the last 10 years, with an A1c of 6.6% even as recently as January 2025.  She tolerates Ozempic well when insurance covers it and she can obtain it from the pharmacy.  Lab Results   Component Value Date    HGBA1C 7.3 (A) 03/27/2025   -It looks like her insurance may prefer Mounjaro, will send 2.5mg and 5mg in to pharmacy         Relevant Medications    Mounjaro 2.5 MG/0.5ML solution auto-injector    Mounjaro 5 MG/0.5ML solution auto-injector    Other Relevant Orders    POC Glycosylated Hemoglobin (Hb A1C) (Completed)       Neuro    Complex regional pain syndrome type I    Relevant Medications    levETIRAcetam (KEPPRA) 500 MG tablet     Other Visit Diagnoses         Biotin deficiency        Relevant Medications    Biotin 10 MG tablet            Patient Instructions   Call pharmacy for refill on amlodipine, atorvastatin and eliquis  New start for Mounjaro- if you can't  by next week call and I will change to Ozempic or check PA    Follow Up:   Return in about 3 months (around 6/27/2025) for with A1c;.      DO BRENNAN Rivero RD  [facility]  [address] [facility] [address]  Fax [phone]  Phone [phone]    Disclaimer to patients: The 21[employer] C[employer] Act makes medical notes like these available to patients in the interest of transparency. However, please be advised that this is still a medical document. It is intended as [id number] communication. Many sections may include medical language or jargon, abbreviations, and additional verbiage that are unfamiliar or confusing. In some ways it may come across as blunt, direct, or may be summarized in order to clearly and concisely communicate the most crucial information to  medical professionals. It may also include mentions of conditions that are unlikely but considered as part of the differential diagnosis, including serious disorders. These are not always discussed at length at the time of appointment because their likelihood is so low, but may be included in a medical note to make it clear what has been considered and/or ruled out as part of a work-up. Medical documents are intended to carry relevant information, facts as evident, and the personal clinical opinion of the physician. If you have any questions regarding this medical document, please bring them to the attention of the physician at your next scheduled appointment.

## 2025-03-27 NOTE — ASSESSMENT & PLAN NOTE
A1c today worsening since January, up from 6.6 to 7.3%.  She is not currently taking any medications since the insurance denied the PA for her Ozempic.  -It looks like the PA for Ozempic did not go through, with them citing that they would only cover if she was diabetic.  She has a known history of diabetes for the last 10 years, with an A1c of 6.6% even as recently as January 2025.  She tolerates Ozempic well when insurance covers it and she can obtain it from the pharmacy.  Lab Results   Component Value Date    HGBA1C 7.3 (A) 03/27/2025   -It looks like her insurance may prefer Mounjaro, will send 2.5mg and 5mg in to pharmacy

## 2025-03-27 NOTE — PATIENT INSTRUCTIONS
Call pharmacy for refill on amlodipine, atorvastatin and eliquis  New start for Mounjaro- if you can't  by next week call and I will change to Ozempic or check PA

## 2025-03-27 NOTE — ASSESSMENT & PLAN NOTE
Did not respond to increasing carvedilol to 2 tabs BID and valsartan 40mg to BID. Will try changing her valsartan to Edarbi 80mg- expensive locally, will try sending to e-rx through [facility]. Encouraged to contact her cardiologist for other medications or follow-up otherwise.

## 2025-03-31 ENCOUNTER — PRIOR AUTHORIZATION (OUTPATIENT)
Dept: FAMILY MEDICINE CLINIC | Facility: CLINIC | Age: 60
End: 2025-03-31
Payer: COMMERCIAL

## 2025-03-31 RX ORDER — FUROSEMIDE 40 MG/1
TABLET ORAL
Qty: 90 TABLET | Refills: 0 | Status: SHIPPED | OUTPATIENT
Start: 2025-03-31

## 2025-03-31 NOTE — TELEPHONE ENCOUNTER
(Key: [id number]) - [id number]  Mounjaro 2.5MG/0.5ML auto-injectors  status: PA RequestCreated: March 31st, 2025Sent: March 31st, 2025

## 2025-04-02 ENCOUNTER — OFFICE VISIT (OUTPATIENT)
Dept: PULMONOLOGY | Facility: CLINIC | Age: 60
End: 2025-04-02
Payer: COMMERCIAL

## 2025-04-02 VITALS
OXYGEN SATURATION: 100 % | DIASTOLIC BLOOD PRESSURE: 80 MMHG | SYSTOLIC BLOOD PRESSURE: 142 MMHG | WEIGHT: 285 LBS | TEMPERATURE: 97.3 F | HEIGHT: 71 IN | HEART RATE: 84 BPM | BODY MASS INDEX: 39.9 KG/M2

## 2025-04-02 DIAGNOSIS — G47.33 OSA (OBSTRUCTIVE SLEEP APNEA): ICD-10-CM

## 2025-04-02 DIAGNOSIS — E66.9 OBESITY (BMI 30-39.9): ICD-10-CM

## 2025-04-02 DIAGNOSIS — R06.02 SHORTNESS OF BREATH: Primary | ICD-10-CM

## 2025-04-02 NOTE — TELEPHONE ENCOUNTER
Message from Plan  Request Reference Number: [id number]. MOUNJARO INJ 2.5/0.5 is approved through 03/31/2026. Your patient may now fill this prescription and it will be covered.. Authorization Expiration Date: March 31, 2026.

## 2025-04-02 NOTE — PROGRESS NOTES
Follow Up Office Visit      P, echocardiogram atient Name: Kellen Esparza    Chief Complaint:    Chief Complaint   Patient presents with    Shortness of Breath       History of Present Illness: Kellen Esparza is a 59 y.o. female who is here today for follow up of ALYSIA, SOA.     59 y.o.female followed by pulmonary regarding her ALYSIA, morbid obesity, and dyspnea on exertion.  Patient also carries diagnosis of hypertension, dyslipidemia, diabetes mellitus, obesity, diastolic dysfunction, high degree AV block status post permanent pacemaker placement, history of DVT/pulmonary embolism on anticoagulation, chronic back pain status post spinal cord stimulator.  Patient has been seen by Dr. Quispe, Deb Guajardo and Jo Greene in the past in our clinic and this is her first visit with me.  Patient was diagnosed with sleep apnea and was placed on CPAP but she was not compliant so CPAP machine was returned.  She had repeat sleep study done last year and found to have clinic.  Mild sleep apnea along with sleep hypoxia again.  She was placed on CPAP.  She was compliant this time but unfortunately her insurance lapsed and apparently her machine was taken away by the DME company last year.  She also has been struggling with shortness of breath going on for a number of years.  She has undergone extensive pulmonary workup including PFTs which have been pretty much normal study, high-resolution CT scan which did not show any evidence of pulmonary fibrosis.  There was some component of dynamic airway collapse noted.  Pulmonary embolism study recently was negative.  No other untoward pulmonary pathology noted either.  Patient was tried on Advair inhaler but that is not helping her much.  She also has been struggling with hoarseness of voice for few years.  She apparently was seen at  ENT and underwent voice therapy but that did not help either.  Patient subsequently underwent cardiopulmonary exercise testing but could not  "complete the test and only walked on treadmill for 1-1/2-minute without reaching RER or AT.     Patient today states that she continues to struggle with shortness of breath with activity.  She denies any chest pain or shortness of breath.  Does wake up with hoarseness of voice and states that it takes her some time to get her voice back in the morning.  She has been using Advair inhaler but that is not helping her much.  If she is feeling more fatigued after stopping CPAP.  And she has recently been approved for Mounjaro for weight loss and she is going to pick it up today.  No pneumonias or ER visits for pulmonary issues.    Subjective      Review of Systems:   Review of Systems   Constitutional:  Positive for fatigue and unexpected weight gain.   HENT:  Positive for voice change.    Respiratory:  Positive for shortness of breath.    Cardiovascular: Negative.    Gastrointestinal: Negative.    Endocrine: Negative.    Musculoskeletal:  Positive for back pain and gait problem.   Skin: Negative.    Neurological:  Positive for weakness.   Hematological: Negative.    Psychiatric/Behavioral:  Positive for sleep disturbance.    All other systems reviewed and are negative.      The following portions of the patient's history were reviewed and updated as appropriate: allergies, current medications, past family history, past medical history, past social history, past surgical history and problem list.    Objective     Physical Exam:  Vital Signs:   Vitals:    04/02/25 0833   BP: 142/80   Pulse: 84   Temp: 97.3 °F (36.3 °C)   TempSrc: Infrared   SpO2: 100%  Comment: room air at rest   Weight: 129 kg (285 lb)   Height: 180.3 cm (70.98\")     Body mass index is 39.77 kg/m².    Physical Exam  Vitals and nursing note reviewed.   Constitutional:       General: She is not in acute distress.     Appearance: She is well-developed. She is not diaphoretic.   HENT:      Head: Normocephalic and atraumatic.      Comments: Mallampati 3 " airway     Nose: Nose normal.      Mouth/Throat:      Pharynx: No oropharyngeal exudate.   Eyes:      Pupils: Pupils are equal, round, and reactive to light.   Neck:      Thyroid: No thyromegaly.      Trachea: No tracheal deviation.   Cardiovascular:      Rate and Rhythm: Normal rate and regular rhythm.      Heart sounds: Normal heart sounds. No murmur heard.     No friction rub. No gallop.   Pulmonary:      Effort: Pulmonary effort is normal. No respiratory distress.      Breath sounds: Normal breath sounds. No wheezing or rales.   Musculoskeletal:         General: No tenderness.      Cervical back: Neck supple.      Right lower leg: No edema.      Left lower leg: No edema.      Comments: Clubbing: None   Lymphadenopathy:      Cervical: No cervical adenopathy.   Neurological:      Mental Status: She is alert and oriented to person, place, and time.   Psychiatric:         Behavior: Behavior normal.         Thought Content: Thought content normal.         Judgment: Judgment normal.         Results Review:   I reviewed the patient's new clinical results.    Multiple studies reviewed including CT chest PE, high-resolution CT scan, previous PFTs.    Cardiopulmonary exercise test reviewed personally and patient did not reach anaerobic threshold.  Only walked for 1 minute and had to stop because of back and leg pain.  Discussions with patient and she told me she had spinal cord stimulator placed day before and she was still in a lot of pain and she could not do much exercise.    Assessment / Plan      Assessment:   Problem List Items Addressed This Visit          Sleep    ALYSIA (obstructive sleep apnea)    Relevant Orders    PAP Therapy     Other Visit Diagnoses         Shortness of breath    -  Primary      Obesity (BMI 30-39.9)                Plan:   1.  Patient with multiple medical problems.  She has shortness of breath.  Review of all the data suggest likely deconditioning and obesity playing a role.  Does not fit with  asthmatic profile.  On the cardiopulmonary exercise test patient had serial spirometry done though  this was not good exercise test as she did not reach anaerobic threshold and it was limited by poor effort and back pain.  Serial spirometry at that time did not suggest exercise-induced asthma either.  Patient is not benefiting from Advair inhaler either.  I have instructed patient to stop using her Advair inhaler and monitor her symptoms.  If she notices symptoms worsening then she can resume that inhaler otherwise we will keep her off of it.  No component of seasonal allergies or hayfever which could also be reflective of allergy induced asthma or eosinophilic lung disease that is evident at this point.  Discussed that shortness of breath is likely multifactorial.  She will benefit from cardiac rehab with slow increase of activity to improve stamina and hopefully improve shortness of breath.  2.  Patient does have hoarseness of voice.  Wondering if Advair inhaler is also playing a role in that.  Taking her off would help.  Vocal cord dysfunction is another possibility and offered her evaluation by ENT.  Patient has apparently already seen ENT in the past at  and has undergone voice therapy without any improvement in the past.  There could be component of dynamic airway collapse based on CT scan findings.  We will monitor for now.  Symptoms are not worsening from that standpoint at this point.  3.  Patient has sleep apnea with significant sleep hypoxia.  She needs treatment of her sleep apnea.  Unfortunately her CPAP machine was confiscated by  DME as her insurance lapsed.  I will go ahead and placed orders again for patient to be started back on CPAP therapy.  Even if she has dynamic airway collapse she will benefit from positive airway pressure therapy.  Will monitor closely with CPAP downloads to ensure compliance with CPAP and treatment of her sleep apnea.  Patient is asking for alternate treatment options.   Discussed that given her obesity, upper airway abnormality and hypoxia I do not think any other therapy would be equal into CPAP and treating her sleep apnea.  She is comfortable with this discussion.  She is interested in inspire therapy but discussed that her body mass index precludes consideration of that therapy at this point.    4.  Counseled patient strongly to work on weight loss and its impact on her other underlying disorders reviewed in detail with her.  She understands the importance of that.  She has been started on Mounjaro for her diabetes.  She is going to start that medicine today.  Hopefully that will help with the weight loss.  Discussed that we will also help her improve on dynamic airway collapse and also with her sleep apnea.  5.  Patient has previous history of pulmonary embolism and DVT.  Continue Eliquis as patient remains at high risk of thromboembolic disease with her deconditioning and multitude of other factors including untreated sleep apnea.    For now we will get her back on CPAP therapy.  We will give trial off Advair therapy.   I will follow her in 3 months or sooner as needed.    Follow Up:   3 months or sooner.     Discussed plan of care in detail with patient today. Patient verbally understands and agrees. I spent 48 minutes on this date of service. This time includes time spent by me in the following activities:preparing for the visit, reviewing tests, obtaining and/or reviewing a separately obtained history, performing a medically appropriate examination, counseling the patient/family/caregiver, ordering medications, tests, or procedures, and/or documenting information in the medical record. This time excludes other separate billable services such as interpretation of tests or procedures, if applicable.     Rodriguez Durham MD  Pulmonary Critical Care and Sleep Medicine

## 2025-04-16 ENCOUNTER — HOSPITAL ENCOUNTER (EMERGENCY)
Facility: HOSPITAL | Age: 60
Discharge: HOME | End: 2025-04-16
Attending: FAMILY MEDICINE | Admitting: FAMILY MEDICINE
Payer: COMMERCIAL

## 2025-04-16 ENCOUNTER — APPOINTMENT (OUTPATIENT)
Facility: HOSPITAL | Age: 60
End: 2025-04-16
Payer: COMMERCIAL

## 2025-04-16 VITALS
WEIGHT: 275.3 LBS | SYSTOLIC BLOOD PRESSURE: 138 MMHG | DIASTOLIC BLOOD PRESSURE: 77 MMHG | OXYGEN SATURATION: 100 % | HEIGHT: 71 IN | BODY MASS INDEX: 38.54 KG/M2 | HEART RATE: 84 BPM | TEMPERATURE: 99.8 F | RESPIRATION RATE: 25 BRPM

## 2025-04-16 DIAGNOSIS — I48.0 PAROXYSMAL A-FIB: ICD-10-CM

## 2025-04-16 DIAGNOSIS — R06.02 SHORTNESS OF BREATH: Primary | ICD-10-CM

## 2025-04-16 DIAGNOSIS — Z86.39 HISTORY OF HYPERLIPIDEMIA: ICD-10-CM

## 2025-04-16 DIAGNOSIS — Z79.01 ANTICOAGULATED ON ELIQUIS: ICD-10-CM

## 2025-04-16 DIAGNOSIS — B34.9 VIRAL ILLNESS: ICD-10-CM

## 2025-04-16 DIAGNOSIS — E66.812 CLASS 2 OBESITY WITH BODY MASS INDEX (BMI) OF 38.0 TO 38.9 IN ADULT, UNSPECIFIED OBESITY TYPE, UNSPECIFIED WHETHER SERIOUS COMORBIDITY PRESENT: ICD-10-CM

## 2025-04-16 DIAGNOSIS — Z86.79 HISTORY OF HYPERTENSION: ICD-10-CM

## 2025-04-16 DIAGNOSIS — D72.829 LEUKOCYTOSIS, UNSPECIFIED TYPE: ICD-10-CM

## 2025-04-16 DIAGNOSIS — Z86.39 HISTORY OF DIABETES MELLITUS: ICD-10-CM

## 2025-04-16 DIAGNOSIS — J02.9 VIRAL PHARYNGITIS: ICD-10-CM

## 2025-04-16 LAB
ALBUMIN SERPL-MCNC: 4.1 G/DL (ref 3.5–5.2)
ALBUMIN/GLOB SERPL: 1.2 G/DL
ALP SERPL-CCNC: 97 U/L (ref 39–117)
ALT SERPL W P-5'-P-CCNC: 22 U/L (ref 1–33)
ANION GAP SERPL CALCULATED.3IONS-SCNC: 14.6 MMOL/L (ref 5–15)
AST SERPL-CCNC: 22 U/L (ref 1–32)
B PARAPERT DNA SPEC QL NAA+PROBE: NOT DETECTED
B PERT DNA SPEC QL NAA+PROBE: NOT DETECTED
BASOPHILS # BLD AUTO: 0.01 10*3/MM3 (ref 0–0.2)
BASOPHILS NFR BLD AUTO: 0.1 % (ref 0–1.5)
BILIRUB SERPL-MCNC: 0.8 MG/DL (ref 0–1.2)
BILIRUB UR QL STRIP: NEGATIVE
BUN SERPL-MCNC: 11 MG/DL (ref 8–23)
BUN/CREAT SERPL: 11.5 (ref 7–25)
C PNEUM DNA NPH QL NAA+NON-PROBE: NOT DETECTED
CALCIUM SPEC-SCNC: 9.4 MG/DL (ref 8.6–10.5)
CHLORIDE SERPL-SCNC: 100 MMOL/L (ref 98–107)
CLARITY UR: CLEAR
CO2 SERPL-SCNC: 24.4 MMOL/L (ref 22–29)
COLOR UR: YELLOW
CREAT SERPL-MCNC: 0.96 MG/DL (ref 0.57–1)
DEPRECATED RDW RBC AUTO: 40.4 FL (ref 37–54)
EGFRCR SERPLBLD CKD-EPI 2021: 67.5 ML/MIN/1.73
EOSINOPHIL # BLD AUTO: 0 10*3/MM3 (ref 0–0.4)
EOSINOPHIL NFR BLD AUTO: 0 % (ref 0.3–6.2)
ERYTHROCYTE [DISTWIDTH] IN BLOOD BY AUTOMATED COUNT: 13.9 % (ref 12.3–15.4)
FLUAV RNA RESP QL NAA+PROBE: NOT DETECTED
FLUAV SUBTYP SPEC NAA+PROBE: NOT DETECTED
FLUBV RNA ISLT QL NAA+PROBE: NOT DETECTED
FLUBV RNA RESP QL NAA+PROBE: NOT DETECTED
GEN 5 1HR TROPONIN T REFLEX: <6 NG/L
GLOBULIN UR ELPH-MCNC: 3.4 GM/DL
GLUCOSE SERPL-MCNC: 115 MG/DL (ref 65–99)
GLUCOSE UR STRIP-MCNC: NEGATIVE MG/DL
HADV DNA SPEC NAA+PROBE: NOT DETECTED
HCOV 229E RNA SPEC QL NAA+PROBE: NOT DETECTED
HCOV HKU1 RNA SPEC QL NAA+PROBE: NOT DETECTED
HCOV NL63 RNA SPEC QL NAA+PROBE: NOT DETECTED
HCOV OC43 RNA SPEC QL NAA+PROBE: NOT DETECTED
HCT VFR BLD AUTO: 33.4 % (ref 34–46.6)
HGB BLD-MCNC: 11.1 G/DL (ref 12–15.9)
HGB UR QL STRIP.AUTO: NEGATIVE
HMPV RNA NPH QL NAA+NON-PROBE: NOT DETECTED
HOLD SPECIMEN: NORMAL
HPIV1 RNA ISLT QL NAA+PROBE: NOT DETECTED
HPIV2 RNA SPEC QL NAA+PROBE: NOT DETECTED
HPIV3 RNA NPH QL NAA+PROBE: NOT DETECTED
HPIV4 P GENE NPH QL NAA+PROBE: NOT DETECTED
IMM GRANULOCYTES # BLD AUTO: 0.04 10*3/MM3 (ref 0–0.05)
IMM GRANULOCYTES NFR BLD AUTO: 0.2 % (ref 0–0.5)
KETONES UR QL STRIP: NEGATIVE
LEUKOCYTE ESTERASE UR QL STRIP.AUTO: NEGATIVE
LYMPHOCYTES # BLD AUTO: 1.7 10*3/MM3 (ref 0.7–3.1)
LYMPHOCYTES NFR BLD AUTO: 10.2 % (ref 19.6–45.3)
M PNEUMO IGG SER IA-ACNC: NOT DETECTED
MAGNESIUM SERPL-MCNC: 1.7 MG/DL (ref 1.6–2.4)
MCH RBC QN AUTO: 26.4 PG (ref 26.6–33)
MCHC RBC AUTO-ENTMCNC: 33.2 G/DL (ref 31.5–35.7)
MCV RBC AUTO: 79.5 FL (ref 79–97)
MONOCYTES # BLD AUTO: 0.96 10*3/MM3 (ref 0.1–0.9)
MONOCYTES NFR BLD AUTO: 5.8 % (ref 5–12)
NEUTROPHILS NFR BLD AUTO: 13.92 10*3/MM3 (ref 1.7–7)
NEUTROPHILS NFR BLD AUTO: 83.7 % (ref 42.7–76)
NITRITE UR QL STRIP: NEGATIVE
PH UR STRIP.AUTO: 7 [PH] (ref 5–8)
PLATELET # BLD AUTO: 150 10*3/MM3 (ref 140–450)
PMV BLD AUTO: 11.9 FL (ref 6–12)
POTASSIUM SERPL-SCNC: 3.6 MMOL/L (ref 3.5–5.2)
PROT SERPL-MCNC: 7.5 G/DL (ref 6–8.5)
PROT UR QL STRIP: NEGATIVE
QT INTERVAL: 362 MS
QTC INTERVAL: 430 MS
RBC # BLD AUTO: 4.2 10*6/MM3 (ref 3.77–5.28)
RHINOVIRUS RNA SPEC NAA+PROBE: NOT DETECTED
RSV RNA NPH QL NAA+NON-PROBE: NOT DETECTED
RSV RNA RESP QL NAA+PROBE: NOT DETECTED
S PYO AG THROAT QL: NEGATIVE
SARS-COV-2 RNA RESP QL NAA+PROBE: NOT DETECTED
SARS-COV-2 RNA RESP QL NAA+PROBE: NOT DETECTED
SODIUM SERPL-SCNC: 139 MMOL/L (ref 136–145)
SP GR UR STRIP: <=1.005 (ref 1–1.03)
TROPONIN T NUMERIC DELTA: NORMAL
TROPONIN T SERPL HS-MCNC: <6 NG/L
UROBILINOGEN UR QL STRIP: NORMAL
WBC NRBC COR # BLD AUTO: 16.63 10*3/MM3 (ref 3.4–10.8)
WHOLE BLOOD HOLD COAG: NORMAL
WHOLE BLOOD HOLD SPECIMEN: NORMAL

## 2025-04-16 PROCEDURE — 85025 COMPLETE CBC W/AUTO DIFF WBC: CPT | Performed by: FAMILY MEDICINE

## 2025-04-16 PROCEDURE — 87880 STREP A ASSAY W/OPTIC: CPT | Performed by: PHYSICIAN ASSISTANT

## 2025-04-16 PROCEDURE — 0202U NFCT DS 22 TRGT SARS-COV-2: CPT | Performed by: PHYSICIAN ASSISTANT

## 2025-04-16 PROCEDURE — 80053 COMPREHEN METABOLIC PANEL: CPT | Performed by: FAMILY MEDICINE

## 2025-04-16 PROCEDURE — 87081 CULTURE SCREEN ONLY: CPT | Performed by: PHYSICIAN ASSISTANT

## 2025-04-16 PROCEDURE — 87637 SARSCOV2&INF A&B&RSV AMP PRB: CPT | Performed by: FAMILY MEDICINE

## 2025-04-16 PROCEDURE — 99284 EMERGENCY DEPT VISIT MOD MDM: CPT | Performed by: FAMILY MEDICINE

## 2025-04-16 PROCEDURE — 36415 COLL VENOUS BLD VENIPUNCTURE: CPT

## 2025-04-16 PROCEDURE — 71045 X-RAY EXAM CHEST 1 VIEW: CPT

## 2025-04-16 PROCEDURE — 83735 ASSAY OF MAGNESIUM: CPT | Performed by: FAMILY MEDICINE

## 2025-04-16 PROCEDURE — 84484 ASSAY OF TROPONIN QUANT: CPT | Performed by: FAMILY MEDICINE

## 2025-04-16 PROCEDURE — 81003 URINALYSIS AUTO W/O SCOPE: CPT | Performed by: FAMILY MEDICINE

## 2025-04-16 PROCEDURE — 93005 ELECTROCARDIOGRAM TRACING: CPT | Performed by: FAMILY MEDICINE

## 2025-04-16 RX ORDER — SODIUM CHLORIDE 0.9 % (FLUSH) 0.9 %
10 SYRINGE (ML) INJECTION AS NEEDED
Status: DISCONTINUED | OUTPATIENT
Start: 2025-04-16 | End: 2025-04-16 | Stop reason: HOSPADM

## 2025-04-16 NOTE — DISCHARGE INSTRUCTIONS
Symptomatic care is recommended with Tylenol and/or ibuprofen as needed for pain and fever. Drink plenty of fluids and get plenty of rest. Take all medications as prescribed and instructed. Follow up with primary care as directed or return to Emergency Department with worsening of symptoms.

## 2025-04-16 NOTE — FSED PROVIDER NOTE
EMERGENCY DEPARTMENT ENCOUNTER    Pt Name: Kellen Esparza  MRN: [id number]  Pt :   1965  Room Number:    Date of encounter:  2025  PCP: Ryne Puente DO  ED Provider: JACQUELYN Pérez    Historian: Patient    HPI:  Chief Complaint: Sore throat, shortness of breath    Context: Kellen Esparza is a 59 y.o. female who presents to the ED c/o sore throat and shortness of breath. Initially, she reports taking her medications last night and then started feeling bad. She confirms that she has taken these medications before without any new additions. Recently, she experienced a fever but did not measure her temperature, noting she felt cold and hot throughout the night. Upon further inquiry, she denies nausea or vomiting but feels like her throat is swollen, making swallowing difficult. She denies any other pain, diarrhea, constipation, or issues with urination. She mentions being under pain management and took muscle relaxants and one Percocet this morning. She did not take any additional medications beyond what was mentioned.  HPI     REVIEW OF SYSTEMS  A chief complaint appropriate review of systems was completed and is negative except as noted in the HPI.     PAST MEDICAL HISTORY  Past Medical History:   Diagnosis Date    Arthritis     Arthritis of back     Atrial fibrillation     Carpal tunnel syndrome     CHF (congestive heart failure)     Chlamydia     CPAP (continuous positive airway pressure) dependence     DDD (degenerative disc disease), cervical 2017    Deep vein thrombosis     Depression     Diabetes mellitus     doesnt check sugar     Elevated cholesterol     GERD (gastroesophageal reflux disease)     History of COVID-19 2022    Hypertension     Knee swelling     Migraine     Musculoligamentous strain     Pacemaker     Pulmonary embolism     Sleep apnea     CPAP machine was recalled, waiting for new one.    Wears eyeglasses        PAST SURGICAL HISTORY  Past Surgical  History:   Procedure Laterality Date    BREAST BIOPSY Left 2014    CARDIAC CATHETERIZATION Left 08/10/2017    Procedure: Cardiac Catheterization/Vascular Study;  Surgeon: Johny Sommer MD;  Location:  [facility];  Service:     CARDIAC ELECTROPHYSIOLOGY PROCEDURE N/A 07/19/2019    Procedure: Pacemaker DC new;  Surgeon: Sonya Gay MD;  Location:  [facility];  Service: Cardiology    CHOLECYSTECTOMY      COLONOSCOPY      COLONOSCOPY N/A 05/03/2022    Procedure: COLONOSCOPY;  Surgeon: Bryant Mejia MD;  Location:  [facility];  Service: Gastroenterology;  Laterality: N/A;    COLONOSCOPY N/A 08/06/2024    Procedure: COLONOSCOPY;  Surgeon: Bryant Mejia MD;  Location:  [facility];  Service: Gastroenterology;  Laterality: N/A;    ENDOSCOPY      HYSTERECTOMY  11/2014    INSERT / REPLACE / REMOVE PACEMAKER      JOINT REPLACEMENT Right     knee    KNEE SURGERY Right 12/2017    total replacement    NASAL SINUS SURGERY      OOPHORECTOMY  11/2014    PACEMAKER IMPLANTATION      PULMONARY EMBOLISM SURGERY      right lung    SPINAL CORD STIMULATOR IMPLANT N/A 09/10/2020    Procedure: SPINAL CORD STIMULATOR INSERTION PHASE 1, UPPER THORACIC PLACEMENT ;  Surgeon: Victor Manuel Geiger MD;  Location:  [facility];  Service: Neurosurgery;  Laterality: N/A;    TUBAL ABDOMINAL LIGATION         FAMILY HISTORY  Family History   Problem Relation Age of Onset    Breast cancer Mother 42    Heart failure Mother     Diabetes Mother     No Known Problems Father     Hypertension Sister     Hypertension Brother     Heart failure Son     Hypertension Maternal Grandmother     No Known Problems Paternal Grandmother     Hypertension Maternal Grandfather     No Known Problems Paternal Grandfather     Ovarian cancer Neg Hx        SOCIAL HISTORY  Social History     Socioeconomic History    Marital status:    Tobacco Use    Smoking status: Never     Passive exposure: Never     Smokeless tobacco: Never   Vaping Use    Vaping status: Never Used   Substance and Sexual Activity    Alcohol use: No     Comment: rare    Drug use: No    Sexual activity: Not Currently     Partners: Male     Birth control/protection: Hysterectomy, Surgical       ALLERGIES  Metformin, Codeine, and Lisinopril    PHYSICAL EXAM  Physical Exam  Vitals and nursing note reviewed.   Constitutional:       General: She is not in acute distress.     Appearance: Normal appearance. She is obese. She is ill-appearing. She is not toxic-appearing.   HENT:      Head: Normocephalic and atraumatic.      Nose: Nose normal.      Mouth/Throat:      Mouth: Mucous membranes are moist.      Pharynx: Oropharynx is clear. No oropharyngeal exudate or posterior oropharyngeal erythema.   Eyes:      Extraocular Movements: Extraocular movements intact.   Cardiovascular:      Rate and Rhythm: Normal rate.   Pulmonary:      Effort: Pulmonary effort is normal.      Breath sounds: Normal breath sounds.   Abdominal:      General: There is no distension.      Palpations: Abdomen is soft.      Tenderness: There is no abdominal tenderness.   Musculoskeletal:         General: Normal range of motion.      Cervical back: Normal range of motion and neck supple.   Skin:     General: Skin is warm and dry.   Neurological:      General: No focal deficit present.      Mental Status: She is alert.   Psychiatric:         Mood and Affect: Mood normal.         Behavior: Behavior normal.       LAB RESULTS  Results for orders placed or performed during the hospital encounter of 04/16/25   COVID-19, FLU A/B, RSV PCR 1 HR TAT - Swab, Nasopharynx    Collection Time: 04/16/25  1:43 PM    Specimen: Nasopharynx; Swab   Result Value Ref Range    COVID19 Not Detected Not Detected - Ref. Range    Influenza A PCR Not Detected Not Detected    Influenza B PCR Not Detected Not Detected    RSV, PCR Not Detected Not Detected   Rapid Strep A Screen - Swab, Throat    Collection Time:  04/16/25  1:43 PM    Specimen: Throat; Swab   Result Value Ref Range    Strep A Ag Negative Negative   Comprehensive Metabolic Panel    Collection Time: 04/16/25  2:01 PM    Specimen: Blood   Result Value Ref Range    Glucose 115 (H) 65 - 99 mg/dL    BUN 11 8 - 23 mg/dL    Creatinine 0.96 0.57 - 1.00 mg/dL    Sodium 139 136 - 145 mmol/L    Potassium 3.6 3.5 - 5.2 mmol/L    Chloride 100 98 - 107 mmol/L    CO2 24.4 22.0 - 29.0 mmol/L    Calcium 9.4 8.6 - 10.5 mg/dL    Total Protein 7.5 6.0 - 8.5 g/dL    Albumin 4.1 3.5 - 5.2 g/dL    ALT (SGPT) 22 1 - 33 U/L    AST (SGOT) 22 1 - 32 U/L    Alkaline Phosphatase 97 39 - 117 U/L    Total Bilirubin 0.8 0.0 - 1.2 mg/dL    Globulin 3.4 gm/dL    A/G Ratio 1.2 g/dL    BUN/Creatinine Ratio 11.5 7.0 - 25.0    Anion Gap 14.6 5.0 - 15.0 mmol/L    eGFR 67.5 >60.0 mL/min/1.73   High Sensitivity Troponin T    Collection Time: 04/16/25  2:01 PM    Specimen: Blood   Result Value Ref Range    HS Troponin T <6 <14 ng/L   Magnesium    Collection Time: 04/16/25  2:01 PM    Specimen: Blood   Result Value Ref Range    Magnesium 1.7 1.6 - 2.4 mg/dL   CBC Auto Differential    Collection Time: 04/16/25  2:01 PM    Specimen: Blood   Result Value Ref Range    WBC 16.63 (H) 3.40 - 10.80 10*3/mm3    RBC 4.20 3.77 - 5.28 10*6/mm3    Hemoglobin 11.1 (L) 12.0 - 15.9 g/dL    Hematocrit 33.4 (L) 34.0 - 46.6 %    MCV 79.5 79.0 - 97.0 fL    MCH 26.4 (L) 26.6 - 33.0 pg    MCHC 33.2 31.5 - 35.7 g/dL    RDW 13.9 12.3 - 15.4 %    RDW-SD 40.4 37.0 - 54.0 fl    MPV 11.9 6.0 - 12.0 fL    Platelets 150 140 - 450 10*3/mm3    Neutrophil % 83.7 (H) 42.7 - 76.0 %    Lymphocyte % 10.2 (L) 19.6 - 45.3 %    Monocyte % 5.8 5.0 - 12.0 %    Eosinophil % 0.0 (L) 0.3 - 6.2 %    Basophil % 0.1 0.0 - 1.5 %    Immature Grans % 0.2 0.0 - 0.5 %    Neutrophils, Absolute 13.92 (H) 1.70 - 7.00 10*3/mm3    Lymphocytes, Absolute 1.70 0.70 - 3.10 10*3/mm3    Monocytes, Absolute 0.96 (H) 0.10 - 0.90 10*3/mm3    Eosinophils, Absolute 0.00  0.00 - 0.40 10*3/mm3    Basophils, Absolute 0.01 0.00 - 0.20 10*3/mm3    Immature Grans, Absolute 0.04 0.00 - 0.05 10*3/mm3   Green Top (Gel)    Collection Time: 04/16/25  2:01 PM   Result Value Ref Range    Extra Tube Hold for add-ons.    Lavender Top    Collection Time: 04/16/25  2:01 PM   Result Value Ref Range    Extra Tube hold for add-on    Gold Top - SST    Collection Time: 04/16/25  2:01 PM   Result Value Ref Range    Extra Tube Hold for add-ons.    Gray Top    Collection Time: 04/16/25  2:01 PM   Result Value Ref Range    Extra Tube Hold for add-ons.    Light Blue Top    Collection Time: 04/16/25  2:01 PM   Result Value Ref Range    Extra Tube Hold for add-ons.    ECG 12 Lead Other; Weakness    Collection Time: 04/16/25  2:12 PM   Result Value Ref Range    QT Interval 362 ms    QTC Interval 430 ms   Respiratory Panel PCR w/COVID-19(SARS-CoV-2) [facility], NP Swab in [place]3], 2 HR TAT - Swab, Nasopharynx    Collection Time: 04/16/25  2:47 PM    Specimen: Nasopharynx; Swab   Result Value Ref Range    ADENOVIRUS, PCR Not Detected Not Detected    Coronavirus 229E Not Detected Not Detected    Coronavirus HKU1 Not Detected Not Detected    Coronavirus NL63 Not Detected Not Detected    Coronavirus OC43 Not Detected Not Detected    COVID19 Not Detected Not Detected - Ref. Range    Human Metapneumovirus Not Detected Not Detected    Human Rhinovirus/Enterovirus Not Detected Not Detected    Influenza A PCR Not Detected Not Detected    Influenza B PCR Not Detected Not Detected    Parainfluenza Virus 1 Not Detected Not Detected    Parainfluenza Virus 2 Not Detected Not Detected    Parainfluenza Virus 3 Not Detected Not Detected    Parainfluenza Virus 4 Not Detected Not Detected    RSV, PCR Not Detected Not Detected    Bordetella pertussis pcr Not Detected Not Detected    Bordetella parapertussis PCR Not Detected Not Detected    Chlamydophila pneumoniae PCR Not Detected Not Detected    Mycoplasma  pneumo by PCR Not Detected Not Detected   ECG 12 Lead Other; Weakness    Collection Time: 04/16/25  3:29 PM   Result Value Ref Range    QT Interval 374 ms    QTC Interval 450 ms   High Sensitivity Troponin T 1Hr    Collection Time: 04/16/25  3:31 PM    Specimen: Blood   Result Value Ref Range    HS Troponin T <6 <14 ng/L    Troponin T Numeric Delta     Urinalysis With Microscopic If Indicated (No Culture) - Urine, Clean Catch    Collection Time: 04/16/25  4:09 PM    Specimen: Urine, Clean Catch   Result Value Ref Range    Color, UA Yellow Yellow, Straw    Appearance, UA Clear Clear    pH, UA 7.0 5.0 - 8.0    Specific Gravity, UA <=1.005 1.005 - 1.030    Glucose, UA Negative Negative    Ketones, UA Negative Negative    Bilirubin, UA Negative Negative    Blood, UA Negative Negative    Protein, UA Negative Negative    Leuk Esterase, UA Negative Negative    Nitrite, UA Negative Negative    Urobilinogen, UA 0.2 E.U./dL 0.2 - 1.0 E.U./dL       If labs were ordered, I independently reviewed the results and considered them in treating the patient.    RADIOLOGY  XR Chest 1 View   Final Result   Impression:   No acute cardiopulmonary abnormality.         Electronically Signed: Jo Travis MD     4/16/2025 2:12 PM EDT     Workstation ID: [id number]        [x] Radiologist's Report Reviewed:  I ordered and independently interpreted the above noted radiographic studies.  See radiologist's dictation for official interpretation.      PROCEDURES    Procedures    ECG 12 Lead Other; Weakness   Preliminary Result   Test Reason : Other~   Blood Pressure :   */*   mmHG   Vent. Rate :  87 BPM     Atrial Rate :  87 BPM      P-R Int : 198 ms          QRS Dur :  84 ms       QT Int : 374 ms       P-R-T Axes :  31   5  35 degrees     QTcB Int : 450 ms      Sinus rhythm with premature supraventricular complexes   Otherwise normal ECG   When compared with ECG of 16-Apr-2025 14:12, (Unconfirmed)   premature supraventricular complexes are now  present      Referred By:            Confirmed By:       ECG 12 Lead Other; Weakness   Preliminary Result   Test Reason : Other~   Blood Pressure :   */*   mmHG   Vent. Rate :  85 BPM     Atrial Rate :  85 BPM      P-R Int : 198 ms          QRS Dur :  86 ms       QT Int : 362 ms       P-R-T Axes :  29   5  25 degrees     QTcB Int : 430 ms      Normal sinus rhythm   Nonspecific T wave abnormality   Abnormal ECG   When compared with ECG of 30-Jul-2024 14:11,   Sinus rhythm has replaced Electronic ventricular pacemaker      Referred By:            Confirmed By:           MEDICATIONS GIVEN IN ER    Medications   sodium chloride 0.9 % flush 10 mL (has no administration in time range)       MEDICAL DECISION MAKING, PROGRESS, and CONSULTS   Medical Decision Making  49-year-old female presented to the emergency department with complaints of sore throat, chest pain, and shortness of breath. The patient has a significant past medical history including arthritis, atrial fibrillation (anticoagulated on Eliquis), congestive heart failure, degenerative disc disease (cervical), deep vein thrombosis, depression, diabetes mellitus, GERD, hypertension, pulmonary embolism (1997), and sleep apnea. On examination, the patient appeared nontoxic with no acute or emergent findings. Laboratory studies revealed leukocytosis with a white blood cell count of 16.63, but no other acute or emergent abnormalities. A respiratory panel was negative for tested viral illnesses. Chest imaging showed no acute cardiopulmonary process. Given the clinical presentation and the results of the emergency department workup, including labs and imaging, there was no clear indication for treatment beyond symptomatic management. The patient was discharged with instructions for continued symptomatic care and outpatient follow-up. Return precautions were provided, and a requested work note was issued.    Problems Addressed:  Leukocytosis, unspecified type: acute  illness or injury  Shortness of breath: complicated acute illness or injury  Viral illness: complicated acute illness or injury  Viral pharyngitis: complicated acute illness or injury    Amount and/or Complexity of Data Reviewed  Labs: ordered. Decision-making details documented in ED Course.  Radiology: ordered and independent interpretation performed. Decision-making details documented in ED Course.  ECG/medicine tests: ordered and independent interpretation performed. Decision-making details documented in ED Course.    Risk  Prescription drug management.      Discussion below represents my analysis of pertinent findings related to patient's condition, differential diagnosis, treatment plan and final disposition.    Differential diagnosis: ACS, CHF, asthma, COPD exacerbation, allergic etiologies, infectious etiologies such as PNA.     Additional sources  Discussed/ obtained information from independent historians:   [x] Spouse:  at bedside contributory to HPI and review of systems  [] Parent  [] Family member  [] Friend  [] EMS   [] Other:  External (non-ED) record review:   [] Inpatient record:   [x] Office record: Personally reviewed patient's most recent visit to pulmonology on April 2, 2025.  It was felt that patient's shortness of breath and dyspnea on exertion is likely related to her obesity at that time is also noted that patient had hoarseness of voice.  Patient suffers from sleep apnea with sleep.  Pulmonology instructed patient to go back on her CPAP, trialed off Advair and follow-up in 3 months   [] Outpatient record:   [] Prior Outpatient labs:   [] Prior Outpatient radiology:   [] Primary Care record:   [] Outside ED record:   [] Other:   Patient's care impacted by:   [x] Diabetes  [x] Hypertension  [x] Hyperlipidemia  [] Hypothyroidism   [x] Coronary Artery Disease  [] Congestive Heart Failure   [] COPD   [] Cancer   [x] Obesity  [] GERD   [] Tobacco Abuse   [] Substance Abuse    []  Anxiety   [] Depression   [x] Other: Paroxysmal atrial fibrillation anticoagulated on Eliquis, complex regional pain syndrome  Care significantly affected by Social Determinants of Health (housing and economic circumstances, unemployment)    [] Yes     [x] No   If yes, Patient's care significantly limited by  Social Determinants of Health including:   [] Inadequate housing   [] Low income   [] Alcoholism and drug addiction in family   [] Problems related to primary support group   [] Unemployment   [] Problems related to employment   [] Other Social Determinants of Health:     Orders placed during this visit:  Orders Placed This Encounter   Procedures    COVID PRE-OP / PRE-PROCEDURE SCREENING ORDER (NO ISOLATION) - Swab, Nasopharynx    COVID-19, FLU A/B, RSV PCR 1 HR TAT - Swab, Nasopharynx    Rapid Strep A Screen - Swab, Throat    Beta Strep Culture, Throat - Swab, Throat    COVID PRE-OP / PRE-PROCEDURE SCREENING ORDER (NO ISOLATION) - Swab, Nasopharynx    Respiratory Panel PCR w/COVID-19(SARS-CoV-2) [facility], NP Swab in UTM/VTM, 2 HR TAT - Swab, Nasopharynx    XR Chest 1 View    [place] Draw    Comprehensive Metabolic Panel    High Sensitivity Troponin T    Magnesium    Urinalysis With Microscopic If Indicated (No Culture) - Urine, Clean Catch    CBC Auto Differential    High Sensitivity Troponin T 1Hr    NPO Diet NPO Type: Strict NPO    Undress & Gown    Continuous Pulse Oximetry    Vital Signs    Oxygen Therapy- Nasal Cannula; Titrate 1-6 LPM Per SpO2; 90 - 95%    ECG 12 Lead Other; Weakness    Insert Peripheral IV    Fall Precautions    CBC & Differential    Green Top (Gel)    Lavender Top    Gold Top - SST    Gray Top    Light Blue Top   I considered prescription management  with:   [] Pain medication  [] Antiviral  [x] Antibiotic: Clinical presentation and ER workup without evidence of bacterial infection requiring treatment with antibiotics.     [] Other:   Rationale:    ED  Course:    ED Course as of 04/16/25 1722   Wed Apr 16, 2025   1326 Vitals and Telemetry tracing was reviewed and directly interpreted by myself demonstrating blood pressure 123/66, temperature 99.8 °F, heart rate of 100, respirations 25 breaths/min oxygen saturation 98% on room air [JG]   1326 BP: 123/66 [JG]   1326 Temp: 99.8 °F (37.7 °C) [JG]   1326 Heart Rate: 100 [JG]   1326 Resp: 25 [JG]   1326 SpO2: 99 % [JG]   1424 ECG 12 Lead Other; Weakness  EKG personally interpreted by myself in addition to interpretation by attending without evidence of acute ischemic changes; normal sinus rhythm [JG]   1425 XR Chest 1 View  Imaging of chest personally interpreted by myself with official read provided by radiology demonstrated no acute cardiopulmonary process.   [JG]   1600 Labs studies were reviewed and directly interpreted by myself and demonstrated nonspecific leukocytosis with WBC 16.63 without additional acute or emergent abnormalities.   [JG]   1602 EKG sinus rhythm with a rate of 87 bpm IA interval is 190 ms QRS is 84 ms QTc is 450 ms [EG]   1609 Repeat vital signs and telemetry tracing reviewed and directly interpreted by myself demonstrated blood pressure 138/77, heart rate 83 with oxygen saturation 94% on room air [JG]      ED Course User Index  [EG] Abbie Rose DO  [JG] Mir Leal, PA          DIAGNOSIS  Final diagnoses:   Shortness of breath   Viral illness   Viral pharyngitis   Leukocytosis, unspecified type   Paroxysmal A-fib   Anticoagulated on Eliquis   History of diabetes mellitus   History of hypertension   History of hyperlipidemia   Class 2 obesity with body mass index (BMI) of 38.0 to 38.9 in adult, unspecified obesity type, unspecified whether serious comorbidity present     DISPOSITION    ED Disposition       ED Disposition   Discharge    Condition   Stable    Comment   --           DISCHARGE    Patient discharged in stable condition.    Reviewed implications of results, diagnosis, meds,  responsibility to follow up, warning signs and symptoms of possible worsening, potential complications and reasons to return to ER.    Patient/Family voiced understanding of above instructions.    Discussed plan for discharge, as there is no emergent indication for admission.  Pt/family is agreeable and understands need for follow up and possible repeat testing.  Pt/family is aware that discharge does not mean that nothing is wrong but that it indicates no emergency is currently present that requires admission and they must continue care with follow-up as given below or with a physician of their choice.     FOLLOW-UP  Ryne Puente DO  [address] [place] [id number]  [phone]    Call   Call for follow up with primary care    [facility] EMERGENCY DEPARTMENT [place]  [address]  [facility] [address] [phone]  Go to   If symptoms worsen         Medication List      No changes were made to your prescriptions during this visit.

## 2025-04-16 NOTE — Clinical Note
[facility] EMERGENCY DEPARTMENT [place]  [address]  [facility] [address]  Phone: [phone]  Fax: [phone]    Kellen Esparza was seen and treated in our emergency department on 4/16/2025.  She may return to work on 04/17/2025.         Thank you for choosing [facility].    Abbie Rose, DO

## 2025-04-17 LAB
QT INTERVAL: 374 MS
QTC INTERVAL: 450 MS

## 2025-04-18 LAB — BACTERIA SPEC AEROBE CULT: NORMAL

## 2025-04-25 LAB
QT INTERVAL: 362 MS
QT INTERVAL: 374 MS
QTC INTERVAL: 430 MS
QTC INTERVAL: 450 MS

## 2025-04-26 ENCOUNTER — TELEPHONE (OUTPATIENT)
Dept: FAMILY MEDICINE CLINIC | Facility: CLINIC | Age: 60
End: 2025-04-26
Payer: COMMERCIAL

## 2025-04-26 NOTE — TELEPHONE ENCOUNTER
Abdelrahman the pharmacy tech called requesting either an prior auth or ICD-10 code for rx Mounjaro 5 mg.

## (undated) DEVICE — STRAP POSTN KN/BDY FM 5X72IN DISP

## (undated) DEVICE — BRAIN SPATULA, 7 7/8", (200 MM), DOUBLE ENDED, MALLEABLE, WIDTH: 10 MM, SILICONE, STERILE, DISPOSABLE, PACKAGE OF 10 PIECES: Brand: AESCULAP

## (undated) DEVICE — SOL IRR H2O BTL 1000ML STRL

## (undated) DEVICE — SKIN AFFIX SURG ADHESIVE 72/CS 0.55ML: Brand: MEDLINE

## (undated) DEVICE — THE SINGLE USE ETRAP – POLYP TRAP IS USED FOR SUCTION RETRIEVAL OF ENDOSCOPICALLY REMOVED POLYPS.: Brand: ETRAP

## (undated) DEVICE — SNAP KOVER: Brand: UNBRANDED

## (undated) DEVICE — INTRO ACCSR BLNT TP

## (undated) DEVICE — KT ORCA ORCAPOD DISP STRL

## (undated) DEVICE — DRSNG WND BORDR/ADHS NONADHR/GZ LF 4X4IN STRL

## (undated) DEVICE — ANTIBACTERIAL UNDYED BRAIDED (POLYGLACTIN 910), SYNTHETIC ABSORBABLE SUTURE: Brand: COATED VICRYL

## (undated) DEVICE — SNAR POLYP CAPTIVATOR/COLD STFF RND 10MM 240CM

## (undated) DEVICE — SINGLE-USE BIOPSY FORCEPS: Brand: RADIAL JAW 4

## (undated) DEVICE — DRSNG SURG AQUACEL AG 9X15CM

## (undated) DEVICE — TOOL 14MH30D LEGEND 14CM 3MM MH DIAM: Brand: MIDAS REX ™

## (undated) DEVICE — GLV SURG SIGNATURE TOUCH PF LTX 7.5 STRL

## (undated) DEVICE — 3M™ STERI-DRAPE™ INSTRUMENT POUCH 1018: Brand: STERI-DRAPE™

## (undated) DEVICE — SUT VIC 3/0 PS2 27IN J427H

## (undated) DEVICE — FIRST STEP BEDSIDE ADD WATER KIT - RESEALABLE STAND-UP POUCH, ENDOSCOPIC CLEANING PAD - 1 POUCH: Brand: FIRST STEP BEDSIDE ADD WATER KIT - RESEALABLE STAND-UP POUCH, ENDOSCOPIC CLEANIN

## (undated) DEVICE — SUT SILK 2/0 SH CR8 18IN CR8 C012D

## (undated) DEVICE — CVR HNDL LIGHT RIGID

## (undated) DEVICE — ADAPT CLN LUM OLYMP AIR/H20

## (undated) DEVICE — HDRST INTUB GENTLETOUCH SLOT 7IN RT

## (undated) DEVICE — APPL CHLORAPREP TINTED 26ML TEAL

## (undated) DEVICE — DEV COMP RAD PRELUDESYNC 24CM

## (undated) DEVICE — CATH DIAG EXPO .056 FL3.5 6F 100CM

## (undated) DEVICE — LUBE GEL ENDOGLIDE 1.1OZ

## (undated) DEVICE — 8 FOOT DISPOSABLE EXTENSION CABLE WITH SAFE CONNECT / ALLIGATOR CLIP

## (undated) DEVICE — UNDERGLV SURG BIOGEL INDICAT PI SZ8 BLU

## (undated) DEVICE — GLV SURG PREMIERPRO MIC LTX PF SZ6.5 BRN

## (undated) DEVICE — INTRO TEAR AWAY/LVD W/SD PRT 6F 13CM

## (undated) DEVICE — CODMAN® DISPOSABLE CATHETER PASSER: Brand: CODMAN®

## (undated) DEVICE — REMOTE CONTROL KIT: Brand: FREELINK™

## (undated) DEVICE — GLV SURG PREMIERPRO MIC LTX PF SZ7 BRN

## (undated) DEVICE — MODEL AT P54, P/N 700608-035KIT CONTENTS: HAND CONTROLLER, 3-WAY HIGH-PRESSURE STOPCOCK WITH ROTATING END AND PREMIUM HIGH-PRESSURE TUBING: Brand: ANGIOTOUCH® KIT

## (undated) DEVICE — LUBE JELLY FOIL PACK 1.4 OZ: Brand: MEDLINE INDUSTRIES, INC.

## (undated) DEVICE — MODEL BT2000 P/N 700287-012KIT CONTENTS: MANIFOLD WITH SALINE AND CONTRAST PORTS, SALINE TUBING WITH SPIKE AND HAND SYRINGE, TRANSDUCER: Brand: BT2000 AUTOMATED MANIFOLD KIT

## (undated) DEVICE — CONTN GRAD MEAS TRIANG 32OZ BLK

## (undated) DEVICE — ARM SLING II: Brand: DEROYAL

## (undated) DEVICE — SOLIDIFIER LIQ PREMISORB 1500CC

## (undated) DEVICE — ACCY PA700 LUBRICANT DIFFUSER MR7 4 PACK: Brand: MIDAS REX

## (undated) DEVICE — PK NEURO DISC 10

## (undated) DEVICE — DRSNG SURESITE123 4X4.8IN

## (undated) DEVICE — TUBING, SUCTION, 1/4" X 10', STRAIGHT: Brand: MEDLINE

## (undated) DEVICE — SAFELINER SUCTION CANISTER 1000CC: Brand: DEROYAL

## (undated) DEVICE — GLIDESHEATH SLENDER STAINLESS STEEL KIT: Brand: GLIDESHEATH SLENDER

## (undated) DEVICE — SUT VIC PLS CTD ANTIB BR 3/0 8/18IN 45CM

## (undated) DEVICE — PK C/SECT 10

## (undated) DEVICE — PK CATH CARD 10

## (undated) DEVICE — HYBRID CO2 TUBING/CAP SET FOR OLYMPUS® SCOPES & CO2 SOURCE: Brand: ERBE

## (undated) DEVICE — CANNULA,ADULT,SOFT-TOUCH,7'TUBE,UC: Brand: PENDING

## (undated) DEVICE — CHARGING SYSTEM KIT: Brand: PRECISION™

## (undated) DEVICE — SYR LUERLOK 50ML

## (undated) DEVICE — IRRIGATOR BULB ASEPTO 60CC STRL

## (undated) DEVICE — CATH DIAG EXPO M/ PK 6FR FL4/FR4 PIG 3PK

## (undated) DEVICE — A2000 MULTI-USE SYRINGE KIT, P/N 701277-003KIT CONTENTS: 100ML CONTRAST RESERVOIR AND TUBING WITH CONTRAST SPIKE AND CLAMP: Brand: A2000 MULTI-USE SYRINGE KIT

## (undated) DEVICE — ELECTRD BLD EXT EDGE/INSUL 1P 4IN

## (undated) DEVICE — SNAR POLYP CAPTIVATOR MICROHEX 13 240CM

## (undated) DEVICE — GW EXCHG TSCF .035 260CM 3MM